# Patient Record
Sex: FEMALE | Race: BLACK OR AFRICAN AMERICAN | NOT HISPANIC OR LATINO
[De-identification: names, ages, dates, MRNs, and addresses within clinical notes are randomized per-mention and may not be internally consistent; named-entity substitution may affect disease eponyms.]

---

## 2017-02-09 ENCOUNTER — APPOINTMENT (OUTPATIENT)
Dept: INTERNAL MEDICINE | Facility: CLINIC | Age: 57
End: 2017-02-09

## 2017-04-11 ENCOUNTER — APPOINTMENT (OUTPATIENT)
Dept: CARDIOLOGY | Facility: CLINIC | Age: 57
End: 2017-04-11

## 2017-04-11 VITALS
HEART RATE: 68 BPM | WEIGHT: 140 LBS | HEIGHT: 62 IN | DIASTOLIC BLOOD PRESSURE: 82 MMHG | SYSTOLIC BLOOD PRESSURE: 152 MMHG | BODY MASS INDEX: 25.76 KG/M2

## 2017-04-11 RX ORDER — MELATONIN 3 MG
3 CAPSULE ORAL
Refills: 0 | Status: ACTIVE | COMMUNITY

## 2017-04-11 RX ORDER — CALCIUM ACETATE 667 MG/1
667 CAPSULE ORAL 3 TIMES DAILY
Refills: 0 | Status: ACTIVE | COMMUNITY

## 2017-04-11 RX ORDER — CARVEDILOL 6.25 MG/1
6.25 TABLET, FILM COATED ORAL TWICE DAILY
Refills: 0 | Status: ACTIVE | COMMUNITY

## 2017-04-11 RX ORDER — ATORVASTATIN CALCIUM 20 MG/1
20 TABLET, FILM COATED ORAL
Refills: 0 | Status: ACTIVE | COMMUNITY

## 2017-04-11 RX ORDER — PREGABALIN 100 MG/1
100 CAPSULE ORAL 3 TIMES DAILY
Refills: 0 | Status: ACTIVE | COMMUNITY

## 2017-04-11 RX ORDER — FUROSEMIDE 40 MG/1
40 TABLET ORAL DAILY
Refills: 0 | Status: ACTIVE | COMMUNITY

## 2017-04-11 RX ORDER — CHLORHEXIDINE GLUCONATE 4 %
325 (65 FE) LIQUID (ML) TOPICAL 3 TIMES DAILY
Refills: 0 | Status: ACTIVE | COMMUNITY

## 2017-04-20 ENCOUNTER — APPOINTMENT (OUTPATIENT)
Dept: INTERNAL MEDICINE | Facility: CLINIC | Age: 57
End: 2017-04-20

## 2017-04-24 RX ORDER — GLUCAGON 1 MG
1 KIT INJECTION
Qty: 1 | Refills: 0 | Status: ACTIVE | COMMUNITY
Start: 2017-04-16

## 2017-05-09 ENCOUNTER — APPOINTMENT (OUTPATIENT)
Dept: CARDIOLOGY | Facility: CLINIC | Age: 57
End: 2017-05-09

## 2017-05-09 VITALS
OXYGEN SATURATION: 97 % | BODY MASS INDEX: 25.76 KG/M2 | HEART RATE: 75 BPM | SYSTOLIC BLOOD PRESSURE: 133 MMHG | WEIGHT: 140 LBS | DIASTOLIC BLOOD PRESSURE: 73 MMHG | HEIGHT: 62 IN

## 2017-05-25 ENCOUNTER — APPOINTMENT (OUTPATIENT)
Dept: VASCULAR SURGERY | Facility: CLINIC | Age: 57
End: 2017-05-25

## 2017-05-25 ENCOUNTER — APPOINTMENT (OUTPATIENT)
Dept: PODIATRY | Facility: CLINIC | Age: 57
End: 2017-05-25

## 2017-05-30 ENCOUNTER — APPOINTMENT (OUTPATIENT)
Dept: INTERNAL MEDICINE | Facility: CLINIC | Age: 57
End: 2017-05-30

## 2017-06-16 ENCOUNTER — OUTPATIENT (OUTPATIENT)
Dept: OUTPATIENT SERVICES | Facility: HOSPITAL | Age: 57
LOS: 1 days | Discharge: HOME | End: 2017-06-16

## 2017-06-16 DIAGNOSIS — E11.9 TYPE 2 DIABETES MELLITUS WITHOUT COMPLICATIONS: ICD-10-CM

## 2017-06-16 DIAGNOSIS — N28.9 DISORDER OF KIDNEY AND URETER, UNSPECIFIED: ICD-10-CM

## 2017-06-16 DIAGNOSIS — R06.02 SHORTNESS OF BREATH: ICD-10-CM

## 2017-06-16 DIAGNOSIS — N18.6 END STAGE RENAL DISEASE: ICD-10-CM

## 2017-06-16 DIAGNOSIS — E16.2 HYPOGLYCEMIA, UNSPECIFIED: ICD-10-CM

## 2017-06-16 DIAGNOSIS — E11.621 TYPE 2 DIABETES MELLITUS WITH FOOT ULCER: ICD-10-CM

## 2017-06-16 DIAGNOSIS — N39.0 URINARY TRACT INFECTION, SITE NOT SPECIFIED: ICD-10-CM

## 2017-06-22 ENCOUNTER — EMERGENCY (EMERGENCY)
Facility: HOSPITAL | Age: 57
LOS: 1 days | Discharge: NURSING, REHAB OR LONG-TERM CARE | End: 2017-06-22
Admitting: INTERNAL MEDICINE

## 2017-06-22 DIAGNOSIS — N18.6 END STAGE RENAL DISEASE: ICD-10-CM

## 2017-06-22 DIAGNOSIS — R06.02 SHORTNESS OF BREATH: ICD-10-CM

## 2017-06-22 DIAGNOSIS — N28.9 DISORDER OF KIDNEY AND URETER, UNSPECIFIED: ICD-10-CM

## 2017-06-22 DIAGNOSIS — E11.9 TYPE 2 DIABETES MELLITUS WITHOUT COMPLICATIONS: ICD-10-CM

## 2017-06-22 DIAGNOSIS — E11.621 TYPE 2 DIABETES MELLITUS WITH FOOT ULCER: ICD-10-CM

## 2017-06-22 DIAGNOSIS — N39.0 URINARY TRACT INFECTION, SITE NOT SPECIFIED: ICD-10-CM

## 2017-06-22 DIAGNOSIS — E16.2 HYPOGLYCEMIA, UNSPECIFIED: ICD-10-CM

## 2017-06-26 ENCOUNTER — APPOINTMENT (OUTPATIENT)
Dept: CARDIOLOGY | Facility: CLINIC | Age: 57
End: 2017-06-26

## 2017-06-28 DIAGNOSIS — B99.9 UNSPECIFIED INFECTIOUS DISEASE: ICD-10-CM

## 2017-06-29 ENCOUNTER — APPOINTMENT (OUTPATIENT)
Dept: VASCULAR SURGERY | Facility: CLINIC | Age: 57
End: 2017-06-29

## 2017-06-29 DIAGNOSIS — D64.9 ANEMIA, UNSPECIFIED: ICD-10-CM

## 2017-06-29 DIAGNOSIS — I12.0 HYPERTENSIVE CHRONIC KIDNEY DISEASE WITH STAGE 5 CHRONIC KIDNEY DISEASE OR END STAGE RENAL DISEASE: ICD-10-CM

## 2017-06-29 DIAGNOSIS — Z79.899 OTHER LONG TERM (CURRENT) DRUG THERAPY: ICD-10-CM

## 2017-06-29 DIAGNOSIS — E78.00 PURE HYPERCHOLESTEROLEMIA, UNSPECIFIED: ICD-10-CM

## 2017-06-29 DIAGNOSIS — Z79.4 LONG TERM (CURRENT) USE OF INSULIN: ICD-10-CM

## 2017-06-29 DIAGNOSIS — I25.2 OLD MYOCARDIAL INFARCTION: ICD-10-CM

## 2017-06-29 DIAGNOSIS — E11.9 TYPE 2 DIABETES MELLITUS WITHOUT COMPLICATIONS: ICD-10-CM

## 2017-06-29 DIAGNOSIS — N18.6 END STAGE RENAL DISEASE: ICD-10-CM

## 2017-07-11 ENCOUNTER — APPOINTMENT (OUTPATIENT)
Dept: CARDIOLOGY | Facility: CLINIC | Age: 57
End: 2017-07-11

## 2017-07-11 VITALS
HEIGHT: 62 IN | HEART RATE: 68 BPM | WEIGHT: 140 LBS | BODY MASS INDEX: 25.76 KG/M2 | SYSTOLIC BLOOD PRESSURE: 138 MMHG | DIASTOLIC BLOOD PRESSURE: 80 MMHG

## 2017-07-11 RX ORDER — ACETAMINOPHEN 325 MG/1
325 TABLET ORAL
Refills: 0 | Status: ACTIVE | COMMUNITY

## 2017-07-11 RX ORDER — DOCUSATE SODIUM 100 MG
100 TABLET ORAL
Refills: 0 | Status: ACTIVE | COMMUNITY

## 2017-07-11 RX ORDER — MAGNESIUM HYDROXIDE 400 MG/5ML
400 SUSPENSION ORAL
Refills: 0 | Status: ACTIVE | COMMUNITY

## 2017-07-21 ENCOUNTER — OUTPATIENT (OUTPATIENT)
Dept: OUTPATIENT SERVICES | Facility: HOSPITAL | Age: 57
LOS: 1 days | Discharge: HOME | End: 2017-07-21

## 2017-07-21 DIAGNOSIS — E11.9 TYPE 2 DIABETES MELLITUS WITHOUT COMPLICATIONS: ICD-10-CM

## 2017-07-21 DIAGNOSIS — N18.6 END STAGE RENAL DISEASE: ICD-10-CM

## 2017-07-21 DIAGNOSIS — R06.02 SHORTNESS OF BREATH: ICD-10-CM

## 2017-07-21 DIAGNOSIS — E16.2 HYPOGLYCEMIA, UNSPECIFIED: ICD-10-CM

## 2017-07-21 DIAGNOSIS — N28.9 DISORDER OF KIDNEY AND URETER, UNSPECIFIED: ICD-10-CM

## 2017-07-21 DIAGNOSIS — N39.0 URINARY TRACT INFECTION, SITE NOT SPECIFIED: ICD-10-CM

## 2017-07-21 DIAGNOSIS — E11.621 TYPE 2 DIABETES MELLITUS WITH FOOT ULCER: ICD-10-CM

## 2017-07-21 DIAGNOSIS — E87.5 HYPERKALEMIA: ICD-10-CM

## 2017-07-27 ENCOUNTER — OUTPATIENT (OUTPATIENT)
Dept: OUTPATIENT SERVICES | Facility: HOSPITAL | Age: 57
LOS: 1 days | Discharge: HOME | End: 2017-07-27

## 2017-07-27 DIAGNOSIS — R06.02 SHORTNESS OF BREATH: ICD-10-CM

## 2017-07-27 DIAGNOSIS — N18.6 END STAGE RENAL DISEASE: ICD-10-CM

## 2017-07-27 DIAGNOSIS — Z01.818 ENCOUNTER FOR OTHER PREPROCEDURAL EXAMINATION: ICD-10-CM

## 2017-07-27 DIAGNOSIS — N39.0 URINARY TRACT INFECTION, SITE NOT SPECIFIED: ICD-10-CM

## 2017-07-27 DIAGNOSIS — N28.9 DISORDER OF KIDNEY AND URETER, UNSPECIFIED: ICD-10-CM

## 2017-07-27 DIAGNOSIS — E11.621 TYPE 2 DIABETES MELLITUS WITH FOOT ULCER: ICD-10-CM

## 2017-07-27 DIAGNOSIS — I25.10 ATHEROSCLEROTIC HEART DISEASE OF NATIVE CORONARY ARTERY WITHOUT ANGINA PECTORIS: ICD-10-CM

## 2017-07-27 DIAGNOSIS — E16.2 HYPOGLYCEMIA, UNSPECIFIED: ICD-10-CM

## 2017-07-27 DIAGNOSIS — E11.9 TYPE 2 DIABETES MELLITUS WITHOUT COMPLICATIONS: ICD-10-CM

## 2017-08-03 ENCOUNTER — OUTPATIENT (OUTPATIENT)
Dept: OUTPATIENT SERVICES | Facility: HOSPITAL | Age: 57
LOS: 1 days | Discharge: HOME | End: 2017-08-03

## 2017-08-03 DIAGNOSIS — E78.00 PURE HYPERCHOLESTEROLEMIA, UNSPECIFIED: ICD-10-CM

## 2017-08-03 DIAGNOSIS — I50.9 HEART FAILURE, UNSPECIFIED: ICD-10-CM

## 2017-08-03 DIAGNOSIS — Z87.891 PERSONAL HISTORY OF NICOTINE DEPENDENCE: ICD-10-CM

## 2017-08-03 DIAGNOSIS — I25.10 ATHEROSCLEROTIC HEART DISEASE OF NATIVE CORONARY ARTERY WITHOUT ANGINA PECTORIS: ICD-10-CM

## 2017-08-03 DIAGNOSIS — E16.2 HYPOGLYCEMIA, UNSPECIFIED: ICD-10-CM

## 2017-08-03 DIAGNOSIS — Z99.2 DEPENDENCE ON RENAL DIALYSIS: ICD-10-CM

## 2017-08-03 DIAGNOSIS — N39.0 URINARY TRACT INFECTION, SITE NOT SPECIFIED: ICD-10-CM

## 2017-08-03 DIAGNOSIS — E11.9 TYPE 2 DIABETES MELLITUS WITHOUT COMPLICATIONS: ICD-10-CM

## 2017-08-03 DIAGNOSIS — I42.9 CARDIOMYOPATHY, UNSPECIFIED: ICD-10-CM

## 2017-08-03 DIAGNOSIS — N28.9 DISORDER OF KIDNEY AND URETER, UNSPECIFIED: ICD-10-CM

## 2017-08-03 DIAGNOSIS — E11.621 TYPE 2 DIABETES MELLITUS WITH FOOT ULCER: ICD-10-CM

## 2017-08-03 DIAGNOSIS — I12.0 HYPERTENSIVE CHRONIC KIDNEY DISEASE WITH STAGE 5 CHRONIC KIDNEY DISEASE OR END STAGE RENAL DISEASE: ICD-10-CM

## 2017-08-03 DIAGNOSIS — R06.02 SHORTNESS OF BREATH: ICD-10-CM

## 2017-08-03 DIAGNOSIS — N18.6 END STAGE RENAL DISEASE: ICD-10-CM

## 2017-08-03 DIAGNOSIS — D64.9 ANEMIA, UNSPECIFIED: ICD-10-CM

## 2017-08-03 DIAGNOSIS — Z79.4 LONG TERM (CURRENT) USE OF INSULIN: ICD-10-CM

## 2017-08-03 DIAGNOSIS — I25.2 OLD MYOCARDIAL INFARCTION: ICD-10-CM

## 2017-08-11 ENCOUNTER — OUTPATIENT (OUTPATIENT)
Dept: OUTPATIENT SERVICES | Facility: HOSPITAL | Age: 57
LOS: 1 days | Discharge: HOME | End: 2017-08-11

## 2017-08-11 DIAGNOSIS — N28.9 DISORDER OF KIDNEY AND URETER, UNSPECIFIED: ICD-10-CM

## 2017-08-11 DIAGNOSIS — N18.6 END STAGE RENAL DISEASE: ICD-10-CM

## 2017-08-11 DIAGNOSIS — N39.0 URINARY TRACT INFECTION, SITE NOT SPECIFIED: ICD-10-CM

## 2017-08-11 DIAGNOSIS — R06.02 SHORTNESS OF BREATH: ICD-10-CM

## 2017-08-11 DIAGNOSIS — E87.5 HYPERKALEMIA: ICD-10-CM

## 2017-08-11 DIAGNOSIS — E11.621 TYPE 2 DIABETES MELLITUS WITH FOOT ULCER: ICD-10-CM

## 2017-08-11 DIAGNOSIS — E16.2 HYPOGLYCEMIA, UNSPECIFIED: ICD-10-CM

## 2017-08-11 DIAGNOSIS — E11.9 TYPE 2 DIABETES MELLITUS WITHOUT COMPLICATIONS: ICD-10-CM

## 2017-09-06 ENCOUNTER — APPOINTMENT (OUTPATIENT)
Dept: CARDIOLOGY | Facility: CLINIC | Age: 57
End: 2017-09-06

## 2017-09-18 ENCOUNTER — APPOINTMENT (OUTPATIENT)
Dept: VASCULAR SURGERY | Facility: CLINIC | Age: 57
End: 2017-09-18
Payer: MEDICARE

## 2017-09-18 PROCEDURE — 99213 OFFICE O/P EST LOW 20 MIN: CPT

## 2017-09-27 ENCOUNTER — APPOINTMENT (OUTPATIENT)
Dept: CARDIOLOGY | Facility: CLINIC | Age: 57
End: 2017-09-27

## 2017-09-27 VITALS
BODY MASS INDEX: 27.79 KG/M2 | SYSTOLIC BLOOD PRESSURE: 140 MMHG | HEIGHT: 62 IN | DIASTOLIC BLOOD PRESSURE: 78 MMHG | WEIGHT: 151 LBS | HEART RATE: 69 BPM

## 2017-09-27 DIAGNOSIS — Z01.810 ENCOUNTER FOR PREPROCEDURAL CARDIOVASCULAR EXAMINATION: ICD-10-CM

## 2017-09-27 RX ORDER — COLLAGENASE SANTYL 250 [ARB'U]/G
250 OINTMENT TOPICAL
Qty: 30 | Refills: 0 | Status: ACTIVE | COMMUNITY
Start: 2017-06-27

## 2017-09-27 RX ORDER — INSULIN GLARGINE 100 [IU]/ML
100 INJECTION, SOLUTION SUBCUTANEOUS
Qty: 10 | Refills: 0 | Status: ACTIVE | COMMUNITY
Start: 2017-05-31

## 2017-09-28 ENCOUNTER — OUTPATIENT (OUTPATIENT)
Dept: OUTPATIENT SERVICES | Facility: HOSPITAL | Age: 57
LOS: 1 days | Discharge: HOME | End: 2017-09-28

## 2017-09-28 DIAGNOSIS — N28.9 DISORDER OF KIDNEY AND URETER, UNSPECIFIED: ICD-10-CM

## 2017-09-28 DIAGNOSIS — Z40.9 ENCOUNTER FOR PROPHYLACTIC SURGERY, UNSPECIFIED: ICD-10-CM

## 2017-09-28 DIAGNOSIS — N18.6 END STAGE RENAL DISEASE: ICD-10-CM

## 2017-09-28 DIAGNOSIS — E11.9 TYPE 2 DIABETES MELLITUS WITHOUT COMPLICATIONS: ICD-10-CM

## 2017-09-28 DIAGNOSIS — E11.621 TYPE 2 DIABETES MELLITUS WITH FOOT ULCER: ICD-10-CM

## 2017-09-28 DIAGNOSIS — E16.2 HYPOGLYCEMIA, UNSPECIFIED: ICD-10-CM

## 2017-09-28 DIAGNOSIS — R06.02 SHORTNESS OF BREATH: ICD-10-CM

## 2017-09-28 DIAGNOSIS — N39.0 URINARY TRACT INFECTION, SITE NOT SPECIFIED: ICD-10-CM

## 2017-10-02 ENCOUNTER — APPOINTMENT (OUTPATIENT)
Dept: CARDIOLOGY | Facility: CLINIC | Age: 57
End: 2017-10-02

## 2017-10-02 VITALS
WEIGHT: 151 LBS | SYSTOLIC BLOOD PRESSURE: 155 MMHG | OXYGEN SATURATION: 98 % | HEIGHT: 62 IN | HEART RATE: 78 BPM | BODY MASS INDEX: 27.79 KG/M2 | DIASTOLIC BLOOD PRESSURE: 81 MMHG

## 2017-10-02 DIAGNOSIS — N18.4 CHRONIC KIDNEY DISEASE, STAGE 4 (SEVERE): ICD-10-CM

## 2017-10-02 DIAGNOSIS — E11.9 TYPE 2 DIABETES MELLITUS W/OUT COMPLICATIONS: ICD-10-CM

## 2017-10-02 RX ORDER — HYDRALAZINE HYDROCHLORIDE 25 MG/1
25 TABLET ORAL
Refills: 0 | Status: ACTIVE | COMMUNITY

## 2017-10-02 RX ORDER — SEVELAMER CARBONATE 800 MG/1
800 TABLET, FILM COATED ORAL 3 TIMES DAILY
Refills: 0 | Status: ACTIVE | COMMUNITY

## 2017-10-05 PROBLEM — Z01.810 PREOPERATIVE CARDIOVASCULAR EXAMINATION: Status: ACTIVE | Noted: 2017-10-05

## 2017-10-10 ENCOUNTER — APPOINTMENT (OUTPATIENT)
Dept: VASCULAR SURGERY | Facility: HOSPITAL | Age: 57
End: 2017-10-10
Payer: MEDICARE

## 2017-10-10 ENCOUNTER — APPOINTMENT (OUTPATIENT)
Dept: CARDIOLOGY | Facility: CLINIC | Age: 57
End: 2017-10-10

## 2017-10-10 ENCOUNTER — OUTPATIENT (OUTPATIENT)
Dept: OUTPATIENT SERVICES | Facility: HOSPITAL | Age: 57
LOS: 1 days | Discharge: HOME | End: 2017-10-10

## 2017-10-10 DIAGNOSIS — E16.2 HYPOGLYCEMIA, UNSPECIFIED: ICD-10-CM

## 2017-10-10 DIAGNOSIS — N39.0 URINARY TRACT INFECTION, SITE NOT SPECIFIED: ICD-10-CM

## 2017-10-10 DIAGNOSIS — E11.9 TYPE 2 DIABETES MELLITUS WITHOUT COMPLICATIONS: ICD-10-CM

## 2017-10-10 DIAGNOSIS — E11.621 TYPE 2 DIABETES MELLITUS WITH FOOT ULCER: ICD-10-CM

## 2017-10-10 DIAGNOSIS — N28.9 DISORDER OF KIDNEY AND URETER, UNSPECIFIED: ICD-10-CM

## 2017-10-10 DIAGNOSIS — R06.02 SHORTNESS OF BREATH: ICD-10-CM

## 2017-10-10 DIAGNOSIS — N18.6 END STAGE RENAL DISEASE: ICD-10-CM

## 2017-10-10 PROCEDURE — 37224: CPT | Mod: RT

## 2017-10-10 PROCEDURE — 37228: CPT | Mod: RT

## 2017-10-10 PROCEDURE — 76937 US GUIDE VASCULAR ACCESS: CPT | Mod: 26

## 2017-10-10 PROCEDURE — 75710 ARTERY X-RAYS ARM/LEG: CPT | Mod: 26

## 2017-10-10 PROCEDURE — 36247 INS CATH ABD/L-EXT ART 3RD: CPT | Mod: 59,RT

## 2017-10-10 PROCEDURE — 36200 PLACE CATHETER IN AORTA: CPT | Mod: 59,RT

## 2017-10-12 ENCOUNTER — OUTPATIENT (OUTPATIENT)
Dept: OUTPATIENT SERVICES | Facility: HOSPITAL | Age: 57
LOS: 1 days | Discharge: HOME | End: 2017-10-12

## 2017-10-12 DIAGNOSIS — N28.9 DISORDER OF KIDNEY AND URETER, UNSPECIFIED: ICD-10-CM

## 2017-10-12 DIAGNOSIS — E11.9 TYPE 2 DIABETES MELLITUS WITHOUT COMPLICATIONS: ICD-10-CM

## 2017-10-12 DIAGNOSIS — E11.621 TYPE 2 DIABETES MELLITUS WITH FOOT ULCER: ICD-10-CM

## 2017-10-12 DIAGNOSIS — E16.2 HYPOGLYCEMIA, UNSPECIFIED: ICD-10-CM

## 2017-10-12 DIAGNOSIS — N39.0 URINARY TRACT INFECTION, SITE NOT SPECIFIED: ICD-10-CM

## 2017-10-12 DIAGNOSIS — R06.02 SHORTNESS OF BREATH: ICD-10-CM

## 2017-10-12 DIAGNOSIS — N18.6 END STAGE RENAL DISEASE: ICD-10-CM

## 2017-10-15 ENCOUNTER — INPATIENT (INPATIENT)
Facility: HOSPITAL | Age: 57
LOS: 4 days | Discharge: NURSING, REHAB OR LONG-TERM CARE | End: 2017-10-20
Admitting: INTERNAL MEDICINE

## 2017-10-15 DIAGNOSIS — E16.2 HYPOGLYCEMIA, UNSPECIFIED: ICD-10-CM

## 2017-10-15 DIAGNOSIS — E11.9 TYPE 2 DIABETES MELLITUS WITHOUT COMPLICATIONS: ICD-10-CM

## 2017-10-15 DIAGNOSIS — E11.621 TYPE 2 DIABETES MELLITUS WITH FOOT ULCER: ICD-10-CM

## 2017-10-15 DIAGNOSIS — N39.0 URINARY TRACT INFECTION, SITE NOT SPECIFIED: ICD-10-CM

## 2017-10-15 DIAGNOSIS — N18.6 END STAGE RENAL DISEASE: ICD-10-CM

## 2017-10-15 DIAGNOSIS — N28.9 DISORDER OF KIDNEY AND URETER, UNSPECIFIED: ICD-10-CM

## 2017-10-15 DIAGNOSIS — R06.02 SHORTNESS OF BREATH: ICD-10-CM

## 2017-10-17 ENCOUNTER — APPOINTMENT (OUTPATIENT)
Dept: VASCULAR SURGERY | Facility: HOSPITAL | Age: 57
End: 2017-10-17
Payer: MEDICARE

## 2017-10-17 DIAGNOSIS — I70.234 ATHEROSCLEROSIS OF NATIVE ARTERIES OF RIGHT LEG WITH ULCERATION OF HEEL AND MIDFOOT: ICD-10-CM

## 2017-10-17 DIAGNOSIS — L97.419 NON-PRESSURE CHRONIC ULCER OF RIGHT HEEL AND MIDFOOT WITH UNSPECIFIED SEVERITY: ICD-10-CM

## 2017-10-17 DIAGNOSIS — Z87.891 PERSONAL HISTORY OF NICOTINE DEPENDENCE: ICD-10-CM

## 2017-10-17 DIAGNOSIS — I10 ESSENTIAL (PRIMARY) HYPERTENSION: ICD-10-CM

## 2017-10-17 DIAGNOSIS — E11.9 TYPE 2 DIABETES MELLITUS WITHOUT COMPLICATIONS: ICD-10-CM

## 2017-10-17 PROCEDURE — 37229: CPT | Mod: LT

## 2017-10-17 PROCEDURE — 37225: CPT | Mod: LT

## 2017-10-17 PROCEDURE — 36247 INS CATH ABD/L-EXT ART 3RD: CPT | Mod: 59,LT

## 2017-10-17 PROCEDURE — 76937 US GUIDE VASCULAR ACCESS: CPT | Mod: 26

## 2017-10-17 PROCEDURE — 75710 ARTERY X-RAYS ARM/LEG: CPT | Mod: 26

## 2017-10-23 ENCOUNTER — APPOINTMENT (OUTPATIENT)
Dept: VASCULAR SURGERY | Facility: CLINIC | Age: 57
End: 2017-10-23
Payer: MEDICARE

## 2017-10-23 VITALS — WEIGHT: 145 LBS | BODY MASS INDEX: 26.52 KG/M2

## 2017-10-23 PROCEDURE — 99213 OFFICE O/P EST LOW 20 MIN: CPT

## 2017-10-23 PROCEDURE — 93925 LOWER EXTREMITY STUDY: CPT

## 2017-10-24 DIAGNOSIS — D72.829 ELEVATED WHITE BLOOD CELL COUNT, UNSPECIFIED: ICD-10-CM

## 2017-10-24 DIAGNOSIS — L97.419 NON-PRESSURE CHRONIC ULCER OF RIGHT HEEL AND MIDFOOT WITH UNSPECIFIED SEVERITY: ICD-10-CM

## 2017-10-24 DIAGNOSIS — E11.52 TYPE 2 DIABETES MELLITUS WITH DIABETIC PERIPHERAL ANGIOPATHY WITH GANGRENE: ICD-10-CM

## 2017-10-24 DIAGNOSIS — I25.10 ATHEROSCLEROTIC HEART DISEASE OF NATIVE CORONARY ARTERY WITHOUT ANGINA PECTORIS: ICD-10-CM

## 2017-10-24 DIAGNOSIS — M86.172 OTHER ACUTE OSTEOMYELITIS, LEFT ANKLE AND FOOT: ICD-10-CM

## 2017-10-24 DIAGNOSIS — N18.6 END STAGE RENAL DISEASE: ICD-10-CM

## 2017-10-24 DIAGNOSIS — E11.59 TYPE 2 DIABETES MELLITUS WITH OTHER CIRCULATORY COMPLICATIONS: ICD-10-CM

## 2017-10-24 DIAGNOSIS — Z99.2 DEPENDENCE ON RENAL DIALYSIS: ICD-10-CM

## 2017-10-24 DIAGNOSIS — M85.80 OTHER SPECIFIED DISORDERS OF BONE DENSITY AND STRUCTURE, UNSPECIFIED SITE: ICD-10-CM

## 2017-10-24 DIAGNOSIS — I50.22 CHRONIC SYSTOLIC (CONGESTIVE) HEART FAILURE: ICD-10-CM

## 2017-10-24 DIAGNOSIS — D64.9 ANEMIA, UNSPECIFIED: ICD-10-CM

## 2017-10-24 DIAGNOSIS — E87.1 HYPO-OSMOLALITY AND HYPONATREMIA: ICD-10-CM

## 2017-10-24 DIAGNOSIS — Z74.01 BED CONFINEMENT STATUS: ICD-10-CM

## 2017-10-24 DIAGNOSIS — I42.9 CARDIOMYOPATHY, UNSPECIFIED: ICD-10-CM

## 2017-10-24 DIAGNOSIS — Z99.3 DEPENDENCE ON WHEELCHAIR: ICD-10-CM

## 2017-10-24 DIAGNOSIS — I25.2 OLD MYOCARDIAL INFARCTION: ICD-10-CM

## 2017-10-24 DIAGNOSIS — E87.5 HYPERKALEMIA: ICD-10-CM

## 2017-10-24 DIAGNOSIS — I13.2 HYPERTENSIVE HEART AND CHRONIC KIDNEY DISEASE WITH HEART FAILURE AND WITH STAGE 5 CHRONIC KIDNEY DISEASE, OR END STAGE RENAL DISEASE: ICD-10-CM

## 2017-10-24 DIAGNOSIS — E78.5 HYPERLIPIDEMIA, UNSPECIFIED: ICD-10-CM

## 2017-10-24 DIAGNOSIS — E11.69 TYPE 2 DIABETES MELLITUS WITH OTHER SPECIFIED COMPLICATION: ICD-10-CM

## 2017-10-24 DIAGNOSIS — E11.42 TYPE 2 DIABETES MELLITUS WITH DIABETIC POLYNEUROPATHY: ICD-10-CM

## 2017-10-24 DIAGNOSIS — M86.672 OTHER CHRONIC OSTEOMYELITIS, LEFT ANKLE AND FOOT: ICD-10-CM

## 2017-10-24 DIAGNOSIS — Z87.891 PERSONAL HISTORY OF NICOTINE DEPENDENCE: ICD-10-CM

## 2017-10-24 DIAGNOSIS — A41.9 SEPSIS, UNSPECIFIED ORGANISM: ICD-10-CM

## 2017-10-24 DIAGNOSIS — E11.621 TYPE 2 DIABETES MELLITUS WITH FOOT ULCER: ICD-10-CM

## 2017-10-24 DIAGNOSIS — L97.429 NON-PRESSURE CHRONIC ULCER OF LEFT HEEL AND MIDFOOT WITH UNSPECIFIED SEVERITY: ICD-10-CM

## 2017-10-24 DIAGNOSIS — Z79.82 LONG TERM (CURRENT) USE OF ASPIRIN: ICD-10-CM

## 2017-10-24 DIAGNOSIS — Z79.4 LONG TERM (CURRENT) USE OF INSULIN: ICD-10-CM

## 2017-12-27 ENCOUNTER — APPOINTMENT (OUTPATIENT)
Dept: CARDIOLOGY | Facility: CLINIC | Age: 57
End: 2017-12-27

## 2017-12-27 VITALS
HEART RATE: 65 BPM | DIASTOLIC BLOOD PRESSURE: 60 MMHG | SYSTOLIC BLOOD PRESSURE: 116 MMHG | BODY MASS INDEX: 27.05 KG/M2 | HEIGHT: 62 IN | WEIGHT: 147 LBS

## 2017-12-27 DIAGNOSIS — I25.10 ATHEROSCLEROTIC HEART DISEASE OF NATIVE CORONARY ARTERY W/OUT ANGINA PECTORIS: ICD-10-CM

## 2017-12-27 DIAGNOSIS — I50.22 CHRONIC SYSTOLIC (CONGESTIVE) HEART FAILURE: ICD-10-CM

## 2017-12-27 DIAGNOSIS — I42.9 CARDIOMYOPATHY, UNSPECIFIED: ICD-10-CM

## 2017-12-27 DIAGNOSIS — I10 ESSENTIAL (PRIMARY) HYPERTENSION: ICD-10-CM

## 2018-01-22 ENCOUNTER — APPOINTMENT (OUTPATIENT)
Dept: VASCULAR SURGERY | Facility: CLINIC | Age: 58
End: 2018-01-22
Payer: MEDICARE

## 2018-01-22 PROCEDURE — 93925 LOWER EXTREMITY STUDY: CPT

## 2018-01-22 PROCEDURE — 99213 OFFICE O/P EST LOW 20 MIN: CPT

## 2018-01-30 ENCOUNTER — OUTPATIENT (OUTPATIENT)
Dept: OUTPATIENT SERVICES | Facility: HOSPITAL | Age: 58
LOS: 1 days | Discharge: HOME | End: 2018-01-30

## 2018-01-30 DIAGNOSIS — L89.620 PRESSURE ULCER OF LEFT HEEL, UNSTAGEABLE: ICD-10-CM

## 2018-02-04 DIAGNOSIS — E11.621 TYPE 2 DIABETES MELLITUS WITH FOOT ULCER: ICD-10-CM

## 2018-02-04 DIAGNOSIS — E11.9 TYPE 2 DIABETES MELLITUS WITHOUT COMPLICATIONS: ICD-10-CM

## 2018-02-04 DIAGNOSIS — N28.9 DISORDER OF KIDNEY AND URETER, UNSPECIFIED: ICD-10-CM

## 2018-02-04 DIAGNOSIS — R06.02 SHORTNESS OF BREATH: ICD-10-CM

## 2018-02-04 DIAGNOSIS — N39.0 URINARY TRACT INFECTION, SITE NOT SPECIFIED: ICD-10-CM

## 2018-02-04 DIAGNOSIS — E16.2 HYPOGLYCEMIA, UNSPECIFIED: ICD-10-CM

## 2018-02-04 DIAGNOSIS — N18.6 END STAGE RENAL DISEASE: ICD-10-CM

## 2018-02-08 DIAGNOSIS — I73.9 PERIPHERAL VASCULAR DISEASE, UNSPECIFIED: ICD-10-CM

## 2018-02-10 ENCOUNTER — OUTPATIENT (OUTPATIENT)
Dept: OUTPATIENT SERVICES | Facility: HOSPITAL | Age: 58
LOS: 1 days | Discharge: HOME | End: 2018-02-10

## 2018-02-11 DIAGNOSIS — B99.9 UNSPECIFIED INFECTIOUS DISEASE: ICD-10-CM

## 2018-02-16 ENCOUNTER — APPOINTMENT (OUTPATIENT)
Dept: VASCULAR SURGERY | Facility: HOSPITAL | Age: 58
End: 2018-02-16
Payer: MEDICARE

## 2018-02-16 ENCOUNTER — OUTPATIENT (OUTPATIENT)
Dept: OUTPATIENT SERVICES | Facility: HOSPITAL | Age: 58
LOS: 1 days | Discharge: HOME | End: 2018-02-16

## 2018-02-16 VITALS
DIASTOLIC BLOOD PRESSURE: 48 MMHG | TEMPERATURE: 98 F | SYSTOLIC BLOOD PRESSURE: 115 MMHG | RESPIRATION RATE: 16 BRPM | HEART RATE: 83 BPM | OXYGEN SATURATION: 99 %

## 2018-02-16 VITALS
HEIGHT: 60 IN | WEIGHT: 139.99 LBS | HEART RATE: 75 BPM | RESPIRATION RATE: 12 BRPM | SYSTOLIC BLOOD PRESSURE: 128 MMHG | DIASTOLIC BLOOD PRESSURE: 60 MMHG | TEMPERATURE: 100 F

## 2018-02-16 DIAGNOSIS — Z95.828 PRESENCE OF OTHER VASCULAR IMPLANTS AND GRAFTS: Chronic | ICD-10-CM

## 2018-02-16 DIAGNOSIS — Z86.79 PERSONAL HISTORY OF OTHER DISEASES OF THE CIRCULATORY SYSTEM: Chronic | ICD-10-CM

## 2018-02-16 PROCEDURE — 76937 US GUIDE VASCULAR ACCESS: CPT | Mod: 26

## 2018-02-16 PROCEDURE — 36247 INS CATH ABD/L-EXT ART 3RD: CPT | Mod: 59,LT

## 2018-02-16 PROCEDURE — 37229: CPT | Mod: LT

## 2018-02-16 PROCEDURE — 75710 ARTERY X-RAYS ARM/LEG: CPT | Mod: 26,59

## 2018-02-16 PROCEDURE — 37224: CPT | Mod: LT

## 2018-02-16 RX ORDER — MORPHINE SULFATE 50 MG/1
1 CAPSULE, EXTENDED RELEASE ORAL
Qty: 0 | Refills: 0 | Status: DISCONTINUED | OUTPATIENT
Start: 2018-02-16 | End: 2018-02-17

## 2018-02-16 RX ORDER — SODIUM CHLORIDE 9 MG/ML
1000 INJECTION INTRAMUSCULAR; INTRAVENOUS; SUBCUTANEOUS
Qty: 0 | Refills: 0 | Status: DISCONTINUED | OUTPATIENT
Start: 2018-02-16 | End: 2018-02-17

## 2018-02-16 RX ORDER — FUROSEMIDE 40 MG
60 TABLET ORAL
Qty: 0 | Refills: 0 | COMMUNITY

## 2018-02-16 RX ORDER — INSULIN GLARGINE 100 [IU]/ML
0 INJECTION, SOLUTION SUBCUTANEOUS
Qty: 0 | Refills: 0 | COMMUNITY

## 2018-02-16 NOTE — ASU PATIENT PROFILE, ADULT - PMH
CHF (congestive heart failure)    End stage renal failure on dialysis    Heart failure    Hyperlipemia    Hypertension    Type 2 diabetes mellitus

## 2018-02-16 NOTE — PRE-ANESTHESIA EVALUATION ADULT - NSANTHOSAYNRD_GEN_A_CORE
not performed/No. ANITHA screening performed.  STOP BANG Legend: 0-2 = LOW Risk; 3-4 = INTERMEDIATE Risk; 5-8 = HIGH Risk

## 2018-02-16 NOTE — BRIEF OPERATIVE NOTE - PROCEDURE
<<-----Click on this checkbox to enter Procedure Angiogram, extremity, left  02/16/2018  Left popliteal, TPT trunk, peroneal artery angioplasty with Lutonix GIRISH  Left TPT and peroneal artery atherectomy  Ultrasound guided access  Active  CHOR

## 2018-02-16 NOTE — CHART NOTE - NSCHARTNOTEFT_GEN_A_CORE
Anesthesia Post Op Assessment  		(    ) Intubated           TV _____	Rate _____	FiO2_____  		(  x  ) Patent airway. Full return of protective reflexes  		( x   )Full recovery from anesthesia/sedation to baseline status      Cardiovascular Function:  		BP:	151/72	                  Pulse:		80                  RR:		12                  Temp:		98.2                  O2Sat:                 99      Mental Status:  	        (  x  ) awake		  ( x   ) alert		 (    ) drowsy	               (    ) sedated      Nausea/Vomiting:  		(    ) Yes, See post-op orders		   (  x  ) No      Pain Scale: (0-10):	0		Treatment:     (  x  ) None	            (  x  ) See Post-Op/PCA Orders      Post-operative Fluids: 	   (    ) Oral	          ( x   ) See post-op Orders        Comments:    Uneventful. No complications from anesthesia.  Discharge when criteria met.

## 2018-02-17 ENCOUNTER — OUTPATIENT (OUTPATIENT)
Dept: OUTPATIENT SERVICES | Facility: HOSPITAL | Age: 58
LOS: 1 days | Discharge: HOME | End: 2018-02-17

## 2018-02-17 DIAGNOSIS — E78.81 LIPOID DERMATOARTHRITIS: ICD-10-CM

## 2018-02-17 DIAGNOSIS — Z86.79 PERSONAL HISTORY OF OTHER DISEASES OF THE CIRCULATORY SYSTEM: Chronic | ICD-10-CM

## 2018-02-17 DIAGNOSIS — Z95.828 PRESENCE OF OTHER VASCULAR IMPLANTS AND GRAFTS: Chronic | ICD-10-CM

## 2018-02-20 DIAGNOSIS — I12.0 HYPERTENSIVE CHRONIC KIDNEY DISEASE WITH STAGE 5 CHRONIC KIDNEY DISEASE OR END STAGE RENAL DISEASE: ICD-10-CM

## 2018-02-20 DIAGNOSIS — I70.262 ATHEROSCLEROSIS OF NATIVE ARTERIES OF EXTREMITIES WITH GANGRENE, LEFT LEG: ICD-10-CM

## 2018-02-20 DIAGNOSIS — I50.9 HEART FAILURE, UNSPECIFIED: ICD-10-CM

## 2018-02-20 DIAGNOSIS — E11.9 TYPE 2 DIABETES MELLITUS WITHOUT COMPLICATIONS: ICD-10-CM

## 2018-02-20 DIAGNOSIS — N18.6 END STAGE RENAL DISEASE: ICD-10-CM

## 2018-02-20 DIAGNOSIS — Z99.2 DEPENDENCE ON RENAL DIALYSIS: ICD-10-CM

## 2018-02-22 ENCOUNTER — OUTPATIENT (OUTPATIENT)
Dept: OUTPATIENT SERVICES | Facility: HOSPITAL | Age: 58
LOS: 1 days | Discharge: HOME | End: 2018-02-22

## 2018-02-22 DIAGNOSIS — Z95.828 PRESENCE OF OTHER VASCULAR IMPLANTS AND GRAFTS: Chronic | ICD-10-CM

## 2018-02-22 DIAGNOSIS — R76.0 RAISED ANTIBODY TITER: ICD-10-CM

## 2018-02-22 DIAGNOSIS — Z86.79 PERSONAL HISTORY OF OTHER DISEASES OF THE CIRCULATORY SYSTEM: Chronic | ICD-10-CM

## 2018-02-23 ENCOUNTER — INPATIENT (INPATIENT)
Facility: HOSPITAL | Age: 58
LOS: 16 days | Discharge: NURSING, REHAB OR LONG-TERM CARE | End: 2018-03-12
Attending: INTERNAL MEDICINE

## 2018-02-23 VITALS
HEART RATE: 60 BPM | TEMPERATURE: 98 F | WEIGHT: 149.47 LBS | SYSTOLIC BLOOD PRESSURE: 113 MMHG | DIASTOLIC BLOOD PRESSURE: 76 MMHG | OXYGEN SATURATION: 100 % | RESPIRATION RATE: 18 BRPM

## 2018-02-23 DIAGNOSIS — Z95.828 PRESENCE OF OTHER VASCULAR IMPLANTS AND GRAFTS: Chronic | ICD-10-CM

## 2018-02-23 DIAGNOSIS — Z86.79 PERSONAL HISTORY OF OTHER DISEASES OF THE CIRCULATORY SYSTEM: Chronic | ICD-10-CM

## 2018-02-23 DIAGNOSIS — S81.802A UNSPECIFIED OPEN WOUND, LEFT LOWER LEG, INITIAL ENCOUNTER: ICD-10-CM

## 2018-02-23 DIAGNOSIS — I10 ESSENTIAL (PRIMARY) HYPERTENSION: ICD-10-CM

## 2018-02-23 DIAGNOSIS — N18.9 CHRONIC KIDNEY DISEASE, UNSPECIFIED: ICD-10-CM

## 2018-02-23 DIAGNOSIS — E78.5 HYPERLIPIDEMIA, UNSPECIFIED: ICD-10-CM

## 2018-02-23 DIAGNOSIS — N18.6 END STAGE RENAL DISEASE: ICD-10-CM

## 2018-02-23 DIAGNOSIS — E11.9 TYPE 2 DIABETES MELLITUS WITHOUT COMPLICATIONS: ICD-10-CM

## 2018-02-23 DIAGNOSIS — I50.9 HEART FAILURE, UNSPECIFIED: ICD-10-CM

## 2018-02-23 RX ORDER — ACETAMINOPHEN 500 MG
650 TABLET ORAL ONCE
Qty: 0 | Refills: 0 | Status: COMPLETED | OUTPATIENT
Start: 2018-02-23 | End: 2018-02-23

## 2018-02-23 RX ORDER — CALCIUM ACETATE 667 MG
667 TABLET ORAL
Qty: 0 | Refills: 0 | Status: DISCONTINUED | OUTPATIENT
Start: 2018-02-23 | End: 2018-03-06

## 2018-02-23 RX ORDER — LISINOPRIL 2.5 MG/1
1 TABLET ORAL
Qty: 0 | Refills: 0 | COMMUNITY

## 2018-02-23 RX ORDER — PIPERACILLIN AND TAZOBACTAM 4; .5 G/20ML; G/20ML
2.25 INJECTION, POWDER, LYOPHILIZED, FOR SOLUTION INTRAVENOUS EVERY 8 HOURS
Qty: 0 | Refills: 0 | Status: DISCONTINUED | OUTPATIENT
Start: 2018-02-23 | End: 2018-02-27

## 2018-02-23 RX ORDER — LANOLIN ALCOHOL/MO/W.PET/CERES
3 CREAM (GRAM) TOPICAL AT BEDTIME
Qty: 0 | Refills: 0 | Status: DISCONTINUED | OUTPATIENT
Start: 2018-02-23 | End: 2018-03-06

## 2018-02-23 RX ORDER — ACETAMINOPHEN 500 MG
650 TABLET ORAL EVERY 6 HOURS
Qty: 0 | Refills: 0 | Status: DISCONTINUED | OUTPATIENT
Start: 2018-02-23 | End: 2018-03-06

## 2018-02-23 RX ORDER — HYDRALAZINE HCL 50 MG
25 TABLET ORAL THREE TIMES A DAY
Qty: 0 | Refills: 0 | Status: DISCONTINUED | OUTPATIENT
Start: 2018-02-23 | End: 2018-03-06

## 2018-02-23 RX ORDER — COLLAGENASE CLOSTRIDIUM HIST. 250 UNIT/G
1 OINTMENT (GRAM) TOPICAL DAILY
Qty: 0 | Refills: 0 | Status: DISCONTINUED | OUTPATIENT
Start: 2018-02-23 | End: 2018-03-06

## 2018-02-23 RX ORDER — ASPIRIN/CALCIUM CARB/MAGNESIUM 324 MG
81 TABLET ORAL DAILY
Qty: 0 | Refills: 0 | Status: DISCONTINUED | OUTPATIENT
Start: 2018-02-23 | End: 2018-03-02

## 2018-02-23 RX ORDER — INSULIN GLARGINE 100 [IU]/ML
12 INJECTION, SOLUTION SUBCUTANEOUS AT BEDTIME
Qty: 0 | Refills: 0 | Status: DISCONTINUED | OUTPATIENT
Start: 2018-02-23 | End: 2018-03-06

## 2018-02-23 RX ORDER — LISINOPRIL 2.5 MG/1
20 TABLET ORAL DAILY
Qty: 0 | Refills: 0 | Status: DISCONTINUED | OUTPATIENT
Start: 2018-02-23 | End: 2018-03-06

## 2018-02-23 RX ORDER — FUROSEMIDE 40 MG
40 TABLET ORAL DAILY
Qty: 0 | Refills: 0 | Status: DISCONTINUED | OUTPATIENT
Start: 2018-02-23 | End: 2018-03-06

## 2018-02-23 RX ORDER — SEVELAMER CARBONATE 2400 MG/1
800 POWDER, FOR SUSPENSION ORAL
Qty: 0 | Refills: 0 | Status: DISCONTINUED | OUTPATIENT
Start: 2018-02-23 | End: 2018-03-04

## 2018-02-23 RX ORDER — CARVEDILOL PHOSPHATE 80 MG/1
6.25 CAPSULE, EXTENDED RELEASE ORAL
Qty: 0 | Refills: 0 | Status: DISCONTINUED | OUTPATIENT
Start: 2018-02-23 | End: 2018-03-06

## 2018-02-23 RX ADMIN — Medication 3 MILLIGRAM(S): at 23:39

## 2018-02-23 RX ADMIN — Medication 100 MILLIGRAM(S): at 23:39

## 2018-02-23 RX ADMIN — Medication 650 MILLIGRAM(S): at 21:07

## 2018-02-23 RX ADMIN — Medication 25 MILLIGRAM(S): at 23:39

## 2018-02-23 NOTE — H&P ADULT - ASSESSMENT
56 y/o female  sent by nursing home  for picc line .  Pt stating " i am here for a PICC LINE placement so that i can get my daily ABX"

## 2018-02-23 NOTE — ED ADULT NURSE NOTE - CHIEF COMPLAINT QUOTE
Pt BIBA from [facility] for PICC line.  Per EMS "Pt's left foot is necrotic and she's septic.  She needs a PICC line inserted".

## 2018-02-23 NOTE — H&P ADULT - NSHPREVIEWOFSYSTEMS_GEN_ALL_CORE

## 2018-02-23 NOTE — ED ADULT NURSE NOTE - NS ED NURSE REPORT GIVEN TO FT
Genevieve Parker Genevieve Parker, Receiving RN aware that patient has elevated temp. Tylenol administered as ordered.

## 2018-02-23 NOTE — ED PROVIDER NOTE - CARE PLAN
Principal Discharge DX:	Wound of left lower extremity  Secondary Diagnosis:	CKD (chronic kidney disease)

## 2018-02-23 NOTE — H&P ADULT - PROBLEM SELECTOR PLAN 1
picc line abx iv zosyn picc line abx iv zosyn. pt refuses labs and iv lines to be placed for zosyn abx. I will order 1 st dose to be given at dialysis

## 2018-02-23 NOTE — ED PROVIDER NOTE - OBJECTIVE STATEMENT
56 yo F with PMHx of HTN, ESRD on dialysis (Tu/Th/Sat), CHF, DM, and left diabetic foot ulcer presents to the ED sent in from [facility] for placement of PICC line. Pt states she did not want to come to the hospital. Pt states she refuses any IV placement without sedation. Pt was on Zosyn for her diabetic foot ulcer. She denies any complaints. She denies fever, chills, nausea, vomiting, abdominal pain, back pain, dizziness, SOB, chest pain, back pain, cough, congestion.

## 2018-02-23 NOTE — ED PROVIDER NOTE - MEDICAL DECISION MAKING DETAILS
Pt sent from NH for placement of PICC line.  Pt upset in ED that this can not be done right away.  She adamantly refuses IV placemnet or blood to be drawn despite staff explaining that she needs to continue antibiotics.  She is AA O x 3 and explains that they can give her the abx during her regular HD tomorrow.  I spoke with Dr North covering for Dr. Rosales who wants patient to be admitted to facilitate her care. Medical PA aware of situation.

## 2018-02-23 NOTE — H&P ADULT - NSHPPHYSICALEXAM_GEN_ALL_CORE
GENERAL:  56y/o Female NAD, resting comfortably.  HEAD:  Atraumatic, Normocephalic  EYES: EOMI, PERRLA, conjunctiva and sclera clear  NECK: Supple, No JVD, no cervical lymphadenopathy, non-tender  CHEST/LUNG: Clear to auscultation bilaterally; No wheeze, rhonchi, or rales  HEART: Regular rate and rhythm; S1&S2  ABDOMEN: Soft, Nontender, Nondistended x 4 quadrants; Bowel sounds present  EXTREMITIES:   Peripheral Pulses Present, No clubbing, no cyanosis, or no edema, no calf tenderness, but + right foot necrotic ulcer  PSYCH: AAOx3, cooperative, appropriate  NEUROLOGY: WNL  SKIN: WNL

## 2018-02-23 NOTE — ED ADULT NURSE REASSESSMENT NOTE - NS ED NURSE REASSESS COMMENT FT1
Patient educated on importance of participating in treatment, patient refusing to allow venipuncture or blood sampling at this time, MD Greer aware. Vital signs stable, no acute distress present, will continue to closely monitor.

## 2018-02-23 NOTE — ED PROVIDER NOTE - ATTENDING CONTRIBUTION TO CARE
56 yo F PMHx DM, HTN, CHF, ESRD on HD (T/Th/Sat), last HD was yesterday presents from NH for the placement of a PICC line for a necrotic foot ulcer, Pt is refusing IV and labs in ED. On exam pt in NAD AAO x 3, MMM, OP clear, Lungs CAT B/L, abd isosft nt nd, + right foot with necrotic ulcer, foul smelling, no edema  will contact PMD

## 2018-02-24 LAB
ANION GAP SERPL CALC-SCNC: 10 MMOL/L — SIGNIFICANT CHANGE UP (ref 7–14)
ANISOCYTOSIS BLD QL: SIGNIFICANT CHANGE UP
BASOPHILS # BLD AUTO: 0.02 K/UL — SIGNIFICANT CHANGE UP (ref 0–0.2)
BASOPHILS NFR BLD AUTO: 0.1 % — SIGNIFICANT CHANGE UP (ref 0–1)
BUN SERPL-MCNC: 33 MG/DL — HIGH (ref 10–20)
CALCIUM SERPL-MCNC: 8.7 MG/DL — SIGNIFICANT CHANGE UP (ref 8.5–10.1)
CHLORIDE SERPL-SCNC: 95 MMOL/L — LOW (ref 98–110)
CO2 SERPL-SCNC: 33 MMOL/L — HIGH (ref 17–32)
CREAT SERPL-MCNC: 4.1 MG/DL — CRITICAL HIGH (ref 0.7–1.5)
EOSINOPHIL # BLD AUTO: 0.16 K/UL — SIGNIFICANT CHANGE UP (ref 0–0.7)
EOSINOPHIL NFR BLD AUTO: 1 % — SIGNIFICANT CHANGE UP (ref 0–8)
GLUCOSE SERPL-MCNC: 178 MG/DL — HIGH (ref 70–110)
HCT VFR BLD CALC: 22.1 % — LOW (ref 37–47)
HCT VFR BLD CALC: 24.7 % — LOW (ref 37–47)
HGB BLD-MCNC: 6.8 G/DL — CRITICAL LOW (ref 14–18)
HGB BLD-MCNC: 7.5 G/DL — LOW (ref 14–18)
HYPOCHROMIA BLD QL: SIGNIFICANT CHANGE UP
IMM GRANULOCYTES NFR BLD AUTO: 1.1 % — HIGH (ref 0.1–0.3)
LYMPHOCYTES # BLD AUTO: 1.77 K/UL — SIGNIFICANT CHANGE UP (ref 1.2–3.4)
LYMPHOCYTES # BLD AUTO: 10.9 % — LOW (ref 20.5–51.1)
MCHC RBC-ENTMCNC: 26.4 PG — LOW (ref 27–31)
MCHC RBC-ENTMCNC: 26.7 PG — LOW (ref 27–31)
MCHC RBC-ENTMCNC: 30.4 G/DL — LOW (ref 32–37)
MCHC RBC-ENTMCNC: 30.8 G/DL — LOW (ref 32–37)
MCV RBC AUTO: 86.7 FL — SIGNIFICANT CHANGE UP (ref 81–91)
MCV RBC AUTO: 87 FL — SIGNIFICANT CHANGE UP (ref 81–91)
MONOCYTES # BLD AUTO: 1.32 K/UL — HIGH (ref 0.1–0.6)
MONOCYTES NFR BLD AUTO: 8.1 % — SIGNIFICANT CHANGE UP (ref 1.7–9.3)
NEUTROPHILS # BLD AUTO: 12.79 K/UL — HIGH (ref 1.4–6.5)
NEUTROPHILS NFR BLD AUTO: 78.8 % — HIGH (ref 42.2–75.2)
NRBC # BLD: 0 /100 WBCS — SIGNIFICANT CHANGE UP (ref 0–0)
NRBC # BLD: 0 /100 WBCS — SIGNIFICANT CHANGE UP (ref 0–0)
PHOSPHATE SERPL-MCNC: 3.6 MG/DL — SIGNIFICANT CHANGE UP (ref 2.1–4.9)
PLAT MORPH BLD: NORMAL — SIGNIFICANT CHANGE UP
PLATELET # BLD AUTO: 451 K/UL — HIGH (ref 130–400)
PLATELET # BLD AUTO: 490 K/UL — HIGH (ref 130–400)
PLATELET COUNT - ESTIMATE: (no result)
POTASSIUM SERPL-MCNC: 4.3 MMOL/L — SIGNIFICANT CHANGE UP (ref 3.5–5)
POTASSIUM SERPL-SCNC: 4.3 MMOL/L — SIGNIFICANT CHANGE UP (ref 3.5–5)
RBC # BLD: 2.55 M/UL — LOW (ref 4.2–5.4)
RBC # BLD: 2.84 M/UL — LOW (ref 4.2–5.4)
RBC # FLD: 20.8 % — HIGH (ref 11.5–14.5)
RBC # FLD: 21 % — HIGH (ref 11.5–14.5)
RBC BLD AUTO: (no result)
SODIUM SERPL-SCNC: 138 MMOL/L — SIGNIFICANT CHANGE UP (ref 135–146)
TYPE + AB SCN PNL BLD: SIGNIFICANT CHANGE UP
WBC # BLD: 16.24 K/UL — HIGH (ref 4.8–10.8)
WBC # BLD: 18.68 K/UL — HIGH (ref 4.8–10.8)
WBC # FLD AUTO: 16.24 K/UL — HIGH (ref 4.8–10.8)
WBC # FLD AUTO: 18.68 K/UL — HIGH (ref 4.8–10.8)

## 2018-02-24 RX ADMIN — INSULIN GLARGINE 12 UNIT(S): 100 INJECTION, SOLUTION SUBCUTANEOUS at 00:07

## 2018-02-24 RX ADMIN — Medication 667 MILLIGRAM(S): at 17:40

## 2018-02-24 RX ADMIN — Medication 81 MILLIGRAM(S): at 15:53

## 2018-02-24 RX ADMIN — Medication 3 MILLIGRAM(S): at 21:31

## 2018-02-24 RX ADMIN — Medication 25 MILLIGRAM(S): at 05:46

## 2018-02-24 RX ADMIN — CARVEDILOL PHOSPHATE 6.25 MILLIGRAM(S): 80 CAPSULE, EXTENDED RELEASE ORAL at 05:47

## 2018-02-24 RX ADMIN — INSULIN GLARGINE 12 UNIT(S): 100 INJECTION, SOLUTION SUBCUTANEOUS at 21:51

## 2018-02-24 RX ADMIN — LISINOPRIL 20 MILLIGRAM(S): 2.5 TABLET ORAL at 05:46

## 2018-02-24 RX ADMIN — Medication 100 MILLIGRAM(S): at 05:46

## 2018-02-24 RX ADMIN — Medication 25 MILLIGRAM(S): at 15:53

## 2018-02-24 RX ADMIN — CARVEDILOL PHOSPHATE 6.25 MILLIGRAM(S): 80 CAPSULE, EXTENDED RELEASE ORAL at 17:40

## 2018-02-24 RX ADMIN — Medication 100 MILLIGRAM(S): at 15:52

## 2018-02-24 RX ADMIN — Medication 40 MILLIGRAM(S): at 05:46

## 2018-02-24 RX ADMIN — Medication 1 APPLICATION(S): at 11:55

## 2018-02-24 RX ADMIN — Medication 25 MILLIGRAM(S): at 21:31

## 2018-02-24 RX ADMIN — Medication 100 MILLIGRAM(S): at 21:31

## 2018-02-24 NOTE — CONSULT NOTE ADULT - ASSESSMENT
57/F with ESRD on HD TTS, DM, HTN, admitted for Lt DFU and picc line placement.    ESRD - HD due today   - 2K bath///UF 2.5L as tolerated  - please follow up labs today drawn before HD  - renal diet, will check phos  -cont Phoslo     HTN - cont current meds    DFU on Zosyn,   - awaiting picc line

## 2018-02-24 NOTE — CHART NOTE - NSCHARTNOTEFT_GEN_A_CORE
Called by lab regarding pt with hgb6.8. No priorpecimens in system. Specimen obtained at HD.  Repeat CBC and T&S obtained after completion of HD. Hgb 7.5. Pt refusing blood transfusion, if necessary, at this time.  Rpt CBC ordered for am.

## 2018-02-24 NOTE — CONSULT NOTE ADULT - SUBJECTIVE AND OBJECTIVE BOX
NEPHROLOGY CONSULTATION NOTE    Patient is a 57y Female who presented to the hospital for picc placement.  Pt with ESRD on HD TTS.  LAst HD 2/22. Denies SOB, nausea, fevers.    PAST MEDICAL & SURGICAL HISTORY:  Type 2 diabetes mellitus  CHF (congestive heart failure)  End stage renal failure on dialysis  Hypertension  Hyperlipemia  Heart failure  History of femoral angiogram: left angiogram  Port-a-cath in place: right chest wall    Allergies:  No Known Allergies    Home Medications Reviewed  Hospital Medications:   MEDICATIONS  (STANDING):  aspirin  chewable 81 milliGRAM(s) Oral daily  calcium acetate 667 milliGRAM(s) Oral two times a day with meals  carvedilol Oral Tab/Cap - Peds 6.25 milliGRAM(s) Oral two times a day  collagenase Ointment 1 Application(s) Topical daily  furosemide    Tablet 40 milliGRAM(s) Oral daily  hydrALAZINE 25 milliGRAM(s) Oral three times a day  insulin glargine SubCutaneous Injection (LANTUS) - Peds 12 Unit(s) SubCutaneous at bedtime  lisinopril 20 milliGRAM(s) Oral daily  melatonin 3 milliGRAM(s) Oral at bedtime  metolazone 2.5 milliGRAM(s) Oral daily  piperacillin/tazobactam IVPB. 2.25 Gram(s) IV Intermittent every 8 hours  pregabalin 100 milliGRAM(s) Oral three times a day  sevelamer hydrochloride 800 milliGRAM(s) Oral with breakfast      SOCIAL HISTORY:  Denies ETOH,Smoking,   FAMILY HISTORY:  No pertinent family history in first degree relatives        REVIEW OF SYSTEMS:  CONSTITUTIONAL: No weakness, fevers or chills  EYES/ENT: No visual changes;  No vertigo or throat pain   NECK: No pain or stiffness  RESPIRATORY: No cough, wheezing, hemoptysis; No shortness of breath  CARDIOVASCULAR: No chest pain or palpitations.  GASTROINTESTINAL: No abdominal or epigastric pain. No nausea, vomiting,   NEUROLOGICAL: No weakness  VASCULAR: No bilateral lower extremity edema.   All other review of systems is negative unless indicated above.    VITALS:  T(F): 98.4 (02-24-18 @ 06:10), Max: 101.3 (02-23-18 @ 20:52)  HR: 81 (02-24-18 @ 06:10)  BP: 117/74 (02-24-18 @ 06:10)  RR: 16 (02-24-18 @ 06:10)  SpO2: 98% (02-23-18 @ 20:52)    02-23 @ 07:01  -  02-24 @ 07:00  --------------------------------------------------------  IN: 0 mL / OUT: 2 mL / NET: -2 mL        Weight (kg): 68.2 (02-23 @ 22:39)      I&O's Detail    23 Feb 2018 07:01  -  24 Feb 2018 07:00  --------------------------------------------------------  IN:  Total IN: 0 mL    OUT:    Voided: 2 mL  Total OUT: 2 mL    Total NET: -2 mL            PHYSICAL EXAM:  Constitutional: NAD  HEENT: anicteric sclera, oropharynx clear, MMM  Neck: No JVD  Respiratory: CTAB, no wheezes, rales or rhonchi  Cardiovascular: S1, S2, RRR  Gastrointestinal: BS+, soft, NT/ND  Extremities: No cyanosis or clubbing. No peripheral edema  Neurological: A/O x 3, no focal deficits  Psychiatric: Flat affect, uncooperative  : No CVA tenderness. No anderson.   Skin: No rashes  Vascular Access: Gigi cath Rt Chest wall    LABS:        Creatinine Trend:     Urine Studies:              RADIOLOGY & ADDITIONAL STUDIES:

## 2018-02-25 DIAGNOSIS — I96 GANGRENE, NOT ELSEWHERE CLASSIFIED: ICD-10-CM

## 2018-02-25 RX ORDER — HYDROXYZINE HCL 10 MG
50 TABLET ORAL ONCE
Qty: 0 | Refills: 0 | Status: COMPLETED | OUTPATIENT
Start: 2018-02-25 | End: 2018-02-25

## 2018-02-25 RX ADMIN — Medication 650 MILLIGRAM(S): at 04:33

## 2018-02-25 RX ADMIN — Medication 25 MILLIGRAM(S): at 13:15

## 2018-02-25 RX ADMIN — Medication 40 MILLIGRAM(S): at 06:28

## 2018-02-25 RX ADMIN — CARVEDILOL PHOSPHATE 6.25 MILLIGRAM(S): 80 CAPSULE, EXTENDED RELEASE ORAL at 06:29

## 2018-02-25 RX ADMIN — Medication 667 MILLIGRAM(S): at 16:56

## 2018-02-25 RX ADMIN — Medication 25 MILLIGRAM(S): at 06:29

## 2018-02-25 RX ADMIN — Medication 50 MILLIGRAM(S): at 02:47

## 2018-02-25 RX ADMIN — Medication 100 MILLIGRAM(S): at 06:30

## 2018-02-25 RX ADMIN — Medication 100 MILLIGRAM(S): at 22:53

## 2018-02-25 RX ADMIN — Medication 100 MILLIGRAM(S): at 13:15

## 2018-02-25 RX ADMIN — SEVELAMER CARBONATE 800 MILLIGRAM(S): 2400 POWDER, FOR SUSPENSION ORAL at 09:15

## 2018-02-25 RX ADMIN — CARVEDILOL PHOSPHATE 6.25 MILLIGRAM(S): 80 CAPSULE, EXTENDED RELEASE ORAL at 17:03

## 2018-02-25 RX ADMIN — Medication 1 APPLICATION(S): at 12:04

## 2018-02-25 RX ADMIN — Medication 25 MILLIGRAM(S): at 22:51

## 2018-02-25 RX ADMIN — Medication 667 MILLIGRAM(S): at 09:16

## 2018-02-25 RX ADMIN — LISINOPRIL 20 MILLIGRAM(S): 2.5 TABLET ORAL at 06:29

## 2018-02-25 RX ADMIN — Medication 81 MILLIGRAM(S): at 12:04

## 2018-02-25 RX ADMIN — Medication 3 MILLIGRAM(S): at 22:51

## 2018-02-25 RX ADMIN — Medication 650 MILLIGRAM(S): at 22:53

## 2018-02-25 NOTE — CONSULT NOTE ADULT - PROBLEM SELECTOR RECOMMENDATION 9
- Picc line for Ivabx.  - ID follow-up.  - Local wound care for left foot.  - Continue medical and renal mgt.    - Case d/w Dr. Roberts (vascular fellow) and patient is well known to [facility] team and she actually had an angiogram LLE at [facility] by Dr. Carrillo about one week ago.  Dr. Roberts states it was recommended to patient about possible need for BKA but she had refused at that time.    Dr. Roberts states if patient agrees to Left BKA, then can transfer to [facility] for probable BKA.  If patient refuses vascular surgical intervention, then recommends Podiatry consult/treatment and IVABX.

## 2018-02-25 NOTE — DIETITIAN INITIAL EVALUATION ADULT. - PROBLEM SELECTOR PLAN 1
picc line abx iv zosyn. pt refuses labs and iv lines to be placed for zosyn abx. I will order 1 st dose to be given at dialysis

## 2018-02-25 NOTE — CONSULT NOTE ADULT - SUBJECTIVE AND OBJECTIVE BOX
PHILIPPE SMITH 57yFemalePatient is a 57y old  Female who presents with a chief complaint of left foot necrosis (23 Feb 2018 22:12)      Patient has history of:  No Known Allergies        WOUND OF LEFT LOWER EXTREMITY CKD  ^WOUND OF LEFT LOWER EXTREMITY CKD  H/o or current diagnosis of HF- ACEI/ARB contraindication unknown  No pertinent family history in first degree relatives  Handoff  MEWS Score  Type 2 diabetes mellitus  CHF (congestive heart failure)  End stage renal failure on dialysis  Hypertension  Hyperlipemia  Heart failure  Acute heart failure, unspecified heart failure type  Heart failure  Wound of left lower extremity  CHF (congestive heart failure)  Hyperlipemia  Hypertension  End stage renal failure on dialysis  Type 2 diabetes mellitus  CKD (chronic kidney disease)  Wound of left lower extremity  History of femoral angiogram  Port-a-cath in place  MEDICAL EVAL  90+  CKD (chronic kidney disease)        Patient treated with:  piperacillin/tazobactam IVPB. 2.25 Gram(s) IV Intermittent every 8 hours        PHYSICAL EXAM  T(F): 97.8 (02-25-18 @ 06:20), Max: 99.8 (02-24-18 @ 15:34)  HR: 65 (02-25-18 @ 06:20) (65 - 79)  BP: 144/65 (02-25-18 @ 06:20) (118/58 - 164/80)  RR: 16 (02-25-18 @ 06:20) (16 - 18)  SpO2: --  Daily     Daily   HEENT: normal, no nuchal rigidity  Cor: RSR Nl S1 S2  Lungs: clear  Decreased breath sounds at bases    Abdomen: Nontender, Nl BS,     Ext: No clubbing,cyanosis or edema    LAB & RADIOLOGIC RESULTS:                        7.5    18.68 )-----------( 490      ( 24 Feb 2018 14:55 )             24.7         02-24    138  |  95<L>  |  33<H>  ----------------------------<  178<H>  4.3   |  33<H>  |  4.1<HH>    Alkalosis    TPro  6.6  /  Alb  1.8<L>  /  TBili  0.4  /  DBili  x   /  AST  13  /  ALT  9   /  AlkPhos  99  02-24                 eGFR if Non African American: 11 mL/min/1.73M2 (02-24-18 @ 12:56)  eGFR if : 13 mL/min/1.73M2 (02-24-18 @ 12:56)  Creatinine, Serum: 4.1 mg/dL (02-24-18 @ 12:56)  Creatinine, Serum: 4.1 mg/dL (02-24-18 @ 12:56)  eGFR if Non African American: 11 mL/min/1.73M2 (02-24-18 @ 12:56)  eGFR if : 13 mL/min/1.73M2 (02-24-18 @ 12:56)      Cxray:

## 2018-02-25 NOTE — DIETITIAN INITIAL EVALUATION ADULT. - ORAL INTAKE PTA
good/pt was on a renal NCS diet with 1L FR, prosource x 3 and high protein pudding with meals as per SNF transfer sheets, as per pt good appetite PTA

## 2018-02-25 NOTE — DIETITIAN INITIAL EVALUATION ADULT. - OTHER INFO
pt sent from [facility] for PICC line insertion needed for ABX 2/2 L DFU, found to be anemic pt presently refusing blood transfusion. tolerating renal diet well. PMHX: CHF, ESRD/HD, DM, L angiogram, R [id number]

## 2018-02-25 NOTE — CONSULT NOTE ADULT - SUBJECTIVE AND OBJECTIVE BOX
Patient is a 56 y/o female admitted from [facility] with gangrenous left foot and for PICC line placement for IVABX.    Patient is a poor historian but reports she thinks it has been black colored and malodorous for some time.    Patient reports saw a vascular MD in the past but unsure of his name.  She states vascular MD did a procedure through her left groin for blood flow but was unsuccessful.  Patient unsure when procedure was done.    Patient reports generalized tenderness to LLE.     Patient unsure if she has been febrile.        Vital Signs Last 24 Hrs  T(C): 35.9 (25 Feb 2018 13:55), Max: 37.7 (24 Feb 2018 15:34)  T(F): 96.6 (25 Feb 2018 13:55), Max: 99.8 (24 Feb 2018 15:34)  HR: 76 (25 Feb 2018 13:55) (65 - 76)  BP: 127/60 (25 Feb 2018 13:55) (118/58 - 161/68)  BP(mean): --  RR: 16 (25 Feb 2018 13:55) (16 - 18)        MEDICAL HISTORY:  Type 2 diabetes mellitus  CHF (congestive heart failure)  End stage renal failure on dialysis  Hypertension  Hyperlipemia  Heart failure  Peripheral Vascular Disease      SURGICAL HISTORY:  History of femoral angiogram: left angiogram  Port-a-cath in place: right chest wall        Home Medications:  Aspir 81: orally once a day (16 Feb 2018 13:38)  calcium acetate 667 mg oral tablet: orally 2 times a day (16 Feb 2018 13:38)  carvedilol 6.25 mg oral tablet: 1 tab(s) orally 2 times a day (16 Feb 2018 13:38)  hydrALAZINE 25 mg oral tablet: orally 3 times a day (16 Feb 2018 13:38)  insulin glargine 100 units/mL subcutaneous solution: 12 unit(s) subcutaneous once a day (at bedtime) (16 Feb 2018 13:38)  Lasix: 60 milligram(s) orally once a day (16 Feb 2018 13:38)  lisinopril 20 mg oral tablet: 1 tab(s) orally once a day (16 Feb 2018 13:38)  Lyrica 100 mg oral capsule: orally 3 times a day (16 Feb 2018 13:38)  Melatonin 3 mg oral tablet: 1 tab(s) orally once (at bedtime) (16 Feb 2018 13:38)  metOLazone 2.5 mg oral tablet: 1 tab(s) orally once a day (16 Feb 2018 13:38)  Renvela 800 mg oral tablet: 1 tab(s) orally 3 times a day (with meals) (16 Feb 2018 13:38)  Santyl 250 units/g topical ointment: Apply topically to affected area once a day (23 Feb 2018 20:57)      Allergies:  No Known Allergies or Intolerances      SOCIAL HISTORY:  Denies ETOH, Smoking or drug use.       Review of Systems:  · CONSTITUTIONAL: no fever and no chills.	  · EYES: no discharge, no irritation, no pain, no redness, and no visual changes.	  · ENMT: Ears: no ear pain and no hearing problems.Nose: no nasal congestion and no nasal drainage.Mouth/Throat: no dysphagia, no hoarseness and no throat pain.Neck: no lumps, no pain, no stiffness and no swollen glands.	  · CARDIOVASCULAR: normal rate and rhythm, no chest pain and no edema.	  · RESPIRATORY: no chest pain, no cough, and no shortness of breath.	  · GASTROINTESTINAL: no abdominal pain, no bloating, no constipation, no diarrhea, no nausea and no vomiting.	  · MUSCULOSKELETAL: - - - 	  · Musculoskeletal [+]: (+) left foot diabetic foot ulcer	  · SKIN: no abrasions, no jaundice, no lesions, no pruritis.	  · ENDOCRINE: - - - 	  · Endocrine [+]: (+) DM	           Exam:  General: WD, thin female, conversant in NAD.  Lungs: CTA B/L.  Cor: S1&S2, RRR.  Abd: + BS, distended but soft, Non tender, no peritoneal signs.  Ext:  Left foot with dressing in place, malodorous, gangrenous, + DFU with greenish/yellow discharge, unable to palpate pedal pulses,  to palpation, no calf tenderness.          Labs:    * (Patient refused 2/25/18 lab draw).*                           7.5    18.68 )-----------( 490      ( 24 Feb 2018 14:55 )             24.7       02-24-18:    138  |  95<L>  |  33<H>  ----------------------------<  178<H>  4.3   |  33<H>  |  4.1<HH>    Ca    8.7      24 Feb 2018 12:56  Phos  3.6     02-24    TPro  6.6  /  Alb  1.8<L>  /  TBili  0.4  /  DBili  x   /  AST  13  /  ALT  9   /  AlkPhos  99  02-24

## 2018-02-25 NOTE — PROGRESS NOTE ADULT - SUBJECTIVE AND OBJECTIVE BOX
PHILIPPE SMITH  57y  Female    Patient is a 57y old  Female who presents with a chief complaint of left foot necrosis (23 Feb 2018 22:12)    ALLERGIES:  No Known Allergies      INTERVAL HPI/OVERNIGHT EVENTS:   gangrene and foul smelling    VITALS:  T(F): 97.8 (02-25-18 @ 06:20), Max: 99.8 (02-24-18 @ 15:34)  HR: 65 (02-25-18 @ 06:20) (65 - 79)  BP: 144/65 (02-25-18 @ 06:20) (118/58 - 164/80)  RR: 16 (02-25-18 @ 06:20) (16 - 18)  SpO2: --    LABS:  02-24    138  |  95<L>  |  33<H>  ----------------------------<  178<H>  4.3   |  33<H>  |  4.1<HH>    Ca    8.7      24 Feb 2018 12:56  Phos  3.6     02-24    TPro  6.6  /  Alb  1.8<L>  /  TBili  0.4  /  DBili  x   /  AST  13  /  ALT  9   /  AlkPhos  99  02-24    MICROBIOLOGY:    MEDICATION:  acetaminophen   Tablet. 650 milliGRAM(s) Oral every 6 hours PRN  aspirin  chewable 81 milliGRAM(s) Oral daily  calcium acetate 667 milliGRAM(s) Oral two times a day with meals  carvedilol Oral Tab/Cap - Peds 6.25 milliGRAM(s) Oral two times a day  collagenase Ointment 1 Application(s) Topical daily  furosemide    Tablet 40 milliGRAM(s) Oral daily  hydrALAZINE 25 milliGRAM(s) Oral three times a day  insulin glargine SubCutaneous Injection (LANTUS) - Peds 12 Unit(s) SubCutaneous at bedtime  lisinopril 20 milliGRAM(s) Oral daily  melatonin 3 milliGRAM(s) Oral at bedtime  metolazone 2.5 milliGRAM(s) Oral daily  piperacillin/tazobactam IVPB. 2.25 Gram(s) IV Intermittent every 8 hours  pregabalin 100 milliGRAM(s) Oral three times a day  sevelamer hydrochloride 800 milliGRAM(s) Oral with breakfast    RADIOLOGY & ADDITIONAL TESTS:

## 2018-02-25 NOTE — CONSULT NOTE ADULT - ASSESSMENT
Impression:    56 y/o female with medical history of Type 2 diabetes mellitus, CHF (congestive heart failure), End stage renal failure on dialysis, Hypertension, Hyperlipemia, Heart failure, Peripheral Vascular Disease now with Gangrene left foot.

## 2018-02-25 NOTE — CONSULT NOTE ADULT - ASSESSMENT
IMPRESSION  Pt with Type 2 diabetes mellitus, CHF, End stage renal failure on dialysis admitted with L foot necrosis    On piperacillin/tazobactam IVPB. 2.25 Gram(s) IV Intermittent every 8 hours    Alkalosis (HCO3 34)      SUGGESTIONs    Continue Zosyn for now    Repeat white blood cell count    Vascular eval for possible surgical intervention

## 2018-02-26 DIAGNOSIS — Z02.9 ENCOUNTER FOR ADMINISTRATIVE EXAMINATIONS, UNSPECIFIED: ICD-10-CM

## 2018-02-26 DIAGNOSIS — R79.0 ABNORMAL LEVEL OF BLOOD MINERAL: ICD-10-CM

## 2018-02-26 RX ORDER — GLUCAGON INJECTION, SOLUTION 0.5 MG/.1ML
1 INJECTION, SOLUTION SUBCUTANEOUS ONCE
Qty: 0 | Refills: 0 | Status: DISCONTINUED | OUTPATIENT
Start: 2018-02-26 | End: 2018-03-06

## 2018-02-26 RX ORDER — DEXTROSE 50 % IN WATER 50 %
12.5 SYRINGE (ML) INTRAVENOUS ONCE
Qty: 0 | Refills: 0 | Status: DISCONTINUED | OUTPATIENT
Start: 2018-02-26 | End: 2018-03-06

## 2018-02-26 RX ORDER — SODIUM CHLORIDE 9 MG/ML
1000 INJECTION, SOLUTION INTRAVENOUS
Qty: 0 | Refills: 0 | Status: DISCONTINUED | OUTPATIENT
Start: 2018-02-26 | End: 2018-03-06

## 2018-02-26 RX ORDER — INSULIN LISPRO 100/ML
VIAL (ML) SUBCUTANEOUS
Qty: 0 | Refills: 0 | Status: DISCONTINUED | OUTPATIENT
Start: 2018-02-26 | End: 2018-03-06

## 2018-02-26 RX ORDER — DEXTROSE 50 % IN WATER 50 %
1 SYRINGE (ML) INTRAVENOUS ONCE
Qty: 0 | Refills: 0 | Status: DISCONTINUED | OUTPATIENT
Start: 2018-02-26 | End: 2018-03-06

## 2018-02-26 RX ORDER — DEXTROSE 50 % IN WATER 50 %
25 SYRINGE (ML) INTRAVENOUS ONCE
Qty: 0 | Refills: 0 | Status: DISCONTINUED | OUTPATIENT
Start: 2018-02-26 | End: 2018-03-06

## 2018-02-26 RX ORDER — INSULIN GLARGINE 100 [IU]/ML
12 INJECTION, SOLUTION SUBCUTANEOUS ONCE
Qty: 0 | Refills: 0 | Status: COMPLETED | OUTPATIENT
Start: 2018-02-26 | End: 2018-02-26

## 2018-02-26 RX ADMIN — INSULIN GLARGINE 12 UNIT(S): 100 INJECTION, SOLUTION SUBCUTANEOUS at 06:57

## 2018-02-26 RX ADMIN — Medication 100 MILLIGRAM(S): at 06:23

## 2018-02-26 RX ADMIN — INSULIN GLARGINE 12 UNIT(S): 100 INJECTION, SOLUTION SUBCUTANEOUS at 21:09

## 2018-02-26 RX ADMIN — PIPERACILLIN AND TAZOBACTAM 25 GRAM(S): 4; .5 INJECTION, POWDER, LYOPHILIZED, FOR SOLUTION INTRAVENOUS at 21:10

## 2018-02-26 RX ADMIN — CARVEDILOL PHOSPHATE 6.25 MILLIGRAM(S): 80 CAPSULE, EXTENDED RELEASE ORAL at 18:32

## 2018-02-26 RX ADMIN — Medication 100 MILLIGRAM(S): at 13:18

## 2018-02-26 RX ADMIN — Medication 81 MILLIGRAM(S): at 13:14

## 2018-02-26 RX ADMIN — Medication 3 MILLIGRAM(S): at 21:09

## 2018-02-26 RX ADMIN — Medication 1 APPLICATION(S): at 13:14

## 2018-02-26 RX ADMIN — SEVELAMER CARBONATE 800 MILLIGRAM(S): 2400 POWDER, FOR SUSPENSION ORAL at 08:21

## 2018-02-26 RX ADMIN — Medication 25 MILLIGRAM(S): at 13:15

## 2018-02-26 RX ADMIN — Medication 100 MILLIGRAM(S): at 21:10

## 2018-02-26 RX ADMIN — Medication 25 MILLIGRAM(S): at 06:23

## 2018-02-26 RX ADMIN — Medication 10: at 19:25

## 2018-02-26 RX ADMIN — LISINOPRIL 20 MILLIGRAM(S): 2.5 TABLET ORAL at 06:24

## 2018-02-26 RX ADMIN — Medication 25 MILLIGRAM(S): at 21:08

## 2018-02-26 RX ADMIN — CARVEDILOL PHOSPHATE 6.25 MILLIGRAM(S): 80 CAPSULE, EXTENDED RELEASE ORAL at 06:24

## 2018-02-26 RX ADMIN — Medication 667 MILLIGRAM(S): at 08:21

## 2018-02-26 RX ADMIN — Medication 667 MILLIGRAM(S): at 18:32

## 2018-02-26 RX ADMIN — Medication 12: at 13:15

## 2018-02-26 RX ADMIN — Medication 40 MILLIGRAM(S): at 06:24

## 2018-02-26 RX ADMIN — Medication 650 MILLIGRAM(S): at 00:16

## 2018-02-26 NOTE — PROGRESS NOTE ADULT - SUBJECTIVE AND OBJECTIVE BOX
PHILIPPE SMITH  57y  Female    Patient is a 57y old  Female who presents with a chief complaint of left foot necrosis (23 Feb 2018 22:12)    ALLERGIES:  No Known Allergies      INTERVAL HPI/OVERNIGHT EVENTS:    VITALS:  T(F): 98.4 (02-26-18 @ 06:00), Max: 101.5 (02-25-18 @ 22:06)  HR: 73 (02-26-18 @ 06:00) (68 - 76)  BP: 179/78 (02-26-18 @ 06:00) (125/62 - 179/78)  RR: 14 (02-26-18 @ 06:00) (14 - 16)  SpO2: --    LABS:  02-24    138  |  95<L>  |  33<H>  ----------------------------<  178<H>  4.3   |  33<H>  |  4.1<HH>    Ca    8.7      24 Feb 2018 12:56  Phos  3.6     02-24    TPro  6.6  /  Alb  1.8<L>  /  TBili  0.4  /  DBili  x   /  AST  13  /  ALT  9   /  AlkPhos  99  02-24    MICROBIOLOGY:    MEDICATION:  acetaminophen   Tablet. 650 milliGRAM(s) Oral every 6 hours PRN  aspirin  chewable 81 milliGRAM(s) Oral daily  calcium acetate 667 milliGRAM(s) Oral two times a day with meals  carvedilol Oral Tab/Cap - Peds 6.25 milliGRAM(s) Oral two times a day  collagenase Ointment 1 Application(s) Topical daily  dextrose 5%. 1000 milliLiter(s) IV Continuous <Continuous>  dextrose 50% Injectable 12.5 Gram(s) IV Push once  dextrose 50% Injectable 25 Gram(s) IV Push once  dextrose 50% Injectable 25 Gram(s) IV Push once  dextrose Gel 1 Dose(s) Oral once PRN  furosemide    Tablet 40 milliGRAM(s) Oral daily  glucagon  Injectable 1 milliGRAM(s) IntraMuscular once PRN  hydrALAZINE 25 milliGRAM(s) Oral three times a day  insulin glargine SubCutaneous Injection (LANTUS) - Peds 12 Unit(s) SubCutaneous at bedtime  insulin lispro (HumaLOG) corrective regimen sliding scale   SubCutaneous three times a day before meals  lisinopril 20 milliGRAM(s) Oral daily  melatonin 3 milliGRAM(s) Oral at bedtime  metolazone 2.5 milliGRAM(s) Oral daily  piperacillin/tazobactam IVPB. 2.25 Gram(s) IV Intermittent every 8 hours  pregabalin 100 milliGRAM(s) Oral three times a day  sevelamer hydrochloride 800 milliGRAM(s) Oral with breakfast    RADIOLOGY & ADDITIONAL TESTS:

## 2018-02-26 NOTE — CONSULT NOTE ADULT - SUBJECTIVE AND OBJECTIVE BOX
Patient is a 57y old  Female who presents with a chief complaint of left foot necrosis (23 Feb 2018 22:12)    Patient is poor historian. Patient did not wake up until shaken by PCA and patient unable to relate who has been taking care of her wounds and just mentioning a nurse at a nursing home. According to her nurse at bedside patient is unable to ambulate. Yessy could only relate that her feet had been like they were for "months" and that she does not recall if she has a podiatrist.    HPI:  56 y/o female  sent by nursing home  for picc line .  Pt stating " i am here for a PICC LINE placement so that i can get my daily ABX" (23 Feb 2018 20:38)          PAST MEDICAL & SURGICAL HISTORY:  Type 2 diabetes mellitus  CHF (congestive heart failure)  End stage renal failure on dialysis  Hypertension  Hyperlipemia  Heart failure  History of femoral angiogram: left angiogram  Port-a-cath in place: right chest wall      Allergies    No Known Allergies    Intolerances        MEDICATIONS  (STANDING):  aspirin  chewable 81 milliGRAM(s) Oral daily  calcium acetate 667 milliGRAM(s) Oral two times a day with meals  carvedilol Oral Tab/Cap - Peds 6.25 milliGRAM(s) Oral two times a day  collagenase Ointment 1 Application(s) Topical daily  furosemide    Tablet 40 milliGRAM(s) Oral daily  hydrALAZINE 25 milliGRAM(s) Oral three times a day  insulin glargine SubCutaneous Injection (LANTUS) - Peds 12 Unit(s) SubCutaneous at bedtime  lisinopril 20 milliGRAM(s) Oral daily  melatonin 3 milliGRAM(s) Oral at bedtime  metolazone 2.5 milliGRAM(s) Oral daily  piperacillin/tazobactam IVPB. 2.25 Gram(s) IV Intermittent every 8 hours  pregabalin 100 milliGRAM(s) Oral three times a day  sevelamer hydrochloride 800 milliGRAM(s) Oral with breakfast    MEDICATIONS  (PRN):  acetaminophen   Tablet. 650 milliGRAM(s) Oral every 6 hours PRN Mild Pain (1 - 3)      FAMILY HISTORY:  No pertinent family history in first degree relatives      SOCIAL HISTORY:     REVIEW OF SYSTEMS:    PHYSICAL EXAM:    Vital Signs Last 24 Hrs  T(C): 36.9 (26 Feb 2018 06:00), Max: 38.6 (25 Feb 2018 22:06)  T(F): 98.4 (26 Feb 2018 06:00), Max: 101.5 (25 Feb 2018 22:06)  HR: 73 (26 Feb 2018 06:00) (68 - 76)  BP: 179/78 (26 Feb 2018 06:00) (125/62 - 179/78)  BP(mean): --  RR: 14 (26 Feb 2018 06:00) (14 - 16)  SpO2: --    Patient seen at bedside. Patient NAD and was alert towards end of examination and bandage change  Vascular: DP/PT pulses non-palpable bilaterally. CFT>5 seconds to all digits bilaterally. Skin temperature cool to cool from proximal to distal bilaterally.   Neurologic: Light touch sensation diminished bilaterally.  Musculoskeletal: No pain to palpation noted bilaterally.  Dermatological: distal right foot stable dry gangrene and right plantar heel superficial ulceration with granular base. Left foot dry gangrene extending to entire plantar foot and dorsal forefoot. Plantar left foot with patches of necrotic skin and granular, bleeding skin noted +malodor.       LABS:                          7.5    18.68 )-----------( 490      ( 24 Feb 2018 14:55 )             24.7                   02-24    138  |  95<L>  |  33<H>  ----------------------------<  178<H>  4.3   |  33<H>  |  4.1<HH>    Ca    8.7      24 Feb 2018 12:56  Phos  3.6     02-24    TPro  6.6  /  Alb  1.8<L>  /  TBili  0.4  /  DBili  x   /  AST  13  /  ALT  9   /  AlkPhos  99  02-24

## 2018-02-26 NOTE — CHART NOTE - NSCHARTNOTEFT_GEN_A_CORE
Called by RN for elevated finger stick 349 this am.  Patient refused Lantus last night (12u) but received it this am.  B/S still elevated.  Wuann-marie start on s/s with ac and HS accu checks.  Patient asymptomatic

## 2018-02-26 NOTE — CONSULT NOTE ADULT - ATTENDING COMMENTS
Patient seen at bedside   Patient will be seen by vascular at [facility] for possible surgical intervention.   Discussed case with patient   Patient is aware of plan.

## 2018-02-27 DIAGNOSIS — M86.672 OTHER CHRONIC OSTEOMYELITIS, LEFT ANKLE AND FOOT: ICD-10-CM

## 2018-02-27 LAB
ANION GAP SERPL CALC-SCNC: 10 MMOL/L — SIGNIFICANT CHANGE UP (ref 7–14)
BUN SERPL-MCNC: 52 MG/DL — HIGH (ref 10–20)
CALCIUM SERPL-MCNC: 8.1 MG/DL — LOW (ref 8.5–10.1)
CHLORIDE SERPL-SCNC: 96 MMOL/L — LOW (ref 98–110)
CO2 SERPL-SCNC: 29 MMOL/L — SIGNIFICANT CHANGE UP (ref 17–32)
CREAT SERPL-MCNC: 5.8 MG/DL — CRITICAL HIGH (ref 0.7–1.5)
GLUCOSE SERPL-MCNC: 182 MG/DL — HIGH (ref 70–110)
HCT VFR BLD CALC: 22.1 % — LOW (ref 37–47)
HGB BLD-MCNC: 6.7 G/DL — CRITICAL LOW (ref 14–18)
MCHC RBC-ENTMCNC: 26.6 PG — LOW (ref 27–31)
MCHC RBC-ENTMCNC: 30.3 G/DL — LOW (ref 32–37)
MCV RBC AUTO: 87.7 FL — SIGNIFICANT CHANGE UP (ref 81–91)
NRBC # BLD: 0 /100 WBCS — SIGNIFICANT CHANGE UP (ref 0–0)
PLATELET # BLD AUTO: 429 K/UL — HIGH (ref 130–400)
POTASSIUM SERPL-MCNC: 6.2 MMOL/L — CRITICAL HIGH (ref 3.5–5)
POTASSIUM SERPL-SCNC: 6.2 MMOL/L — CRITICAL HIGH (ref 3.5–5)
RBC # BLD: 2.52 M/UL — LOW (ref 4.2–5.4)
RBC # FLD: 20.9 % — HIGH (ref 11.5–14.5)
SODIUM SERPL-SCNC: 135 MMOL/L — SIGNIFICANT CHANGE UP (ref 135–146)
WBC # BLD: 16.93 K/UL — HIGH (ref 4.8–10.8)
WBC # FLD AUTO: 16.93 K/UL — HIGH (ref 4.8–10.8)

## 2018-02-27 RX ORDER — DARBEPOETIN ALFA IN POLYSORBAT 200MCG/0.4
80 PEN INJECTOR (ML) SUBCUTANEOUS
Qty: 0 | Refills: 0 | Status: DISCONTINUED | OUTPATIENT
Start: 2018-02-27 | End: 2018-03-06

## 2018-02-27 RX ORDER — VANCOMYCIN HCL 1 G
750 VIAL (EA) INTRAVENOUS
Qty: 0 | Refills: 0 | Status: DISCONTINUED | OUTPATIENT
Start: 2018-02-27 | End: 2018-03-03

## 2018-02-27 RX ORDER — METRONIDAZOLE 500 MG
500 TABLET ORAL
Qty: 0 | Refills: 0 | Status: DISCONTINUED | OUTPATIENT
Start: 2018-02-27 | End: 2018-03-06

## 2018-02-27 RX ORDER — CEFEPIME 1 G/1
2000 INJECTION, POWDER, FOR SOLUTION INTRAMUSCULAR; INTRAVENOUS
Qty: 0 | Refills: 0 | Status: DISCONTINUED | OUTPATIENT
Start: 2018-02-27 | End: 2018-03-03

## 2018-02-27 RX ADMIN — Medication 150 MILLIGRAM(S): at 10:27

## 2018-02-27 RX ADMIN — CEFEPIME 100 MILLIGRAM(S): 1 INJECTION, POWDER, FOR SOLUTION INTRAMUSCULAR; INTRAVENOUS at 11:20

## 2018-02-27 RX ADMIN — Medication 4: at 17:01

## 2018-02-27 RX ADMIN — Medication 667 MILLIGRAM(S): at 08:10

## 2018-02-27 RX ADMIN — Medication 80 MICROGRAM(S): at 10:36

## 2018-02-27 RX ADMIN — Medication 81 MILLIGRAM(S): at 12:14

## 2018-02-27 RX ADMIN — LISINOPRIL 20 MILLIGRAM(S): 2.5 TABLET ORAL at 06:05

## 2018-02-27 RX ADMIN — CARVEDILOL PHOSPHATE 6.25 MILLIGRAM(S): 80 CAPSULE, EXTENDED RELEASE ORAL at 06:05

## 2018-02-27 RX ADMIN — CARVEDILOL PHOSPHATE 6.25 MILLIGRAM(S): 80 CAPSULE, EXTENDED RELEASE ORAL at 17:41

## 2018-02-27 RX ADMIN — Medication 40 MILLIGRAM(S): at 06:05

## 2018-02-27 RX ADMIN — SEVELAMER CARBONATE 800 MILLIGRAM(S): 2400 POWDER, FOR SUSPENSION ORAL at 08:10

## 2018-02-27 RX ADMIN — Medication 25 MILLIGRAM(S): at 06:05

## 2018-02-27 RX ADMIN — Medication 667 MILLIGRAM(S): at 17:40

## 2018-02-27 RX ADMIN — Medication 500 MILLIGRAM(S): at 17:40

## 2018-02-27 RX ADMIN — Medication 100 MILLIGRAM(S): at 15:12

## 2018-02-27 RX ADMIN — Medication 100 MILLIGRAM(S): at 06:05

## 2018-02-27 RX ADMIN — Medication 25 MILLIGRAM(S): at 21:13

## 2018-02-27 RX ADMIN — Medication 1 APPLICATION(S): at 12:14

## 2018-02-27 RX ADMIN — Medication 4: at 08:09

## 2018-02-27 RX ADMIN — Medication 25 MILLIGRAM(S): at 15:12

## 2018-02-27 RX ADMIN — Medication 100 MILLIGRAM(S): at 21:11

## 2018-02-27 RX ADMIN — INSULIN GLARGINE 12 UNIT(S): 100 INJECTION, SOLUTION SUBCUTANEOUS at 21:13

## 2018-02-27 RX ADMIN — Medication 3 MILLIGRAM(S): at 21:14

## 2018-02-27 NOTE — PROGRESS NOTE ADULT - SUBJECTIVE AND OBJECTIVE BOX
Nephrology progress note    Patient is seen and examined, events over the last 24 h noted .  Seen on HD today. Denies SOB, nausea, leg pain    Allergies:  No Known Allergies    Hospital Medications:   MEDICATIONS  (STANDING):  aspirin  chewable 81 milliGRAM(s) Oral daily  calcium acetate 667 milliGRAM(s) Oral two times a day with meals  carvedilol Oral Tab/Cap - Peds 6.25 milliGRAM(s) Oral two times a day  cefepime  IVPB 2000 milliGRAM(s) IV Intermittent every 48 hours  collagenase Ointment 1 Application(s) Topical daily  darbepoetin Injectable ViaL 80 MICROGram(s) IV Push <User Schedule>  dextrose 5%. 1000 milliLiter(s) (50 mL/Hr) IV Continuous <Continuous>  dextrose 50% Injectable 12.5 Gram(s) IV Push once  dextrose 50% Injectable 25 Gram(s) IV Push once  dextrose 50% Injectable 25 Gram(s) IV Push once  furosemide    Tablet 40 milliGRAM(s) Oral daily  hydrALAZINE 25 milliGRAM(s) Oral three times a day  insulin glargine SubCutaneous Injection (LANTUS) - Peds 12 Unit(s) SubCutaneous at bedtime  insulin lispro (HumaLOG) corrective regimen sliding scale   SubCutaneous three times a day before meals  lisinopril 20 milliGRAM(s) Oral daily  melatonin 3 milliGRAM(s) Oral at bedtime  metolazone 2.5 milliGRAM(s) Oral daily  metroNIDAZOLE    Tablet 500 milliGRAM(s) Oral two times a day  pregabalin 100 milliGRAM(s) Oral three times a day  sevelamer hydrochloride 800 milliGRAM(s) Oral with breakfast  vancomycin  IVPB 750 milliGRAM(s) IV Intermittent every 48 hours        VITALS:  T(F): 99.2 (02-27-18 @ 05:56), Max: 100.2 (02-26-18 @ 21:30)  HR: 73 (02-27-18 @ 08:25)  BP: 151/68 (02-27-18 @ 08:25)  RR: 14 (02-27-18 @ 05:56)  SpO2: --  Wt(kg): --    02-25 @ 07:01  -  02-26 @ 07:00  --------------------------------------------------------  IN: 360 mL / OUT: 0 mL / NET: 360 mL        Weight (kg): 68.2 (02-26 @ 10:55)    PHYSICAL EXAM:  Constitutional: NAD  HEENT: anicteric sclera, oropharynx clear, MMM  Neck: No JVD  Respiratory: CTAB, no wheezes, rales or rhonchi  Cardiovascular: S1, S2, RRR  Gastrointestinal: BS+, soft, NT/ND  Extremities: Left foot gangrene  Neurological: A/O x 3  : No CVA tenderness. No anderson.   Vascular Access: Tesio CW    LABS:            Urine Studies:      RADIOLOGY & ADDITIONAL STUDIES:  < from: VA Duplex Lower Extrem Arterial, Bilat (02.26.18 @ 11:48) >    Bilateral diffuse arterial disease of lower extremities without any   apparent hemodynamical significance.  Right external iliac artery with velocities of 243 cm/s suggestive of   moderate stenosis however there is no spectral broadening and no luminal   narrowing please correlate clinically  Similar findings are seen in study performed on 10/16/2017.    < end of copied text >

## 2018-02-27 NOTE — CHART NOTE - NSCHARTNOTEFT_GEN_A_CORE
Spoke with medical PA, who was told by Dr. Rosales that she was now interested in BKA. Spoke with patient, who states that she is still thinking about it and hs not yet made a decision. If pt agrees to procedure, will D/W Dr. Carrillo regarding [place]. Otherwise, she has an outpatient follow up appointment with Dr. Carrillo on 3/5/18 at 10:45 am. Will follow

## 2018-02-27 NOTE — PROGRESS NOTE ADULT - SUBJECTIVE AND OBJECTIVE BOX
PHILIPPE SMITH  57y  Female    Patient is a 57y old  Female who presents with a chief complaint of left foot necrosis (23 Feb 2018 22:12)    ALLERGIES:  No Known Allergies      INTERVAL HPI/OVERNIGHT EVENTS:    VITALS:  T(F): 99.2 (02-27-18 @ 05:56), Max: 100.2 (02-26-18 @ 21:30)  HR: 73 (02-27-18 @ 08:25) (73 - 83)  BP: 151/68 (02-27-18 @ 08:25) (129/60 - 151/68)  RR: 14 (02-27-18 @ 05:56) (14 - 16)  SpO2: --    LABS:        MICROBIOLOGY:    MEDICATION:  acetaminophen   Tablet. 650 milliGRAM(s) Oral every 6 hours PRN  aspirin  chewable 81 milliGRAM(s) Oral daily  calcium acetate 667 milliGRAM(s) Oral two times a day with meals  carvedilol Oral Tab/Cap - Peds 6.25 milliGRAM(s) Oral two times a day  cefepime  IVPB 2000 milliGRAM(s) IV Intermittent every 48 hours  collagenase Ointment 1 Application(s) Topical daily  darbepoetin Injectable ViaL 80 MICROGram(s) IV Push <User Schedule>  dextrose 5%. 1000 milliLiter(s) IV Continuous <Continuous>  dextrose 50% Injectable 12.5 Gram(s) IV Push once  dextrose 50% Injectable 25 Gram(s) IV Push once  dextrose 50% Injectable 25 Gram(s) IV Push once  dextrose Gel 1 Dose(s) Oral once PRN  furosemide    Tablet 40 milliGRAM(s) Oral daily  glucagon  Injectable 1 milliGRAM(s) IntraMuscular once PRN  hydrALAZINE 25 milliGRAM(s) Oral three times a day  insulin glargine SubCutaneous Injection (LANTUS) - Peds 12 Unit(s) SubCutaneous at bedtime  insulin lispro (HumaLOG) corrective regimen sliding scale   SubCutaneous three times a day before meals  lisinopril 20 milliGRAM(s) Oral daily  melatonin 3 milliGRAM(s) Oral at bedtime  metolazone 2.5 milliGRAM(s) Oral daily  metroNIDAZOLE    Tablet 500 milliGRAM(s) Oral two times a day  pregabalin 100 milliGRAM(s) Oral three times a day  sevelamer hydrochloride 800 milliGRAM(s) Oral with breakfast  vancomycin  IVPB 750 milliGRAM(s) IV Intermittent every 48 hours    RADIOLOGY & ADDITIONAL TESTS:

## 2018-02-27 NOTE — PROGRESS NOTE ADULT - PROBLEM SELECTOR PLAN 2
refusing abx     Needs amputation    Give Vanco & Cefepime post HD - doubt salvage is possible  PO Flagyl 500mg Q12 - 6 weeks    recall if needed

## 2018-02-27 NOTE — CHART NOTE - NSCHARTNOTEFT_GEN_A_CORE
Labs noted and d/w renal attending Dr. Frey:  Labs pre-dialysis.  Repeat at next lab draw.  If Hgb <7 transfuse 1 unit.  Recheck K+ as well Labs noted and d/w renal attending Dr. Frey:  Labs pre-dialysis.  Repeat this evening at 1900.  If Hgb <7 transfuse 1 unit.  Recheck K+ as well

## 2018-02-27 NOTE — PROGRESS NOTE ADULT - SUBJECTIVE AND OBJECTIVE BOX
infectious diseases progress note:  PHILIPPE SMITH is a 57yFemale patient    WOUND OF LEFT LOWER EXTREMITY CKD  refusing abx and surgery     Gangrene of left foot  CHF (congestive heart failure)  Hyperlipemia  Hypertension  End stage renal failure on dialysis  Type 2 diabetes mellitus  CKD (chronic kidney disease)  Wound of left lower extremity      ROS:  not relevant     Allergies    No Known Allergies    Intolerances        ANTIBIOTICS/RELEVANT:  antimicrobials  piperacillin/tazobactam IVPB. 2.25 Gram(s) IV Intermittent every 8 hours    immunologic:    OTHER:  acetaminophen   Tablet. 650 milliGRAM(s) Oral every 6 hours PRN  aspirin  chewable 81 milliGRAM(s) Oral daily  calcium acetate 667 milliGRAM(s) Oral two times a day with meals  carvedilol Oral Tab/Cap - Peds 6.25 milliGRAM(s) Oral two times a day  collagenase Ointment 1 Application(s) Topical daily  dextrose 5%. 1000 milliLiter(s) IV Continuous <Continuous>  dextrose 50% Injectable 12.5 Gram(s) IV Push once  dextrose 50% Injectable 25 Gram(s) IV Push once  dextrose 50% Injectable 25 Gram(s) IV Push once  dextrose Gel 1 Dose(s) Oral once PRN  furosemide    Tablet 40 milliGRAM(s) Oral daily  glucagon  Injectable 1 milliGRAM(s) IntraMuscular once PRN  hydrALAZINE 25 milliGRAM(s) Oral three times a day  insulin glargine SubCutaneous Injection (LANTUS) - Peds 12 Unit(s) SubCutaneous at bedtime  insulin lispro (HumaLOG) corrective regimen sliding scale   SubCutaneous three times a day before meals  lisinopril 20 milliGRAM(s) Oral daily  melatonin 3 milliGRAM(s) Oral at bedtime  metolazone 2.5 milliGRAM(s) Oral daily  pregabalin 100 milliGRAM(s) Oral three times a day  sevelamer hydrochloride 800 milliGRAM(s) Oral with breakfast      Objective:  T(F): 99.2 (02-27-18 @ 05:56), Max: 100.2 (02-26-18 @ 21:30)  HR: 78 (02-27-18 @ 05:56) (75 - 83)  BP: 143/64 (02-27-18 @ 05:56) (129/60 - 143/64)  RR: 14 (02-27-18 @ 05:56) (14 - 16)  SpO2: --    PHYSICAL EXAM:  Constitutional: comfortable . no distress   Eyes:DORITA, EOMI  Ear/Nose/Throat: no oral lesion, no sinus tenderness on percussion	  Neck:no JVD, no lymphadenopathy, supple  Respiratory: CTA [place]  Cardiovascular: S1S2 RRR, no murmurs  Gastrointestinal:soft, (+) BS, no HSM  Extremities: gangrenous wound left foot         LABS:                  MICROBIOLOGY:            RADIOLOGY & ADDITIONAL STUDIES:

## 2018-02-28 RX ADMIN — Medication 25 MILLIGRAM(S): at 21:06

## 2018-02-28 RX ADMIN — CARVEDILOL PHOSPHATE 6.25 MILLIGRAM(S): 80 CAPSULE, EXTENDED RELEASE ORAL at 18:26

## 2018-02-28 RX ADMIN — Medication 100 MILLIGRAM(S): at 06:04

## 2018-02-28 RX ADMIN — Medication 4: at 12:32

## 2018-02-28 RX ADMIN — Medication 81 MILLIGRAM(S): at 12:32

## 2018-02-28 RX ADMIN — Medication 667 MILLIGRAM(S): at 18:26

## 2018-02-28 RX ADMIN — Medication 100 MILLIGRAM(S): at 14:02

## 2018-02-28 RX ADMIN — Medication 6: at 08:18

## 2018-02-28 RX ADMIN — Medication 25 MILLIGRAM(S): at 14:02

## 2018-02-28 RX ADMIN — Medication 100 MILLIGRAM(S): at 21:06

## 2018-02-28 RX ADMIN — Medication 667 MILLIGRAM(S): at 08:18

## 2018-02-28 RX ADMIN — Medication 40 MILLIGRAM(S): at 06:04

## 2018-02-28 RX ADMIN — SEVELAMER CARBONATE 800 MILLIGRAM(S): 2400 POWDER, FOR SUSPENSION ORAL at 08:18

## 2018-02-28 RX ADMIN — Medication 500 MILLIGRAM(S): at 18:26

## 2018-02-28 RX ADMIN — Medication 1 APPLICATION(S): at 12:37

## 2018-02-28 RX ADMIN — CARVEDILOL PHOSPHATE 6.25 MILLIGRAM(S): 80 CAPSULE, EXTENDED RELEASE ORAL at 06:05

## 2018-02-28 RX ADMIN — LISINOPRIL 20 MILLIGRAM(S): 2.5 TABLET ORAL at 06:05

## 2018-02-28 RX ADMIN — Medication 3 MILLIGRAM(S): at 21:06

## 2018-02-28 RX ADMIN — Medication 25 MILLIGRAM(S): at 06:05

## 2018-02-28 RX ADMIN — Medication 500 MILLIGRAM(S): at 06:04

## 2018-02-28 NOTE — PROGRESS NOTE ADULT - SUBJECTIVE AND OBJECTIVE BOX
PROGRESS NOTE   Patient is a 57y old  Female who presents with a chief complaint of left foot necrosis (23 Feb 2018 22:12)    Vascular recommending BKA possibly Friday, [facility]     HPI:  56 y/o female  sent by nursing home  for picc line .  Pt stating " i am here for a PICC LINE placement so that i can get my daily ABX" (23 Feb 2018 20:38)      Vital Signs Last 24 Hrs  T(C): 36.5 (28 Feb 2018 05:16), Max: 38 (27 Feb 2018 22:20)  T(F): 97.7 (28 Feb 2018 05:16), Max: 100.4 (27 Feb 2018 22:20)  HR: 78 (28 Feb 2018 05:16) (73 - 89)  BP: 144/69 (28 Feb 2018 05:16) (133/61 - 152/67)  BP(mean): --  RR: 14 (28 Feb 2018 05:16) (14 - 16)  SpO2: --                          6.7    16.93 )-----------( 429      ( 27 Feb 2018 10:13 )             22.1               02-27    135  |  96<L>  |  52<H>  ----------------------------<  182<H>  6.2<HH>   |  29  |  5.8<HH>    Ca    8.1<L>      27 Feb 2018 10:13        PHYSICAL EXAM  GEN: PHILIPPE SMITH is a pleasant well-nourished, well developed 57y Female in no acute distress, alert awake, and oriented to person, place and time.   LE Focused:  Vasc:  DP/PT pulses non-palpable bilaterally. CFT >5 seconds to all digits bilaterally. Skin temp warm to cool proximal to distal bilaterally.                          Derm: left foot wet gangrene +malodor, right plantar foot wound superficial                         Neuro: Light touch diminished sensation bilaterally                          MSK: No pain to palpation. Patient bilateral feet in fixed plantarflexed position

## 2018-02-28 NOTE — PROGRESS NOTE ADULT - SUBJECTIVE AND OBJECTIVE BOX
PHILIPPE SMITH  57y  Female    Patient is a 57y old  Female who presents with a chief complaint of left foot necrosis (23 Feb 2018 22:12)    ALLERGIES:  No Known Allergies      INTERVAL HPI/OVERNIGHT EVENTS:    VITALS:  T(F): 97.7 (02-28-18 @ 05:16), Max: 100.4 (02-27-18 @ 22:20)  HR: 78 (02-28-18 @ 05:16) (73 - 89)  BP: 144/69 (02-28-18 @ 05:16) (133/61 - 152/67)  RR: 14 (02-28-18 @ 05:16) (14 - 16)  SpO2: --    LABS:  02-27    135  |  96<L>  |  52<H>  ----------------------------<  182<H>  6.2<HH>   |  29  |  5.8<HH>    Ca    8.1<L>      27 Feb 2018 10:13      MICROBIOLOGY:    MEDICATION:  acetaminophen   Tablet. 650 milliGRAM(s) Oral every 6 hours PRN  aspirin  chewable 81 milliGRAM(s) Oral daily  calcium acetate 667 milliGRAM(s) Oral two times a day with meals  carvedilol Oral Tab/Cap - Peds 6.25 milliGRAM(s) Oral two times a day  cefepime  IVPB 2000 milliGRAM(s) IV Intermittent every 48 hours  collagenase Ointment 1 Application(s) Topical daily  darbepoetin Injectable ViaL 80 MICROGram(s) IV Push <User Schedule>  dextrose 5%. 1000 milliLiter(s) IV Continuous <Continuous>  dextrose 50% Injectable 12.5 Gram(s) IV Push once  dextrose 50% Injectable 25 Gram(s) IV Push once  dextrose 50% Injectable 25 Gram(s) IV Push once  dextrose Gel 1 Dose(s) Oral once PRN  furosemide    Tablet 40 milliGRAM(s) Oral daily  glucagon  Injectable 1 milliGRAM(s) IntraMuscular once PRN  hydrALAZINE 25 milliGRAM(s) Oral three times a day  insulin glargine SubCutaneous Injection (LANTUS) - Peds 12 Unit(s) SubCutaneous at bedtime  insulin lispro (HumaLOG) corrective regimen sliding scale   SubCutaneous three times a day before meals  lisinopril 20 milliGRAM(s) Oral daily  melatonin 3 milliGRAM(s) Oral at bedtime  metolazone 2.5 milliGRAM(s) Oral daily  metroNIDAZOLE    Tablet 500 milliGRAM(s) Oral two times a day  pregabalin 100 milliGRAM(s) Oral three times a day  sevelamer hydrochloride 800 milliGRAM(s) Oral with breakfast  vancomycin  IVPB 750 milliGRAM(s) IV Intermittent every 48 hours    RADIOLOGY & ADDITIONAL TESTS:

## 2018-03-01 LAB
ANION GAP SERPL CALC-SCNC: 9 MMOL/L — SIGNIFICANT CHANGE UP (ref 7–14)
BASOPHILS # BLD AUTO: 0.03 K/UL — SIGNIFICANT CHANGE UP (ref 0–0.2)
BASOPHILS NFR BLD AUTO: 0.2 % — SIGNIFICANT CHANGE UP (ref 0–1)
BUN SERPL-MCNC: 42 MG/DL — HIGH (ref 10–20)
CALCIUM SERPL-MCNC: 8.8 MG/DL — SIGNIFICANT CHANGE UP (ref 8.5–10.1)
CHLORIDE SERPL-SCNC: 98 MMOL/L — SIGNIFICANT CHANGE UP (ref 98–110)
CO2 SERPL-SCNC: 29 MMOL/L — SIGNIFICANT CHANGE UP (ref 17–32)
CREAT SERPL-MCNC: 4.4 MG/DL — CRITICAL HIGH (ref 0.7–1.5)
EOSINOPHIL # BLD AUTO: 0.24 K/UL — SIGNIFICANT CHANGE UP (ref 0–0.7)
EOSINOPHIL NFR BLD AUTO: 1.5 % — SIGNIFICANT CHANGE UP (ref 0–8)
GLUCOSE SERPL-MCNC: 130 MG/DL — HIGH (ref 70–110)
HCT VFR BLD CALC: 23 % — LOW (ref 37–47)
HGB BLD-MCNC: 7.2 G/DL — CRITICAL LOW (ref 14–18)
IMM GRANULOCYTES NFR BLD AUTO: 1.2 % — HIGH (ref 0.1–0.3)
LYMPHOCYTES # BLD AUTO: 1.9 K/UL — SIGNIFICANT CHANGE UP (ref 1.2–3.4)
LYMPHOCYTES # BLD AUTO: 12 % — LOW (ref 20.5–51.1)
MCHC RBC-ENTMCNC: 27.5 PG — SIGNIFICANT CHANGE UP (ref 27–31)
MCHC RBC-ENTMCNC: 31.3 G/DL — LOW (ref 32–37)
MCV RBC AUTO: 87.8 FL — SIGNIFICANT CHANGE UP (ref 81–91)
MONOCYTES # BLD AUTO: 1.5 K/UL — HIGH (ref 0.1–0.6)
MONOCYTES NFR BLD AUTO: 9.5 % — HIGH (ref 1.7–9.3)
NEUTROPHILS # BLD AUTO: 11.99 K/UL — HIGH (ref 1.4–6.5)
NEUTROPHILS NFR BLD AUTO: 75.6 % — HIGH (ref 42.2–75.2)
NRBC # BLD: 0 /100 WBCS — SIGNIFICANT CHANGE UP (ref 0–0)
PHOSPHATE SERPL-MCNC: 4.6 MG/DL — SIGNIFICANT CHANGE UP (ref 2.1–4.9)
PLATELET # BLD AUTO: 409 K/UL — HIGH (ref 130–400)
POTASSIUM SERPL-MCNC: 5.9 MMOL/L — HIGH (ref 3.5–5)
POTASSIUM SERPL-SCNC: 5.9 MMOL/L — HIGH (ref 3.5–5)
RBC # BLD: 2.62 M/UL — LOW (ref 4.2–5.4)
RBC # FLD: 20.1 % — HIGH (ref 11.5–14.5)
SODIUM SERPL-SCNC: 136 MMOL/L — SIGNIFICANT CHANGE UP (ref 135–146)
WBC # BLD: 15.85 K/UL — HIGH (ref 4.8–10.8)
WBC # FLD AUTO: 15.85 K/UL — HIGH (ref 4.8–10.8)

## 2018-03-01 RX ADMIN — Medication 40 MILLIGRAM(S): at 06:06

## 2018-03-01 RX ADMIN — CEFEPIME 100 MILLIGRAM(S): 1 INJECTION, POWDER, FOR SOLUTION INTRAMUSCULAR; INTRAVENOUS at 11:23

## 2018-03-01 RX ADMIN — Medication 150 MILLIGRAM(S): at 10:13

## 2018-03-01 RX ADMIN — Medication 500 MILLIGRAM(S): at 06:06

## 2018-03-01 RX ADMIN — Medication 25 MILLIGRAM(S): at 06:06

## 2018-03-01 RX ADMIN — CARVEDILOL PHOSPHATE 6.25 MILLIGRAM(S): 80 CAPSULE, EXTENDED RELEASE ORAL at 06:06

## 2018-03-01 RX ADMIN — LISINOPRIL 20 MILLIGRAM(S): 2.5 TABLET ORAL at 06:06

## 2018-03-01 RX ADMIN — Medication 100 MILLIGRAM(S): at 06:06

## 2018-03-01 NOTE — CHART NOTE - NSCHARTNOTEFT_GEN_A_CORE
as per dee dee HANNA  who d/w dr. nga garner     request a psych. consult for capacity and decision making

## 2018-03-01 NOTE — PROGRESS NOTE ADULT - SUBJECTIVE AND OBJECTIVE BOX
PHILIPPE SMITH  57y  Female    Patient is a 57y old  Female who presents with a chief complaint of left foot necrosis (23 Feb 2018 22:12)    ALLERGIES:  No Known Allergies      INTERVAL HPI/OVERNIGHT EVENTS:    VITALS:  T(F): 97.3 (03-01-18 @ 06:14), Max: 98 (02-28-18 @ 13:58)  HR: 76 (03-01-18 @ 08:45) (75 - 76)  BP: 139/59 (03-01-18 @ 08:45) (139/59 - 142/71)  RR: 18 (03-01-18 @ 08:45) (16 - 18)  SpO2: --    LABS:  02-27    135  |  96<L>  |  52<H>  ----------------------------<  182<H>  6.2<HH>   |  29  |  5.8<HH>    Ca    8.1<L>      27 Feb 2018 10:13      MICROBIOLOGY:    MEDICATION:  cefepime  IVPB 2000 milliGRAM(s) IV Intermittent every 48 hours  darbepoetin Injectable ViaL 80 MICROGram(s) IV Push <User Schedule>  dextrose 5%. 1000 milliLiter(s) IV Continuous <Continuous>  dextrose 50% Injectable 12.5 Gram(s) IV Push once  dextrose 50% Injectable 25 Gram(s) IV Push once  dextrose 50% Injectable 25 Gram(s) IV Push once  dextrose Gel 1 Dose(s) Oral once PRN  glucagon  Injectable 1 milliGRAM(s) IntraMuscular once PRN  insulin lispro (HumaLOG) corrective regimen sliding scale   SubCutaneous three times a day before meals  metroNIDAZOLE    Tablet 500 milliGRAM(s) Oral two times a day  vancomycin  IVPB 750 milliGRAM(s) IV Intermittent every 48 hours    RADIOLOGY & ADDITIONAL TESTS:

## 2018-03-01 NOTE — PROGRESS NOTE ADULT - SUBJECTIVE AND OBJECTIVE BOX
Nephrology progress note    Patient is seen and examined, events over the last 24 h noted .  Pt seen on HD today.  No SOB, no pain in LE reported.    Allergies:  No Known Allergies    Hospital Medications:   MEDICATIONS  (STANDING):  aspirin  chewable 81 milliGRAM(s) Oral daily  calcium acetate 667 milliGRAM(s) Oral two times a day with meals  carvedilol Oral Tab/Cap - Peds 6.25 milliGRAM(s) Oral two times a day  cefepime  IVPB 2000 milliGRAM(s) IV Intermittent every 48 hours  collagenase Ointment 1 Application(s) Topical daily  darbepoetin Injectable ViaL 80 MICROGram(s) IV Push <User Schedule>  dextrose 5%. 1000 milliLiter(s) (50 mL/Hr) IV Continuous <Continuous>  dextrose 50% Injectable 12.5 Gram(s) IV Push once  dextrose 50% Injectable 25 Gram(s) IV Push once  dextrose 50% Injectable 25 Gram(s) IV Push once  furosemide    Tablet 40 milliGRAM(s) Oral daily  hydrALAZINE 25 milliGRAM(s) Oral three times a day  insulin glargine SubCutaneous Injection (LANTUS) - Peds 12 Unit(s) SubCutaneous at bedtime  insulin lispro (HumaLOG) corrective regimen sliding scale   SubCutaneous three times a day before meals  lisinopril 20 milliGRAM(s) Oral daily  melatonin 3 milliGRAM(s) Oral at bedtime  metolazone 2.5 milliGRAM(s) Oral daily  metroNIDAZOLE    Tablet 500 milliGRAM(s) Oral two times a day  pregabalin 100 milliGRAM(s) Oral three times a day  sevelamer hydrochloride 800 milliGRAM(s) Oral with breakfast  vancomycin  IVPB 750 milliGRAM(s) IV Intermittent every 48 hours        VITALS:  T(F): 97.3 (03-01-18 @ 06:14), Max: 98 (02-28-18 @ 13:58)  HR: 75 (03-01-18 @ 11:45)  BP: 125/55 (03-01-18 @ 11:45)  RR: 20 (03-01-18 @ 11:45)  SpO2: --  Wt(kg): --    02-27 @ 07:01  -  02-28 @ 07:00  --------------------------------------------------------  IN: 240 mL / OUT: 0 mL / NET: 240 mL    02-28 @ 07:01  -  03-01 @ 07:00  --------------------------------------------------------  IN: 0 mL / OUT: 1 mL / NET: -1 mL    03-01 @ 07:01  -  03-01 @ 12:27  --------------------------------------------------------  IN: 0 mL / OUT: 2500 mL / NET: -2500 mL        Weight (kg): 68.2 (03-01 @ 09:46)    PHYSICAL EXAM:  Constitutional: NAD  HEENT: anicteric sclera, oropharynx clear, MMM  Neck: No JVD  Respiratory: CTAB, no wheezes, rales or rhonchi  Cardiovascular: S1, S2, RRR  Gastrointestinal: BS+, soft, NT/ND  Extremities: mild peripheral edema, Lt foot gangrene  Neurological: awake, alert  : No CVA tenderness. No anderson.   Skin: No rashes  Vascular Access: Tesio    LABS:  03-01    136  |  98  |  42<H>  ----------------------------<  130<H>  5.9<H>   |  29  |  4.4<HH>    Ca    8.8      01 Mar 2018 10:19  Phos  4.6     03-01                            7.2    15.85 )-----------( 409      ( 01 Mar 2018 10:19 )             23.0       Urine Studies:      RADIOLOGY & ADDITIONAL STUDIES:

## 2018-03-02 LAB
FERRITIN SERPL-MCNC: 1091 NG/ML — HIGH (ref 15–150)
IRON SATN MFR SERPL: 16 % — SIGNIFICANT CHANGE UP (ref 14–50)
IRON SATN MFR SERPL: 19 UG/DL — LOW (ref 30–160)
IRON SATN MFR SERPL: 19 UG/DL — LOW (ref 30–160)
TIBC SERPL-MCNC: 116 UG/DL — LOW (ref 220–430)
UIBC SERPL-MCNC: 97 UG/DL — LOW (ref 110–370)

## 2018-03-02 RX ADMIN — LISINOPRIL 20 MILLIGRAM(S): 2.5 TABLET ORAL at 05:55

## 2018-03-02 RX ADMIN — Medication 500 MILLIGRAM(S): at 17:13

## 2018-03-02 RX ADMIN — Medication 1 APPLICATION(S): at 13:52

## 2018-03-02 RX ADMIN — SEVELAMER CARBONATE 800 MILLIGRAM(S): 2400 POWDER, FOR SUSPENSION ORAL at 08:42

## 2018-03-02 RX ADMIN — CARVEDILOL PHOSPHATE 6.25 MILLIGRAM(S): 80 CAPSULE, EXTENDED RELEASE ORAL at 05:54

## 2018-03-02 RX ADMIN — INSULIN GLARGINE 12 UNIT(S): 100 INJECTION, SOLUTION SUBCUTANEOUS at 22:56

## 2018-03-02 RX ADMIN — Medication 25 MILLIGRAM(S): at 13:52

## 2018-03-02 RX ADMIN — Medication 667 MILLIGRAM(S): at 08:42

## 2018-03-02 RX ADMIN — Medication 2: at 12:13

## 2018-03-02 RX ADMIN — CARVEDILOL PHOSPHATE 6.25 MILLIGRAM(S): 80 CAPSULE, EXTENDED RELEASE ORAL at 17:13

## 2018-03-02 RX ADMIN — Medication 25 MILLIGRAM(S): at 05:54

## 2018-03-02 RX ADMIN — Medication 25 MILLIGRAM(S): at 22:31

## 2018-03-02 RX ADMIN — Medication 500 MILLIGRAM(S): at 05:55

## 2018-03-02 RX ADMIN — Medication 3 MILLIGRAM(S): at 22:31

## 2018-03-02 RX ADMIN — Medication 40 MILLIGRAM(S): at 05:54

## 2018-03-02 RX ADMIN — Medication 100 MILLIGRAM(S): at 05:54

## 2018-03-02 RX ADMIN — Medication 100 MILLIGRAM(S): at 13:52

## 2018-03-02 RX ADMIN — Medication 100 MILLIGRAM(S): at 22:57

## 2018-03-02 RX ADMIN — Medication 667 MILLIGRAM(S): at 17:13

## 2018-03-02 NOTE — CHART NOTE - NSCHARTNOTEFT_GEN_A_CORE
Registered Dietitian Follow-Up     Patient Profile Reviewed                           Yes [X]   No []     Nutrition History Previously Obtained        Yes [X]  No []       Pertinent Subjective Information: Visited pt for an at nutritional risk follow up. Patient awake but did not respond to any questions. Per PCA, pt eats very little & prefers drinking over eating. PCA unsure if pt has been receiving prostat.     Pertinent Medical Interventions: Pt refusing transfer to [facility] for BKA; pt with L foot gangrene. Last HD treatment on 3/1.     Diet order: Renal Restrictions; prostat q12 not yet activated     Anthropometrics:  - Ht. 60 inches  - Wt. 152.3 lbs.  - %wt change 1.3%  - BMI 29.7  -  lbs.     Pertinent Lab Data: 3/1- H/H 7.2/23L, K 5.9H, BUN 42H, Cr 4.4H, Gluc 130H, GFR 12L; blood glucose levels 121-167mg/dL     Pertinent Meds: Vancomycin, D5%, Dextrose 50%, Humalog, Glucagon, Phoslo, Lasix, Lantus, Melatonin     Physical Findings:  - Appearance: awake and alert; not oriented; sitting up in bed  - GI function: BM+ 3/1  - Tubes: N/A  - Oral/Mouth cavity: no difficulties chewing/swallowing noted  - Skin:  L DFU with necrotic tissue, + gangrene     Nutrition Requirements  Weight Used: admission     Estimated Energy Needs    Continue [X]  Adjust []  Adjusted Energy Recommendations:  3893-1880 kcal/day per 25-30kcals/kg        Estimated Protein Needs    Continue [X]  Adjust []  Adjusted Protein Recommendations:  82-95 gm/day per 1.2-1.4 g/kg        Estimated Fluid Needs        Continue [X]  Adjust []  Adjusted Fluid Recommendations:  1000 mL/day (FR 2/2 HD)     Nutrient Intake: % intakes noted in chart; average: 65%        [] Previous Nutrition Diagnosis: Increased protein needs            [X] Ongoing          [] Resolved    [] No active nutrition diagnosis identified at this time     Nutrition Diagnostic #1  Problem:  Etiology:  Statement:     Nutrition Diagnostic #2  Problem:  Etiology:  Statement:     Nutrition Intervention- Recommend prostat q12 again so order is activated. Also, recommend Nepro q24 to better meet protein needs.     Goal/Expected Outcome: Pt able to meet >75% of needs within 3 days through meals, snacks, and medical food supplements     Indicator/Monitoring: RD to monitor PO intakes and nutrition-focused physical findings.

## 2018-03-02 NOTE — PROGRESS NOTE ADULT - SUBJECTIVE AND OBJECTIVE BOX
Nephrology progress note    Patient is seen and examined, events over the last 24 h noted .  No major events seen by psychiatry and deemed not having capacity     Allergies:  No Known Allergies    Hospital Medications:   MEDICATIONS  (STANDING):  aspirin  chewable 81 milliGRAM(s) Oral daily  calcium acetate 667 milliGRAM(s) Oral two times a day with meals  carvedilol Oral Tab/Cap - Peds 6.25 milliGRAM(s) Oral two times a day  cefepime  IVPB 2000 milliGRAM(s) IV Intermittent every 48 hours  collagenase Ointment 1 Application(s) Topical daily  darbepoetin Injectable ViaL 80 MICROGram(s) IV Push <User Schedule>  furosemide    Tablet 40 milliGRAM(s) Oral daily  hydrALAZINE 25 milliGRAM(s) Oral three times a day  insulin glargine SubCutaneous Injection (LANTUS) - Peds 12 Unit(s) SubCutaneous at bedtime  insulin lispro (HumaLOG) corrective regimen sliding scale   SubCutaneous three times a day before meals  lisinopril 20 milliGRAM(s) Oral daily  melatonin 3 milliGRAM(s) Oral at bedtime  metolazone 2.5 milliGRAM(s) Oral daily  metroNIDAZOLE    Tablet 500 milliGRAM(s) Oral two times a day  pregabalin 100 milliGRAM(s) Oral three times a day  sevelamer hydrochloride 800 milliGRAM(s) Oral with breakfast  vancomycin  IVPB 750 milliGRAM(s) IV Intermittent every 48 hours        VITALS:  T(F): 99 (03-02-18 @ 05:15), Max: 99.7 (03-01-18 @ 12:30)  HR: 82 (03-02-18 @ 05:15)  BP: 155/80 (03-02-18 @ 05:15)  RR: 18 (03-02-18 @ 05:15)      02-28 @ 07:01  -  03-01 @ 07:00  --------------------------------------------------------  IN: 0 mL / OUT: 1 mL / NET: -1 mL    03-01 @ 07:01  -  03-02 @ 07:00  --------------------------------------------------------  IN: 0 mL / OUT: 2500 mL / NET: -2500 mL    03-02 @ 07:01  -  03-02 @ 10:59  --------------------------------------------------------  IN: 420 mL / OUT: 0 mL / NET: 420 mL        Weight (kg): 68.2 (03-02 @ 10:34)    PHYSICAL EXAM:  Constitutional: NAD  HEENT: anicteric sclera, oropharynx clear, MMM  Neck: No JVD  Respiratory: CTAB, no wheezes, rales or rhonchi  Cardiovascular: S1, S2, RRR  Gastrointestinal: BS+, soft, NT/ND  Extremities: mild peripheral edema , left foot gangrene   Skin: No rashes    LABS:  03-01    136  |  98  |  42<H>  ----------------------------<  130<H>  5.9<H>   |  29  |  4.4<HH>    Ca    8.8      01 Mar 2018 10:19  Phos  4.6     03-01                            7.2    15.85 )-----------( 409      ( 01 Mar 2018 10:19 )             23.0             RADIOLOGY & ADDITIONAL STUDIES:

## 2018-03-02 NOTE — CONSULT NOTE ADULT - ASSESSMENT
r/o delirium     she is not capable of making informed decision regarding her treatment and disposition.

## 2018-03-02 NOTE — CHART NOTE - NSCHARTNOTEFT_GEN_A_CORE
57y old female with diabetic foot ulcer. pt needs  BKA. Transfer to [facility] for vascular consult and BKA.  Will transfer [place]. case d/w dr garner

## 2018-03-02 NOTE — PROGRESS NOTE ADULT - SUBJECTIVE AND OBJECTIVE BOX
PHILIPPE SMITH  57y  Female    Patient is a 57y old  Female who presents with a chief complaint of left foot necrosis     ALLERGIES:  No Known Allergies      INTERVAL HPI/OVERNIGHT EVENTS:    VITALS:  T(F): 99 (03-02-18 @ 05:15), Max: 99.7 (03-01-18 @ 12:30)  HR: 82 (03-02-18 @ 05:15) (75 - 85)  BP: 155/80 (03-02-18 @ 05:15) (120/57 - 156/64)  RR: 18 (03-02-18 @ 05:15) (18 - 20)  SpO2: --    LABS:  03-01    136  |  98  |  42<H>  ----------------------------<  130<H>  5.9<H>   |  29  |  4.4<HH>    Ca    8.8      01 Mar 2018 10:19  Phos  4.6     03-01      MICROBIOLOGY:    MEDICATION:  cefepime  IVPB 2000 milliGRAM(s) IV Intermittent every 48 hours  darbepoetin Injectable ViaL 80 MICROGram(s) IV Push <User Schedule>  dextrose 5%. 1000 milliLiter(s) IV Continuous <Continuous>  dextrose 50% Injectable 12.5 Gram(s) IV Push once  dextrose 50% Injectable 25 Gram(s) IV Push once  dextrose 50% Injectable 25 Gram(s) IV Push once  dextrose Gel 1 Dose(s) Oral once PRN  glucagon  Injectable 1 milliGRAM(s) IntraMuscular once PRN  insulin lispro (HumaLOG) corrective regimen sliding scale   SubCutaneous three times a day before meals  metroNIDAZOLE    Tablet 500 milliGRAM(s) Oral two times a day  vancomycin  IVPB 750 milliGRAM(s) IV Intermittent every 48 hours    RADIOLOGY & ADDITIONAL TESTS:

## 2018-03-02 NOTE — CONSULT NOTE ADULT - SUBJECTIVE AND OBJECTIVE BOX
Consult requested to evaluate the capacity to make medical decision.    reviewed and referred to chart notes. pt has Consult requested to evaluate the capacity to make medical decision.    reviewed and referred to chart notes. pt has left foot gangrene requiring possible surgical intervention. She is observed to be calm engages with difficulty. she keeps repeating "my foot smells". she is alert not oriented to place person. she is not able to give names of her daughters. she is confused and disoriented with change in her mental status.    no prior psych hx.    pt is alert ox1 confused changing attention span with cognitive deficits.

## 2018-03-02 NOTE — PROGRESS NOTE ADULT - EXTREMITIES COMMENTS
gangrene foot
gangrene left foot
gangrene left foot
wet gangrene
gangrene and foul smelling left foot
gangrene left foot and foul smelling

## 2018-03-03 RX ORDER — VANCOMYCIN HCL 1 G
750 VIAL (EA) INTRAVENOUS
Qty: 0 | Refills: 0 | Status: DISCONTINUED | OUTPATIENT
Start: 2018-03-03 | End: 2018-03-06

## 2018-03-03 RX ORDER — CEFEPIME 1 G/1
2000 INJECTION, POWDER, FOR SOLUTION INTRAMUSCULAR; INTRAVENOUS
Qty: 0 | Refills: 0 | Status: DISCONTINUED | OUTPATIENT
Start: 2018-03-03 | End: 2018-03-06

## 2018-03-03 RX ADMIN — Medication 667 MILLIGRAM(S): at 17:04

## 2018-03-03 RX ADMIN — SEVELAMER CARBONATE 800 MILLIGRAM(S): 2400 POWDER, FOR SUSPENSION ORAL at 08:32

## 2018-03-03 RX ADMIN — Medication 2: at 17:04

## 2018-03-03 RX ADMIN — CARVEDILOL PHOSPHATE 6.25 MILLIGRAM(S): 80 CAPSULE, EXTENDED RELEASE ORAL at 05:46

## 2018-03-03 RX ADMIN — Medication 150 MILLIGRAM(S): at 10:56

## 2018-03-03 RX ADMIN — Medication 667 MILLIGRAM(S): at 08:32

## 2018-03-03 RX ADMIN — CEFEPIME 100 MILLIGRAM(S): 1 INJECTION, POWDER, FOR SOLUTION INTRAMUSCULAR; INTRAVENOUS at 12:36

## 2018-03-03 RX ADMIN — INSULIN GLARGINE 12 UNIT(S): 100 INJECTION, SOLUTION SUBCUTANEOUS at 21:42

## 2018-03-03 RX ADMIN — Medication 25 MILLIGRAM(S): at 05:46

## 2018-03-03 RX ADMIN — Medication 40 MILLIGRAM(S): at 05:46

## 2018-03-03 RX ADMIN — Medication 650 MILLIGRAM(S): at 18:55

## 2018-03-03 RX ADMIN — Medication 25 MILLIGRAM(S): at 21:42

## 2018-03-03 RX ADMIN — Medication 2: at 13:17

## 2018-03-03 RX ADMIN — Medication 500 MILLIGRAM(S): at 05:47

## 2018-03-03 RX ADMIN — CARVEDILOL PHOSPHATE 6.25 MILLIGRAM(S): 80 CAPSULE, EXTENDED RELEASE ORAL at 17:04

## 2018-03-03 RX ADMIN — Medication 500 MILLIGRAM(S): at 17:04

## 2018-03-03 RX ADMIN — Medication 25 MILLIGRAM(S): at 13:17

## 2018-03-03 RX ADMIN — LISINOPRIL 20 MILLIGRAM(S): 2.5 TABLET ORAL at 05:47

## 2018-03-03 RX ADMIN — Medication 3 MILLIGRAM(S): at 21:42

## 2018-03-03 NOTE — PROGRESS NOTE ADULT - PROBLEM SELECTOR PLAN 1
Patient examined and evaluated.   Patient dressed with Betadine/DSD/ABD/Kerlix bilaterally  Continue Abx per ID recommendations  Will f/u with attending for any further recommendations
prognosis is poor.   likely amputation
patient examined and evaluated  Patient dressed with betadine/DSD/Kerlix to bilateral feet  f/u vascular  continue IV Abx per ID

## 2018-03-03 NOTE — PROGRESS NOTE ADULT - SUBJECTIVE AND OBJECTIVE BOX
patient is seen and examined  no major events over last 24 hours  no chest pain ,no SOB  left diabetic infected foot ulcer  ON EXAMINATION  Vital Signs Last 24 Hrs  T(C): 36.3 (03 Mar 2018 06:49), Max: 37.8 (02 Mar 2018 18:50)  T(F): 97.4 (03 Mar 2018 06:49), Max: 100.1 (02 Mar 2018 18:50)  HR: 71 (03 Mar 2018 06:49) (71 - 78)  BP: 152/71 (03 Mar 2018 06:49) (117/57 - 171/78)  BP(mean): --  RR: 16 (03 Mar 2018 06:49) (16 - 18)    CHEST BILATERAL BASAL CRACKLES  CVS S1 AND S2 NO ADDED SOUND  ABDOMEN SOFT LAX NO ORGANOMEGALY  MILD PEDAL EDEMA  LEFT INFECTED DIABETIC FOOT ULCER    03-01    136  |  98  |  42<H>  ----------------------------<  130<H>  5.9<H>   |  29  |  4.4<HH>    Ca    8.8      01 Mar 2018 10:19  Phos  4.6     03-01                          7.2    15.85 )-----------( 409      ( 01 Mar 2018 10:19 )             23.0   MEDICATIONS  (STANDING):  calcium acetate 667 milliGRAM(s) Oral two times a day with meals  carvedilol Oral Tab/Cap - Peds 6.25 milliGRAM(s) Oral two times a day  cefepime  IVPB 2000 milliGRAM(s) IV Intermittent every 48 hours  collagenase Ointment 1 Application(s) Topical daily  darbepoetin Injectable ViaL 80 MICROGram(s) IV Push <User Schedule>  dextrose 5%. 1000 milliLiter(s) (50 mL/Hr) IV Continuous <Continuous>  dextrose 50% Injectable 12.5 Gram(s) IV Push once  dextrose 50% Injectable 25 Gram(s) IV Push once  dextrose 50% Injectable 25 Gram(s) IV Push once  furosemide    Tablet 40 milliGRAM(s) Oral daily  hydrALAZINE 25 milliGRAM(s) Oral three times a day  insulin glargine SubCutaneous Injection (LANTUS) - Peds 12 Unit(s) SubCutaneous at bedtime  insulin lispro (HumaLOG) corrective regimen sliding scale   SubCutaneous three times a day before meals  lisinopril 20 milliGRAM(s) Oral daily  melatonin 3 milliGRAM(s) Oral at bedtime  metolazone 2.5 milliGRAM(s) Oral daily  metroNIDAZOLE    Tablet 500 milliGRAM(s) Oral two times a day  sevelamer hydrochloride 800 milliGRAM(s) Oral with breakfast  vancomycin  IVPB 750 milliGRAM(s) IV Intermittent every 48 hours    MEDICATIONS  (PRN):  acetaminophen   Tablet. 650 milliGRAM(s) Oral every 6 hours PRN Mild Pain (1 - 3)  dextrose Gel 1 Dose(s) Oral once PRN Blood Glucose LESS THAN 70 milliGRAM(s)/deciliter  glucagon  Injectable 1 milliGRAM(s) IntraMuscular once PRN Glucose LESS THAN 70 milligrams/deciliter

## 2018-03-03 NOTE — PROGRESS NOTE ADULT - SUBJECTIVE AND OBJECTIVE BOX
PROGRESS NOTE   Patient is a 57y old  Female who presents with a chief complaint of left foot necrosis (23 Feb 2018 22:12)      HPI:  56 y/o female  sent by nursing home  for picc line .  Pt stating " i am here for a PICC LINE placement so that i can get my daily ABX" (23 Feb 2018 20:38)      Vital Signs Last 24 Hrs  T(C): 36.7 (03 Mar 2018 21:38), Max: 37 (03 Mar 2018 13:36)  T(F): 98.1 (03 Mar 2018 21:38), Max: 98.6 (03 Mar 2018 13:36)  HR: 82 (03 Mar 2018 21:38) (71 - 82)  BP: 130/60 (03 Mar 2018 21:38) (101/43 - 152/71)  BP(mean): --  RR: 20 (03 Mar 2018 21:38) (16 - 20)  SpO2: --                        PHYSICAL EXAM  GEN: PHILIPPE SMITH is a pleasant well-nourished, well developed 57y Female in no acute distress, alert awake.   LE Focused:  Vasc:  DP/PT pulses non-palpable bilaterally. Skin temp warm to cool proximal to distal bilaterally.                          Derm: left foot wet gangrene. Right plantar midfoot superficial ulceration                         Neuro: Light touch diminished sensation bilaterally                          MSK: No pain to palpation.

## 2018-03-04 RX ADMIN — CARVEDILOL PHOSPHATE 6.25 MILLIGRAM(S): 80 CAPSULE, EXTENDED RELEASE ORAL at 17:40

## 2018-03-04 RX ADMIN — LISINOPRIL 20 MILLIGRAM(S): 2.5 TABLET ORAL at 05:46

## 2018-03-04 RX ADMIN — Medication 4: at 17:37

## 2018-03-04 RX ADMIN — Medication 667 MILLIGRAM(S): at 08:20

## 2018-03-04 RX ADMIN — Medication 4: at 11:53

## 2018-03-04 RX ADMIN — Medication 25 MILLIGRAM(S): at 05:47

## 2018-03-04 RX ADMIN — Medication 500 MILLIGRAM(S): at 17:40

## 2018-03-04 RX ADMIN — Medication 40 MILLIGRAM(S): at 05:47

## 2018-03-04 RX ADMIN — CARVEDILOL PHOSPHATE 6.25 MILLIGRAM(S): 80 CAPSULE, EXTENDED RELEASE ORAL at 05:47

## 2018-03-04 RX ADMIN — Medication 500 MILLIGRAM(S): at 05:46

## 2018-03-04 RX ADMIN — Medication 3 MILLIGRAM(S): at 21:16

## 2018-03-04 RX ADMIN — Medication 25 MILLIGRAM(S): at 21:15

## 2018-03-04 RX ADMIN — Medication 667 MILLIGRAM(S): at 17:40

## 2018-03-04 NOTE — PROGRESS NOTE ADULT - SUBJECTIVE AND OBJECTIVE BOX
patient is seen and examined  no major events over last 24 hours  denies any complaints    ON EXAMINATION  Vital Signs Last 24 Hrs  T(C): 36.7 (04 Mar 2018 05:00), Max: 36.7 (03 Mar 2018 21:38)  T(F): 98.1 (04 Mar 2018 05:00), Max: 98.1 (03 Mar 2018 21:38)  HR: 73 (04 Mar 2018 05:00) (73 - 82)  BP: 138/63 (04 Mar 2018 05:00) (130/60 - 138/63)  RR: 18 (04 Mar 2018 05:00) (18 - 20)  SpO2: 97% (03 Mar 2018 20:00) (97% - 97%)    MILD PALE  NOT IN ANY ACUTE DISTRESS  CHEST BILATERAL MINIMAL BASAL CRACKLES  CVS S1 AND S2 NO ADDED SOUND  ABDOMEN SOFT LAX NO ORGANOMEGALY  INFECTED LEFT FOOT GANGRENE  RT CHEST WALL CATH    labs  no latest labs  MEDICATIONS  (STANDING):  calcium acetate 667 milliGRAM(s) Oral two times a day with meals  carvedilol Oral Tab/Cap - Peds 6.25 milliGRAM(s) Oral two times a day  cefepime  IVPB 2000 milliGRAM(s) IV Intermittent <User Schedule>  collagenase Ointment 1 Application(s) Topical daily  darbepoetin Injectable ViaL 80 MICROGram(s) IV Push <User Schedule>  dextrose 5%. 1000 milliLiter(s) (50 mL/Hr) IV Continuous <Continuous>  dextrose 50% Injectable 12.5 Gram(s) IV Push once  dextrose 50% Injectable 25 Gram(s) IV Push once  dextrose 50% Injectable 25 Gram(s) IV Push once  furosemide    Tablet 40 milliGRAM(s) Oral daily  hydrALAZINE 25 milliGRAM(s) Oral three times a day  insulin glargine SubCutaneous Injection (LANTUS) - Peds 12 Unit(s) SubCutaneous at bedtime  insulin lispro (HumaLOG) corrective regimen sliding scale   SubCutaneous three times a day before meals  lisinopril 20 milliGRAM(s) Oral daily  melatonin 3 milliGRAM(s) Oral at bedtime  metolazone 2.5 milliGRAM(s) Oral daily  metroNIDAZOLE    Tablet 500 milliGRAM(s) Oral two times a day  sevelamer hydrochloride 800 milliGRAM(s) Oral with breakfast  vancomycin  IVPB 750 milliGRAM(s) IV Intermittent <User Schedule>    MEDICATIONS  (PRN):  acetaminophen   Tablet. 650 milliGRAM(s) Oral every 6 hours PRN Mild Pain (1 - 3)  dextrose Gel 1 Dose(s) Oral once PRN Blood Glucose LESS THAN 70 milliGRAM(s)/deciliter  glucagon  Injectable 1 milliGRAM(s) IntraMuscular once PRN Glucose LESS THAN 70 milligrams/deciliter

## 2018-03-04 NOTE — PROVIDER CONTACT NOTE (OTHER) - ACTION/TREATMENT ORDERED:
md alanis made aware of odor from patient lower extremities, color and wound. as per md pardo from podiatry, patient will be getting amputation with vascular team.

## 2018-03-04 NOTE — PROGRESS NOTE ADULT - SUBJECTIVE AND OBJECTIVE BOX
Hospital Day:  9d    Subjective:  no events overnight, laying in bed comfortably, says she doesn't walk uses a wheelchair to get around in the NH, is aware that she is going to have her foot removed on Tuesday    Past Medical Hx:   Type 2 diabetes mellitus  CHF (congestive heart failure)  End stage renal failure on dialysis  Hypertension  Hyperlipemia  Heart failure  Acute heart failure, unspecified heart failure type  Heart failure    Past Sx:  History of femoral angiogram  Port-a-cath in place    Allergies:  No Known Allergies    Current Meds:   Standng Meds:  calcium acetate 667 milliGRAM(s) Oral two times a day with meals  carvedilol Oral Tab/Cap - Peds 6.25 milliGRAM(s) Oral two times a day  cefepime  IVPB 2000 milliGRAM(s) IV Intermittent <User Schedule>  collagenase Ointment 1 Application(s) Topical daily  darbepoetin Injectable ViaL 80 MICROGram(s) IV Push <User Schedule>  dextrose 5%. 1000 milliLiter(s) (50 mL/Hr) IV Continuous <Continuous>  dextrose 50% Injectable 12.5 Gram(s) IV Push once  dextrose 50% Injectable 25 Gram(s) IV Push once  dextrose 50% Injectable 25 Gram(s) IV Push once  furosemide    Tablet 40 milliGRAM(s) Oral daily  hydrALAZINE 25 milliGRAM(s) Oral three times a day  insulin glargine SubCutaneous Injection (LANTUS) - Peds 12 Unit(s) SubCutaneous at bedtime  insulin lispro (HumaLOG) corrective regimen sliding scale   SubCutaneous three times a day before meals  lisinopril 20 milliGRAM(s) Oral daily  melatonin 3 milliGRAM(s) Oral at bedtime  metolazone 2.5 milliGRAM(s) Oral daily  metroNIDAZOLE    Tablet 500 milliGRAM(s) Oral two times a day  sevelamer hydrochloride 800 milliGRAM(s) Oral with breakfast  vancomycin  IVPB 750 milliGRAM(s) IV Intermittent <User Schedule>    PRN Meds:  acetaminophen   Tablet. 650 milliGRAM(s) Oral every 6 hours PRN Mild Pain (1 - 3)  dextrose Gel 1 Dose(s) Oral once PRN Blood Glucose LESS THAN 70 milliGRAM(s)/deciliter  glucagon  Injectable 1 milliGRAM(s) IntraMuscular once PRN Glucose LESS THAN 70 milligrams/deciliter    Vital Signs:   T(F): 99.4 (03-04-18 @ 21:08), Max: 99.4 (03-04-18 @ 21:08)  HR: 78 (03-04-18 @ 21:08) (70 - 80)  BP: 140/64 (03-04-18 @ 21:08) (103/53 - 140/64)  RR: 18 (03-04-18 @ 21:08) (17 - 18)  SpO2: --      03-03-18 @ 07:01  -  03-04-18 @ 07:00  --------------------------------------------------------  IN: 0 mL / OUT: 2000 mL / NET: -2000 mL    03-04-18 @ 07:01  -  03-04-18 @ 22:51  --------------------------------------------------------  IN: 0 mL / OUT: 50 mL / NET: -50 mL      Physical Exam:   GENERAL: NAD  HEENT: NCAT  CHEST/LUNG: CTAB, HD catheter right upper chest,   HEART: Regular rate and rhythm; s1 s2 appreciated, No murmurs  ABDOMEN: Soft, Nontender, Nondistended  EXTREMITIES: b/l feet wrapped in kerlix gauze, negative edema  NERVOUS SYSTEM:  Alert to person place and situation (in hospital to have foot removed on tuesday)    Labs:   No new labs, 4pm labs ordered but not resulted, f/u AM labs    Iron with Total Binding Capacity (03.01.18 @ 10:19)    % Saturation, Iron: 16 %    Iron - Total Binding Capacity.: 116 ug/dL    Iron Total, Serum: 19 ug/dL    Unsaturated Iron Binding Capacity: 97 ug/dL  Ferritin, Serum (03.01.18 @ 10:19)    Ferritin, Serum: 1091.0 ng/mL    Radiology:   lower extremity arterial duplex 2/26/18: Bilateral diffuse arterial disease of lower extremities without any   apparent hemodynamical significance.    Xray LE 2/26/18: Left: Fixed flexion deformity. Multiple foci of gas are seen within the soft tissues of the left foot with surrounding bandage (which was fine bone detail). There is moderate osteopenia with a mildly angulated fracture of the fifth metatarsal bone. There is deformity of the calcaneus presumably from prior osteomyelitis.    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 Pt initially refused IV Abx and surgery at [facility], s/p psych eval pt has no capacity to refuse treatment.    # Gangrenous DFU  -local wound care per podiatry   -IV abx per ID: vanco and cefepime post HD and Flagy 500mg q12  -vascular recs: for BKA likely on Tuesday  -Requires cardio eval for pre-op    # CHF  -2D echo pending  -c/w coreg, lisinopril, lasix    # HTN  -c/w hydralazine, coreg, lisinopril    # ESRD  -s/p HD yesterday  -renal recs appreciated: sevelamer d/c  -c/w phoslo, metolozone, lasix  -monitor potassium levels     # DM  -monitor FS  -c/w insulin regimen    # Anemia (normocytic)  -transfuse PRN if <7 or per vascular recs pre-op  -Iron studies consistent with anemia of chronic disease    # DVT ppx needed    # Dispo  -pt states she doesn't walk and has no use for an orthotic in the future  -likely return to NH when stable post procedure Hospital Day:  9d    Subjective:  no events overnight, laying in bed comfortably, says she doesn't walk uses a wheelchair to get around in the NH, is aware that she is going to have her foot removed on Tuesday    Past Medical Hx:   Type 2 diabetes mellitus  CHF (congestive heart failure)  End stage renal failure on dialysis  Hypertension  Hyperlipemia  Heart failure  Acute heart failure, unspecified heart failure type  Heart failure    Past Sx:  History of femoral angiogram  Port-a-cath in place    Allergies:  No Known Allergies    Current Meds:   Standng Meds:  calcium acetate 667 milliGRAM(s) Oral two times a day with meals  carvedilol Oral Tab/Cap - Peds 6.25 milliGRAM(s) Oral two times a day  cefepime  IVPB 2000 milliGRAM(s) IV Intermittent <User Schedule>  collagenase Ointment 1 Application(s) Topical daily  darbepoetin Injectable ViaL 80 MICROGram(s) IV Push <User Schedule>  dextrose 5%. 1000 milliLiter(s) (50 mL/Hr) IV Continuous <Continuous>  dextrose 50% Injectable 12.5 Gram(s) IV Push once  dextrose 50% Injectable 25 Gram(s) IV Push once  dextrose 50% Injectable 25 Gram(s) IV Push once  furosemide    Tablet 40 milliGRAM(s) Oral daily  hydrALAZINE 25 milliGRAM(s) Oral three times a day  insulin glargine SubCutaneous Injection (LANTUS) - Peds 12 Unit(s) SubCutaneous at bedtime  insulin lispro (HumaLOG) corrective regimen sliding scale   SubCutaneous three times a day before meals  lisinopril 20 milliGRAM(s) Oral daily  melatonin 3 milliGRAM(s) Oral at bedtime  metolazone 2.5 milliGRAM(s) Oral daily  metroNIDAZOLE    Tablet 500 milliGRAM(s) Oral two times a day  sevelamer hydrochloride 800 milliGRAM(s) Oral with breakfast  vancomycin  IVPB 750 milliGRAM(s) IV Intermittent <User Schedule>    PRN Meds:  acetaminophen   Tablet. 650 milliGRAM(s) Oral every 6 hours PRN Mild Pain (1 - 3)  dextrose Gel 1 Dose(s) Oral once PRN Blood Glucose LESS THAN 70 milliGRAM(s)/deciliter  glucagon  Injectable 1 milliGRAM(s) IntraMuscular once PRN Glucose LESS THAN 70 milligrams/deciliter    Vital Signs:   T(F): 99.4 (03-04-18 @ 21:08), Max: 99.4 (03-04-18 @ 21:08)  HR: 78 (03-04-18 @ 21:08) (70 - 80)  BP: 140/64 (03-04-18 @ 21:08) (103/53 - 140/64)  RR: 18 (03-04-18 @ 21:08) (17 - 18)  SpO2: --      03-03-18 @ 07:01  -  03-04-18 @ 07:00  --------------------------------------------------------  IN: 0 mL / OUT: 2000 mL / NET: -2000 mL    03-04-18 @ 07:01  -  03-04-18 @ 22:51  --------------------------------------------------------  IN: 0 mL / OUT: 50 mL / NET: -50 mL      Physical Exam:   GENERAL: NAD  HEENT: NCAT  CHEST/LUNG: CTAB, HD catheter right upper chest,   HEART: Regular rate and rhythm; s1 s2 appreciated, No murmurs  ABDOMEN: Soft, Nontender, Nondistended  EXTREMITIES: b/l feet wrapped in kerlix gauze, negative edema  NERVOUS SYSTEM:  Alert to person place and situation (in hospital to have foot removed on tuesday)    Labs:   No new labs, 4pm labs ordered but not resulted, f/u AM labs    Iron with Total Binding Capacity (03.01.18 @ 10:19)    % Saturation, Iron: 16 %    Iron - Total Binding Capacity.: 116 ug/dL    Iron Total, Serum: 19 ug/dL    Unsaturated Iron Binding Capacity: 97 ug/dL  Ferritin, Serum (03.01.18 @ 10:19)    Ferritin, Serum: 1091.0 ng/mL    Radiology:   lower extremity arterial duplex 2/26/18: Bilateral diffuse arterial disease of lower extremities without any   apparent hemodynamical significance.    Xray LE 2/26/18: Left: Fixed flexion deformity. Multiple foci of gas are seen within the soft tissues of the left foot with surrounding bandage (which was fine bone detail). There is moderate osteopenia with a mildly angulated fracture of the fifth metatarsal bone. There is deformity of the calcaneus presumably from prior osteomyelitis.    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 Pt initially refused IV Abx and surgery at [facility], s/p psych eval pt has no capacity to refuse treatment.    # Gangrenous DFU  -afebrile, WBCs downtrending  -local wound care per podiatry   -IV abx per ID: vanco and cefepime post HD and Flagy 500mg q12  -vascular recs: for BKA likely on Tuesday  -Requires cardio eval for pre-op    # CHF  -2D echo pending  -c/w coreg, lisinopril, lasix    # HTN  -c/w hydralazine, coreg, lisinopril    # ESRD  -s/p HD yesterday  -renal recs appreciated: sevelamer d/c, vanc level pending collection  -c/w phoslo, metolazone lasix  -monitor potassium levels     # DM  -monitor FS  -c/w insulin regimen    # Anemia (normocytic)  -c/w Aransep 80mcg q week  -transfuse PRN if <7 or per vascular recs pre-op  -Iron studies consistent with anemia of chronic disease    # DVT ppx needed    # Dispo  -pt states she doesn't walk and has no use for an orthotic in the future  -likely return to NH when stable post procedure

## 2018-03-04 NOTE — PROGRESS NOTE ADULT - SUBJECTIVE AND OBJECTIVE BOX
Patient was seen and examined. Spoke with RN. Chart reviewed.  No events overnight.  Vital Signs Last 24 Hrs  T(F): 98.1 (04 Mar 2018 05:00), Max: 98.6 (03 Mar 2018 13:36)  HR: 73 (04 Mar 2018 05:00) (72 - 82)  BP: 138/63 (04 Mar 2018 05:00) (101/43 - 138/63)  SpO2: 97% (03 Mar 2018 20:00) (97% - 97%)  MEDICATIONS  (STANDING):  calcium acetate 667 milliGRAM(s) Oral two times a day with meals  carvedilol Oral Tab/Cap - Peds 6.25 milliGRAM(s) Oral two times a day  cefepime  IVPB 2000 milliGRAM(s) IV Intermittent <User Schedule>  collagenase Ointment 1 Application(s) Topical daily  darbepoetin Injectable ViaL 80 MICROGram(s) IV Push <User Schedule>  dextrose 5%. 1000 milliLiter(s) (50 mL/Hr) IV Continuous <Continuous>  dextrose 50% Injectable 12.5 Gram(s) IV Push once  dextrose 50% Injectable 25 Gram(s) IV Push once  dextrose 50% Injectable 25 Gram(s) IV Push once  furosemide    Tablet 40 milliGRAM(s) Oral daily  hydrALAZINE 25 milliGRAM(s) Oral three times a day  insulin glargine SubCutaneous Injection (LANTUS) - Peds 12 Unit(s) SubCutaneous at bedtime  insulin lispro (HumaLOG) corrective regimen sliding scale   SubCutaneous three times a day before meals  lisinopril 20 milliGRAM(s) Oral daily  melatonin 3 milliGRAM(s) Oral at bedtime  metolazone 2.5 milliGRAM(s) Oral daily  metroNIDAZOLE    Tablet 500 milliGRAM(s) Oral two times a day  sevelamer hydrochloride 800 milliGRAM(s) Oral with breakfast  vancomycin  IVPB 750 milliGRAM(s) IV Intermittent <User Schedule>    MEDICATIONS  (PRN):  acetaminophen   Tablet. 650 milliGRAM(s) Oral every 6 hours PRN Mild Pain (1 - 3)  dextrose Gel 1 Dose(s) Oral once PRN Blood Glucose LESS THAN 70 milliGRAM(s)/deciliter  glucagon  Injectable 1 milliGRAM(s) IntraMuscular once PRN Glucose LESS THAN 70 milligrams/deciliter        General: comfortable, NAD  Neurology: A&Ox3, nonfocal  Head:  Normocephalic, atraumatic  ENT:  Mucosa moist, no ulcerations  Neck:  Supple, no JVD,   Resp: CTA B/L  CV: RRR, S1S2,   GI: Soft, NT, bowel sounds  MS: LLE wound    A/P:  56 yo woman transferred from [facility] for BKA due to Gangrene of left foot.  Patient dressed with betadine/DSD/Kerlix to bilateral feet  f/u vascular- ?plan for surgery    podiatry f/u- wound care- please call to notify them of pt location    Cardio for preop assessment    continue IV Abx per ID.  DVT prophylaxis  Decubitus prevention- all measures as per RN protocol  Please call or text me with any questions or updates

## 2018-03-04 NOTE — PROGRESS NOTE ADULT - SUBJECTIVE AND OBJECTIVE BOX
Podiatry follow up  Pt seen bedside. Pt confused in time and place. Pt aware she is going for a procedure but states it is next month.     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

## 2018-03-04 NOTE — CHART NOTE - NSCHARTNOTEFT_GEN_A_CORE
Registered Dietitian Follow-Up     Patient Profile Reviewed                           Yes [X]   No []     Nutrition History Previously Obtained        Yes [X]  No []       Pertinent Subjective Information: Pt alert and oriented but confused at times. Reports good angelique/PO intake and is consuming 100% of meals per EMR.      Pertinent Medical Interventions: chronic osteomyelitis pre-op L BKA, likely Tuesday     Diet order: Renal Diet     Anthropometrics:  - Ht. 60 inches  - Wt. 152.3 lbs.- wt appears stable  - BMI 29.7    Pertinent Lab Data: No labs since 3/1      Pertinent Meds: cefepime, vanco, metronidazole, sevelamer, cavedilol, calcium acetate, furosemide, lisinopril, insulin, hydralazine     Physical Findings:  - Appearance: alert/oriented  - GI function: fecal incontinence   - Tubes: N/A  - Oral/Mouth cavity: no difficulties chewing/swallowing noted  - Skin:  L DFU with necrotic tissue, + gangrene     Nutrition Requirements  Weight Used: admission     Estimated Energy Needs    Continue [X]  Adjust []  Adjusted Energy Recommendations:  8796-4145 kcal/day per 25-30kcals/kg        Estimated Protein Needs    Continue [X]  Adjust []  Adjusted Protein Recommendations:  82-95 gm/day per 1.2-1.4 g/kg        Estimated Fluid Needs        Continue [X]  Adjust []  Adjusted Fluid Recommendations:  1000 mL/day (FR 2/2 HD)     Nutrient Intake: 100% per EMR, and good PO per pt report (meeting needs)        [] Previous Nutrition Diagnosis: Increased protein needs (however pt meeting needs, consuming 100% of meals, no need for supplement at this time)            [X] Ongoing          [] Resolved         Nutrition Intervention- Meals and snacks     Goal/Expected Outcome: Pt able to meet >75% of needs within 3 days through meals, snacks     Indicator/Monitoring: RD to monitor energy intake, renal fx/lytes

## 2018-03-04 NOTE — CONSULT NOTE ADULT - SUBJECTIVE AND OBJECTIVE BOX
VASCULAR SURGERY PROGRESS NOTE    Hospital Day #9d  Post-Op Day #    Procedure/Dx:    Events of past 24 hours:      Diet: reg    I&O's Detail    03 Mar 2018 07:01  -  04 Mar 2018 07:00  --------------------------------------------------------  IN:  Total IN: 0 mL    OUT:    Other: 2000 mL  Total OUT: 2000 mL    Total NET: -2000 mL          PHYSICAL EXAM    Vital Signs Last 24 Hrs  T(C): 36.7 (04 Mar 2018 05:00), Max: 36.7 (03 Mar 2018 21:38)  T(F): 98.1 (04 Mar 2018 05:00), Max: 98.1 (03 Mar 2018 21:38)  HR: 73 (04 Mar 2018 05:00) (73 - 82)  BP: 103/53 (04 Mar 2018 08:07) (103/53 - 138/63)  BP(mean): --  RR: 18 (04 Mar 2018 05:00) (18 - 20)  SpO2: 97% (03 Mar 2018 20:00) (97% - 97%)    Gen: NAD, AAOx3  CV: S1 S2 RRR  Lungs: CTABL  Abd: +BS SOFT NT ND  Incision:   Ext: NO EDEMA NON TENDER    MEDICATIONS  (STANDING):  calcium acetate 667 milliGRAM(s) Oral two times a day with meals  carvedilol Oral Tab/Cap - Peds 6.25 milliGRAM(s) Oral two times a day  cefepime  IVPB 2000 milliGRAM(s) IV Intermittent <User Schedule>  collagenase Ointment 1 Application(s) Topical daily  darbepoetin Injectable ViaL 80 MICROGram(s) IV Push <User Schedule>  dextrose 5%. 1000 milliLiter(s) (50 mL/Hr) IV Continuous <Continuous>  dextrose 50% Injectable 12.5 Gram(s) IV Push once  dextrose 50% Injectable 25 Gram(s) IV Push once  dextrose 50% Injectable 25 Gram(s) IV Push once  furosemide    Tablet 40 milliGRAM(s) Oral daily  hydrALAZINE 25 milliGRAM(s) Oral three times a day  insulin glargine SubCutaneous Injection (LANTUS) - Peds 12 Unit(s) SubCutaneous at bedtime  insulin lispro (HumaLOG) corrective regimen sliding scale   SubCutaneous three times a day before meals  lisinopril 20 milliGRAM(s) Oral daily  melatonin 3 milliGRAM(s) Oral at bedtime  metolazone 2.5 milliGRAM(s) Oral daily  metroNIDAZOLE    Tablet 500 milliGRAM(s) Oral two times a day  sevelamer hydrochloride 800 milliGRAM(s) Oral with breakfast  vancomycin  IVPB 750 milliGRAM(s) IV Intermittent <User Schedule>    MEDICATIONS  (PRN):  acetaminophen   Tablet. 650 milliGRAM(s) Oral every 6 hours PRN Mild Pain (1 - 3)  dextrose Gel 1 Dose(s) Oral once PRN Blood Glucose LESS THAN 70 milliGRAM(s)/deciliter  glucagon  Injectable 1 milliGRAM(s) IntraMuscular once PRN Glucose LESS THAN 70 milligrams/deciliter        LAB/STUDIES: VASCULAR SURGERY PROGRESS NOTE    Hospital Day #9d    Patient presents with c/o wet gangrene left foot. C/o malodorous on-healing foot gangrene. Denies cp, cough, dizziness. fever/chills, chest pain, difficulty breathing, nausea/vomiting/diarrhea      Diet: reg    I&O's Detail    03 Mar 2018 07:01  -  04 Mar 2018 07:00  --------------------------------------------------------  IN:  Total IN: 0 mL    OUT:    Other: 2000 mL  Total OUT: 2000 mL    Total NET: -2000 mL          PHYSICAL EXAM    Vital Signs Last 24 Hrs  T(C): 36.7 (04 Mar 2018 05:00), Max: 36.7 (03 Mar 2018 21:38)  T(F): 98.1 (04 Mar 2018 05:00), Max: 98.1 (03 Mar 2018 21:38)  HR: 73 (04 Mar 2018 05:00) (73 - 82)  BP: 103/53 (04 Mar 2018 08:07) (103/53 - 138/63)  BP(mean): --  RR: 18 (04 Mar 2018 05:00) (18 - 20)  SpO2: 97% (03 Mar 2018 20:00) (97% - 97%)    Gen: NAD, AAOx3  CV: S1 S2 RRR  Lungs: CTABL  Abd: +BS SOFT NT ND  Ext: NO EDEMA NON TENDER, wet gangrene left foot, no signals dp/pt left    MEDICATIONS  (STANDING):  calcium acetate 667 milliGRAM(s) Oral two times a day with meals  carvedilol Oral Tab/Cap - Peds 6.25 milliGRAM(s) Oral two times a day  cefepime  IVPB 2000 milliGRAM(s) IV Intermittent <User Schedule>  collagenase Ointment 1 Application(s) Topical daily  darbepoetin Injectable ViaL 80 MICROGram(s) IV Push <User Schedule>  dextrose 5%. 1000 milliLiter(s) (50 mL/Hr) IV Continuous <Continuous>  dextrose 50% Injectable 12.5 Gram(s) IV Push once  dextrose 50% Injectable 25 Gram(s) IV Push once  dextrose 50% Injectable 25 Gram(s) IV Push once  furosemide    Tablet 40 milliGRAM(s) Oral daily  hydrALAZINE 25 milliGRAM(s) Oral three times a day  insulin glargine SubCutaneous Injection (LANTUS) - Peds 12 Unit(s) SubCutaneous at bedtime  insulin lispro (HumaLOG) corrective regimen sliding scale   SubCutaneous three times a day before meals  lisinopril 20 milliGRAM(s) Oral daily  melatonin 3 milliGRAM(s) Oral at bedtime  metolazone 2.5 milliGRAM(s) Oral daily  metroNIDAZOLE    Tablet 500 milliGRAM(s) Oral two times a day  sevelamer hydrochloride 800 milliGRAM(s) Oral with breakfast  vancomycin  IVPB 750 milliGRAM(s) IV Intermittent <User Schedule>    MEDICATIONS  (PRN):  acetaminophen   Tablet. 650 milliGRAM(s) Oral every 6 hours PRN Mild Pain (1 - 3)  dextrose Gel 1 Dose(s) Oral once PRN Blood Glucose LESS THAN 70 milliGRAM(s)/deciliter  glucagon  Injectable 1 milliGRAM(s) IntraMuscular once PRN Glucose LESS THAN 70 milligrams/deciliter        LAB/STUDIES:

## 2018-03-05 ENCOUNTER — APPOINTMENT (OUTPATIENT)
Dept: VASCULAR SURGERY | Facility: CLINIC | Age: 58
End: 2018-03-05

## 2018-03-05 RX ADMIN — CARVEDILOL PHOSPHATE 6.25 MILLIGRAM(S): 80 CAPSULE, EXTENDED RELEASE ORAL at 17:29

## 2018-03-05 RX ADMIN — Medication 25 MILLIGRAM(S): at 21:22

## 2018-03-05 RX ADMIN — INSULIN GLARGINE 12 UNIT(S): 100 INJECTION, SOLUTION SUBCUTANEOUS at 21:22

## 2018-03-05 RX ADMIN — Medication 500 MILLIGRAM(S): at 17:30

## 2018-03-05 RX ADMIN — Medication 3 MILLIGRAM(S): at 21:22

## 2018-03-05 RX ADMIN — Medication 4: at 12:41

## 2018-03-05 RX ADMIN — Medication 4: at 17:29

## 2018-03-05 RX ADMIN — Medication 667 MILLIGRAM(S): at 17:29

## 2018-03-05 RX ADMIN — Medication 25 MILLIGRAM(S): at 13:49

## 2018-03-05 NOTE — PROGRESS NOTE ADULT - SUBJECTIVE AND OBJECTIVE BOX
Hospital Day:  10d    Subjective:  no events overnight, refused all meds and labs this morning, laying in bed comfortably, denies SOB or any pain, left foot extremely malodorous Patient became very anxious in late afternoon regardng being asleep for the procedure stating I don't want to be awake, pt was counseled that anesthesia will discuss with her tomorrow but she will likely by sleeping for procedure.    Past Medical Hx:   Type 2 diabetes mellitus  CHF (congestive heart failure)  End stage renal failure on dialysis  Hypertension  Hyperlipemia  Heart failure  Acute heart failure, unspecified heart failure type  Heart failure    Past Sx:  History of femoral angiogram  Port-a-cath in place    Allergies:  No Known Allergies    Current Meds:   Standng Meds:  calcium acetate 667 milliGRAM(s) Oral two times a day with meals  carvedilol Oral Tab/Cap - Peds 6.25 milliGRAM(s) Oral two times a day  cefepime  IVPB 2000 milliGRAM(s) IV Intermittent <User Schedule>  collagenase Ointment 1 Application(s) Topical daily  darbepoetin Injectable ViaL 80 MICROGram(s) IV Push <User Schedule>  dextrose 5%. 1000 milliLiter(s) (50 mL/Hr) IV Continuous <Continuous>  dextrose 50% Injectable 12.5 Gram(s) IV Push once  dextrose 50% Injectable 25 Gram(s) IV Push once  dextrose 50% Injectable 25 Gram(s) IV Push once  furosemide    Tablet 40 milliGRAM(s) Oral daily  hydrALAZINE 25 milliGRAM(s) Oral three times a day  insulin glargine SubCutaneous Injection (LANTUS) - Peds 12 Unit(s) SubCutaneous at bedtime  insulin lispro (HumaLOG) corrective regimen sliding scale   SubCutaneous three times a day before meals  lisinopril 20 milliGRAM(s) Oral daily  melatonin 3 milliGRAM(s) Oral at bedtime  metolazone 2.5 milliGRAM(s) Oral daily  metroNIDAZOLE    Tablet 500 milliGRAM(s) Oral two times a day  vancomycin  IVPB 750 milliGRAM(s) IV Intermittent <User Schedule>    PRN Meds:  acetaminophen   Tablet. 650 milliGRAM(s) Oral every 6 hours PRN Mild Pain (1 - 3)  dextrose Gel 1 Dose(s) Oral once PRN Blood Glucose LESS THAN 70 milliGRAM(s)/deciliter  glucagon  Injectable 1 milliGRAM(s) IntraMuscular once PRN Glucose LESS THAN 70 milligrams/deciliter    Vital Signs:   T(F): 98.8 (03-05-18 @ 21:20), Max: 98.8 (03-05-18 @ 21:20)  HR: 77 (03-05-18 @ 21:20) (76 - 77)  BP: 134/63 (03-05-18 @ 21:20) (134/63 - 142/63)  RR: 18 (03-05-18 @ 21:20) (18 - 18)  SpO2: --      03-04-18 @ 07:01  -  03-05-18 @ 07:00  --------------------------------------------------------  IN: 1 mL / OUT: 50 mL / NET: -49 mL    03-05-18 @ 07:01  -  03-05-18 @ 21:33  --------------------------------------------------------  IN: 320 mL / OUT: 0 mL / NET: 320 mL        Physical Exam:   GENERAL: NAD                     exam incomplete???????????????  HEENT: NCAT  CHEST/LUNG: CTAB  HEART: Regular rate and rhythm; s1 s2 appreciated, No murmurs, rubs, or gallops  ABDOMEN: Soft, Nontender, Nondistended; Bowel sounds present  EXTREMITIES: No LE edema b/l  NERVOUS SYSTEM:  Alert & Oriented X3    Labs:   refusing blood draws- says she cant take needles, will get stat labs with HD tomorrow    Radiology:   Echo completed report pending, prelim discussion with tech EF 50%? some mitral regurgitation?    Assessment and Plan:   56 y/o F PMH as listed transfered from [facility] for 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 Pt initially refused IV Abx and surgery at [facility], s/p psych eval pt has no capacity to refuse treatment.    # Gangrenous DFU  -afebrile, WBCs downtrending (per last labs)  -local wound care per podiatry   -IV abx per ID: vanco and cefepime post HD and Flagy 500mg q12  -vascular recs: for BKA on Tuesday afternoon  -seen by cardio fellow,  recs pending attending     # HFrEF ([facility])  -s/p cardiac cath 8/2017: Left main: 20% distal stenosis; LAD: 25% ostial stenosis, 25-30% mid stenosis; Circumflex: Sequential 25-30% mid stenoses; RCA: 50% mid stenosis.   -2D echo official report pending  -c/w coreg, lisinopril, lasix    # HTN  -c/w hydralazine, coreg, lisinopril    # ESRD  -scheduled for HD pre-op in the morning	  -renal recs appreciated  -vanc level pending collection (pt refusing blood draw- will check at beginning of HD tomorrow)  -c/w phoslo, metolazone lasix (pt still making some urine- multiple episodes of incontinence today)  -monitor potassium levels (refused blood draws)    # DM  -monitor FS  -c/w insulin (pt refused lantus last night so no adjustments made today)    # Anemia (normocytic)  -c/w Aransep 80mcg q week  -Iron studies consistent with anemia of chronic disease  -will transfuse 2 units PRBCs tomorrow with HD prior to surgery    # DVT ppx heparin SQ (holding for surgery)    # Dispo  -pt states she doesn't walk and has no use for an orthotic in the future  -likely return to NH when stable post procedure Hospital Day:  10d    Subjective:  no events overnight, refused all meds and labs this morning, laying in bed comfortably, denies SOB or any pain, left foot extremely malodorous Patient became very anxious in late afternoon regardng being asleep for the procedure stating I don't want to be awake, pt was counseled that anesthesia will discuss with her tomorrow but she will likely by sleeping for procedure.    Past Medical Hx:   Type 2 diabetes mellitus  CHF (congestive heart failure)  End stage renal failure on dialysis  Hypertension  Hyperlipemia  Heart failure  Acute heart failure, unspecified heart failure type  Heart failure    Past Sx:  History of femoral angiogram  Port-a-cath in place    Allergies:  No Known Allergies    Current Meds:   Standng Meds:  calcium acetate 667 milliGRAM(s) Oral two times a day with meals  carvedilol Oral Tab/Cap - Peds 6.25 milliGRAM(s) Oral two times a day  cefepime  IVPB 2000 milliGRAM(s) IV Intermittent <User Schedule>  collagenase Ointment 1 Application(s) Topical daily  darbepoetin Injectable ViaL 80 MICROGram(s) IV Push <User Schedule>  dextrose 5%. 1000 milliLiter(s) (50 mL/Hr) IV Continuous <Continuous>  dextrose 50% Injectable 12.5 Gram(s) IV Push once  dextrose 50% Injectable 25 Gram(s) IV Push once  dextrose 50% Injectable 25 Gram(s) IV Push once  furosemide    Tablet 40 milliGRAM(s) Oral daily  hydrALAZINE 25 milliGRAM(s) Oral three times a day  insulin glargine SubCutaneous Injection (LANTUS) - Peds 12 Unit(s) SubCutaneous at bedtime  insulin lispro (HumaLOG) corrective regimen sliding scale   SubCutaneous three times a day before meals  lisinopril 20 milliGRAM(s) Oral daily  melatonin 3 milliGRAM(s) Oral at bedtime  metolazone 2.5 milliGRAM(s) Oral daily  metroNIDAZOLE    Tablet 500 milliGRAM(s) Oral two times a day  vancomycin  IVPB 750 milliGRAM(s) IV Intermittent <User Schedule>    PRN Meds:  acetaminophen   Tablet. 650 milliGRAM(s) Oral every 6 hours PRN Mild Pain (1 - 3)  dextrose Gel 1 Dose(s) Oral once PRN Blood Glucose LESS THAN 70 milliGRAM(s)/deciliter  glucagon  Injectable 1 milliGRAM(s) IntraMuscular once PRN Glucose LESS THAN 70 milligrams/deciliter    Vital Signs:   T(F): 98.8 (03-05-18 @ 21:20), Max: 98.8 (03-05-18 @ 21:20)  HR: 77 (03-05-18 @ 21:20) (76 - 77)  BP: 134/63 (03-05-18 @ 21:20) (134/63 - 142/63)  RR: 18 (03-05-18 @ 21:20) (18 - 18)  SpO2: --      03-04-18 @ 07:01  -  03-05-18 @ 07:00  --------------------------------------------------------  IN: 1 mL / OUT: 50 mL / NET: -49 mL    03-05-18 @ 07:01  -  03-05-18 @ 21:33  --------------------------------------------------------  IN: 320 mL / OUT: 0 mL / NET: 320 mL        Physical Exam:   GENERAL: NAD                      HEENT: NCAT  CHEST/LUNG: CTAB  HEART: Regular rate and rhythm; s1 s2 appreciated, No murmurs  ABDOMEN: Soft, Nontender, Nondistended; Bowel sounds present  EXTREMITIES: No LE edema, b/l feet wrapped, very malodorous  NERVOUS SYSTEM:  Alert & Oriented X3    Labs:   refusing blood draws- says she cant take needles, will get stat labs with HD tomorrow    Radiology:   Echo completed report pending, prelim discussion with tech EF 50%? some mitral regurgitation?    Assessment and Plan:   56 y/o F PMH as listed transfered from [facility] for 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 Pt initially refused IV Abx and surgery at [facility], s/p psych eval pt has no capacity to refuse treatment.    # Gangrenous DFU  -afebrile, WBCs downtrending (per last labs)  -local wound care per podiatry   -IV abx per ID: vanco and cefepime post HD and Flagy 500mg q12  -vascular recs: for BKA on Tuesday afternoon  -seen by cardio fellow,  recs pending attending     # HFrEF ([facility])  -s/p cardiac cath 8/2017: Left main: 20% distal stenosis; LAD: 25% ostial stenosis, 25-30% mid stenosis; Circumflex: Sequential 25-30% mid stenoses; RCA: 50% mid stenosis.   -2D echo official report pending  -c/w coreg, lisinopril, lasix    # HTN  -c/w hydralazine, coreg, lisinopril    # ESRD  -scheduled for HD pre-op in the morning	  -renal recs appreciated  -vanc level pending collection (pt refusing blood draw- will check at beginning of HD tomorrow)  -c/w phoslo, metolazone lasix (pt still making some urine- multiple episodes of incontinence today)  -monitor potassium levels (refused blood draws)    # DM  -monitor FS  -c/w insulin (pt refused lantus last night so no adjustments made today)    # Anemia (normocytic)  -c/w Aransep 80mcg q week  -Iron studies consistent with anemia of chronic disease  -will transfuse 2 units PRBCs tomorrow with HD prior to surgery    # DVT ppx heparin SQ (holding for surgery)    # Dispo  -pt states she doesn't walk and has no use for an orthotic in the future  -likely return to NH when stable post procedure

## 2018-03-05 NOTE — PROGRESS NOTE ADULT - SUBJECTIVE AND OBJECTIVE BOX
seen and examined  no distress  refusing all meds as per RN         Standing Inpatient Medications  calcium acetate 667 milliGRAM(s) Oral two times a day with meals  carvedilol Oral Tab/Cap - Peds 6.25 milliGRAM(s) Oral two times a day  cefepime  IVPB 2000 milliGRAM(s) IV Intermittent <User Schedule>  collagenase Ointment 1 Application(s) Topical daily  darbepoetin Injectable ViaL 80 MICROGram(s) IV Push <User Schedule>  dextrose 5%. 1000 milliLiter(s) IV Continuous <Continuous>  dextrose 50% Injectable 12.5 Gram(s) IV Push once  dextrose 50% Injectable 25 Gram(s) IV Push once  dextrose 50% Injectable 25 Gram(s) IV Push once  furosemide    Tablet 40 milliGRAM(s) Oral daily  hydrALAZINE 25 milliGRAM(s) Oral three times a day  insulin glargine SubCutaneous Injection (LANTUS) - Peds 12 Unit(s) SubCutaneous at bedtime  insulin lispro (HumaLOG) corrective regimen sliding scale   SubCutaneous three times a day before meals  lisinopril 20 milliGRAM(s) Oral daily  melatonin 3 milliGRAM(s) Oral at bedtime  metolazone 2.5 milliGRAM(s) Oral daily  metroNIDAZOLE    Tablet 500 milliGRAM(s) Oral two times a day  vancomycin  IVPB 750 milliGRAM(s) IV Intermittent <User Schedule>          VITALS/PHYSICAL EXAM  --------------------------------------------------------------------------------  T(C): 37.4 (03-04-18 @ 21:08), Max: 37.4 (03-04-18 @ 21:08)  HR: 78 (03-04-18 @ 21:08) (70 - 80)  BP: 140/64 (03-04-18 @ 21:08) (103/53 - 140/64)  RR: 18 (03-04-18 @ 21:08) (17 - 18)  SpO2: --  Wt(kg): --        03-04-18 @ 07:01 - 03-05-18 @ 07:00  --------------------------------------------------------  IN: 1 mL / OUT: 50 mL / NET: -49 mL      Physical Exam:  	Gen: NAD  	Pulm:   decrease BS B/L  	CV: S1S2; no rub  	Abd:  nontender/nondistended  	LE: b/l dressings   	Vascular access: chest wall tesio    LABS/STUDIES  --------------------------------------------------------------------------------        Iron 19, TIBC 116, %sat 16      [03-01-18 @ 10:19]  Ferritin 1091.0      [03-01-18 @ 10:19]

## 2018-03-05 NOTE — CONSULT NOTE ADULT - ASSESSMENT
56 y/o F PMH as listed transfered from [facility] for 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 Pt initially refused IV Abx and surgery at [facility], s/p psych eval pt has no capacity to refuse treatment.    # Gangrenous DFU  -afebrile, WBCs downtrending  -local wound care per podiatry   -IV abx per ID: vanco and cefepime post HD and Flagy 500mg q12  -vascular recs: for BKA likely on Tuesday  -Requires cardio eval for pre-op    #HFREF([facility]ICM):  is not in active failure  -2D echo pending:(bed side echo): EF looks 50%, f/up official echo  -c/w coreg, lisinopril, lasix    # HTN  -c/w hydralazine, coreg, lisinopril    # ESRD  -s/p HD yesterday  -renal recs appreciated: sevelamer d/c, vanc level pending collection  -c/w phoslo, metolazone lasix  -monitor potassium levels     # DM  -monitor FS  -c/w insulin regimen    # Anemia (normocytic)  -c/w Aransep 80mcg q week  -transfuse PRN if <7 or per vascular recs pre-op  -Iron studies consistent with anemia of chronic disease    -Please do ECG, patient has elevated risk for moderate risk procedure. 56 y/o F PMH as listed transferred rom [facility] for 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 Pt initially refused IV Abx and surgery. As per Psychiatry assessment, patient does NOT have capacity to refuse treatment.    #Gangrenous DFU  -afebrile, WBCs downtrending  -local wound care per podiatry   -IV abx per ID: vanco and cefepime post HD and Flagy 500mg q12  -vascular recs: for BKA likely on Tuesday    #HFREF(NICM):  is not in active failure  -2D echo pending:(bed side echo): EF looks 50%, f/up official echo  -c/w coreg, lisinopril, lasix    # HTN  -c/w hydralazine, coreg, lisinopril    # ESRD  -s/p HD yesterday  -renal recs appreciated: sevelamer d/c, vanc level pending collection  -c/w phoslo, metolazone lasix  -monitor potassium levels     # DM  -monitor FS  -c/w insulin regimen    # Anemia (normocytic)  -c/w Aransep 80mcg q week  -transfuse PRN if <7 or per vascular recs pre-op  -Iron studies consistent with anemia of chronic disease      EKG today prior to surgery  Elevated risk for moderate-risk procedure

## 2018-03-05 NOTE — PROGRESS NOTE ADULT - SUBJECTIVE AND OBJECTIVE BOX
Patient was seen and examined. Spoke with RN. Chart reviewed.  No events overnight. Pt grossly noncompliant with meds or medical care  Vital Signs Last 24 Hrs  T(F): 99.4 (04 Mar 2018 21:08), Max: 99.4 (04 Mar 2018 21:08)  HR: 78 (04 Mar 2018 21:08) (70 - 80)  BP: 140/64 (04 Mar 2018 21:08) (103/53 - 140/64)  SpO2: --  MEDICATIONS  (STANDING):  calcium acetate 667 milliGRAM(s) Oral two times a day with meals  carvedilol Oral Tab/Cap - Peds 6.25 milliGRAM(s) Oral two times a day  cefepime  IVPB 2000 milliGRAM(s) IV Intermittent <User Schedule>  collagenase Ointment 1 Application(s) Topical daily  darbepoetin Injectable ViaL 80 MICROGram(s) IV Push <User Schedule>  dextrose 5%. 1000 milliLiter(s) (50 mL/Hr) IV Continuous <Continuous>  dextrose 50% Injectable 12.5 Gram(s) IV Push once  dextrose 50% Injectable 25 Gram(s) IV Push once  dextrose 50% Injectable 25 Gram(s) IV Push once  furosemide    Tablet 40 milliGRAM(s) Oral daily  hydrALAZINE 25 milliGRAM(s) Oral three times a day  insulin glargine SubCutaneous Injection (LANTUS) - Peds 12 Unit(s) SubCutaneous at bedtime  insulin lispro (HumaLOG) corrective regimen sliding scale   SubCutaneous three times a day before meals  lisinopril 20 milliGRAM(s) Oral daily  melatonin 3 milliGRAM(s) Oral at bedtime  metolazone 2.5 milliGRAM(s) Oral daily  metroNIDAZOLE    Tablet 500 milliGRAM(s) Oral two times a day  vancomycin  IVPB 750 milliGRAM(s) IV Intermittent <User Schedule>    MEDICATIONS  (PRN):  acetaminophen   Tablet. 650 milliGRAM(s) Oral every 6 hours PRN Mild Pain (1 - 3)  dextrose Gel 1 Dose(s) Oral once PRN Blood Glucose LESS THAN 70 milliGRAM(s)/deciliter  glucagon  Injectable 1 milliGRAM(s) IntraMuscular once PRN Glucose LESS THAN 70 milligrams/deciliter        General: comfortable, NAD  Neurology:  nonfocal  Head:  Normocephalic, atraumatic  ENT:  Mucosa moist, no ulcerations  Neck:  Supple, no JVD,   Resp: CTA B/L  CV: RRR, S1S2,   GI: Soft, NT, bowel sounds        A/P:  Bilateral lower extremity ischemic gangrene L>R  Continue betadine dsd kerlix dressing q24h bilateral feet   Awaiting VASC follow up for BKA   Podiatry to follow up  renal f/u appreciated    psych eval  DVT prophylaxis  Decubitus prevention- all measures as per RN protocol  Please call or text me with any questions or updates

## 2018-03-06 ENCOUNTER — RESULT REVIEW (OUTPATIENT)
Age: 58
End: 2018-03-06

## 2018-03-06 ENCOUNTER — APPOINTMENT (OUTPATIENT)
Dept: VASCULAR SURGERY | Facility: HOSPITAL | Age: 58
End: 2018-03-06
Payer: MEDICARE

## 2018-03-06 LAB
ANION GAP SERPL CALC-SCNC: 11 MMOL/L — SIGNIFICANT CHANGE UP (ref 7–14)
APTT BLD: 29 SEC — SIGNIFICANT CHANGE UP (ref 27–39.2)
BASOPHILS # BLD AUTO: 0.02 K/UL — SIGNIFICANT CHANGE UP (ref 0–0.2)
BASOPHILS NFR BLD AUTO: 0.2 % — SIGNIFICANT CHANGE UP (ref 0–1)
BLD GP AB SCN SERPL QL: SIGNIFICANT CHANGE UP
BUN SERPL-MCNC: 54 MG/DL — HIGH (ref 10–20)
CALCIUM SERPL-MCNC: 8.6 MG/DL — SIGNIFICANT CHANGE UP (ref 8.5–10.1)
CHLORIDE SERPL-SCNC: 97 MMOL/L — LOW (ref 98–110)
CO2 SERPL-SCNC: 26 MMOL/L — SIGNIFICANT CHANGE UP (ref 17–32)
CREAT SERPL-MCNC: 6 MG/DL — CRITICAL HIGH (ref 0.7–1.5)
EOSINOPHIL # BLD AUTO: 0.27 K/UL — SIGNIFICANT CHANGE UP (ref 0–0.7)
EOSINOPHIL NFR BLD AUTO: 2.4 % — SIGNIFICANT CHANGE UP (ref 0–8)
GLUCOSE SERPL-MCNC: 171 MG/DL — HIGH (ref 70–110)
HCT VFR BLD CALC: 23.2 % — LOW (ref 37–47)
HGB BLD-MCNC: 7.3 G/DL — CRITICAL LOW (ref 12–16)
IMM GRANULOCYTES NFR BLD AUTO: 1.2 % — HIGH (ref 0.1–0.3)
INR BLD: 1.19 RATIO — SIGNIFICANT CHANGE UP (ref 0.65–1.3)
LYMPHOCYTES # BLD AUTO: 1.78 K/UL — SIGNIFICANT CHANGE UP (ref 1.2–3.4)
LYMPHOCYTES # BLD AUTO: 15.6 % — LOW (ref 20.5–51.1)
MAGNESIUM SERPL-MCNC: 2.2 MG/DL — SIGNIFICANT CHANGE UP (ref 1.8–2.4)
MCHC RBC-ENTMCNC: 26.7 PG — LOW (ref 27–31)
MCHC RBC-ENTMCNC: 31.5 G/DL — LOW (ref 32–37)
MCV RBC AUTO: 85 FL — SIGNIFICANT CHANGE UP (ref 81–99)
MONOCYTES # BLD AUTO: 1.1 K/UL — HIGH (ref 0.1–0.6)
MONOCYTES NFR BLD AUTO: 9.6 % — HIGH (ref 1.7–9.3)
NEUTROPHILS # BLD AUTO: 8.12 K/UL — HIGH (ref 1.4–6.5)
NEUTROPHILS NFR BLD AUTO: 71 % — SIGNIFICANT CHANGE UP (ref 42.2–75.2)
NRBC # BLD: 0 /100 WBCS — SIGNIFICANT CHANGE UP (ref 0–0)
PLATELET # BLD AUTO: 343 K/UL — SIGNIFICANT CHANGE UP (ref 130–400)
POTASSIUM SERPL-MCNC: 3.8 MMOL/L — SIGNIFICANT CHANGE UP (ref 3.5–5)
POTASSIUM SERPL-SCNC: 3.8 MMOL/L — SIGNIFICANT CHANGE UP (ref 3.5–5)
PROTHROM AB SERPL-ACNC: 12.9 SEC — HIGH (ref 9.95–12.87)
RBC # BLD: 2.73 M/UL — LOW (ref 4.2–5.4)
RBC # FLD: 19.2 % — HIGH (ref 11.5–14.5)
SODIUM SERPL-SCNC: 134 MMOL/L — LOW (ref 135–146)
TYPE + AB SCN PNL BLD: SIGNIFICANT CHANGE UP
VANCOMYCIN TROUGH SERPL-MCNC: 35.1 UG/ML — HIGH (ref 5–10)
WBC # BLD: 11.43 K/UL — HIGH (ref 4.8–10.8)
WBC # FLD AUTO: 11.43 K/UL — HIGH (ref 4.8–10.8)

## 2018-03-06 PROCEDURE — 97605 NEG PRS WND THER DME<=50SQCM: CPT | Mod: 59,LT

## 2018-03-06 PROCEDURE — 27882 AMPUTATION OF LOWER LEG: CPT | Mod: LT

## 2018-03-06 RX ORDER — LANOLIN ALCOHOL/MO/W.PET/CERES
3 CREAM (GRAM) TOPICAL AT BEDTIME
Qty: 0 | Refills: 0 | Status: DISCONTINUED | OUTPATIENT
Start: 2018-03-06 | End: 2018-03-06

## 2018-03-06 RX ORDER — METRONIDAZOLE 500 MG
500 TABLET ORAL
Qty: 0 | Refills: 0 | Status: DISCONTINUED | OUTPATIENT
Start: 2018-03-06 | End: 2018-03-07

## 2018-03-06 RX ORDER — MORPHINE SULFATE 50 MG/1
4 CAPSULE, EXTENDED RELEASE ORAL
Qty: 0 | Refills: 0 | Status: DISCONTINUED | OUTPATIENT
Start: 2018-03-06 | End: 2018-03-06

## 2018-03-06 RX ORDER — OXYCODONE HYDROCHLORIDE 5 MG/1
5 TABLET ORAL EVERY 4 HOURS
Qty: 0 | Refills: 0 | Status: DISCONTINUED | OUTPATIENT
Start: 2018-03-06 | End: 2018-03-09

## 2018-03-06 RX ORDER — ACETAMINOPHEN 500 MG
650 TABLET ORAL EVERY 6 HOURS
Qty: 0 | Refills: 0 | Status: DISCONTINUED | OUTPATIENT
Start: 2018-03-06 | End: 2018-03-09

## 2018-03-06 RX ORDER — MORPHINE SULFATE 50 MG/1
1 CAPSULE, EXTENDED RELEASE ORAL EVERY 6 HOURS
Qty: 0 | Refills: 0 | Status: DISCONTINUED | OUTPATIENT
Start: 2018-03-06 | End: 2018-03-08

## 2018-03-06 RX ORDER — INSULIN GLARGINE 100 [IU]/ML
12 INJECTION, SOLUTION SUBCUTANEOUS AT BEDTIME
Qty: 0 | Refills: 0 | Status: DISCONTINUED | OUTPATIENT
Start: 2018-03-06 | End: 2018-03-08

## 2018-03-06 RX ORDER — CALCIUM ACETATE 667 MG
667 TABLET ORAL
Qty: 0 | Refills: 0 | Status: DISCONTINUED | OUTPATIENT
Start: 2018-03-06 | End: 2018-03-09

## 2018-03-06 RX ORDER — HYDRALAZINE HCL 50 MG
25 TABLET ORAL THREE TIMES A DAY
Qty: 0 | Refills: 0 | Status: DISCONTINUED | OUTPATIENT
Start: 2018-03-06 | End: 2018-03-09

## 2018-03-06 RX ORDER — SODIUM CHLORIDE 9 MG/ML
1000 INJECTION, SOLUTION INTRAVENOUS
Qty: 0 | Refills: 0 | Status: DISCONTINUED | OUTPATIENT
Start: 2018-03-06 | End: 2018-03-06

## 2018-03-06 RX ORDER — FUROSEMIDE 40 MG
40 TABLET ORAL DAILY
Qty: 0 | Refills: 0 | Status: DISCONTINUED | OUTPATIENT
Start: 2018-03-06 | End: 2018-03-09

## 2018-03-06 RX ORDER — LISINOPRIL 2.5 MG/1
20 TABLET ORAL DAILY
Qty: 0 | Refills: 0 | Status: DISCONTINUED | OUTPATIENT
Start: 2018-03-06 | End: 2018-03-09

## 2018-03-06 RX ORDER — ACETAMINOPHEN 500 MG
650 TABLET ORAL EVERY 6 HOURS
Qty: 0 | Refills: 0 | Status: DISCONTINUED | OUTPATIENT
Start: 2018-03-06 | End: 2018-03-08

## 2018-03-06 RX ORDER — CEFEPIME 1 G/1
2000 INJECTION, POWDER, FOR SOLUTION INTRAMUSCULAR; INTRAVENOUS
Qty: 0 | Refills: 0 | Status: DISCONTINUED | OUTPATIENT
Start: 2018-03-06 | End: 2018-03-07

## 2018-03-06 RX ORDER — DARBEPOETIN ALFA IN POLYSORBAT 200MCG/0.4
80 PEN INJECTOR (ML) SUBCUTANEOUS
Qty: 0 | Refills: 0 | Status: DISCONTINUED | OUTPATIENT
Start: 2018-03-06 | End: 2018-03-09

## 2018-03-06 RX ORDER — DIPHENHYDRAMINE HCL 50 MG
25 CAPSULE ORAL EVERY 6 HOURS
Qty: 0 | Refills: 0 | Status: DISCONTINUED | OUTPATIENT
Start: 2018-03-06 | End: 2018-03-09

## 2018-03-06 RX ORDER — CARVEDILOL PHOSPHATE 80 MG/1
6.25 CAPSULE, EXTENDED RELEASE ORAL
Qty: 0 | Refills: 0 | Status: DISCONTINUED | OUTPATIENT
Start: 2018-03-06 | End: 2018-03-09

## 2018-03-06 RX ORDER — INSULIN LISPRO 100/ML
VIAL (ML) SUBCUTANEOUS
Qty: 0 | Refills: 0 | Status: DISCONTINUED | OUTPATIENT
Start: 2018-03-06 | End: 2018-03-08

## 2018-03-06 RX ORDER — LANOLIN ALCOHOL/MO/W.PET/CERES
5 CREAM (GRAM) TOPICAL AT BEDTIME
Qty: 0 | Refills: 0 | Status: DISCONTINUED | OUTPATIENT
Start: 2018-03-06 | End: 2018-03-09

## 2018-03-06 RX ADMIN — Medication 80 MICROGRAM(S): at 10:52

## 2018-03-06 RX ADMIN — Medication 150 MILLIGRAM(S): at 10:44

## 2018-03-06 RX ADMIN — CARVEDILOL PHOSPHATE 6.25 MILLIGRAM(S): 80 CAPSULE, EXTENDED RELEASE ORAL at 05:16

## 2018-03-06 RX ADMIN — Medication 25 MILLIGRAM(S): at 18:36

## 2018-03-06 RX ADMIN — CARVEDILOL PHOSPHATE 6.25 MILLIGRAM(S): 80 CAPSULE, EXTENDED RELEASE ORAL at 17:43

## 2018-03-06 RX ADMIN — Medication 500 MILLIGRAM(S): at 05:17

## 2018-03-06 RX ADMIN — INSULIN GLARGINE 12 UNIT(S): 100 INJECTION, SOLUTION SUBCUTANEOUS at 21:45

## 2018-03-06 RX ADMIN — Medication 25 MILLIGRAM(S): at 05:16

## 2018-03-06 RX ADMIN — Medication 650 MILLIGRAM(S): at 21:43

## 2018-03-06 RX ADMIN — LISINOPRIL 20 MILLIGRAM(S): 2.5 TABLET ORAL at 05:17

## 2018-03-06 RX ADMIN — CEFEPIME 100 MILLIGRAM(S): 1 INJECTION, POWDER, FOR SOLUTION INTRAMUSCULAR; INTRAVENOUS at 10:49

## 2018-03-06 RX ADMIN — Medication 667 MILLIGRAM(S): at 17:43

## 2018-03-06 RX ADMIN — Medication 500 MILLIGRAM(S): at 17:43

## 2018-03-06 RX ADMIN — Medication 5 MILLIGRAM(S): at 21:43

## 2018-03-06 RX ADMIN — Medication 40 MILLIGRAM(S): at 05:16

## 2018-03-06 RX ADMIN — Medication 25 MILLIGRAM(S): at 21:43

## 2018-03-06 NOTE — PROGRESS NOTE ADULT - SUBJECTIVE AND OBJECTIVE BOX
seen and examined  no distress  lying comfortable     Standing Inpatient Medications  calcium acetate 667 milliGRAM(s) Oral two times a day with meals  carvedilol Oral Tab/Cap - Peds 6.25 milliGRAM(s) Oral two times a day  cefepime  IVPB 2000 milliGRAM(s) IV Intermittent <User Schedule>  collagenase Ointment 1 Application(s) Topical daily  darbepoetin Injectable ViaL 80 MICROGram(s) IV Push <User Schedule>  dextrose 5%. 1000 milliLiter(s) IV Continuous <Continuous>  dextrose 50% Injectable 12.5 Gram(s) IV Push once  dextrose 50% Injectable 25 Gram(s) IV Push once  dextrose 50% Injectable 25 Gram(s) IV Push once  furosemide    Tablet 40 milliGRAM(s) Oral daily  hydrALAZINE 25 milliGRAM(s) Oral three times a day  insulin glargine SubCutaneous Injection (LANTUS) - Peds 12 Unit(s) SubCutaneous at bedtime  insulin lispro (HumaLOG) corrective regimen sliding scale   SubCutaneous three times a day before meals  lisinopril 20 milliGRAM(s) Oral daily  melatonin 3 milliGRAM(s) Oral at bedtime  metolazone 2.5 milliGRAM(s) Oral daily  metroNIDAZOLE    Tablet 500 milliGRAM(s) Oral two times a day  vancomycin  IVPB 750 milliGRAM(s) IV Intermittent <User Schedule>          VITALS/PHYSICAL EXAM  --------------------------------------------------------------------------------  T(C): 36.3 (03-06-18 @ 05:20), Max: 37.1 (03-05-18 @ 21:20)  HR: 71 (03-06-18 @ 05:20) (71 - 77)  BP: 193/81 (03-06-18 @ 05:20) (134/63 - 193/81)  RR: 19 (03-06-18 @ 05:20) (18 - 19)        03-05-18 @ 07:01  -  03-06-18 @ 07:00  --------------------------------------------------------  IN: 320 mL / OUT: 0 mL / NET: 320 mL      Physical Exam:  	Gen: NAD  	Pulm:  decrease BS B/L  	CV: S1S2; no rub  	Abd: distended  	LE: b/l dressings   	Vascular access: chest wall tesio     LABS/STUDIES  --------------------------------------------------------------------------------          Iron 19, TIBC 116, %sat 16      [03-01-18 @ 10:19]  Ferritin 1091.0      [03-01-18 @ 10:19]

## 2018-03-06 NOTE — PRE-OP CHECKLIST - SURGICAL CONSENT
pt lacks psych capacity reaffirmed surgical consent obtained by patients daughter and next of kin, rocio guo via telephone in pre op holding area-/done

## 2018-03-06 NOTE — PROGRESS NOTE ADULT - SUBJECTIVE AND OBJECTIVE BOX
Procedure: Status post lle guillotine below the knee amputation   Post Operative day #1    Pt. resting comfortably no complaints    PMH:  Type 2 diabetes mellitus  CHF (congestive heart failure)  End stage renal failure on dialysis  Hypertension  Hyperlipemia  Heart failure  Wet gangrene  Osteomyelitis, chronic, ankle or foot, left  CKD (chronic kidney disease)  Port-a-cath in place    No Known Allergies    medications:  acetaminophen   Tablet. 650 milliGRAM(s) Oral every 6 hours PRN  calcium acetate 667 milliGRAM(s) Oral two times a day with meals  carvedilol Oral Tab/Cap - Peds 6.25 milliGRAM(s) Oral two times a day  cefepime  IVPB 2000 milliGRAM(s) IV Intermittent <User Schedule>  darbepoetin Injectable ViaL 80 MICROGram(s) IV Push <User Schedule>  diphenhydrAMINE   Capsule 25 milliGRAM(s) Oral every 6 hours PRN  furosemide    Tablet 40 milliGRAM(s) Oral daily  hydrALAZINE 25 milliGRAM(s) Oral three times a day  insulin glargine SubCutaneous Injection (LANTUS) - Peds 12 Unit(s) SubCutaneous at bedtime  insulin lispro (HumaLOG) corrective regimen sliding scale   SubCutaneous three times a day before meals  lisinopril 20 milliGRAM(s) Oral daily  melatonin 3 milliGRAM(s) Oral at bedtime  metolazone 2.5 milliGRAM(s) Oral daily  metroNIDAZOLE    Tablet 500 milliGRAM(s) Oral two times a day  morphine  - Injectable 1 milliGRAM(s) IV Push every 6 hours PRN  oxyCODONE    IR 5 milliGRAM(s) Oral every 4 hours PRN        vitals:  T(C): 37.3 (03-06-18 @ 17:26), Max: 37.9 (03-06-18 @ 15:40)  HR: 75 (03-06-18 @ 17:26) (70 - 84)  BP: 122/58 (03-06-18 @ 17:26) (109/52 - 193/81)  RR: 17 (03-06-18 @ 17:26) (11 - 26)  SpO2: 96% (03-06-18 @ 16:53) (96% - 99%)    03-05-18 @ 07:01  -  03-06-18 @ 07:00  --------------------------------------------------------  IN: 320 mL / OUT: 0 mL / NET: 320 mL    03-06-18 @ 07:01  -  03-06-18 @ 20:00  --------------------------------------------------------  IN: 618 mL / OUT: 2400 mL / NET: -1782 mL        Physical exam :  General: Alert and oriented times 3, not in acute distress   Heart: Regular rate and rhythm, no rubs , murmurs or gallops  Lungs: Clear to auscultation bilaterally, no wheezes, rales, rhonci appreciated  Abdomen: Soft , positive bowel sounds, no tenderness, no distention, no peritoneal signs   Extremities: left lower extremity bka with wound vac in place incison clean dry and intact

## 2018-03-06 NOTE — BRIEF OPERATIVE NOTE - PROCEDURE
<<-----Click on this checkbox to enter Procedure Open amputation of lower limb  03/06/2018  ERICHA  Active  JSCHOR

## 2018-03-06 NOTE — PROGRESS NOTE ADULT - SUBJECTIVE AND OBJECTIVE BOX
Hospital Day:  11d    Subjective:  no events overnight, pt went for HD today and then to the OR for amputation, pt examined when returned to floor post-op, pain controlled, c/o itching    Past Medical Hx:   Type 2 diabetes mellitus  CHF (congestive heart failure)  End stage renal failure on dialysis  Hypertension  Hyperlipemia  Heart failure  Acute heart failure, unspecified heart failure type  Heart failure    Past Sx:  History of femoral angiogram  Port-a-cath in place    Allergies:  No Known Allergies    Current Meds:   Standng Meds:  calcium acetate 667 milliGRAM(s) Oral two times a day with meals  carvedilol Oral Tab/Cap - Peds 6.25 milliGRAM(s) Oral two times a day  cefepime  IVPB 2000 milliGRAM(s) IV Intermittent <User Schedule>  darbepoetin Injectable ViaL 80 MICROGram(s) IV Push <User Schedule>  furosemide    Tablet 40 milliGRAM(s) Oral daily  hydrALAZINE 25 milliGRAM(s) Oral three times a day  insulin glargine SubCutaneous Injection (LANTUS) - Peds 12 Unit(s) SubCutaneous at bedtime  insulin lispro (HumaLOG) corrective regimen sliding scale   SubCutaneous three times a day before meals  lisinopril 20 milliGRAM(s) Oral daily  melatonin 5 milliGRAM(s) Oral at bedtime  metolazone 2.5 milliGRAM(s) Oral daily  metroNIDAZOLE    Tablet 500 milliGRAM(s) Oral two times a day    PRN Meds:  acetaminophen   Tablet 650 milliGRAM(s) Oral every 6 hours PRN For Temp greater than 38 C (100.4 F)  acetaminophen   Tablet. 650 milliGRAM(s) Oral every 6 hours PRN Mild Pain (1 - 3)  diphenhydrAMINE   Capsule 25 milliGRAM(s) Oral every 6 hours PRN Rash and/or Itching  morphine  - Injectable 1 milliGRAM(s) IV Push every 6 hours PRN Severe Pain (7 - 10)  oxyCODONE    IR 5 milliGRAM(s) Oral every 4 hours PRN Moderate Pain (4 - 6)    Vital Signs:   T(F): 100.8 (03-06-18 @ 20:09), Max: 100.8 (03-06-18 @ 20:09)  HR: 82 (03-06-18 @ 20:09) (70 - 84)  BP: 126/60 (03-06-18 @ 20:09) (109/52 - 193/81)  RR: 15 (03-06-18 @ 20:09) (11 - 26)  SpO2: 96% (03-06-18 @ 16:53) (96% - 99%)    03-05-18 @ 07:01  -  03-06-18 @ 07:00  --------------------------------------------------------  IN: 320 mL / OUT: 0 mL / NET: 320 mL    03-06-18 @ 07:01  -  03-06-18 @ 20:47  --------------------------------------------------------  IN: 618 mL / OUT: 2400 mL / NET: -1782 mL    Physical Exam:   GENERAL: NAD  HEENT: NCAT  CHEST/LUNG: CTAB  HEART: Regular rate and rhythm; s1 s2 appreciated, No murmurs, rubs, or gallops  ABDOMEN: Soft, Nontender, Nondistended;  EXTREMITIES: right foot wrapped, left amputation above the ankle, wound vac in place, negative edema b/l  NERVOUS SYSTEM:  Alert & Oriented X3    Labs:                         7.3    11.43 )-----------( 343      ( 06 Mar 2018 09:09 )             23.2     Neutophil% 71.0, Lymphocyte% 15.6, Monocyte% 9.6, Bands% -- 03-06-18 @ 09:09    06 Mar 2018 09:09    134    |  97     |  54     ----------------------------<  171    3.8     |  26     |  6.0      Ca    8.6        06 Mar 2018 09:09  Mg     2.2       06 Mar 2018 09:09         pTT    29.0             ----< 1.19 INR  (03-06-18 @ 09:09)    12.90        PT    Vancomycin Level, Trough: 35.1 (03.06.18 @ 09:09)    Assessment and Plan:   56 y/o F PMH as listed transfered from [facility] for 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 Pt initially refused IV Abx and surgery at [facility], s/p psych eval pt has no capacity to refuse treatment.    # Gangrenous DFU  -afebrile, WBCs downtrending  -local wound care of right foot per podiatry   -IV abx per ID: vanco and cefepime post HD and Flagy 500mg q12  -f/u ID for revised Abx regimen post amputation, will decrease Vancomycin as troph 35  -vascular signout recs: EBL 20cc, advance diet as tolerated, pain management, amputation revision to be scheduled   -benadryl PRN for the itching    # HFrEF (NICM)  -s/p cardiac cath 8/2017: Left main: 20% distal stenosis; LAD: 25% ostial stenosis, 25-30% mid stenosis; Circumflex: Sequential 25-30% mid stenoses; RCA: 50% mid stenosis.   -2D echo: report pending  -c/w coreg, lisinopril, lasix    # HTN  -c/w hydralazine, coreg, lisinopril    # ESRD  -s/p HD this morning	  -renal recs appreciated  -c/w phoslo, metolazone lasix     # DM  -monitor FS  -c/w insulin    # Anemia (normocytic)  -c/w Aransep 80mcg q week  -Iron studies consistent with anemia of chronic disease  -s/p 2 units PRBCs today 3/6/18 with HD pre-op    # DVT ppx heparin SQ (holding for surgery)    # Dispo  -pending revision and final closure of amputation  -likely return to NH when stable post procedure Hospital Day:  11d    Subjective:  no events overnight, pt went for HD today and then to the OR for amputation, pt examined when returned to floor post-op, pain controlled, c/o itching    Past Medical Hx:   Type 2 diabetes mellitus  CHF (congestive heart failure)  End stage renal failure on dialysis  Hypertension  Hyperlipemia  Heart failure  Acute heart failure, unspecified heart failure type  Heart failure    Past Sx:  History of femoral angiogram  Port-a-cath in place    Allergies:  No Known Allergies    Current Meds:   Standng Meds:  calcium acetate 667 milliGRAM(s) Oral two times a day with meals  carvedilol Oral Tab/Cap - Peds 6.25 milliGRAM(s) Oral two times a day  cefepime  IVPB 2000 milliGRAM(s) IV Intermittent <User Schedule>  darbepoetin Injectable ViaL 80 MICROGram(s) IV Push <User Schedule>  furosemide    Tablet 40 milliGRAM(s) Oral daily  hydrALAZINE 25 milliGRAM(s) Oral three times a day  insulin glargine SubCutaneous Injection (LANTUS) - Peds 12 Unit(s) SubCutaneous at bedtime  insulin lispro (HumaLOG) corrective regimen sliding scale   SubCutaneous three times a day before meals  lisinopril 20 milliGRAM(s) Oral daily  melatonin 5 milliGRAM(s) Oral at bedtime  metolazone 2.5 milliGRAM(s) Oral daily  metroNIDAZOLE    Tablet 500 milliGRAM(s) Oral two times a day    PRN Meds:  acetaminophen   Tablet 650 milliGRAM(s) Oral every 6 hours PRN For Temp greater than 38 C (100.4 F)  acetaminophen   Tablet. 650 milliGRAM(s) Oral every 6 hours PRN Mild Pain (1 - 3)  diphenhydrAMINE   Capsule 25 milliGRAM(s) Oral every 6 hours PRN Rash and/or Itching  morphine  - Injectable 1 milliGRAM(s) IV Push every 6 hours PRN Severe Pain (7 - 10)  oxyCODONE    IR 5 milliGRAM(s) Oral every 4 hours PRN Moderate Pain (4 - 6)    Vital Signs:   T(F): 100.8 (03-06-18 @ 20:09), Max: 100.8 (03-06-18 @ 20:09)  HR: 82 (03-06-18 @ 20:09) (70 - 84)  BP: 126/60 (03-06-18 @ 20:09) (109/52 - 193/81)  RR: 15 (03-06-18 @ 20:09) (11 - 26)  SpO2: 96% (03-06-18 @ 16:53) (96% - 99%)    03-05-18 @ 07:01  -  03-06-18 @ 07:00  --------------------------------------------------------  IN: 320 mL / OUT: 0 mL / NET: 320 mL    03-06-18 @ 07:01  -  03-06-18 @ 20:47  --------------------------------------------------------  IN: 618 mL / OUT: 2400 mL / NET: -1782 mL    Physical Exam:   GENERAL: NAD  HEENT: NCAT  CHEST/LUNG: CTAB  HEART: Regular rate and rhythm; s1 s2 appreciated, No murmurs, rubs, or gallops  ABDOMEN: Soft, Nontender, Nondistended;  EXTREMITIES: right foot wrapped, left amputation above the ankle, wound vac in place, negative edema b/l  NERVOUS SYSTEM:  Alert & Oriented X3    Labs:                         7.3    11.43 )-----------( 343      ( 06 Mar 2018 09:09 )             23.2     Neutophil% 71.0, Lymphocyte% 15.6, Monocyte% 9.6, Bands% -- 03-06-18 @ 09:09    06 Mar 2018 09:09    134    |  97     |  54     ----------------------------<  171    3.8     |  26     |  6.0      Ca    8.6        06 Mar 2018 09:09  Mg     2.2       06 Mar 2018 09:09         pTT    29.0             ----< 1.19 INR  (03-06-18 @ 09:09)    12.90        PT    Vancomycin Level, Trough: 35.1 (03.06.18 @ 09:09)    Assessment and Plan:   58 y/o F PMH as listed transfered from [facility] for 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 Pt initially refused IV Abx and surgery at [facility], s/p psych eval pt has no capacity to refuse treatment.    # Gangrenous DFU  -afebrile, WBCs downtrending  -local wound care of right foot per podiatry   -IV abx per ID: vanco and cefepime post HD and Flagy 500mg q12  -f/u ID for revised Abx regimen post amputation,  -vascular signout recs: EBL 20cc, advance diet as tolerated, pain management, amputation revision to be scheduled   -benadryl PRN for the itching    # HFrEF (NICM)  -s/p cardiac cath 8/2017: Left main: 20% distal stenosis; LAD: 25% ostial stenosis, 25-30% mid stenosis; Circumflex: Sequential 25-30% mid stenoses; RCA: 50% mid stenosis.   -2D echo: report pending  -c/w coreg, lisinopril, lasix    # HTN  -c/w hydralazine, coreg, lisinopril    # ESRD  -s/p HD this morning	  -renal recs appreciated  -c/w phoslo, metolazone lasix   -If continuing with Vancomycin will decrease dose to 500mg post hemo as vanc troph 35    # DM  -monitor FS  -c/w insulin    # Anemia (normocytic)  -c/w Aransep 80mcg q week  -Iron studies consistent with anemia of chronic disease  -s/p 2 units PRBCs today 3/6/18 with HD pre-op    # DVT ppx heparin SQ (holding for surgery)    # Dispo  -pending revision and final closure of amputation  -likely return to NH when stable post procedure Hospital Day:  11d    Subjective:  no events overnight, pt went for HD today and then to the OR for amputation, pt examined when returned to floor post-op, pain controlled, c/o itching    Past Medical Hx:   Type 2 diabetes mellitus  CHF (congestive heart failure)  End stage renal failure on dialysis  Hypertension  Hyperlipemia  Heart failure  Acute heart failure, unspecified heart failure type  Heart failure    Past Sx:  History of femoral angiogram  Port-a-cath in place    Allergies:  No Known Allergies    Current Meds:   Standng Meds:  calcium acetate 667 milliGRAM(s) Oral two times a day with meals  carvedilol Oral Tab/Cap - Peds 6.25 milliGRAM(s) Oral two times a day  cefepime  IVPB 2000 milliGRAM(s) IV Intermittent <User Schedule>  darbepoetin Injectable ViaL 80 MICROGram(s) IV Push <User Schedule>  furosemide    Tablet 40 milliGRAM(s) Oral daily  hydrALAZINE 25 milliGRAM(s) Oral three times a day  insulin glargine SubCutaneous Injection (LANTUS) - Peds 12 Unit(s) SubCutaneous at bedtime  insulin lispro (HumaLOG) corrective regimen sliding scale   SubCutaneous three times a day before meals  lisinopril 20 milliGRAM(s) Oral daily  melatonin 5 milliGRAM(s) Oral at bedtime  metolazone 2.5 milliGRAM(s) Oral daily  metroNIDAZOLE    Tablet 500 milliGRAM(s) Oral two times a day    PRN Meds:  acetaminophen   Tablet 650 milliGRAM(s) Oral every 6 hours PRN For Temp greater than 38 C (100.4 F)  acetaminophen   Tablet. 650 milliGRAM(s) Oral every 6 hours PRN Mild Pain (1 - 3)  diphenhydrAMINE   Capsule 25 milliGRAM(s) Oral every 6 hours PRN Rash and/or Itching  morphine  - Injectable 1 milliGRAM(s) IV Push every 6 hours PRN Severe Pain (7 - 10)  oxyCODONE    IR 5 milliGRAM(s) Oral every 4 hours PRN Moderate Pain (4 - 6)    Vital Signs:   T(F): 100.8 (03-06-18 @ 20:09), Max: 100.8 (03-06-18 @ 20:09)  HR: 82 (03-06-18 @ 20:09) (70 - 84)  BP: 126/60 (03-06-18 @ 20:09) (109/52 - 193/81)  RR: 15 (03-06-18 @ 20:09) (11 - 26)  SpO2: 96% (03-06-18 @ 16:53) (96% - 99%)    03-05-18 @ 07:01  -  03-06-18 @ 07:00  --------------------------------------------------------  IN: 320 mL / OUT: 0 mL / NET: 320 mL    03-06-18 @ 07:01  -  03-06-18 @ 20:47  --------------------------------------------------------  IN: 618 mL / OUT: 2400 mL / NET: -1782 mL    Physical Exam:   GENERAL: NAD  HEENT: NCAT  CHEST/LUNG: CTAB  HEART: Regular rate and rhythm; s1 s2 appreciated, No murmurs, rubs, or gallops  ABDOMEN: Soft, Nontender, Nondistended;  EXTREMITIES: right foot wrapped, left amputation above the ankle, wound vac in place, negative edema b/l  NERVOUS SYSTEM:  Alert & Oriented X3    Labs:                         7.3    11.43 )-----------( 343      ( 06 Mar 2018 09:09 )             23.2     Neutophil% 71.0, Lymphocyte% 15.6, Monocyte% 9.6, Bands% -- 03-06-18 @ 09:09    06 Mar 2018 09:09    134    |  97     |  54     ----------------------------<  171    3.8     |  26     |  6.0      Ca    8.6        06 Mar 2018 09:09  Mg     2.2       06 Mar 2018 09:09         pTT    29.0             ----< 1.19 INR  (03-06-18 @ 09:09)    12.90        PT    Vancomycin Level, Trough: 35.1 (03.06.18 @ 09:09)    Assessment and Plan:   56 y/o F PMH as listed transfered from [facility] for 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 Pt initially refused IV Abx and surgery at [facility], s/p psych eval pt has no capacity to refuse treatment.    # Gangrenous DFU  -afebrile, WBCs downtrending  -local wound care of right foot per podiatry   -IV abx per ID: vanco and cefepime post HD and Flagy 500mg q12  -f/u ID for revised Abx regimen post amputation,  -vascular signout recs: EBL 50cc, advance diet as tolerated, pain management, amputation revision to be scheduled   -benadryl PRN for the itching    # HFrEF (NICM)  -s/p cardiac cath 8/2017: Left main: 20% distal stenosis; LAD: 25% ostial stenosis, 25-30% mid stenosis; Circumflex: Sequential 25-30% mid stenoses; RCA: 50% mid stenosis.   -2D echo: report pending  -c/w coreg, lisinopril, lasix    # HTN  -c/w hydralazine, coreg, lisinopril    # ESRD  -s/p HD this morning	  -renal recs appreciated  -c/w phoslo, metolazone lasix   -If continuing with Vancomycin will decrease dose to 500mg post hemo as vanc troph 35    # DM  -monitor FS  -c/w insulin    # Anemia (normocytic)  -c/w Aransep 80mcg q week  -Iron studies consistent with anemia of chronic disease  -s/p 2 units PRBCs today 3/6/18 with HD pre-op    # DVT ppx heparin SQ (holding for surgery)    # Dispo  -pending revision and final closure of amputation  -likely return to NH when stable post procedure

## 2018-03-07 LAB
CREAT SERPL-MCNC: 5.88 MG/DL — HIGH (ref 0.5–1.3)
PHOSPHATE SERPL-MCNC: 5.3 MG/DL — HIGH (ref 2.5–4.5)

## 2018-03-07 RX ORDER — DIPHENHYDRAMINE HCL 50 MG
12.5 CAPSULE ORAL ONCE
Qty: 0 | Refills: 0 | Status: DISCONTINUED | OUTPATIENT
Start: 2018-03-07 | End: 2018-03-07

## 2018-03-07 RX ORDER — DIPHENHYDRAMINE HCL 50 MG
25 CAPSULE ORAL ONCE
Qty: 0 | Refills: 0 | Status: COMPLETED | OUTPATIENT
Start: 2018-03-07 | End: 2018-03-07

## 2018-03-07 RX ADMIN — Medication 500 MILLIGRAM(S): at 06:39

## 2018-03-07 RX ADMIN — Medication 25 MILLIGRAM(S): at 14:27

## 2018-03-07 RX ADMIN — LISINOPRIL 20 MILLIGRAM(S): 2.5 TABLET ORAL at 06:39

## 2018-03-07 RX ADMIN — Medication 40 MILLIGRAM(S): at 06:37

## 2018-03-07 RX ADMIN — Medication 2: at 08:51

## 2018-03-07 RX ADMIN — Medication 25 MILLIGRAM(S): at 17:18

## 2018-03-07 RX ADMIN — Medication 25 MILLIGRAM(S): at 21:53

## 2018-03-07 RX ADMIN — CARVEDILOL PHOSPHATE 6.25 MILLIGRAM(S): 80 CAPSULE, EXTENDED RELEASE ORAL at 18:46

## 2018-03-07 RX ADMIN — Medication 5 MILLIGRAM(S): at 21:52

## 2018-03-07 RX ADMIN — Medication 25 MILLIGRAM(S): at 21:52

## 2018-03-07 RX ADMIN — INSULIN GLARGINE 12 UNIT(S): 100 INJECTION, SOLUTION SUBCUTANEOUS at 21:52

## 2018-03-07 RX ADMIN — OXYCODONE HYDROCHLORIDE 5 MILLIGRAM(S): 5 TABLET ORAL at 06:37

## 2018-03-07 RX ADMIN — Medication 25 MILLIGRAM(S): at 06:38

## 2018-03-07 RX ADMIN — Medication 667 MILLIGRAM(S): at 10:25

## 2018-03-07 RX ADMIN — CARVEDILOL PHOSPHATE 6.25 MILLIGRAM(S): 80 CAPSULE, EXTENDED RELEASE ORAL at 06:39

## 2018-03-07 RX ADMIN — Medication 667 MILLIGRAM(S): at 17:18

## 2018-03-07 RX ADMIN — Medication 25 MILLIGRAM(S): at 06:36

## 2018-03-07 NOTE — PROGRESS NOTE ADULT - SUBJECTIVE AND OBJECTIVE BOX
S/P LEFT BKA 3/6    PAST MEDICAL & SURGICAL HISTORY:  Type 2 diabetes mellitus  CHF (congestive heart failure)  End stage renal failure on dialysis  Hypertension  Hyperlipemia  Heart failure  History of femoral angiogram: left angiogram  Port-a-cath in place: right chest wall    Vital Signs Last 24 Hrs  T(C): 36.9 (07 Mar 2018 05:07), Max: 38.2 (06 Mar 2018 20:09)  T(F): 98.5 (07 Mar 2018 05:07), Max: 100.8 (06 Mar 2018 20:09)  HR: 73 (07 Mar 2018 05:07) (71 - 84)  BP: 131/63 (07 Mar 2018 05:07) (109/52 - 166/73)  BP(mean): --  RR: 16 (07 Mar 2018 05:07) (11 - 26)  SpO2: 96% (06 Mar 2018 16:53) (96% - 99%)        I&O's Detail    06 Mar 2018 07:01  -  07 Mar 2018 07:00  --------------------------------------------------------  IN:    PRBCs (Packed Red Blood Cells): 618 mL  Total IN: 618 mL    OUT:    Other: 2400 mL  Total OUT: 2400 mL    Total NET: -1782 mL               7.3    11.43 )-----------( 343      (  @ 09:09 )             23.2                  134   |  97    |  54                 Ca: 8.6    BMP:   ----------------------------< 171    M.2   (18 @ 09:09)             3.8    |  26    | 6.0                Ph: 5.3      PT/INR - ( 06 Mar 2018 09:09 )   PT: 12.90 sec;   INR: 1.19 ratio         PTT - ( 06 Mar 2018 09:09 )  PTT:29.0 sec    MEDICATIONS  (STANDING):  calcium acetate 667 milliGRAM(s) Oral two times a day with meals  carvedilol Oral Tab/Cap - Peds 6.25 milliGRAM(s) Oral two times a day  cefepime  IVPB 2000 milliGRAM(s) IV Intermittent <User Schedule>  darbepoetin Injectable ViaL 80 MICROGram(s) IV Push <User Schedule>  furosemide    Tablet 40 milliGRAM(s) Oral daily  hydrALAZINE 25 milliGRAM(s) Oral three times a day  insulin glargine SubCutaneous Injection (LANTUS) - Peds 12 Unit(s) SubCutaneous at bedtime  insulin lispro (HumaLOG) corrective regimen sliding scale   SubCutaneous three times a day before meals  lisinopril 20 milliGRAM(s) Oral daily  melatonin 5 milliGRAM(s) Oral at bedtime  metolazone 2.5 milliGRAM(s) Oral daily  metroNIDAZOLE    Tablet 500 milliGRAM(s) Oral two times a day    MEDICATIONS  (PRN):  acetaminophen   Tablet 650 milliGRAM(s) Oral every 6 hours PRN For Temp greater than 38 C (100.4 F)  acetaminophen   Tablet. 650 milliGRAM(s) Oral every 6 hours PRN Mild Pain (1 - 3)  diphenhydrAMINE   Capsule 25 milliGRAM(s) Oral every 6 hours PRN Rash and/or Itching  morphine  - Injectable 1 milliGRAM(s) IV Push every 6 hours PRN Severe Pain (7 - 10)  oxyCODONE    IR 5 milliGRAM(s) Oral every 4 hours PRN Moderate Pain (4 - 6)    Diet, Renal Restrictions:   For patients receiving Renal Replacement - No Protein Restr, No Conc K, No Conc Phos, Low Sodium  Consistent Carbohydrate No Snacks  Supplement Feeding Modality:  Oral  Prostat Cans or Servings Per Day:  1       Frequency:  Two Times a day (18 @ 15:55)    PHYSICAL EXAM:  General Appearance: NAD  Neck: Supple  Chest: Equal expansion bilaterally, equal breath sounds  CV: S1, S2, RRR  Abdomen: Soft, NT, ND  Extremities: Left sided wound vac on BKA, draining minimal amount of blood  Neuro: A&Ox3

## 2018-03-07 NOTE — PROGRESS NOTE ADULT - SUBJECTIVE AND OBJECTIVE BOX
Podiatry Follow Up:  Pt seen bedside. Pt s/p Left BKA open 3/6.   Pt denies F/N/V/C/D/SOB.     A:  Equinus Right LE  Superficial heel ulceration Right heel  Ischemic changes to the Right forefoot    P:  Continue with wound care: betadine dsd kerlix Right foot q24h  continue with heel off  Right foot   Podiatry to follow up q72h.

## 2018-03-07 NOTE — PROGRESS NOTE ADULT - SUBJECTIVE AND OBJECTIVE BOX
Hospital Day:  12d    Subjective:  low grade temp 100.8 overnight treated with tylenol, laying in bed comfortably, has an aversion to seeing the amputation stump, says the itching she had last night resolved with the benadryl    Past Medical Hx:   Type 2 diabetes mellitus  CHF (congestive heart failure)  End stage renal failure on dialysis  Hypertension  Hyperlipemia  Heart failure  Acute heart failure, unspecified heart failure type  Heart failure    Past Sx:  History of femoral angiogram  Port-a-cath in place    Allergies:  No Known Allergies    Current Meds:   Standng Meds:  calcium acetate 667 milliGRAM(s) Oral two times a day with meals  carvedilol Oral Tab/Cap - Peds 6.25 milliGRAM(s) Oral two times a day  cefepime  IVPB 2000 milliGRAM(s) IV Intermittent <User Schedule>  darbepoetin Injectable ViaL 80 MICROGram(s) IV Push <User Schedule>  furosemide    Tablet 40 milliGRAM(s) Oral daily  hydrALAZINE 25 milliGRAM(s) Oral three times a day  insulin glargine SubCutaneous Injection (LANTUS) - Peds 12 Unit(s) SubCutaneous at bedtime  insulin lispro (HumaLOG) corrective regimen sliding scale   SubCutaneous three times a day before meals  lisinopril 20 milliGRAM(s) Oral daily  melatonin 5 milliGRAM(s) Oral at bedtime  metolazone 2.5 milliGRAM(s) Oral daily  metroNIDAZOLE    Tablet 500 milliGRAM(s) Oral two times a day    PRN Meds:  acetaminophen   Tablet 650 milliGRAM(s) Oral every 6 hours PRN For Temp greater than 38 C (100.4 F)  acetaminophen   Tablet. 650 milliGRAM(s) Oral every 6 hours PRN Mild Pain (1 - 3)  diphenhydrAMINE   Capsule 25 milliGRAM(s) Oral every 6 hours PRN Rash and/or Itching  morphine  - Injectable 1 milliGRAM(s) IV Push every 6 hours PRN Severe Pain (7 - 10)  oxyCODONE    IR 5 milliGRAM(s) Oral every 4 hours PRN Moderate Pain (4 - 6)    Vital Signs:   T(F): 97.5 (03-07-18 @ 12:10), Max: 100.8 (03-06-18 @ 20:09)  HR: 65 (03-07-18 @ 12:10) (65 - 82)  BP: 122/59 (03-07-18 @ 12:10) (112/48 - 131/63)  RR: 16 (03-07-18 @ 12:10) (11 - 17)  SpO2: 96% (03-06-18 @ 16:53) (96% - 97%)      03-06-18 @ 07:01  -  03-07-18 @ 07:00  --------------------------------------------------------  IN: 618 mL / OUT: 2400 mL / NET: -1782 mL    Physical Exam:   GENERAL: NAD  HEENT: NCAT  CHEST/LUNG: mild bibasilar crackles  HEART: Regular rate and rhythm; s1 s2 appreciated, Soft systolic murmur  ABDOMEN: Soft, Nontender, Nondistended; Bowel sounds present  EXTREMITIES: Right foot wrapped in gauze, Left BKA (above the ankle) wound vac in place on stump with minimal drainage  NERVOUS SYSTEM:  Alert & Oriented X3    Labs:                         7.3    11.43 )-----------( 343      ( 06 Mar 2018 09:09 )             23.2       06 Mar 2018 09:09    134    |  97     |  54     ----------------------------<  171    3.8     |  26     |  6.0      Ca    8.6        06 Mar 2018 09:09  Phos  5.3       06 Mar 2018 09:09  Mg     2.2       06 Mar 2018 09:09    Assessment and Plan:   58 y/o F PMH as listed transfered from [facility] for 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 Pt initially refused IV Abx and surgery at [facility], s/p psych eval pt has no capacity to refuse treatment.    # Gangrenous DFU  -afebrile, WBCs downtrending  -POD #1 s/p left BKA, wound vac on stump  -local wound care of right foot per podiatry   -vascular recs: wound vac to be changed tomorrow, amputation revision to be scheduled   -d/w ID can d/c Abx as infection source resolved  -pain management  -c/w benadryl PRN for the itching    # HFrEF (NICM)  -s/p cardiac cath 8/2017: Left main: 20% distal stenosis; LAD: 25% ostial stenosis, 25-30% mid stenosis; Circumflex: Sequential 25-30% mid stenoses; RCA: 50% mid stenosis.   -2D echo: report still pending  -c/w coreg, lisinopril, lasix    # HTN  -c/w hydralazine, coreg, lisinopril    # ESRD  -scheduled for HD tomorrow (will get repeat labs in HD)  -renal following   -c/w phoslo, metolazone, lasix     # DM  -monitor FS  -c/w insulin    # Anemia (normocytic)  -c/w Aransep 80mcg q week  -Iron studies consistent with anemia of chronic disease  -s/p 2 units PRBCs 3/6/18 with HD pre-op  -keep active T&S (next due on 3/9/18- will do it with HD tomorrow as pt refuses blood draws)    # DVT ppx- heparin SQ    # Dispo  -pending revision and final closure of amputation  -likely return to NH when stable post procedure

## 2018-03-08 LAB
ANION GAP SERPL CALC-SCNC: 14 MMOL/L — SIGNIFICANT CHANGE UP (ref 7–14)
BASOPHILS # BLD AUTO: 0.04 K/UL — SIGNIFICANT CHANGE UP (ref 0–0.2)
BASOPHILS NFR BLD AUTO: 0.4 % — SIGNIFICANT CHANGE UP (ref 0–1)
BLD GP AB SCN SERPL QL: SIGNIFICANT CHANGE UP
BUN SERPL-MCNC: 35 MG/DL — HIGH (ref 10–20)
CALCIUM SERPL-MCNC: 9.1 MG/DL — SIGNIFICANT CHANGE UP (ref 8.5–10.1)
CHLORIDE SERPL-SCNC: 96 MMOL/L — LOW (ref 98–110)
CO2 SERPL-SCNC: 27 MMOL/L — SIGNIFICANT CHANGE UP (ref 17–32)
CREAT SERPL-MCNC: 4.8 MG/DL — CRITICAL HIGH (ref 0.7–1.5)
EOSINOPHIL # BLD AUTO: 0.26 K/UL — SIGNIFICANT CHANGE UP (ref 0–0.7)
EOSINOPHIL NFR BLD AUTO: 2.7 % — SIGNIFICANT CHANGE UP (ref 0–8)
GLUCOSE SERPL-MCNC: 72 MG/DL — SIGNIFICANT CHANGE UP (ref 70–110)
HCT VFR BLD CALC: 30.1 % — LOW (ref 37–47)
HGB BLD-MCNC: 9.7 G/DL — LOW (ref 12–16)
IMM GRANULOCYTES NFR BLD AUTO: 2 % — HIGH (ref 0.1–0.3)
LYMPHOCYTES # BLD AUTO: 1.71 K/UL — SIGNIFICANT CHANGE UP (ref 1.2–3.4)
LYMPHOCYTES # BLD AUTO: 17.6 % — LOW (ref 20.5–51.1)
MCHC RBC-ENTMCNC: 26.6 PG — LOW (ref 27–31)
MCHC RBC-ENTMCNC: 32.2 G/DL — SIGNIFICANT CHANGE UP (ref 32–37)
MCV RBC AUTO: 82.5 FL — SIGNIFICANT CHANGE UP (ref 81–99)
MONOCYTES # BLD AUTO: 1.3 K/UL — HIGH (ref 0.1–0.6)
MONOCYTES NFR BLD AUTO: 13.4 % — HIGH (ref 1.7–9.3)
NEUTROPHILS # BLD AUTO: 6.23 K/UL — SIGNIFICANT CHANGE UP (ref 1.4–6.5)
NEUTROPHILS NFR BLD AUTO: 63.9 % — SIGNIFICANT CHANGE UP (ref 42.2–75.2)
NRBC # BLD: 0 /100 WBCS — SIGNIFICANT CHANGE UP (ref 0–0)
PHOSPHATE 24H UR-MCNC: SIGNIFICANT CHANGE UP
PHOSPHATE CL ?TM UR+SERPL-VRATE: SIGNIFICANT CHANGE UP % (ref 78–97)
PHOSPHATE SERPL-MCNC: 5.2 MG/DL — HIGH (ref 2.1–4.9)
PLATELET # BLD AUTO: 329 K/UL — SIGNIFICANT CHANGE UP (ref 130–400)
POTASSIUM SERPL-MCNC: 3.7 MMOL/L — SIGNIFICANT CHANGE UP (ref 3.5–5)
POTASSIUM SERPL-SCNC: 3.7 MMOL/L — SIGNIFICANT CHANGE UP (ref 3.5–5)
RBC # BLD: 3.65 M/UL — LOW (ref 4.2–5.4)
RBC # FLD: 20.7 % — HIGH (ref 11.5–14.5)
SODIUM SERPL-SCNC: 137 MMOL/L — SIGNIFICANT CHANGE UP (ref 135–146)
TYPE + AB SCN PNL BLD: SIGNIFICANT CHANGE UP
WBC # BLD: 9.73 K/UL — SIGNIFICANT CHANGE UP (ref 4.8–10.8)
WBC # FLD AUTO: 9.73 K/UL — SIGNIFICANT CHANGE UP (ref 4.8–10.8)

## 2018-03-08 RX ORDER — INSULIN LISPRO 100/ML
VIAL (ML) SUBCUTANEOUS
Qty: 0 | Refills: 0 | Status: DISCONTINUED | OUTPATIENT
Start: 2018-03-08 | End: 2018-03-09

## 2018-03-08 RX ORDER — INSULIN GLARGINE 100 [IU]/ML
9 INJECTION, SOLUTION SUBCUTANEOUS AT BEDTIME
Qty: 0 | Refills: 0 | Status: DISCONTINUED | OUTPATIENT
Start: 2018-03-08 | End: 2018-03-09

## 2018-03-08 RX ADMIN — Medication 40 MILLIGRAM(S): at 05:08

## 2018-03-08 RX ADMIN — Medication 25 MILLIGRAM(S): at 05:08

## 2018-03-08 RX ADMIN — Medication 2: at 12:54

## 2018-03-08 RX ADMIN — OXYCODONE HYDROCHLORIDE 5 MILLIGRAM(S): 5 TABLET ORAL at 13:02

## 2018-03-08 RX ADMIN — Medication 25 MILLIGRAM(S): at 14:04

## 2018-03-08 RX ADMIN — Medication 5 MILLIGRAM(S): at 21:22

## 2018-03-08 RX ADMIN — INSULIN GLARGINE 9 UNIT(S): 100 INJECTION, SOLUTION SUBCUTANEOUS at 21:56

## 2018-03-08 RX ADMIN — Medication 25 MILLIGRAM(S): at 05:53

## 2018-03-08 RX ADMIN — LISINOPRIL 20 MILLIGRAM(S): 2.5 TABLET ORAL at 05:08

## 2018-03-08 RX ADMIN — Medication 25 MILLIGRAM(S): at 12:13

## 2018-03-08 RX ADMIN — CARVEDILOL PHOSPHATE 6.25 MILLIGRAM(S): 80 CAPSULE, EXTENDED RELEASE ORAL at 17:31

## 2018-03-08 RX ADMIN — Medication 25 MILLIGRAM(S): at 23:19

## 2018-03-08 RX ADMIN — CARVEDILOL PHOSPHATE 6.25 MILLIGRAM(S): 80 CAPSULE, EXTENDED RELEASE ORAL at 05:08

## 2018-03-08 RX ADMIN — OXYCODONE HYDROCHLORIDE 5 MILLIGRAM(S): 5 TABLET ORAL at 14:07

## 2018-03-08 RX ADMIN — Medication 667 MILLIGRAM(S): at 17:30

## 2018-03-08 NOTE — PROGRESS NOTE ADULT - SUBJECTIVE AND OBJECTIVE BOX
Hospital Day:  13d    Subjective:  no events overnight, laying in bed comfortably, tired after dialysis, c/o of itchiness and stab of pain in right foot when moved but has refused pain meds saying I don't want drugs, d/w pt that ok to take the pain meds when needed and she is willing, Patient also noted with aversion to looking at stump- when asked she says she is scared to look at it but would agree to have someone come talk to her about it.    Past Medical Hx:   Type 2 diabetes mellitus  CHF (congestive heart failure)  End stage renal failure on dialysis  Hypertension  Hyperlipemia  Heart failure  Acute heart failure, unspecified heart failure type  Heart failure    Past Sx:  History of femoral angiogram  Port-a-cath in place    Allergies:  No Known Allergies    Current Meds:   Standng Meds:  calcium acetate 667 milliGRAM(s) Oral two times a day with meals  carvedilol Oral Tab/Cap - Peds 6.25 milliGRAM(s) Oral two times a day  darbepoetin Injectable ViaL 80 MICROGram(s) IV Push <User Schedule>  furosemide    Tablet 40 milliGRAM(s) Oral daily  hydrALAZINE 25 milliGRAM(s) Oral three times a day  insulin glargine SubCutaneous Injection (LANTUS) - Peds 12 Unit(s) SubCutaneous at bedtime  insulin lispro (HumaLOG) corrective regimen sliding scale   SubCutaneous three times a day before meals  lisinopril 20 milliGRAM(s) Oral daily  melatonin 5 milliGRAM(s) Oral at bedtime  metolazone 2.5 milliGRAM(s) Oral daily    PRN Meds:  acetaminophen   Tablet. 650 milliGRAM(s) Oral every 6 hours PRN Mild Pain (1 - 3)  diphenhydrAMINE   Capsule 25 milliGRAM(s) Oral every 6 hours PRN Rash and/or Itching  oxyCODONE    IR 5 milliGRAM(s) Oral every 4 hours PRN Moderate Pain (4 - 6)    Vital Signs:   T(F): 97.2 (03-08-18 @ 05:15), Max: 97.2 (03-08-18 @ 05:15)  HR: 68 (03-08-18 @ 11:10) (67 - 70)  BP: 118/66 (03-08-18 @ 11:10) (118/66 - 173/78)  RR: 20 (03-08-18 @ 11:10) (16 - 20)  SpO2: 99% (03-08-18 @ 05:58) (99% - 99%)    03-08-18 @ 07:01  -  03-08-18 @ 12:51  --------------------------------------------------------  IN: 0 mL / OUT: 1600 mL / NET: -1600 mL    Physical Exam:   GENERAL: NAD  HEENT: NCAT  CHEST/LUNG: CTAB  HEART: Regular rate and rhythm; s1 s2 appreciated, No murmurs  ABDOMEN: Soft, Nontender, Nondistended;  EXTREMITIES: left BKA (above ankle), right foot wrapped in kerlix (clean/dry/ no strikethrough)  NERVOUS SYSTEM:  Alert & Oriented X3    Labs:                         9.7    9.73  )-----------( 329      ( 08 Mar 2018 09:48 )             30.1     Neutophil% 63.9, Lymphocyte% 17.6, Monocyte% 13.4, Bands% -- 03-08-18 @ 09:48    08 Mar 2018 09:48    137    |  96     |  35     ----------------------------<  72     3.7     |  27     |  4.8      Ca    9.1        08 Mar 2018 09:48  Phos  5.2       08 Mar 2018 09:48    Assessment and Plan:   58 y/o F PMH as listed transfered from [facility] for 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 Pt initially refused IV Abx and surgery at [facility], s/p psych eval pt has no capacity to refuse treatment.    # Gangrenous DFU  -afebrile, WBCs WNL  -POD #2 s/p left BKA, wound vac on stump  -local wound care of right foot per podiatry   -vascular recs: wound vac to be changed by vascular today, amputation revision scheduled for Tuesday 3/13/18  -Abx d/c as infection source resolved  -pain management (IV meds d/c)   -c/w benadryl PRN for the itching  -has aversion to seeing stump will have psych come discuss with patient     # HFrEF (NICM)  -s/p cardiac cath 8/2017: Left main: 20% distal stenosis; LAD: 25% ostial stenosis, 25-30% mid stenosis; Circumflex: Sequential 25-30% mid stenoses; RCA: 50% mid stenosis.   -2D echo 3/5/18: EF 55%, G1DD, LA enlarged, mild-mod MVR, mild TVR/PVR  -c/w coreg, lisinopril, lasix    # HTN  -c/w hydralazine, coreg, lisinopril    # ESRD  -s/p HD today  -renal recs appreciated  -c/w phoslo, metolazone, lasix (pt still making some urine/incontinence)     # DM  -monitor FS  -mornign FS on the lower side will decrease lantus as infection resolved    # Anemia (normocytic)- stable  -c/w Aransep 80mcg q week  -Iron studies consistent with anemia of chronic disease  -s/p 2 units PRBCs 3/6/18 with HD pre-op  -keep active T&S     # DVT ppx- heparin SQ    # Dispo  -pending revision and final closure of amputation on Tuesday  -likely return to NH when stable post procedure Hospital Day:  13d    Subjective:  no events overnight, laying in bed comfortably, tired after dialysis, c/o of itchiness and stab of pain in right foot when moved but has refused pain meds saying I don't want drugs, d/w pt that ok to take the pain meds when needed and she is willing, Patient also noted with aversion to looking at stump- when asked she says she is scared to look at it but would agree to have someone come talk to her about it.    Past Medical Hx:   Type 2 diabetes mellitus  CHF (congestive heart failure)  End stage renal failure on dialysis  Hypertension  Hyperlipemia  Heart failure  Acute heart failure, unspecified heart failure type  Heart failure    Past Sx:  History of femoral angiogram  Port-a-cath in place    Allergies:  No Known Allergies    Current Meds:   Standng Meds:  calcium acetate 667 milliGRAM(s) Oral two times a day with meals  carvedilol Oral Tab/Cap - Peds 6.25 milliGRAM(s) Oral two times a day  darbepoetin Injectable ViaL 80 MICROGram(s) IV Push <User Schedule>  furosemide    Tablet 40 milliGRAM(s) Oral daily  hydrALAZINE 25 milliGRAM(s) Oral three times a day  insulin glargine SubCutaneous Injection (LANTUS) - Peds 12 Unit(s) SubCutaneous at bedtime  insulin lispro (HumaLOG) corrective regimen sliding scale   SubCutaneous three times a day before meals  lisinopril 20 milliGRAM(s) Oral daily  melatonin 5 milliGRAM(s) Oral at bedtime  metolazone 2.5 milliGRAM(s) Oral daily    PRN Meds:  acetaminophen   Tablet. 650 milliGRAM(s) Oral every 6 hours PRN Mild Pain (1 - 3)  diphenhydrAMINE   Capsule 25 milliGRAM(s) Oral every 6 hours PRN Rash and/or Itching  oxyCODONE    IR 5 milliGRAM(s) Oral every 4 hours PRN Moderate Pain (4 - 6)    Vital Signs:   T(F): 97.2 (03-08-18 @ 05:15), Max: 97.2 (03-08-18 @ 05:15)  HR: 68 (03-08-18 @ 11:10) (67 - 70)  BP: 118/66 (03-08-18 @ 11:10) (118/66 - 173/78)  RR: 20 (03-08-18 @ 11:10) (16 - 20)  SpO2: 99% (03-08-18 @ 05:58) (99% - 99%)    03-08-18 @ 07:01  -  03-08-18 @ 12:51  --------------------------------------------------------  IN: 0 mL / OUT: 1600 mL / NET: -1600 mL    Physical Exam:   GENERAL: NAD  HEENT: NCAT  CHEST/LUNG: CTAB  HEART: Regular rate and rhythm; s1 s2 appreciated, No murmurs  ABDOMEN: Soft, Nontender, Nondistended;  EXTREMITIES: left BKA (above ankle), right foot wrapped in kerlix (clean/dry/ no strikethrough)  NERVOUS SYSTEM:  Alert & Oriented X3    Labs:                         9.7    9.73  )-----------( 329      ( 08 Mar 2018 09:48 )             30.1     Neutophil% 63.9, Lymphocyte% 17.6, Monocyte% 13.4, Bands% -- 03-08-18 @ 09:48    08 Mar 2018 09:48    137    |  96     |  35     ----------------------------<  72     3.7     |  27     |  4.8      Ca    9.1        08 Mar 2018 09:48  Phos  5.2       08 Mar 2018 09:48    Assessment and Plan:   58 y/o F PMH as listed transfered from [facility] for 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 Pt initially refused IV Abx and surgery at [facility], s/p psych eval pt has no capacity to refuse treatment.    # Gangrenous DFU  -afebrile, WBCs WNL  -POD #2 s/p left BKA, wound vac on stump  -local wound care of right foot per podiatry   -vascular recs: wound vac to be changed by vascular today, amputation revision scheduled for tomorrow 3/9/18- NPO at 12am  -Abx d/c as infection source resolved  -pain management (IV meds d/c)   -c/w benadryl PRN for the itching  -has aversion to seeing stump will have psych come discuss with patient     # HFrEF (NICM)  -s/p cardiac cath 8/2017: Left main: 20% distal stenosis; LAD: 25% ostial stenosis, 25-30% mid stenosis; Circumflex: Sequential 25-30% mid stenoses; RCA: 50% mid stenosis.   -2D echo 3/5/18: EF 55%, G1DD, LA enlarged, mild-mod MVR, mild TVR/PVR  -c/w coreg, lisinopril, lasix    # HTN  -c/w hydralazine, coreg, lisinopril    # ESRD  -s/p HD today  -renal recs appreciated  -c/w phoslo, metolazone, lasix (pt still making some urine/incontinence)     # DM  -monitor FS  -mornign FS on the lower side will decrease lantus as infection resolved    # Anemia (normocytic)- stable  -c/w Aransep 80mcg q week  -Iron studies consistent with anemia of chronic disease  -s/p 2 units PRBCs 3/6/18 with HD pre-op  -keep active T&S     # DVT ppx- heparin SQ    # Dispo  -pending revision and final closure of amputation on Tuesday  -likely return to NH when stable post procedure

## 2018-03-08 NOTE — PROGRESS NOTE ADULT - SUBJECTIVE AND OBJECTIVE BOX
seen and examined  no distress  lying comfortable     Standing Inpatient Medications  calcium acetate 667 milliGRAM(s) Oral two times a day with meals  carvedilol Oral Tab/Cap - Peds 6.25 milliGRAM(s) Oral two times a day  darbepoetin Injectable ViaL 80 MICROGram(s) IV Push <User Schedule>  furosemide    Tablet 40 milliGRAM(s) Oral daily  hydrALAZINE 25 milliGRAM(s) Oral three times a day  insulin glargine SubCutaneous Injection (LANTUS) - Peds 12 Unit(s) SubCutaneous at bedtime  insulin lispro (HumaLOG) corrective regimen sliding scale   SubCutaneous three times a day before meals  lisinopril 20 milliGRAM(s) Oral daily  melatonin 5 milliGRAM(s) Oral at bedtime  metolazone 2.5 milliGRAM(s) Oral daily            VITALS/PHYSICAL EXAM  --------------------------------------------------------------------------------  T(C): 36.2 (03-08-18 @ 05:15), Max: 36.4 (03-07-18 @ 12:10)  HR: 67 (03-08-18 @ 08:10) (65 - 70)  BP: 173/78 (03-08-18 @ 08:10) (122/59 - 173/78)  RR: 18 (03-08-18 @ 08:10) (16 - 18)  SpO2: 99% (03-08-18 @ 05:58) (99% - 99%)      Physical Exam:  	Gen: NAD  	Pulm:  B/L  	CV: S1S2; no rub  	Abd: soft, nontender/nondistended  	LE: left BKA, dressing right foot   	Vascular access: chest wall tesio     LABS/STUDIES  --------------------------------------------------------------------------------            Iron 19, TIBC 116, %sat 16      [03-01-18 @ 10:19]  Ferritin 1091.0      [03-01-18 @ 10:19]

## 2018-03-09 ENCOUNTER — RESULT REVIEW (OUTPATIENT)
Age: 58
End: 2018-03-09

## 2018-03-09 ENCOUNTER — APPOINTMENT (OUTPATIENT)
Dept: VASCULAR SURGERY | Facility: HOSPITAL | Age: 58
End: 2018-03-09
Payer: MEDICARE

## 2018-03-09 PROCEDURE — 27884 AMPUTATION FOLLOW-UP SURGERY: CPT | Mod: 58,LT

## 2018-03-09 RX ORDER — ONDANSETRON 8 MG/1
4 TABLET, FILM COATED ORAL ONCE
Qty: 0 | Refills: 0 | Status: COMPLETED | OUTPATIENT
Start: 2018-03-09 | End: 2018-03-09

## 2018-03-09 RX ORDER — ACETAMINOPHEN 500 MG
650 TABLET ORAL EVERY 6 HOURS
Qty: 0 | Refills: 0 | Status: DISCONTINUED | OUTPATIENT
Start: 2018-03-09 | End: 2018-03-12

## 2018-03-09 RX ORDER — INSULIN GLARGINE 100 [IU]/ML
9 INJECTION, SOLUTION SUBCUTANEOUS AT BEDTIME
Qty: 0 | Refills: 0 | Status: DISCONTINUED | OUTPATIENT
Start: 2018-03-09 | End: 2018-03-11

## 2018-03-09 RX ORDER — DARBEPOETIN ALFA IN POLYSORBAT 200MCG/0.4
80 PEN INJECTOR (ML) SUBCUTANEOUS
Qty: 0 | Refills: 0 | Status: DISCONTINUED | OUTPATIENT
Start: 2018-03-09 | End: 2018-03-12

## 2018-03-09 RX ORDER — OXYCODONE HYDROCHLORIDE 5 MG/1
5 TABLET ORAL EVERY 4 HOURS
Qty: 0 | Refills: 0 | Status: DISCONTINUED | OUTPATIENT
Start: 2018-03-09 | End: 2018-03-12

## 2018-03-09 RX ORDER — HYDRALAZINE HCL 50 MG
25 TABLET ORAL THREE TIMES A DAY
Qty: 0 | Refills: 0 | Status: DISCONTINUED | OUTPATIENT
Start: 2018-03-09 | End: 2018-03-12

## 2018-03-09 RX ORDER — LISINOPRIL 2.5 MG/1
20 TABLET ORAL DAILY
Qty: 0 | Refills: 0 | Status: DISCONTINUED | OUTPATIENT
Start: 2018-03-09 | End: 2018-03-12

## 2018-03-09 RX ORDER — CALCIUM ACETATE 667 MG
667 TABLET ORAL
Qty: 0 | Refills: 0 | Status: DISCONTINUED | OUTPATIENT
Start: 2018-03-09 | End: 2018-03-12

## 2018-03-09 RX ORDER — INSULIN LISPRO 100/ML
VIAL (ML) SUBCUTANEOUS
Qty: 0 | Refills: 0 | Status: DISCONTINUED | OUTPATIENT
Start: 2018-03-09 | End: 2018-03-11

## 2018-03-09 RX ORDER — DIPHENHYDRAMINE HCL 50 MG
25 CAPSULE ORAL EVERY 6 HOURS
Qty: 0 | Refills: 0 | Status: DISCONTINUED | OUTPATIENT
Start: 2018-03-09 | End: 2018-03-12

## 2018-03-09 RX ORDER — SODIUM CHLORIDE 9 MG/ML
1000 INJECTION INTRAMUSCULAR; INTRAVENOUS; SUBCUTANEOUS
Qty: 0 | Refills: 0 | Status: DISCONTINUED | OUTPATIENT
Start: 2018-03-09 | End: 2018-03-09

## 2018-03-09 RX ORDER — CARVEDILOL PHOSPHATE 80 MG/1
6.25 CAPSULE, EXTENDED RELEASE ORAL
Qty: 0 | Refills: 0 | Status: DISCONTINUED | OUTPATIENT
Start: 2018-03-09 | End: 2018-03-12

## 2018-03-09 RX ORDER — LANOLIN ALCOHOL/MO/W.PET/CERES
5 CREAM (GRAM) TOPICAL AT BEDTIME
Qty: 0 | Refills: 0 | Status: DISCONTINUED | OUTPATIENT
Start: 2018-03-09 | End: 2018-03-12

## 2018-03-09 RX ORDER — FUROSEMIDE 40 MG
40 TABLET ORAL DAILY
Qty: 0 | Refills: 0 | Status: DISCONTINUED | OUTPATIENT
Start: 2018-03-09 | End: 2018-03-12

## 2018-03-09 RX ORDER — MORPHINE SULFATE 50 MG/1
2 CAPSULE, EXTENDED RELEASE ORAL
Qty: 0 | Refills: 0 | Status: DISCONTINUED | OUTPATIENT
Start: 2018-03-09 | End: 2018-03-09

## 2018-03-09 RX ORDER — MORPHINE SULFATE 50 MG/1
2 CAPSULE, EXTENDED RELEASE ORAL EVERY 6 HOURS
Qty: 0 | Refills: 0 | Status: DISCONTINUED | OUTPATIENT
Start: 2018-03-09 | End: 2018-03-09

## 2018-03-09 RX ORDER — MORPHINE SULFATE 50 MG/1
4 CAPSULE, EXTENDED RELEASE ORAL
Qty: 0 | Refills: 0 | Status: DISCONTINUED | OUTPATIENT
Start: 2018-03-09 | End: 2018-03-09

## 2018-03-09 RX ADMIN — OXYCODONE HYDROCHLORIDE 5 MILLIGRAM(S): 5 TABLET ORAL at 20:13

## 2018-03-09 RX ADMIN — Medication 25 MILLIGRAM(S): at 17:27

## 2018-03-09 RX ADMIN — Medication 25 MILLIGRAM(S): at 22:13

## 2018-03-09 RX ADMIN — INSULIN GLARGINE 9 UNIT(S): 100 INJECTION, SOLUTION SUBCUTANEOUS at 22:14

## 2018-03-09 RX ADMIN — ONDANSETRON 4 MILLIGRAM(S): 8 TABLET, FILM COATED ORAL at 22:11

## 2018-03-09 RX ADMIN — Medication 5 MILLIGRAM(S): at 22:13

## 2018-03-09 RX ADMIN — Medication 40 MILLIGRAM(S): at 17:30

## 2018-03-09 RX ADMIN — Medication 40 MILLIGRAM(S): at 05:12

## 2018-03-09 RX ADMIN — Medication 25 MILLIGRAM(S): at 05:06

## 2018-03-09 RX ADMIN — Medication 25 MILLIGRAM(S): at 11:51

## 2018-03-09 RX ADMIN — Medication 25 MILLIGRAM(S): at 03:39

## 2018-03-09 RX ADMIN — SODIUM CHLORIDE 30 MILLILITER(S): 9 INJECTION INTRAMUSCULAR; INTRAVENOUS; SUBCUTANEOUS at 15:01

## 2018-03-09 RX ADMIN — Medication 650 MILLIGRAM(S): at 03:39

## 2018-03-09 RX ADMIN — Medication 25 MILLIGRAM(S): at 22:14

## 2018-03-09 RX ADMIN — CARVEDILOL PHOSPHATE 6.25 MILLIGRAM(S): 80 CAPSULE, EXTENDED RELEASE ORAL at 17:30

## 2018-03-09 RX ADMIN — CARVEDILOL PHOSPHATE 6.25 MILLIGRAM(S): 80 CAPSULE, EXTENDED RELEASE ORAL at 05:06

## 2018-03-09 RX ADMIN — LISINOPRIL 20 MILLIGRAM(S): 2.5 TABLET ORAL at 05:06

## 2018-03-09 RX ADMIN — Medication 650 MILLIGRAM(S): at 05:13

## 2018-03-09 RX ADMIN — Medication 667 MILLIGRAM(S): at 17:30

## 2018-03-09 RX ADMIN — OXYCODONE HYDROCHLORIDE 5 MILLIGRAM(S): 5 TABLET ORAL at 22:16

## 2018-03-09 NOTE — PROGRESS NOTE ADULT - SUBJECTIVE AND OBJECTIVE BOX
Hospital Day:  14d    Subjective:  no events overnight, laying in bed, denies SOB or pain, refused to be examined saying "just leave me alone"    Past Medical Hx:   Type 2 diabetes mellitus  CHF (congestive heart failure)  End stage renal failure on dialysis  Hypertension  Hyperlipemia  Heart failure  Acute heart failure, unspecified heart failure type  Heart failure    Past Sx:  History of femoral angiogram  Port-a-cath in place    Allergies:  No Known Allergies    Current Meds:   Standng Meds:  calcium acetate 667 milliGRAM(s) Oral two times a day with meals  carvedilol Oral Tab/Cap - Peds 6.25 milliGRAM(s) Oral two times a day  darbepoetin Injectable ViaL 80 MICROGram(s) IV Push <User Schedule>  furosemide    Tablet 40 milliGRAM(s) Oral daily  hydrALAZINE 25 milliGRAM(s) Oral three times a day  insulin glargine Injectable (LANTUS) 9 Unit(s) SubCutaneous at bedtime  insulin lispro (HumaLOG) corrective regimen sliding scale   SubCutaneous three times a day before meals  lisinopril 20 milliGRAM(s) Oral daily  melatonin 5 milliGRAM(s) Oral at bedtime  metolazone 2.5 milliGRAM(s) Oral daily    PRN Meds:  acetaminophen   Tablet. 650 milliGRAM(s) Oral every 6 hours PRN Mild Pain (1 - 3)  diphenhydrAMINE   Capsule 25 milliGRAM(s) Oral every 6 hours PRN Rash and/or Itching  oxyCODONE    IR 5 milliGRAM(s) Oral every 4 hours PRN Moderate Pain (4 - 6)    Vital Signs:   T(F): 96.5 (03-09-18 @ 10:09), Max: 98.3 (03-08-18 @ 21:16)  HR: 61 (03-09-18 @ 12:23) (61 - 77)  BP: 184/79 (03-09-18 @ 12:23) (104/55 - 184/79)  RR: 18 (03-09-18 @ 12:23) (18 - 18)  SpO2: 99% (03-09-18 @ 12:23) (95% - 99%)    03-08-18 @ 07:01  -  03-09-18 @ 07:00  --------------------------------------------------------  IN: 0 mL / OUT: 1600 mL / NET: -1600 mL    Physical Exam:   GENERAL: NAD  HEENT: NCAT  NERVOUS SYSTEM:  Alert   -refused to be examined today says "leave me alone"      Labs:                         9.7    9.73  )-----------( 329      ( 08 Mar 2018 09:48 )             30.1       08 Mar 2018 09:48    137    |  96     |  35     ----------------------------<  72     3.7     |  27     |  4.8      Ca    9.1        08 Mar 2018 09:48  Phos  5.2       08 Mar 2018 09:48    Assessment and Plan:   56 y/o F PMH as listed transfered from [facility] for 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 Pt initially refused IV Abx and surgery at [facility], s/p psych eval pt has no capacity to refuse treatment.    # Gangrenous DFU  -afebrile, WBCs WNL  -POD #3 s/p left BKA, going to OR for revision and closure today  -local wound care of right foot per podiatry   -Abx d/c as infection source resolved  -pain management  -c/w benadryl PRN for the itching  -has aversion to seeing stump will have psych come discuss with patient     # HFrEF (NICM)  -s/p cardiac cath 8/2017: Left main: 20% distal stenosis; LAD: 25% ostial stenosis, 25-30% mid stenosis; Circumflex: Sequential 25-30% mid stenoses; RCA: 50% mid stenosis.   -2D echo 3/5/18: EF 55%, G1DD, LA enlarged, mild-mod MVR, mild TVR/PVR  -c/w coreg, lisinopril, lasix    # HTN  -c/w hydralazine, coreg, lisinopril    # ESRD  -s/p HD today  -renal recs appreciated  -c/w phoslo, metolazone, lasix (pt still making some urine/incontinence)     # DM  -monitor FS  -c/w insulin regimen    # Anemia (normocytic)- stable  -c/w Aransep 80mcg q week  -Iron studies consistent with anemia of chronic disease  -s/p 2 units PRBCs 3/6/18 with HD pre-op  -keep active T&S     # DVT ppx- heparin SQ (hold for OR today)    # Dispo  -pending vascular/PT recs  -likely return to NH when stable post procedure Hospital Day:  14d    Subjective:  no events overnight, laying in bed, denies SOB or pain, refused to be examined saying "just leave me alone"    Past Medical Hx:   Type 2 diabetes mellitus  CHF (congestive heart failure)  End stage renal failure on dialysis  Hypertension  Hyperlipemia  Heart failure  Acute heart failure, unspecified heart failure type  Heart failure    Past Sx:  History of femoral angiogram  Port-a-cath in place    Allergies:  No Known Allergies    Current Meds:   Standng Meds:  calcium acetate 667 milliGRAM(s) Oral two times a day with meals  carvedilol Oral Tab/Cap - Peds 6.25 milliGRAM(s) Oral two times a day  darbepoetin Injectable ViaL 80 MICROGram(s) IV Push <User Schedule>  furosemide    Tablet 40 milliGRAM(s) Oral daily  hydrALAZINE 25 milliGRAM(s) Oral three times a day  insulin glargine Injectable (LANTUS) 9 Unit(s) SubCutaneous at bedtime  insulin lispro (HumaLOG) corrective regimen sliding scale   SubCutaneous three times a day before meals  lisinopril 20 milliGRAM(s) Oral daily  melatonin 5 milliGRAM(s) Oral at bedtime  metolazone 2.5 milliGRAM(s) Oral daily    PRN Meds:  acetaminophen   Tablet. 650 milliGRAM(s) Oral every 6 hours PRN Mild Pain (1 - 3)  diphenhydrAMINE   Capsule 25 milliGRAM(s) Oral every 6 hours PRN Rash and/or Itching  oxyCODONE    IR 5 milliGRAM(s) Oral every 4 hours PRN Moderate Pain (4 - 6)    Vital Signs:   T(F): 96.5 (03-09-18 @ 10:09), Max: 98.3 (03-08-18 @ 21:16)  HR: 61 (03-09-18 @ 12:23) (61 - 77)  BP: 184/79 (03-09-18 @ 12:23) (104/55 - 184/79)  RR: 18 (03-09-18 @ 12:23) (18 - 18)  SpO2: 99% (03-09-18 @ 12:23) (95% - 99%)    03-08-18 @ 07:01  -  03-09-18 @ 07:00  --------------------------------------------------------  IN: 0 mL / OUT: 1600 mL / NET: -1600 mL    Physical Exam:   GENERAL: NAD  HEENT: NCAT  NERVOUS SYSTEM:  Alert   -refused to be examined today says "leave me alone"      Labs:                         9.7    9.73  )-----------( 329      ( 08 Mar 2018 09:48 )             30.1       08 Mar 2018 09:48    137    |  96     |  35     ----------------------------<  72     3.7     |  27     |  4.8      Ca    9.1        08 Mar 2018 09:48  Phos  5.2       08 Mar 2018 09:48    Assessment and Plan:   56 y/o F PMH as listed transfered from [facility] for 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 Pt initially refused IV Abx and surgery at [facility], s/p psych eval pt has no capacity to refuse treatment.    # Gangrenous DFU  -afebrile, WBCs WNL  -POD #3 s/p left BKA, going to OR for revision and closure today  -local wound care of right foot per podiatry   -Abx d/c as infection source resolved  -pain management  -c/w benadryl PRN for the itching  -has aversion to seeing stump will have psych come discuss with patient     # HFrEF (NICM)  -s/p cardiac cath 8/2017: Left main: 20% distal stenosis; LAD: 25% ostial stenosis, 25-30% mid stenosis; Circumflex: Sequential 25-30% mid stenoses; RCA: 50% mid stenosis.   -2D echo 3/5/18: EF 55%, G1DD, LA enlarged, mild-mod MVR, mild TVR/PVR  -c/w coreg, lisinopril, lasix    # HTN  -c/w hydralazine, coreg, lisinopril    # ESRD  -s/p HD today  -renal recs appreciated  -c/w phoslo, metolazone, lasix (pt still making some urine/incontinence)     # DM  -monitor FS  -c/w insulin regimen    # Anemia (normocytic)- stable  -c/w Aransep 80mcg q week  -Iron studies consistent with anemia of chronic disease  -s/p 2 units PRBCs 3/6/18 with HD pre-op  -keep active T&S     # DVT ppx- SCDs but pt refusing    # Dispo  -pending vascular/PT recs  -likely return to NH when stable post procedure

## 2018-03-09 NOTE — PRE-ANESTHESIA EVALUATION ADULT - NSANTHOSAYNRD_GEN_A_CORE
not performed/No. ANITHA screening performed.  STOP BANG Legend: 0-2 = LOW Risk; 3-4 = INTERMEDIATE Risk; 5-8 = HIGH Risk
No. ANITHA screening performed.  STOP BANG Legend: 0-2 = LOW Risk; 3-4 = INTERMEDIATE Risk; 5-8 = HIGH Risk/not performed
No. ANITHA screening performed.  STOP BANG Legend: 0-2 = LOW Risk; 3-4 = INTERMEDIATE Risk; 5-8 = HIGH Risk/not performed

## 2018-03-09 NOTE — BRIEF OPERATIVE NOTE - PROCEDURE
<<-----Click on this checkbox to enter Procedure Amputation or amputation revision, below-knee  03/09/2018  Revision of left leg guillotine amputation into formal below-knee amputation  Active  ARVIND

## 2018-03-09 NOTE — BRIEF OPERATIVE NOTE - POST-OP DX
Wet gangrene  03/06/2018  left  Active  Eusebio Carrillo
Wet gangrene  03/06/2018  left  Active  Eusebio Carrillo

## 2018-03-09 NOTE — ANESTHESIA FOLLOW-UP NOTE - NSEVALATIONFT_GEN_ALL_CORE
Patient drowsy but arousable.  Oriented x1 (as was pre-op).  Return of protective reflexes.  Denies PONV or pain.  Vitals stable (refer to anesthesia record).

## 2018-03-09 NOTE — PRE-ANESTHESIA EVALUATION ADULT - NSANTHPMHFT_GEN_ALL_CORE
s/p Left BKA 3/6/18  Patient is confused  Patient sent to pre-op holding with No PIV access.
ESRD/HTN/anemia/CHF/DM/PVD/ b/l gangrene of feet:  -Requires Cardiac Clearance, 2D Echo 3/4/18 with results pending

## 2018-03-09 NOTE — PROGRESS NOTE ADULT - SUBJECTIVE AND OBJECTIVE BOX
Nephrology progress note    Patient is seen and examined, events over the last 24 h noted .    Allergies:  No Known Allergies    Hospital Medications:   MEDICATIONS  (STANDING):  calcium acetate 667 milliGRAM(s) Oral two times a day with meals  carvedilol Oral Tab/Cap - Peds 6.25 milliGRAM(s) Oral two times a day  darbepoetin Injectable ViaL 80 MICROGram(s) IV Push <User Schedule>  furosemide    Tablet 40 milliGRAM(s) Oral daily  hydrALAZINE 25 milliGRAM(s) Oral three times a day  insulin glargine Injectable (LANTUS) 9 Unit(s) SubCutaneous at bedtime  insulin lispro (HumaLOG) corrective regimen sliding scale   SubCutaneous three times a day before meals  lisinopril 20 milliGRAM(s) Oral daily  melatonin 5 milliGRAM(s) Oral at bedtime  metolazone 2.5 milliGRAM(s) Oral daily  sodium chloride 0.9%. 1000 milliLiter(s) (30 mL/Hr) IV Continuous <Continuous>        VITALS:  T(F): 98 (03-09-18 @ 16:01), Max: 98.8 (03-09-18 @ 15:01)  HR: 69 (03-09-18 @ 16:01)  BP: 160/72 (03-09-18 @ 16:01)  RR: 15 (03-09-18 @ 16:01)  SpO2: 98% (03-09-18 @ 16:01)  Wt(kg): --    03-08 @ 07:01  -  03-09 @ 07:00  --------------------------------------------------------  IN: 0 mL / OUT: 1600 mL / NET: -1600 mL    03-09 @ 07:01  -  03-09 @ 16:26  --------------------------------------------------------  IN: 0 mL / OUT: 0 mL / NET: 0 mL      Height (cm): 68.9 (03-09 @ 12:23)  Weight (kg): 61.7 (03-09 @ 12:23)  BMI (kg/m2): 130 (03-09 @ 12:23)  BSA (m2): 0.89 (03-09 @ 12:23)    PHYSICAL EXAM:  Constitutional: NAD  Respiratory: CTAB,  Cardiovascular: S1, S2, RRR  Gastrointestinal: BS+, soft, NT/ND  Extremities: no edema, Lt BKA, dry dressing Rt foot  Neurological: Awake, alert  Vascular Access: TEsio RCW    LABS:  03-08    137  |  96<L>  |  35<H>  ----------------------------<  72  3.7   |  27  |  4.8<HH>    Ca    9.1      08 Mar 2018 09:48  Phos  5.2     03-08                            9.7    9.73  )-----------( 329      ( 08 Mar 2018 09:48 )             30.1       Urine Studies:      RADIOLOGY & ADDITIONAL STUDIES:

## 2018-03-10 DIAGNOSIS — F43.21 ADJUSTMENT DISORDER WITH DEPRESSED MOOD: ICD-10-CM

## 2018-03-10 DIAGNOSIS — F05 DELIRIUM DUE TO KNOWN PHYSIOLOGICAL CONDITION: ICD-10-CM

## 2018-03-10 LAB
ANION GAP SERPL CALC-SCNC: 12 MMOL/L — SIGNIFICANT CHANGE UP (ref 7–14)
BASOPHILS # BLD AUTO: 0.04 K/UL — SIGNIFICANT CHANGE UP (ref 0–0.2)
BASOPHILS NFR BLD AUTO: 0.3 % — SIGNIFICANT CHANGE UP (ref 0–1)
BUN SERPL-MCNC: 28 MG/DL — HIGH (ref 10–20)
CALCIUM SERPL-MCNC: 9.2 MG/DL — SIGNIFICANT CHANGE UP (ref 8.5–10.1)
CHLORIDE SERPL-SCNC: 99 MMOL/L — SIGNIFICANT CHANGE UP (ref 98–110)
CO2 SERPL-SCNC: 26 MMOL/L — SIGNIFICANT CHANGE UP (ref 17–32)
CREAT SERPL-MCNC: 4.6 MG/DL — CRITICAL HIGH (ref 0.7–1.5)
EOSINOPHIL # BLD AUTO: 0.02 K/UL — SIGNIFICANT CHANGE UP (ref 0–0.7)
EOSINOPHIL NFR BLD AUTO: 0.1 % — SIGNIFICANT CHANGE UP (ref 0–8)
GLUCOSE SERPL-MCNC: 78 MG/DL — SIGNIFICANT CHANGE UP (ref 70–110)
HCT VFR BLD CALC: 30.1 % — LOW (ref 37–47)
HGB BLD-MCNC: 9.4 G/DL — LOW (ref 12–16)
IMM GRANULOCYTES NFR BLD AUTO: 1.3 % — HIGH (ref 0.1–0.3)
LYMPHOCYTES # BLD AUTO: 15.5 % — LOW (ref 20.5–51.1)
LYMPHOCYTES # BLD AUTO: 2.32 K/UL — SIGNIFICANT CHANGE UP (ref 1.2–3.4)
MCHC RBC-ENTMCNC: 26.5 PG — LOW (ref 27–31)
MCHC RBC-ENTMCNC: 31.2 G/DL — LOW (ref 32–37)
MCV RBC AUTO: 84.8 FL — SIGNIFICANT CHANGE UP (ref 81–99)
MONOCYTES # BLD AUTO: 1.51 K/UL — HIGH (ref 0.1–0.6)
MONOCYTES NFR BLD AUTO: 10.1 % — HIGH (ref 1.7–9.3)
NEUTROPHILS # BLD AUTO: 10.87 K/UL — HIGH (ref 1.4–6.5)
NEUTROPHILS NFR BLD AUTO: 72.7 % — SIGNIFICANT CHANGE UP (ref 42.2–75.2)
NRBC # BLD: 0 /100 WBCS — SIGNIFICANT CHANGE UP (ref 0–0)
PHOSPHATE SERPL-MCNC: 4.3 MG/DL — SIGNIFICANT CHANGE UP (ref 2.1–4.9)
PLATELET # BLD AUTO: 371 K/UL — SIGNIFICANT CHANGE UP (ref 130–400)
POTASSIUM SERPL-MCNC: 3.8 MMOL/L — SIGNIFICANT CHANGE UP (ref 3.5–5)
POTASSIUM SERPL-SCNC: 3.8 MMOL/L — SIGNIFICANT CHANGE UP (ref 3.5–5)
RBC # BLD: 3.55 M/UL — LOW (ref 4.2–5.4)
RBC # FLD: 19.9 % — HIGH (ref 11.5–14.5)
SODIUM SERPL-SCNC: 137 MMOL/L — SIGNIFICANT CHANGE UP (ref 135–146)
WBC # BLD: 14.95 K/UL — HIGH (ref 4.8–10.8)
WBC # FLD AUTO: 14.95 K/UL — HIGH (ref 4.8–10.8)

## 2018-03-10 RX ADMIN — Medication 5 MILLIGRAM(S): at 21:53

## 2018-03-10 RX ADMIN — Medication 25 MILLIGRAM(S): at 21:54

## 2018-03-10 RX ADMIN — Medication 650 MILLIGRAM(S): at 23:58

## 2018-03-10 RX ADMIN — INSULIN GLARGINE 9 UNIT(S): 100 INJECTION, SOLUTION SUBCUTANEOUS at 21:53

## 2018-03-10 RX ADMIN — CARVEDILOL PHOSPHATE 6.25 MILLIGRAM(S): 80 CAPSULE, EXTENDED RELEASE ORAL at 05:31

## 2018-03-10 RX ADMIN — OXYCODONE HYDROCHLORIDE 5 MILLIGRAM(S): 5 TABLET ORAL at 19:05

## 2018-03-10 RX ADMIN — Medication 25 MILLIGRAM(S): at 13:57

## 2018-03-10 RX ADMIN — OXYCODONE HYDROCHLORIDE 5 MILLIGRAM(S): 5 TABLET ORAL at 09:02

## 2018-03-10 RX ADMIN — Medication 25 MILLIGRAM(S): at 05:31

## 2018-03-10 RX ADMIN — OXYCODONE HYDROCHLORIDE 5 MILLIGRAM(S): 5 TABLET ORAL at 13:57

## 2018-03-10 RX ADMIN — Medication 25 MILLIGRAM(S): at 07:54

## 2018-03-10 RX ADMIN — CARVEDILOL PHOSPHATE 6.25 MILLIGRAM(S): 80 CAPSULE, EXTENDED RELEASE ORAL at 17:48

## 2018-03-10 RX ADMIN — LISINOPRIL 20 MILLIGRAM(S): 2.5 TABLET ORAL at 05:31

## 2018-03-10 RX ADMIN — Medication 40 MILLIGRAM(S): at 05:31

## 2018-03-10 NOTE — BEHAVIORAL HEALTH ASSESSMENT NOTE - NSBHCHARTREVIEWVS_PSY_A_CORE FT
Vital Signs Last 24 Hrs  T(C): 36.3 (10 Mar 2018 06:00), Max: 37.1 (09 Mar 2018 15:01)  T(F): 97.3 (10 Mar 2018 06:00), Max: 98.8 (09 Mar 2018 15:01)  HR: 69 (10 Mar 2018 08:10) (61 - 76)  BP: 128/62 (10 Mar 2018 08:10) (93/64 - 184/79)  BP(mean): --  RR: 18 (10 Mar 2018 06:00) (9 - 19)  SpO2: 99% (09 Mar 2018 17:08) (96% - 100%)

## 2018-03-10 NOTE — CONSULT NOTE ADULT - SUBJECTIVE AND OBJECTIVE BOX
HPI:  56 y/o female  sent by nursing home  for picc line .  Pt stating " i am here for a PICC LINE placement so that i can get my daily ABX" (23 Feb 2018 20:38)      PAST MEDICAL & SURGICAL HISTORY:  Type 2 diabetes mellitus  CHF (congestive heart failure)  End stage renal failure on dialysis  Hypertension  Hyperlipemia  Heart failure  History of femoral angiogram: left angiogram  Port-a-cath in place: right chest wall      Hospital Course:    TODAY'S SUBJECTIVE & REVIEW OF SYMPTOMS:     Constitutional WNL   Cardio WNL   Resp WNL   GI WNL  Heme WNL  Endo WNL  Skin WNL  MSK WNL  Neuro WNL  Cognitive WNL  Psych WNL      MEDICATIONS  (STANDING):  calcium acetate 667 milliGRAM(s) Oral two times a day with meals  carvedilol Oral Tab/Cap - Peds 6.25 milliGRAM(s) Oral two times a day  darbepoetin Injectable ViaL 80 MICROGram(s) IV Push <User Schedule>  furosemide    Tablet 40 milliGRAM(s) Oral daily  hydrALAZINE 25 milliGRAM(s) Oral three times a day  insulin glargine Injectable (LANTUS) 9 Unit(s) SubCutaneous at bedtime  insulin lispro (HumaLOG) corrective regimen sliding scale   SubCutaneous three times a day before meals  lisinopril 20 milliGRAM(s) Oral daily  melatonin 5 milliGRAM(s) Oral at bedtime  metolazone 2.5 milliGRAM(s) Oral daily    MEDICATIONS  (PRN):  acetaminophen   Tablet. 650 milliGRAM(s) Oral every 6 hours PRN Mild Pain (1 - 3)  diphenhydrAMINE   Capsule 25 milliGRAM(s) Oral every 6 hours PRN Rash and/or Itching  oxyCODONE    IR 5 milliGRAM(s) Oral every 4 hours PRN Moderate Pain (4 - 6)      FAMILY HISTORY:  No pertinent family history in first degree relatives      Allergies    No Known Allergies    Intolerances        SOCIAL HISTORY:    [  ] EtOH  [  ] Smoking  [  ] Substance abuse     Home Environment:  [  ] Home Alone  [  ] Lives with Family  [  ] Home Health Aid    Dwelling:  [  ] Apartment  [  ] Private House  [  ] Adult Home  [  ] Skilled Nursing Facility      [  ] Short Term  [  ] Long Term  [  ] Stairs       Elevator [  ]    FUNCTIONAL STATUS PTA: (Check all that apply)  Ambulation: [   ]Independent    [  ] Dependent     [  ] Non-Ambulatory  Assistive Device: [  ] SA Cane  [  ]  Q Cane  [  ] Walker  [  ]  Wheelchair  ADL : [  ] Independent  [  ]  Dependent       Vital Signs Last 24 Hrs  T(C): 36.3 (10 Mar 2018 06:00), Max: 37.1 (09 Mar 2018 15:01)  T(F): 97.3 (10 Mar 2018 06:00), Max: 98.8 (09 Mar 2018 15:01)  HR: 69 (10 Mar 2018 08:10) (61 - 76)  BP: 128/62 (10 Mar 2018 08:10) (93/64 - 184/79)  BP(mean): --  RR: 18 (10 Mar 2018 06:00) (9 - 19)  SpO2: 99% (09 Mar 2018 17:08) (96% - 100%)      PHYSICAL EXAM: Alert & Oriented X3  GENERAL: NAD, well-groomed, well-developed  HEAD:  Atraumatic, Normocephalic  EYES: EOMI, PERRLA, conjunctiva and sclera clear  NECK: Supple, No JVD, Normal thyroid  CHEST/LUNG: Clear to percussion bilaterally; No rales, rhonchi, wheezing, or rubs  HEART: Regular rate and rhythm; No murmurs, rubs, or gallops  ABDOMEN: Soft, Nontender, Nondistended; Bowel sounds present  EXTREMITIES:  2+ Peripheral Pulses, No clubbing, cyanosis, or edema    NERVOUS SYSTEM:  Cranial Nerves 2-12 intact [  ] Abnormal  [  ]  ROM: WFL all extremities [  ]  Abnormal [  ]  Motor Strength: WFL all extremities  [  ]  Abnormal [  ]  Sensation: intact to light touch [  ] Abnormal [  ]  Reflexes: Symmetric [  ]  Abnormal [  ]    FUNCTIONAL STATUS:  Bed Mobility: Independent [  ]  Supervision [  ]  Needs Assistance [  ]  N/A [  ]  Transfers: Independent [  ]  Supervision [  ]  Needs Assistance [  ]  N/A [  ]   Ambulation: Independent [  ]  Supervision [  ]  Needs Assistance [  ]  N/A [  ]  ADL: Independent [  ] Requires Assistance [  ] N/A [  ]      LABS:                        9.7    9.73  )-----------( 329      ( 08 Mar 2018 09:48 )             30.1     03-08    137  |  96<L>  |  35<H>  ----------------------------<  72  3.7   |  27  |  4.8<HH>    Ca    9.1      08 Mar 2018 09:48  Phos  5.2     03-08            RADIOLOGY & ADDITIONAL STUDIES:    EXAM:  ART DUPLEX LOWER EXT BILATERAL            PROCEDURE DATE:  02/26/2018            INTERPRETATION:  Clinical History / Reason for exam: This patient is a   57-year-old female with lower extremity ulcers.   Lower extremity arterial duplex ultrasound was performed to assess for   arterial occlusive disease.    Right external iliac, common femoral, superficial femoral, popliteal,   anterior tibial and posterior tibial arteries were visualized. Mild   diffuse disease visualized in femoral and popliteal vessels, while   moderate to severe arterial disease is visualized in the tibial vessels   based on velocities, waveform degeneration and spectral broadening.    The left external iliac, common femoral, superficial femoral, popliteal   and and posterior tibial arteries were visualized. Mild to moderate   diffuse disease visualized in the vessels of left lower extremity without   evidence of hemodynamic significance.    Impression:    Bilateral diffuse arterial disease of lower extremities without any   apparent hemodynamical significance.  Right external iliac artery with velocities of 243 cm/s suggestive of   moderate stenosis however there is no spectral broadening and no luminal   narrowing please correlate clinically  Similar findings are seen in study performed on 10/16/2017.    ICD-10: I70.233  EXAM:  DUPLEX SCAN EXT VEINS LOWER BI            PROCEDURE DATE:  02/26/2018            INTERPRETATION:  Clinical History / Reason for exam: The patient is a   57-year-old female with lower extremity swelling. A venous duplex   examination was performed to evaluate the patient for deep venous   thrombosis of the lower extremities.    The common femoral, greater saphenous, superficial femoral, popliteal and   lesser saphenous veins were visualized bilaterally with no evidence of   deep venous thrombosis.    All veins were fully compressible.  There was presence of spontaneous   flow, augmentation with distal compression and phasicity.    The right anterior tibial veins were  patent  right posterior tibial   veins were visualized and  right peroneal veins were not visualized    The left anterior tibial veins were  patent  left posterior tibial veins   were not visualized and  left peroneal veins were not visualized    Impression:    No evidence of deep venous thrombosis or superficial thrombophlebitis in   bilateral lower extremities.    EXAM:  XR FOOT COMP MIN 3 VIEWS BI            PROCEDURE DATE:  02/26/2018            INTERPRETATION:  Clinical History/Reason For Exam: Gangrene.    Comparison: 10/16/2017.    Procedure: Three views of the left foot. 3 views of the right foot.    Findings/  Impression:    Left: Fixed flexion deformity. Multiple foci of gas are seen within the   soft tissues of the left foot with surrounding bandage (which was fine   bone detail). There is moderate osteopenia with a mildly angulated   fracture of the fifth metatarsal bone. There is deformity of the   calcaneus presumably from prior osteomyelitis.    Right: Fixed flexion deformity. Moderate osteopenia. No acute fracture or   dislocation. No soft tissue ulceration or soft tissue gas. HPI:  56 y/o female  sent by nursing home  for picc line .  Pt stating "I am here for a PICC LINE placement so that i can get my daily ABX" (23 Feb 2018 20:38)      PAST MEDICAL & SURGICAL HISTORY:  Type 2 diabetes mellitus  CHF (congestive heart failure)  End stage renal failure on dialysis  Hypertension  Hyperlipemia  Heart failure  History of femoral angiogram: left angiogram  Port-a-cath in place: right chest wall      Hospital Course:  S/P LEFT BKA 3/6/2018, s/p amputation revision 3/9/2018    TODAY'S SUBJECTIVE & REVIEW OF SYMPTOMS:     Constitutional WNL   Cardio WNL   Resp WNL   GI WNL  Heme WNL  Endo WNL  Skin WNL  MSK WNL  Neuro WNL  Cognitive WNL  Psych WNL      MEDICATIONS  (STANDING):  calcium acetate 667 milliGRAM(s) Oral two times a day with meals  carvedilol Oral Tab/Cap - Peds 6.25 milliGRAM(s) Oral two times a day  darbepoetin Injectable ViaL 80 MICROGram(s) IV Push <User Schedule>  furosemide    Tablet 40 milliGRAM(s) Oral daily  hydrALAZINE 25 milliGRAM(s) Oral three times a day  insulin glargine Injectable (LANTUS) 9 Unit(s) SubCutaneous at bedtime  insulin lispro (HumaLOG) corrective regimen sliding scale   SubCutaneous three times a day before meals  lisinopril 20 milliGRAM(s) Oral daily  melatonin 5 milliGRAM(s) Oral at bedtime  metolazone 2.5 milliGRAM(s) Oral daily    MEDICATIONS  (PRN):  acetaminophen   Tablet. 650 milliGRAM(s) Oral every 6 hours PRN Mild Pain (1 - 3)  diphenhydrAMINE   Capsule 25 milliGRAM(s) Oral every 6 hours PRN Rash and/or Itching  oxyCODONE    IR 5 milliGRAM(s) Oral every 4 hours PRN Moderate Pain (4 - 6)      FAMILY HISTORY:  No pertinent family history in first degree relatives      Allergies    No Known Allergies    Intolerances        SOCIAL HISTORY:    [  ] EtOH  [  ] Smoking  [  ] Substance abuse     Home Environment:  [  ] Home Alone  [  ] Lives with Family  [  ] Home Health Aid    Dwelling:  [  ] Apartment  [  ] Private House  [  ] Adult Home  [ X ] Skilled Nursing Facility      [  ] Short Term  [X  ] Long Term--[facility]  [  ] Stairs       Elevator [  ]    FUNCTIONAL STATUS PTA: (Check all that apply)  Ambulation: [   ]Independent    [  ] Dependent     [ X ] Non-Ambulatory. Patient reports Adrianne lift transfers x 1 year  Assistive Device: [  ] SA Cane  [  ]  Q Cane  [  ] Walker  [  ]  Wheelchair  ADL : [  ] Independent  [ X ]  Dependent       Vital Signs Last 24 Hrs  T(C): 36.3 (10 Mar 2018 06:00), Max: 37.1 (09 Mar 2018 15:01)  T(F): 97.3 (10 Mar 2018 06:00), Max: 98.8 (09 Mar 2018 15:01)  HR: 69 (10 Mar 2018 08:10) (61 - 76)  BP: 128/62 (10 Mar 2018 08:10) (93/64 - 184/79)  BP(mean): --  RR: 18 (10 Mar 2018 06:00) (9 - 19)  SpO2: 99% (09 Mar 2018 17:08) (96% - 100%)      PHYSICAL EXAM: Alert & Oriented X3  GENERAL: NAD, well-groomed, well-developed  HEAD:  Atraumatic, Normocephalic  EYES: EOMI, PERRLA, conjunctiva and sclera clear  NECK: Supple, No JVD, Normal thyroid  CHEST/LUNG: Clear to percussion bilaterally; No rales, rhonchi, wheezing, or rubs  HEART: Regular rate and rhythm; No murmurs, rubs, or gallops  ABDOMEN: Soft, Nontender, Nondistended; Bowel sounds present  EXTREMITIES:  L BKA, R foot dressing    NERVOUS SYSTEM:  Cranial Nerves 2-12 intact [ X ] Abnormal  [  ]  ROM: WFL all extremities [  ]  Abnormal [ X ]  Motor Strength: WFL all extremities  [  ]  Abnormal [ X ]  Sensation: intact to light touch [  ] Abnormal [  ]  Reflexes: Symmetric [  ]  Abnormal [  ]    FUNCTIONAL STATUS:  Bed Mobility: Independent [  ]  Supervision [  ]  Needs Assistance [ X ]  N/A [  ]  Transfers: Independent [  ]  Supervision [  ]  Needs Assistance [  ]  N/A [ X ]   Ambulation: Independent [  ]  Supervision [  ]  Needs Assistance [  ]  N/A [ X ]  ADL: Independent [  ] Requires Assistance [  ] N/A [  ]      LABS:                        9.7    9.73  )-----------( 329      ( 08 Mar 2018 09:48 )             30.1     03-08    137  |  96<L>  |  35<H>  ----------------------------<  72  3.7   |  27  |  4.8<HH>    Ca    9.1      08 Mar 2018 09:48  Phos  5.2     03-08            RADIOLOGY & ADDITIONAL STUDIES:    EXAM:  ART DUPLEX LOWER EXT BILATERAL            PROCEDURE DATE:  02/26/2018            INTERPRETATION:  Clinical History / Reason for exam: This patient is a   57-year-old female with lower extremity ulcers.   Lower extremity arterial duplex ultrasound was performed to assess for   arterial occlusive disease.    Right external iliac, common femoral, superficial femoral, popliteal,   anterior tibial and posterior tibial arteries were visualized. Mild   diffuse disease visualized in femoral and popliteal vessels, while   moderate to severe arterial disease is visualized in the tibial vessels   based on velocities, waveform degeneration and spectral broadening.    The left external iliac, common femoral, superficial femoral, popliteal   and and posterior tibial arteries were visualized. Mild to moderate   diffuse disease visualized in the vessels of left lower extremity without   evidence of hemodynamic significance.    Impression:    Bilateral diffuse arterial disease of lower extremities without any   apparent hemodynamical significance.  Right external iliac artery with velocities of 243 cm/s suggestive of   moderate stenosis however there is no spectral broadening and no luminal   narrowing please correlate clinically  Similar findings are seen in study performed on 10/16/2017.    ICD-10: I70.233  EXAM:  DUPLEX SCAN EXT VEINS LOWER BI            PROCEDURE DATE:  02/26/2018            INTERPRETATION:  Clinical History / Reason for exam: The patient is a   57-year-old female with lower extremity swelling. A venous duplex   examination was performed to evaluate the patient for deep venous   thrombosis of the lower extremities.    The common femoral, greater saphenous, superficial femoral, popliteal and   lesser saphenous veins were visualized bilaterally with no evidence of   deep venous thrombosis.    All veins were fully compressible.  There was presence of spontaneous   flow, augmentation with distal compression and phasicity.    The right anterior tibial veins were  patent  right posterior tibial   veins were visualized and  right peroneal veins were not visualized    The left anterior tibial veins were  patent  left posterior tibial veins   were not visualized and  left peroneal veins were not visualized    Impression:    No evidence of deep venous thrombosis or superficial thrombophlebitis in   bilateral lower extremities.    EXAM:  XR FOOT COMP MIN 3 VIEWS BI            PROCEDURE DATE:  02/26/2018            INTERPRETATION:  Clinical History/Reason For Exam: Gangrene.    Comparison: 10/16/2017.    Procedure: Three views of the left foot. 3 views of the right foot.    Findings/  Impression:    Left: Fixed flexion deformity. Multiple foci of gas are seen within the   soft tissues of the left foot with surrounding bandage (which was fine   bone detail). There is moderate osteopenia with a mildly angulated   fracture of the fifth metatarsal bone. There is deformity of the   calcaneus presumably from prior osteomyelitis.    Right: Fixed flexion deformity. Moderate osteopenia. No acute fracture or   dislocation. No soft tissue ulceration or soft tissue gas.

## 2018-03-10 NOTE — BEHAVIORAL HEALTH ASSESSMENT NOTE - CASE SUMMARY
56 yo -American woman with no formal psychiatric history in hospital following left leg BKA due to complications of insulin-dependent diabetes mellitus and end-stage renal disease (on hemodialysis). Pt also suffers from CHF, hypertension and associated medical conditions.  As noted we were consulted due to pt's demeanor following surgery. On exam this afternoon the patient is subdued but pleasant and engageable. She acknowledges her difficulty accepting her amputation: "I can't look at it...but I'll get used to it...I did it for my grandkids...it's hard but I'll be ok, I have to be for them." On exam patient does not endorse depressed mood, suicidality or other psychopathology. She has adequate supports and a hopeful attitude. No further psychiatric intervention necessary.

## 2018-03-10 NOTE — PROGRESS NOTE ADULT - SUBJECTIVE AND OBJECTIVE BOX
S/P LEFT BKA 3/6, amputation revision 3/9    PAST MEDICAL & SURGICAL HISTORY:  Type 2 diabetes mellitus  CHF (congestive heart failure)  End stage renal failure on dialysis  Hypertension  Hyperlipemia  Heart failure  History of femoral angiogram: left angiogram  Port-a-cath in place: right chest wall    Vital Signs Last 24 Hrs  T(C): 36.3 (09 Mar 2018 21:45), Max: 37.1 (09 Mar 2018 15:01)  T(F): 97.3 (09 Mar 2018 21:45), Max: 98.8 (09 Mar 2018 15:01)  HR: 75 (10 Mar 2018 01:14) (61 - 76)  BP: 132/61 (10 Mar 2018 01:14) (93/64 - 184/79)  BP(mean): --  RR: 19 (09 Mar 2018 21:45) (9 - 19)  SpO2: 99% (09 Mar 2018 17:08) (96% - 100%)    I&O's Detail    08 Mar 2018 07:01  -  09 Mar 2018 07:00  --------------------------------------------------------  IN:  Total IN: 0 mL    OUT:    Other: 1600 mL  Total OUT: 1600 mL    Total NET: -1600 mL      09 Mar 2018 07:01  -  10 Mar 2018 05:13  --------------------------------------------------------  IN:    sodium chloride 0.9%: 30 mL  Total IN: 30 mL    OUT:  Total OUT: 0 mL    Total NET: 30 mL                 9.7    9.73  )-----------( 329      ( 03-08 @ 09:48 )             30.1                    137   |  96    |  35                 Ca: 9.1    BMP:   ----------------------------< 72     Mg: x     (03-08-18 @ 09:48)             3.7    |  27    | 4.8                Ph: 5.2      MEDICATIONS  (STANDING):  calcium acetate 667 milliGRAM(s) Oral two times a day with meals  carvedilol Oral Tab/Cap - Peds 6.25 milliGRAM(s) Oral two times a day  darbepoetin Injectable ViaL 80 MICROGram(s) IV Push <User Schedule>  furosemide    Tablet 40 milliGRAM(s) Oral daily  hydrALAZINE 25 milliGRAM(s) Oral three times a day  insulin glargine Injectable (LANTUS) 9 Unit(s) SubCutaneous at bedtime  insulin lispro (HumaLOG) corrective regimen sliding scale   SubCutaneous three times a day before meals  lisinopril 20 milliGRAM(s) Oral daily  melatonin 5 milliGRAM(s) Oral at bedtime  metolazone 2.5 milliGRAM(s) Oral daily    MEDICATIONS  (PRN):  acetaminophen   Tablet. 650 milliGRAM(s) Oral every 6 hours PRN Mild Pain (1 - 3)  diphenhydrAMINE   Capsule 25 milliGRAM(s) Oral every 6 hours PRN Rash and/or Itching  oxyCODONE    IR 5 milliGRAM(s) Oral every 4 hours PRN Moderate Pain (4 - 6)      Diet, Renal Restrictions:   For patients receiving Renal Replacement - No Protein Restr, No Conc K, No Conc Phos, Low Sodium  Consistent Carbohydrate No Snacks  Supplement Feeding Modality:  Oral  Prostat Cans or Servings Per Day:  1       Frequency:  Two Times a day (03-09-18 @ 15:15)      PHYSICAL EXAM:  General Appearance: Appears well, NAD  Neck: Supple  Chest: Equal expansion bilaterally, equal breath sounds  CV: S1, S2, RRR  Abdomen: Soft, NT, ND  Extremities: Dressing on, intact  Neuro: A&Ox3

## 2018-03-10 NOTE — CONSULT NOTE ADULT - CONSULT REQUESTED DATE/TIME
02-Mar-2018 10:27
04-Mar-2018
05-Mar-2018 18:01
24-Feb-2018 08:51
25-Feb-2018 14:10
26-Feb-2018 07:31
10-Mar-2018 09:21
25-Feb-2018 09:08

## 2018-03-10 NOTE — BEHAVIORAL HEALTH ASSESSMENT NOTE - PRIMARY DX
Delirium due to another medical condition, acute, hypoactive Adjustment disorder with depressed mood

## 2018-03-10 NOTE — PROGRESS NOTE ADULT - SUBJECTIVE AND OBJECTIVE BOX
seen and examined  lying comfortable  no distress         Standing Inpatient Medications  calcium acetate 667 milliGRAM(s) Oral two times a day with meals  carvedilol Oral Tab/Cap - Peds 6.25 milliGRAM(s) Oral two times a day  darbepoetin Injectable ViaL 80 MICROGram(s) IV Push <User Schedule>  furosemide    Tablet 40 milliGRAM(s) Oral daily  hydrALAZINE 25 milliGRAM(s) Oral three times a day  insulin glargine Injectable (LANTUS) 9 Unit(s) SubCutaneous at bedtime  insulin lispro (HumaLOG) corrective regimen sliding scale   SubCutaneous three times a day before meals  lisinopril 20 milliGRAM(s) Oral daily  melatonin 5 milliGRAM(s) Oral at bedtime  metolazone 2.5 milliGRAM(s) Oral daily        VITALS/PHYSICAL EXAM  --------------------------------------------------------------------------------  T(C): 36.3 (03-10-18 @ 06:00), Max: 37.1 (03-09-18 @ 15:01)  HR: 75 (03-10-18 @ 06:00) (61 - 76)  BP: 131/62 (03-10-18 @ 06:00) (93/64 - 184/79)  RR: 18 (03-10-18 @ 06:00) (9 - 19)  SpO2: 99% (03-09-18 @ 17:08) (96% - 100%)        03-09-18 @ 07:01  -  03-10-18 @ 07:00  --------------------------------------------------------  IN: 30 mL / OUT: 0 mL / NET: 30 mL      Physical Exam:  	Gen: NAD  	Pulm: CTA B/L  	CV:  S1S2; no rub  	Abd: nontender/nondistended  	LE:  left BKA, no edema  	Vascular access: chest wall tesio     LABS/STUDIES  --------------------------------------------------------------------------------              9.7    9.73  >-----------<  329      [03-08-18 @ 09:48]              30.1     137  |  96  |  35  ----------------------------<  72      [03-08-18 @ 09:48]  3.7   |  27  |  4.8        Ca     9.1     [03-08-18 @ 09:48]      Phos  5.2     [03-08-18 @ 09:48]          Urine Phosphorus See Note      [03-06-18 @ 09:09]    Iron 19, TIBC 116, %sat 16      [03-01-18 @ 10:19]  Ferritin 1091.0      [03-01-18 @ 10:19] seen and examined  lying comfortable  no distress         Standing Inpatient Medications  calcium acetate 667 milliGRAM(s) Oral two times a day with meals  carvedilol Oral Tab/Cap - Peds 6.25 milliGRAM(s) Oral two times a day  darbepoetin Injectable ViaL 80 MICROGram(s) IV Push <User Schedule>  furosemide    Tablet 40 milliGRAM(s) Oral daily  hydrALAZINE 25 milliGRAM(s) Oral three times a day  insulin glargine Injectable (LANTUS) 9 Unit(s) SubCutaneous at bedtime  insulin lispro (HumaLOG) corrective regimen sliding scale   SubCutaneous three times a day before meals  lisinopril 20 milliGRAM(s) Oral daily  melatonin 5 milliGRAM(s) Oral at bedtime  metolazone 2.5 milliGRAM(s) Oral daily        VITALS/PHYSICAL EXAM  --------------------------------------------------------------------------------  T(C): 36.3 (03-10-18 @ 06:00), Max: 37.1 (03-09-18 @ 15:01)  HR: 75 (03-10-18 @ 06:00) (61 - 76)  BP: 131/62 (03-10-18 @ 06:00) (93/64 - 184/79)  RR: 18 (03-10-18 @ 06:00) (9 - 19)  SpO2: 99% (03-09-18 @ 17:08) (96% - 100%)        03-09-18 @ 07:01  -  03-10-18 @ 07:00  --------------------------------------------------------  IN: 30 mL / OUT: 0 mL / NET: 30 mL      Physical Exam:  	Gen: NAD  	Pulm: CTA B/L  	CV:  S1S2; no rub  	Abd: nontender/nondistended  	LE:  left BKA, no edema  	Vascular access: chest wall tesio     LABS/STUDIES  --------------------------------------------------------------------------------              9.7    9.73  >-----------<  329      [03-08-18 @ 09:48]              30.1     137  |  96  |  35  ----------------------------<  72      [03-08-18 @ 09:48]  3.7   |  27  |  4.8        Ca     9.1     [03-08-18 @ 09:48]      Phos  5.2     [03-08-18 @ 09:48]          Iron 19, TIBC 116, %sat 16      [03-01-18 @ 10:19]  Ferritin 1091.0      [03-01-18 @ 10:19]

## 2018-03-10 NOTE — BEHAVIORAL HEALTH ASSESSMENT NOTE - NSBHCONSULTFOLLOWAFTERCARE_PSY_A_CORE FT
Pt would benefit from psychotherapy but is not interested in outpatient psych followup at this time.

## 2018-03-10 NOTE — CONSULT NOTE ADULT - ASSESSMENT
IMPRESSION: Rehab for    PRECAUTIONS: [  ] Cardiac  [  ] Respiratory  [  ] Seizures [  ] Contact Isolation  [  ] Droplet Isolation  [  ] Other    Weight Bearing Status:     RECOMMENDATION:    Out of Bed to Chair     DVT/Decubiti Prophylaxis    REHAB PLAN:     [   ] Bedside P/T 3-5 times a week   [   ]   Bedside O/T  2-3 times a week             [   ] No Rehab Therapy Indicated                   [   ]  Speech Therapy   Conditioning/ROM                                    ADL  Bed Mobility                                               Conditioning/ROM  Transfers                                                     Bed Mobility  Sitting /Standing Balance                         Transfers                                        Gait Training                                               Sitting/Standing Balance  Stair Training [   ]Applicable                    Home equipment Eval                                                                        Splinting  [   ] Only      GOALS:   ADL   [   ]   Independent                    Transfers  [   ] Independent                          Ambulation  [   ] Independent     [    ] With device                            [   ]  CG                                                         [   ]  CG                                                                  [   ] CG                            [    ] Min A                                                   [   ] Min A                                                              [   ] Min  A          DISCHARGE PLAN:   [   ]  Good candidate for Intensive Rehabilitation/Hospital based-[facility]                                             Will tolerate 3hrs Intensive Rehab Daily                                       [    ]  Short Term Rehab in Skilled Nursing Facility                                       [    ]  Home with Outpatient or VN services                                         [    ]  Possible Candidate for Intensive Hospital based Rehab      Thank you. IMPRESSION: Rehab for debility.  Has relied on a [facility] lift for transfers for about one year. Not a candidate for rehabilitation services.    PRECAUTIONS: [  ] Cardiac  [  ] Respiratory  [  ] Seizures [  ] Contact Isolation  [  ] Droplet Isolation  [  ] Other    Weight Bearing Status:  NWB BLEs    RECOMMENDATION:    Out of Bed to Chair via [facility]    DVT/Decubiti Prophylaxis    REHAB PLAN:     [   ] Bedside P/T 3-5 times a week   [   ]   Bedside O/T  2-3 times a week             [  X ] No Rehab Therapy Indicated                   [   ]  Speech Therapy   Conditioning/ROM                                    ADL  Bed Mobility                                               Conditioning/ROM  Transfers                                                     Bed Mobility  Sitting /Standing Balance                         Transfers                                        Gait Training                                               Sitting/Standing Balance  Stair Training [   ]Applicable                    Home equipment Eval                                                                        Splinting  [   ] Only      GOALS:   ADL   [   ]   Independent                    Transfers  [   ] Independent                          Ambulation  [   ] Independent     [    ] With device                            [   ]  CG                                                         [   ]  CG                                                                  [   ] CG                            [    ] Min A                                                   [   ] Min A                                                              [   ] Min  A          DISCHARGE PLAN:   [   ]  Good candidate for Intensive Rehabilitation/Hospital based-[facility]                                             Will tolerate 3hrs Intensive Rehab Daily                                       [  X  ]  Long Term Care in Skilled Nursing Facility--[facility]                                       [    ]  Home with Outpatient or  services                                         [    ]  Possible Candidate for Intensive Hospital based Rehab      Thank you.

## 2018-03-10 NOTE — CONSULT NOTE ADULT - CONSULT REASON
BKA
CAPACITY
ESRD on HD
Gangrene of left foot
Pre-op risk stratification
gangrene left foot
CC:  Functionally bedbound. Work on OOB transfers.
Left foot gangrene

## 2018-03-10 NOTE — BEHAVIORAL HEALTH ASSESSMENT NOTE - AXIS III
Type 2 diabetes mellitus, CHF (congestive heart failure), End stage renal failure on dialysis, Hypertension  Hyperlipemia

## 2018-03-10 NOTE — BEHAVIORAL HEALTH ASSESSMENT NOTE - RISK ASSESSMENT
Pt denies suicidal thoughts at this time. Pt does not warrant involuntary psychiatric hospitalization as she is not considered to be a danger to herself or others at this time.

## 2018-03-10 NOTE — BEHAVIORAL HEALTH ASSESSMENT NOTE - HPI (INCLUDE ILLNESS QUALITY, SEVERITY, DURATION, TIMING, CONTEXT, MODIFYING FACTORS, ASSOCIATED SIGNS AND SYMPTOMS)
56 yo  female, domiciled at [facility], , retired, with multiple medical problems and no formal psych dx. Pt had a left BKA on 3/6 for gangrene 2/2 poorly controlled DM with revision on 3/9. Per the medicine team, pt has been refusing to look at her BKA, Psych team consulted for evaluation. Pt was interviewed in hemodialysis unit. Pt was calm but difficult to engage in interview. Pt has periods of inattention and needs to be redirected to interview. Pt expresses understanding of the reason for BKA "because of diabetes ... it was this or death", and acknowledges avoiding looking at her BKA because it is distressing to her. Pt was a limited historian "I have memory problems", frequently responding with "I don't remember". Pt was minimally engaged in discussion of her mood, but denies suicidal ideation at this time. No signs or symptoms of psychosis or alberto noted at this time.

## 2018-03-10 NOTE — BEHAVIORAL HEALTH ASSESSMENT NOTE - NSBHSUICPROTECTFACT_PSY_A_CORE
Positive therapeutic relationships/Identifies reasons for living/Fear of death or dying due to pain/suffering/Responsibility to family and others

## 2018-03-10 NOTE — BEHAVIORAL HEALTH ASSESSMENT NOTE - SECONDARY DX1
Adjustment disorder with depressed mood Delirium due to another medical condition, acute, hypoactive

## 2018-03-10 NOTE — BEHAVIORAL HEALTH ASSESSMENT NOTE - SUMMARY
56 yo  female with multiple medical problems and no formal psych dx, s/p left BKA on 3/6 for gangrene 2/2 poorly controlled DM with revision on 3/9 58 yo  female with multiple medical problems and no formal psych dx, s/p left BKA on 3/6 for gangrene 2/2 poorly controlled DM with revision on 3/9. Pt appears inattentive, requiring frequent reorientation to conversation and unable to recall or unwilling to discuss details of HPI. Pt may be delirious in the context of recent surgery and ongoing hemodialysis and may not be able to fully engage in interview at this time. Pt expresses some understanding of reason for BKA and admits to avoiding looking at the BKA due to distress with congruently depressed affect. Pt would benefit from psychotherapy but is not interested in outpatient psych followup at this time.

## 2018-03-10 NOTE — CONSULT NOTE ADULT - PROVIDER SPECIALTY LIST ADULT
Cardiology
Nephrology
Podiatry
Psychiatry
Vascular Surgery
Vascular Surgery
Physiatry
Infectious Disease

## 2018-03-11 RX ORDER — INSULIN GLARGINE 100 [IU]/ML
8 INJECTION, SOLUTION SUBCUTANEOUS AT BEDTIME
Qty: 0 | Refills: 0 | Status: DISCONTINUED | OUTPATIENT
Start: 2018-03-11 | End: 2018-03-12

## 2018-03-11 RX ORDER — HEPARIN SODIUM 5000 [USP'U]/ML
5000 INJECTION INTRAVENOUS; SUBCUTANEOUS EVERY 8 HOURS
Qty: 0 | Refills: 0 | Status: DISCONTINUED | OUTPATIENT
Start: 2018-03-11 | End: 2018-03-12

## 2018-03-11 RX ORDER — INSULIN LISPRO 100/ML
VIAL (ML) SUBCUTANEOUS
Qty: 0 | Refills: 0 | Status: DISCONTINUED | OUTPATIENT
Start: 2018-03-11 | End: 2018-03-12

## 2018-03-11 RX ORDER — ONDANSETRON 8 MG/1
8 TABLET, FILM COATED ORAL THREE TIMES A DAY
Qty: 0 | Refills: 0 | Status: DISCONTINUED | OUTPATIENT
Start: 2018-03-11 | End: 2018-03-12

## 2018-03-11 RX ADMIN — OXYCODONE HYDROCHLORIDE 5 MILLIGRAM(S): 5 TABLET ORAL at 22:00

## 2018-03-11 RX ADMIN — CARVEDILOL PHOSPHATE 6.25 MILLIGRAM(S): 80 CAPSULE, EXTENDED RELEASE ORAL at 05:47

## 2018-03-11 RX ADMIN — Medication 25 MILLIGRAM(S): at 22:14

## 2018-03-11 RX ADMIN — OXYCODONE HYDROCHLORIDE 5 MILLIGRAM(S): 5 TABLET ORAL at 22:15

## 2018-03-11 RX ADMIN — Medication 25 MILLIGRAM(S): at 15:58

## 2018-03-11 RX ADMIN — INSULIN GLARGINE 8 UNIT(S): 100 INJECTION, SOLUTION SUBCUTANEOUS at 22:17

## 2018-03-11 RX ADMIN — LISINOPRIL 20 MILLIGRAM(S): 2.5 TABLET ORAL at 05:47

## 2018-03-11 RX ADMIN — ONDANSETRON 8 MILLIGRAM(S): 8 TABLET, FILM COATED ORAL at 17:57

## 2018-03-11 RX ADMIN — CARVEDILOL PHOSPHATE 6.25 MILLIGRAM(S): 80 CAPSULE, EXTENDED RELEASE ORAL at 17:57

## 2018-03-11 RX ADMIN — Medication 25 MILLIGRAM(S): at 22:13

## 2018-03-11 RX ADMIN — Medication 40 MILLIGRAM(S): at 05:47

## 2018-03-11 RX ADMIN — Medication 25 MILLIGRAM(S): at 05:47

## 2018-03-11 RX ADMIN — OXYCODONE HYDROCHLORIDE 5 MILLIGRAM(S): 5 TABLET ORAL at 09:59

## 2018-03-11 RX ADMIN — Medication 5 MILLIGRAM(S): at 22:11

## 2018-03-11 NOTE — PROGRESS NOTE ADULT - SUBJECTIVE AND OBJECTIVE BOX
Hospital Day:  16d    Subjective:  no events overnight, laying in bed comfortably, denies SOB or any pain, says she saw her stump a bit but doesn't want to look at it, says will go slowly    Past Medical Hx:   Type 2 diabetes mellitus  CHF (congestive heart failure)  End stage renal failure on dialysis  Hypertension  Hyperlipemia  Heart failure  Acute heart failure, unspecified heart failure type  Heart failure    Past Sx:  History of femoral angiogram  Port-a-cath in place    Allergies:  No Known Allergies    Current Meds:   Standng Meds:  calcium acetate 667 milliGRAM(s) Oral two times a day with meals  carvedilol Oral Tab/Cap - Peds 6.25 milliGRAM(s) Oral two times a day  darbepoetin Injectable ViaL 80 MICROGram(s) IV Push <User Schedule>  furosemide    Tablet 40 milliGRAM(s) Oral daily  hydrALAZINE 25 milliGRAM(s) Oral three times a day  insulin glargine Injectable (LANTUS) 9 Unit(s) SubCutaneous at bedtime  insulin lispro (HumaLOG) corrective regimen sliding scale   SubCutaneous three times a day before meals  lisinopril 20 milliGRAM(s) Oral daily  melatonin 5 milliGRAM(s) Oral at bedtime  metolazone 2.5 milliGRAM(s) Oral daily    PRN Meds:  acetaminophen   Tablet. 650 milliGRAM(s) Oral every 6 hours PRN Mild Pain (1 - 3)  diphenhydrAMINE   Capsule 25 milliGRAM(s) Oral every 6 hours PRN Rash and/or Itching  ondansetron    Tablet 8 milliGRAM(s) Oral three times a day PRN Nausea and/or Vomiting  oxyCODONE    IR 5 milliGRAM(s) Oral every 4 hours PRN Moderate Pain (4 - 6)    Vital Signs:   T(F): 98.7 (03-11-18 @ 14:03), Max: 98.7 (03-11-18 @ 14:03)  HR: 69 (03-11-18 @ 14:03) (69 - 86)  BP: 154/67 (03-11-18 @ 14:03) (107/52 - 154/67)  RR: 16 (03-11-18 @ 14:03) (16 - 18)  SpO2: 99% (03-10-18 @ 22:48) (98% - 99%)    03-10-18 @ 06:01  -  03-11-18 @ 07:00  --------------------------------------------------------  IN: 0 mL / OUT: 1000 mL / NET: -1000 mL    Physical Exam:   GENERAL: NAD  HEENT: NCAT  CHEST/LUNG: CTAB, right chest wall tesio  HEART: Regular rate and rhythm; s1 s2 appreciated, No murmur  ABDOMEN: Soft, Nontender, Nondistended  EXTREMITIES: No LE edema b/l, right foot wrapped in kerlix, left BKA with stump dressing clean/dry/intact negative strikethrough  NERVOUS SYSTEM:  Alert & Oriented X3    Labs:                         9.4    14.95 )-----------( 371      ( 10 Mar 2018 10:41 )             30.1       10 Mar 2018 10:41    137    |  99     |  28     ----------------------------<  78     3.8     |  26     |  4.6      Ca    9.2        10 Mar 2018 10:41  Phos  4.3       10 Mar 2018 10:41      Assessment and Plan:   56 y/o F PMH as listed transfered from [facility] for 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 Pt initially refused IV Abx and surgery at [facility], s/p psych eval pt has no capacity to refuse treatment.    # Gangrenous DFU  -afebrile, WBCs WNL  -POD #2 s/p revision of left BKA  -local wound care of right foot per podiatry   -Abx d/c as infection source resolved  -pain management  -c/w benadryl PRN for the itching  -psych recs noted: delerium vs adjustment disorder vs depression- pt refusing o/p therapy    # HFrEF (NICM)  -s/p cardiac cath 8/2017: Left main: 20% distal stenosis; LAD: 25% ostial stenosis, 25-30% mid stenosis; Circumflex: Sequential 25-30% mid stenoses; RCA: 50% mid stenosis.   -2D echo 3/5/18: EF 55%, G1DD, LA enlarged, mild-mod MVR, mild TVR/PVR  -c/w coreg, lisinopril, lasix    # HTN  -c/w hydralazine, coreg, lisinopril  -renal recs (3/9/18) to increase hydralazine noted, pt mostly controlled will c/w current regimen for now    # ESRD  -s/p HD yesterday  -renal recs appreciated  -c/w phoslo, metolazone, lasix (pt still making some urine/incontinence)     # DM  -monitor FS  -c/w insulin regimen    # Anemia (normocytic)- stable  -c/w Aransep 80mcg q week  -Iron studies consistent with anemia of chronic disease  -s/p 2 units PRBCs 3/6/18 with HD pre-op  -keep active T&S     # DVT ppx- SCDs but pt refusing    # Dispo  -pending vascular/PT recs  -likely return to NH when stable post procedure Hospital Day:  16d    Subjective:  no events overnight, laying in bed comfortably, denies SOB or any pain, says she saw her stump a bit but doesn't want to look at it, says will go slowly    Past Medical Hx:   Type 2 diabetes mellitus  CHF (congestive heart failure)  End stage renal failure on dialysis  Hypertension  Hyperlipemia  Heart failure  Acute heart failure, unspecified heart failure type  Heart failure    Past Sx:  History of femoral angiogram  Port-a-cath in place    Allergies:  No Known Allergies    Current Meds:   Standng Meds:  calcium acetate 667 milliGRAM(s) Oral two times a day with meals  carvedilol Oral Tab/Cap - Peds 6.25 milliGRAM(s) Oral two times a day  darbepoetin Injectable ViaL 80 MICROGram(s) IV Push <User Schedule>  furosemide    Tablet 40 milliGRAM(s) Oral daily  hydrALAZINE 25 milliGRAM(s) Oral three times a day  insulin glargine Injectable (LANTUS) 9 Unit(s) SubCutaneous at bedtime  insulin lispro (HumaLOG) corrective regimen sliding scale   SubCutaneous three times a day before meals  lisinopril 20 milliGRAM(s) Oral daily  melatonin 5 milliGRAM(s) Oral at bedtime  metolazone 2.5 milliGRAM(s) Oral daily    PRN Meds:  acetaminophen   Tablet. 650 milliGRAM(s) Oral every 6 hours PRN Mild Pain (1 - 3)  diphenhydrAMINE   Capsule 25 milliGRAM(s) Oral every 6 hours PRN Rash and/or Itching  ondansetron    Tablet 8 milliGRAM(s) Oral three times a day PRN Nausea and/or Vomiting  oxyCODONE    IR 5 milliGRAM(s) Oral every 4 hours PRN Moderate Pain (4 - 6)    Vital Signs:   T(F): 98.7 (03-11-18 @ 14:03), Max: 98.7 (03-11-18 @ 14:03)  HR: 69 (03-11-18 @ 14:03) (69 - 86)  BP: 154/67 (03-11-18 @ 14:03) (107/52 - 154/67)  RR: 16 (03-11-18 @ 14:03) (16 - 18)  SpO2: 99% (03-10-18 @ 22:48) (98% - 99%)    03-10-18 @ 06:01  -  03-11-18 @ 07:00  --------------------------------------------------------  IN: 0 mL / OUT: 1000 mL / NET: -1000 mL    Physical Exam:   GENERAL: NAD  HEENT: NCAT  CHEST/LUNG: CTAB, right chest wall tesio  HEART: Regular rate and rhythm; s1 s2 appreciated, No murmur  ABDOMEN: Soft, Nontender, Nondistended  EXTREMITIES: No LE edema b/l, right foot wrapped in kerlix, left BKA with stump dressing clean/dry/intact negative strikethrough  NERVOUS SYSTEM:  Alert & Oriented X3    Labs:                         9.4    14.95 )-----------( 371      ( 10 Mar 2018 10:41 )             30.1       10 Mar 2018 10:41    137    |  99     |  28     ----------------------------<  78     3.8     |  26     |  4.6      Ca    9.2        10 Mar 2018 10:41  Phos  4.3       10 Mar 2018 10:41      Assessment and Plan:   58 y/o F PMH as listed transfered from [facility] for 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 Pt initially refused IV Abx and surgery at [facility], s/p psych eval pt has no capacity to refuse treatment.    # Gangrenous DFU  -afebrile, WBCs WNL  -POD #2 s/p revision of left BKA  -local wound care of right foot per podiatry   -Abx d/c as infection source resolved  -pain management  -c/w benadryl PRN for the itching  -psych recs noted: delerium vs adjustment disorder vs depression- pt refusing o/p therapy    # HFrEF (NICM)  -s/p cardiac cath 8/2017: Left main: 20% distal stenosis; LAD: 25% ostial stenosis, 25-30% mid stenosis; Circumflex: Sequential 25-30% mid stenoses; RCA: 50% mid stenosis.   -2D echo 3/5/18: EF 55%, G1DD, LA enlarged, mild-mod MVR, mild TVR/PVR  -c/w coreg, lisinopril, lasix    # HTN  -c/w hydralazine, coreg, lisinopril  -renal recs (3/9/18) to increase hydralazine noted, pt mostly controlled will c/w current regimen for now    # ESRD  -s/p HD yesterday  -renal recs appreciated  -c/w phoslo, metolazone, lasix (pt still making some urine/incontinence)     # DM  -monitor FS  -insulin regimen adjusted    # Anemia (normocytic)- stable  -c/w Aransep 80mcg q week  -Iron studies consistent with anemia of chronic disease  -s/p 2 units PRBCs 3/6/18 with HD pre-op    # DVT ppx- restart heparin SQ    # Dispo  -physiatry c/s pending  -likely return to NH when stable post procedure

## 2018-03-11 NOTE — PROGRESS NOTE ADULT - SUBJECTIVE AND OBJECTIVE BOX
Patient was seen and examined. Spoke with RN. Chart reviewed.  No events overnight.  Vital Signs Last 24 Hrs  T(F): 98.1 (11 Mar 2018 05:01), Max: 98.1 (11 Mar 2018 05:01)  HR: 75 (11 Mar 2018 05:01) (70 - 86)  BP: 140/65 (11 Mar 2018 05:01) (99/53 - 140/65)  SpO2: 99% (10 Mar 2018 22:48) (98% - 99%)  MEDICATIONS  (STANDING):  calcium acetate 667 milliGRAM(s) Oral two times a day with meals  carvedilol Oral Tab/Cap - Peds 6.25 milliGRAM(s) Oral two times a day  darbepoetin Injectable ViaL 80 MICROGram(s) IV Push <User Schedule>  furosemide    Tablet 40 milliGRAM(s) Oral daily  hydrALAZINE 25 milliGRAM(s) Oral three times a day  insulin glargine Injectable (LANTUS) 9 Unit(s) SubCutaneous at bedtime  insulin lispro (HumaLOG) corrective regimen sliding scale   SubCutaneous three times a day before meals  lisinopril 20 milliGRAM(s) Oral daily  melatonin 5 milliGRAM(s) Oral at bedtime  metolazone 2.5 milliGRAM(s) Oral daily    MEDICATIONS  (PRN):  acetaminophen   Tablet. 650 milliGRAM(s) Oral every 6 hours PRN Mild Pain (1 - 3)  diphenhydrAMINE   Capsule 25 milliGRAM(s) Oral every 6 hours PRN Rash and/or Itching  oxyCODONE    IR 5 milliGRAM(s) Oral every 4 hours PRN Moderate Pain (4 - 6)    Labs:                        9.4    14.95 )-----------( 371      ( 10 Mar 2018 10:41 )             30.1     10 Mar 2018 10:41    137    |  99     |  28     ----------------------------<  78     3.8     |  26     |  4.6      Ca    9.2        10 Mar 2018 10:41  Phos  4.3       10 Mar 2018 10:41            General: NAD  Neurology:  nonfocal  Head:  Normocephalic, atraumatic  ENT:  Mucosa moist, no ulcerations  Neck:  Supple, no JVD,   Skin: no breakdowns (as per RN)  Resp: CTA B/L  CV: RRR, S1S2,   GI: Soft, NT, bowel sounds        A/P:  56 y/o F PMH as listed transfered from [facility] side for 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 Pt initially refused IV Abx and surgery at [facility], s/p psych eval pt has no capacity to refuse treatment.    # Gangrenous DFU  -afebrile, WBCs WNL  - s/p left BKA- with revision done 3/9  -local wound care of right foot per podiatry   -Abx d/c as infection source resolved  -pain management  -c/w benadryl PRN for the itching  -has aversion to seeing stump - ask psych to discuss with patient   - vascular f/u    # HFrEF (NICM)  -s/p cardiac cath 8/2017: Left main: 20% distal stenosis; LAD: 25% ostial stenosis, 25-30% mid stenosis; Circumflex: Sequential 25-30% mid stenoses; RCA: 50% mid stenosis.   -2D echo 3/5/18: EF 55%, G1DD, LA enlarged, mild-mod MVR, mild TVR/PVR  -c/w coreg, lisinopril, lasix    # HTN  -c/w hydralazine, coreg, lisinopril    # ESRD  -s/p HD today  -renal recs appreciated  -c/w phoslo, metolazone, lasix (pt still making some urine/incontinence)     # DM  -monitor FS  -c/w insulin regimen    # Anemia (normocytic)- stable  -c/w Aransep 80mcg q week  -Iron studies consistent with anemia of chronic disease  -s/p 2 units PRBCs 3/6/18 with HD pre-op  -keep active T&S     # DVT ppx- SCDs but pt refusing    # Dispo  -pending vascular/PT recs  -likely return to NH when stable post procedure    DVT prophylaxis  Decubitus prevention- all measures as per RN protocol  Please call or text me with any questions or updates

## 2018-03-12 ENCOUNTER — TRANSCRIPTION ENCOUNTER (OUTPATIENT)
Age: 58
End: 2018-03-12

## 2018-03-12 VITALS
HEART RATE: 78 BPM | SYSTOLIC BLOOD PRESSURE: 156 MMHG | TEMPERATURE: 98 F | RESPIRATION RATE: 17 BRPM | DIASTOLIC BLOOD PRESSURE: 68 MMHG

## 2018-03-12 LAB — SURGICAL PATHOLOGY STUDY: SIGNIFICANT CHANGE UP

## 2018-03-12 RX ORDER — DARBEPOETIN ALFA IN POLYSORBAT 200MCG/0.4
40 PEN INJECTOR (ML) SUBCUTANEOUS
Qty: 0 | Refills: 0 | Status: DISCONTINUED | OUTPATIENT
Start: 2018-03-13 | End: 2018-03-12

## 2018-03-12 RX ORDER — HYDRALAZINE HCL 50 MG
0 TABLET ORAL
Qty: 0 | Refills: 0 | COMMUNITY

## 2018-03-12 RX ORDER — INSULIN LISPRO 100/ML
2 VIAL (ML) SUBCUTANEOUS
Qty: 0 | Refills: 0 | COMMUNITY
Start: 2018-03-12

## 2018-03-12 RX ORDER — HYDRALAZINE HCL 50 MG
50 TABLET ORAL
Qty: 0 | Refills: 0 | DISCHARGE
Start: 2018-03-12

## 2018-03-12 RX ORDER — COLLAGENASE CLOSTRIDIUM HIST. 250 UNIT/G
1 OINTMENT (GRAM) TOPICAL
Qty: 0 | Refills: 0 | COMMUNITY

## 2018-03-12 RX ORDER — SEVELAMER CARBONATE 2400 MG/1
1 POWDER, FOR SUSPENSION ORAL
Qty: 0 | Refills: 0 | COMMUNITY

## 2018-03-12 RX ORDER — HYDRALAZINE HCL 50 MG
1 TABLET ORAL
Qty: 0 | Refills: 0 | DISCHARGE
Start: 2018-03-12

## 2018-03-12 RX ORDER — FUROSEMIDE 40 MG
60 TABLET ORAL
Qty: 0 | Refills: 0 | COMMUNITY

## 2018-03-12 RX ORDER — FUROSEMIDE 40 MG
1 TABLET ORAL
Qty: 0 | Refills: 0 | COMMUNITY
Start: 2018-03-12

## 2018-03-12 RX ORDER — INSULIN LISPRO 100/ML
VIAL (ML) SUBCUTANEOUS
Qty: 0 | Refills: 0 | Status: DISCONTINUED | OUTPATIENT
Start: 2018-03-12 | End: 2018-03-12

## 2018-03-12 RX ORDER — INSULIN GLARGINE 100 [IU]/ML
12 INJECTION, SOLUTION SUBCUTANEOUS
Qty: 0 | Refills: 0 | COMMUNITY

## 2018-03-12 RX ORDER — OXYCODONE HYDROCHLORIDE 5 MG/1
1 TABLET ORAL
Qty: 0 | Refills: 0 | COMMUNITY
Start: 2018-03-12

## 2018-03-12 RX ORDER — DARBEPOETIN ALFA IN POLYSORBAT 200MCG/0.4
0 PEN INJECTOR (ML) SUBCUTANEOUS
Qty: 0 | Refills: 0 | COMMUNITY
Start: 2018-03-12

## 2018-03-12 RX ORDER — DIPHENHYDRAMINE HCL 50 MG
1 CAPSULE ORAL
Qty: 0 | Refills: 0 | COMMUNITY
Start: 2018-03-12

## 2018-03-12 RX ADMIN — Medication 25 MILLIGRAM(S): at 05:26

## 2018-03-12 RX ADMIN — Medication 667 MILLIGRAM(S): at 09:31

## 2018-03-12 RX ADMIN — CARVEDILOL PHOSPHATE 6.25 MILLIGRAM(S): 80 CAPSULE, EXTENDED RELEASE ORAL at 05:26

## 2018-03-12 RX ADMIN — Medication 40 MILLIGRAM(S): at 05:26

## 2018-03-12 RX ADMIN — Medication 25 MILLIGRAM(S): at 13:52

## 2018-03-12 RX ADMIN — Medication 25 MILLIGRAM(S): at 09:31

## 2018-03-12 RX ADMIN — LISINOPRIL 20 MILLIGRAM(S): 2.5 TABLET ORAL at 05:27

## 2018-03-12 NOTE — PROGRESS NOTE ADULT - PROVIDER SPECIALTY LIST ADULT
Infectious Disease
Internal Medicine
Nephrology
Podiatry
Vascular Surgery
Nephrology
Podiatry
Internal Medicine
Internal Medicine

## 2018-03-12 NOTE — DISCHARGE NOTE ADULT - PROVIDER TOKENS
FITZ:'[id number]',FITZ:'[id number]' TOKEN:'[id number]:'[id number]:'[id number]'

## 2018-03-12 NOTE — CHART NOTE - NSCHARTNOTEFT_GEN_A_CORE
Registered Dietitian Follow-Up     Patient Profile Reviewed                           Yes [x]   No []     Nutrition History Previously Obtained        Yes [x]  No []       Pertinent Subjective Information:]  -As per pt report, appetite declined s/p BKA but is now back to baseline with 75-90% PO intake. Prostat ordered but pt states she is not consuming as "supplement gives her diarrhea". Noted that Nepro has same effect on her. No need for nutrition supplement at this time as pt meeting needs with 75-80% PO intake.     Pertinent Medical Interventions:  BKA on 3/6 with revision of surgery on 3/9   Dispo  -physiatry c/s pending  -likely return to NH when stable post procedure     Diet order: Renal, Low Na, Consistent CHO + Prostat Q12H     Anthropometrics:  - Ht.: 69in   - Wt.: 61.7kg s/p L BKA. Will adjust needs based on new wt.   - %wt change  - BMI: 20.1   - IBW     Pertinent Lab Data: (3/10/18) WBC 14.95, RBC 3.55, H/H 9.4/30.1, BUN 28, Cr 4.8, GFR 12     Pertinent Meds: heparin, insulin, ondansetron, phoslo, coreg, lasix     Physical Findings:  - Appearance: a/o, lethargic   - GI function: +LBM 3/11, fecal incontinence   - Tubes:  - Oral/Mouth cavity: none noted by pt   - Skin: L BKA      Nutrition Requirements  Weight Used: 61.7kg wt s/p BKA     Estimated Energy Needs    Continue []  Adjust [x]  Adjusted Energy Recommendations: 8701-6458  kcal/day (25-30 kcal/kg ABW)        Estimated Protein Needs    Continue []  Adjust [x]  Adjusted Protein Recommendations: 74-86 gm/day (1.2-1.4 g/kg ABW)        Estimated Fluid Needs        Continue [x]  Adjust []  Adjusted Fluid Recommendations: 1000 mL/day (FR 2/2 HD)     Nutrient Intake:  -meeting estimated kcal/protein needs      [] Previous Nutrition Diagnosis: Increased protein needs (however pt meeting needs, consuming 100% of meals, no need for supplement at this time)            [X] Ongoing          [] Resolve    Nutrition Intervention   1. Meals and Snacks    Reccs: Liberalize Low Na restriction as this is covered under Renal diet order. No longer need Prostat Q12H.   2. continue Renal, Consistent CHO diet   3. Continue Phoslo with meals    Goal/Expected Outcome:  -Pt to continue to consume/tolerate >75% meals and snacks throughout LOS.     Indicator/Monitoring:  RD will monitor diet order, energy intake, body composition (wt), NFPF (appetite)

## 2018-03-12 NOTE — DISCHARGE NOTE ADULT - PLAN OF CARE
amputation you had an amputation of the left foot, follow up with vascular surgery as outpatient Continue with dialysis Continue with dialysis, follow up with nephrology

## 2018-03-12 NOTE — DISCHARGE NOTE ADULT - CARE PROVIDER_API CALL
Eusebio Carrillo), Surgery; Vascular Surgery  [address] [address]  Phone: [phone]  Fax: [phone]    Marya Obrien), Nephrology  [address] [place] [address]  Phone: [phone]  Fax: [phone] Eusebio Carrillo), Surgery; Vascular Surgery  [address] [address]  Phone: [phone]  Fax: [phone]    Marya Obrien), Nephrology  [address] [place] [address]  Phone: [phone]  Fax: [phone]    Roberto Harper), Surgical Physicians  [address] [place] [address]  Phone: [phone]  Fax: [phone]

## 2018-03-12 NOTE — DISCHARGE NOTE ADULT - CARE PLAN
Principal Discharge DX:	Gangrene of left foot  Goal:	amputation  Assessment and plan of treatment:	you had an amputation of the left foot, follow up with vascular surgery as outpatient  Secondary Diagnosis:	End stage renal failure on dialysis  Assessment and plan of treatment:	Continue with dialysis Principal Discharge DX:	Gangrene of left foot  Goal:	amputation  Assessment and plan of treatment:	you had an amputation of the left foot, follow up with vascular surgery as outpatient  Secondary Diagnosis:	End stage renal failure on dialysis  Assessment and plan of treatment:	Continue with dialysis, follow up with nephrology

## 2018-03-12 NOTE — DISCHARGE NOTE ADULT - CARE PROVIDERS DIRECT ADDRESSES
,[email or web address],DirectAddress_Unknown [email or web address],DirectAddress_Unknown,DirectAddress_Unknown

## 2018-03-12 NOTE — PROGRESS NOTE ADULT - SUBJECTIVE AND OBJECTIVE BOX
PROGRESS NOTE   Pt seen at bedside NAD. Pt seen with attending      Vital Signs Last 24 Hrs  T(C): 36.2 (12 Mar 2018 05:14), Max: 37 (11 Mar 2018 20:35)  T(F): 97.2 (12 Mar 2018 05:14), Max: 98.6 (11 Mar 2018 20:35)  HR: 69 (12 Mar 2018 05:14) (69 - 75)  BP: 183/81 (12 Mar 2018 05:14) (126/53 - 183/81)  RR: 19 (12 Mar 2018 05:14) (16 - 19)  SpO2: 96% (12 Mar 2018 07:56) (96% - 99%)                        PHYSICAL EXAM  GEN: PHILIPPE SMITH is a pleasant well-nourished, well developed 57y Female in no acute distress, alert awake, and oriented to person, place and time.   LE Focused:  Right foot stable with no active sign of infection. ischemic changes noted.     A/P   Right foot with ischemic changes however, no open lesions needed at the moment- No dressing needed.   No intervention from podiatry stand point.  Pt can f/u as o/p. Recall podiatry PRN

## 2018-03-12 NOTE — DISCHARGE NOTE ADULT - HOSPITAL COURSE
56 y/o F PMH of diabetic foot ulcer initially sent to [facility] ED for placement of PICC line for IV antibiotics was found to have wet gangrene of the left foot. Patient transferred to [facility] for left below knee amputation. Patient underwent initial BKA on 3/6/18 with revision and closure on 3/9/18. Both procedures well tolerated. Pt still has an aversion to looking at the BKA stump, she was assessed by psychiatry who recommended out patient therapy for adjustment disorder with depression but patient refused.  Patient was followed by nephrology for her ESRD receiving regularly scheduled dialysis. Diabetic insulin regimen was adjusted as needed based on regular fingerstick blood sugars. Patient was evaluated by physiatry and determined to not be a candidate for rehab. Patient stable for discharge back to long term nursing home.

## 2018-03-12 NOTE — PROVIDER CONTACT NOTE (OTHER) - SITUATION
, Guero Mcarthur notified
patient LLE odor and wound.
MD Nick aware pt has no IV access and pt is adamantly refusing IV access.
/68, dr austin  made aware, saul asymptomatic.  stated no interventions needed at this time, just monitoring
FS 52,  made aware. pt alert. juice provided. FS reassessed , FS 73
Fs 481, Guero Mcarthur notified
pt refsued IV insertion regardless of education. also pt refused SQS, pt has no DVT prophylaxis. dr stated pt scheduled for OR today, will order AC after surgery

## 2018-03-12 NOTE — DISCHARGE NOTE ADULT - MEDICATION SUMMARY - MEDICATIONS TO TAKE
I will START or STAY ON the medications listed below when I get home from the hospital:    Aspir 81  -- orally once a day  -- Indication: For CHF (congestive heart failure)    oxyCODONE 5 mg oral tablet  -- 1 tab(s) by mouth every 4 hours, As needed, Moderate Pain (4 - 6)  -- Indication: For pain    lisinopril 20 mg oral tablet  -- 1 tab(s) by mouth once a day  -- Indication: For Hypertension    Lyrica 100 mg oral capsule  -- orally 3 times a day  -- Indication: For pain    insulin lispro  -- 2 unit(s) subcutaneous 3 times a day (before meals)  -- Indication: For Type 2 diabetes mellitus    diphenhydrAMINE 25 mg oral capsule  -- 1 cap(s) by mouth every 6 hours, As needed, Rash and/or Itching  -- Indication: For itching    carvedilol 6.25 mg oral tablet  -- 1 tab(s) by mouth 2 times a day  -- Indication: For CHF (congestive heart failure)    furosemide 40 mg oral tablet  -- 1 tab(s) by mouth once a day  -- Indication: For CHF (congestive heart failure)    metOLazone 2.5 mg oral tablet  -- 1 tab(s) by mouth once a day  -- Indication: For CHF (congestive heart failure)    darbepoetin carolina 40 mcg/mL injectable solution  --  injectable every 7 days post dialysis (hold if BP>160/90)  -- Indication: For CKD (chronic kidney disease)    Melatonin 3 mg oral tablet  -- 1 tab(s) by mouth once (at bedtime)  -- Indication: For insomnia    calcium acetate 667 mg oral tablet  -- orally 2 times a day  -- Indication: For CKD (chronic kidney disease)    hydrALAZINE 50 mg oral tablet  -- 50 milligram(s) by mouth 3 times a day  -- Indication: For Hypertension

## 2018-03-12 NOTE — DISCHARGE NOTE ADULT - MEDICATION SUMMARY - MEDICATIONS TO CHANGE
I will SWITCH the dose or number of times a day I take the medications listed below when I get home from the hospital:    Lasix  -- 60 milligram(s) by mouth once a day    hydrALAZINE 25 mg oral tablet  -- orally 3 times a day

## 2018-03-12 NOTE — DISCHARGE NOTE ADULT - ADDITIONAL INSTRUCTIONS
Follow up with vascular surgery (Dr Carrillo) in 2 weeks  Follow up with nephrology at your next dialysis session  Follow up with your PMD Follow up with vascular surgery (Dr Carrillo) in 2 weeks  Follow up with nephrology at your next dialysis session  Follow up with your PMD  Follow up with podiatry in 1-2 weeks (no dressings needed on right foot)

## 2018-03-12 NOTE — PROGRESS NOTE ADULT - ASSESSMENT
ESRD/HTN/anemia/CHF/DM/PVD/  gangrene of LE   # HD due tomorrow via Tesio cath    # anemia , on darbo 80, please decrease to 40, hold if BP elevated , no venofer in view of elevated ferritin   # s/p left  BKA    # followed by vascular   #ph noted on phoslo   # HTN - increase Hydralazine to 50 mg q8h  # will follow
left foot wet gangrene
57/F with ESRD on HD TTS, DM, HTN, admitted for Lt DFU and picc line placement.    ESRD - on HD today for 3 hrs   - 2K bath///UF 2.5L as tolerated  - please follow up labs today drawn before HD  - renal diet, will check phos  - cont Phoslo     HTN - cont current meds    Anemia - cont Aranesp 80 mcg q week    Left foot gangrene - on Cefipime, Flagyl and Vanco on Zosyn,   - severe PAD  - needs BKA as per vascular note    Will follow
57/F with ESRD on HD TTS, DM, HTN, admitted for Lt DFU and picc line placement.   Found to have Lt foot gangrene.    ESRD - on HD today for 3 hrs   - 2K bath///UF 2.5L as tolerated  - renal diet, will check phos  - cont Phoslo     HTN - cont current meds    Anemia - cont Aranesp 80 mcg q week    Left foot gangrene - on Cefipime, Flagyl and Vanco  - severe PAD  - will need BKA     Will follow
57/F with ESRD on HD TTS, DM, HTN, admitted for Lt DFU and picc line placement.   Found to have Lt foot gangrene.    ·	ESRD - on HD for HD TODAY 2 -2.5L UF WITH 2 K BATH NO HEPAREN  ·	CKD MBD hyperphosphatemia on Phoslo LAST PO4 4.6 PLEASE HOLD SEVELAMER  ·	HTN - cont current meds  ·	Anemia -on Aranesp 80 mcg q week transfuse if Hb <7 or preop as per vascular recs   ·	Left foot gangrene - on Cefipime, Flagyl and Vanco being transferred to [facility] for amputation   ·	get vancomycin trough level  ·	Patient lacking capacity for decision making as per psych       Will follow
57/F with ESRD on HD TTS, DM, HTN, admitted for Lt DFU and picc line placement.   Found to have Lt foot gangrene.    ·	ESRD - on HD for HD in AM 3h hrs 2K bath///UF 2.5L as tolerated  ·	CKD MBD hyperphosphatemia on Phoslo  ·	HTN - cont current meds  ·	Anemia -on Aranesp 80 mcg q week transfuse if Hb <7 or preop as per vascular recs   ·	Left foot gangrene - on Cefipime, Flagyl and Vanco being transferred to [facility] for amputation   ·	Patient lacking capacity for decision making as per psych       Will follow
57/F with ESRD on HD TTS, DM, HTN, admitted for Lt DFU and picc line placement.   Found to have Lt foot gangrene.    ·	ESRD - s/p hemodialysis yesterday   ·	CKD MBD hyperphosphatemia on Phoslo LAST PO4 4.6 PLEASE HOLD SEVELAMER  ·	HTN - cont current meds  ·	Anemia -on Aranesp 80 mcg q week transfuse if Hb <7 or preop as per vascular recs   ·	Left foot gangrene - on Cefipime, Flagyl and Vanco being transferred to [facility] for amputation   ·	get vancomycin trough level  ·	Patient lacking capacity for decision making as per psych       Will follow
Doing well postop. Continue clinical monitoring.
Doing well postop. Wound vac functioning appropriately. Wound vac change tomorrow. Further plans for debriedement to follow and be discussed with Dr Carrillo.
ESRD/HTN/anemia/CHF/DM/PVD/ b/l gangrene of feet:  # HD due tomorrow, labs reviewed  # anemia , check repeat labs  # s/p left  BKA, going for closure today  # monitor BP, increase Hydralazine 50 mg q 8 h  # will follow
ESRD/HTN/anemia/CHF/DM/PVD/ b/l gangrene of feet:  # HD today, standard bath, uf 2 liters as tolerated, chest wall tesio , no heparin    #off ATB   # d/c lasix and metolazone if no UO   # anemia , check repeat labs  # s/p left  BKA   # check repeat BP after HD, if remains elevated , increase hydralazine to 50   # will follow
ESRD/HTN/anemia/CHF/DM/PVD/ b/l gangrene of feet:  # HD today, standard bath, uf 2 liters as tolerated, chest wall tesio , no heparin   # will do labs today with HD   #on vanco /flagyl/ cefepime, check vanco level  # d/c lasix and metolazone if no UO   # anemia , check repeat labs,  resistant to BRYON, on high dose, will tx before OR   # ? BKA today   # check repeat BP   # will follow
ESRD/HTN/anemia/CHF/DM/PVD/ b/l gangrene of feet:  # s/p hd on saturday, hd in am  # refusing all meds  #on vanco /flagyl/ cefepime, check vanco level  # d/c lasix and metolazone if no UO   # anemia , check repeat labs, tx if h <7, resistant to BRYON  # BP at goal  # will follow
Plan and assessment  Pt. 57y Female status post lle bka  -Pain management  -wound vac in place  -Vascular to follow call as needed     INO Traylor   03-06-18 @ 20:00  # 9085/ 0981
left foot wet gangrene
ESRD/HTN/anemia/CHF/DM/PVD/  gangrene of LE   # HD today, 3 k bath, uf 2 liters as tolerated, chest wall tesio , no heparin    #off ATB   # anemia , on darbo 80, please decrease to 40, hold if BP elevated , no venofer in view of elevated ferritin   # s/p left  BKA    # followed by vascular   #ph noted on phoslo   # will follow
patient need BKA amputation  [place]iatry consultation done. advised  no capacity .d/w daughter going to sign paper for BKMARCIE left
await transfer to [place] for BKA left
gangrene of left foot    plan======vascular consult  need amputation    ESRD and anemia  need to increase procrit
gangrene of left foot  plan  - transfer to [facility]
podiatry follow up noted and appreciated  patient waiting to be transfer to [place] for BKA LEFT

## 2018-03-12 NOTE — DISCHARGE NOTE ADULT - PATIENT PORTAL LINK FT
You can access the [facility] Patient Portal, offered by [facility], by registering with the following website: [email or web address]

## 2018-03-12 NOTE — PROGRESS NOTE ADULT - SUBJECTIVE AND OBJECTIVE BOX
Nephrology progress note    Patient is seen and examined, events over the last 24 h noted .    Allergies:  No Known Allergies    Hospital Medications:   MEDICATIONS  (STANDING):  calcium acetate 667 milliGRAM(s) Oral two times a day with meals  carvedilol Oral Tab/Cap - Peds 6.25 milliGRAM(s) Oral two times a day  furosemide    Tablet 40 milliGRAM(s) Oral daily  heparin  Injectable 5000 Unit(s) SubCutaneous every 8 hours  hydrALAZINE 25 milliGRAM(s) Oral three times a day  insulin glargine Injectable (LANTUS) 8 Unit(s) SubCutaneous at bedtime  insulin lispro (HumaLOG) corrective regimen sliding scale   SubCutaneous three times a day before meals  lisinopril 20 milliGRAM(s) Oral daily  melatonin 5 milliGRAM(s) Oral at bedtime  metolazone 2.5 milliGRAM(s) Oral daily        VITALS:  T(F): 97.2 (03-12-18 @ 05:14), Max: 98.7 (03-11-18 @ 14:03)  HR: 69 (03-12-18 @ 05:14)  BP: 183/81 (03-12-18 @ 05:14)  RR: 19 (03-12-18 @ 05:14)  SpO2: 96% (03-12-18 @ 07:56)  Wt(kg): --    03-10 @ 06:01  -  03-11 @ 07:00  --------------------------------------------------------  IN: 0 mL / OUT: 1000 mL / NET: -1000 mL          PHYSICAL EXAM:  Constitutional: NAD  HEENT: anicteric sclera  Neck: No JVD  Respiratory: CTAB  Cardiovascular: S1, S2, rrr  Extremities: No peripheral edema, Lt BKA  Neurological: Awake, alert  :  No anderson.   Vascular Access: Tesio    LABS:  03-10    137  |  99  |  28<H>  ----------------------------<  78  3.8   |  26  |  4.6<HH>    Ca    9.2      10 Mar 2018 10:41  Phos  4.3     03-10                            9.4    14.95 )-----------( 371      ( 10 Mar 2018 10:41 )             30.1       Urine Studies:      RADIOLOGY & ADDITIONAL STUDIES:

## 2018-03-12 NOTE — DISCHARGE NOTE ADULT - MEDICATION SUMMARY - MEDICATIONS TO STOP TAKING
I will STOP taking the medications listed below when I get home from the hospital:    Renvela 800 mg oral tablet  -- 1 tab(s) by mouth 3 times a day (with meals)    insulin glargine 100 units/mL subcutaneous solution  -- 12 unit(s) subcutaneous once a day (at bedtime)    Santyl 250 units/g topical ointment  -- Apply on skin to affected area once a day

## 2018-03-12 NOTE — PROGRESS NOTE ADULT - ATTENDING COMMENTS
Pt is well known to me. She is s/p angiogram recently. She has wet gangrene and likely non salvageable foot. She will likely need an amputation. Will plan on OR possibly Friday if patient is agreeable to surgery. It will likely be a 2 stage operation with a secondary closure the following week.
Patient awaiting left LE BKA   Will continue to follow for right foot wound care following procedure   Discussed plan with Patient on 3/5/2018
Patient was seen at bedside. No acute surgical care is needed at this time. Patient can follow up at podiatry clinic on discharge.     Thank you for allowing me to participate in your patient care.
pt seen and examined independently, I have read and agree with above exam and plan of care, conitingregg womaryare as per surgery, hd as per renal  amputation revision to be scheduled, continue current treatment
Plan for revision tomorrow. Npo past midnight
as above  OOB to chair  PT rehab
pt seen and examined independently, I have read and agree with above  for revsion of stump  vacc dressing in place  coninue abx  surgical fu  continue current treatment
pt seen and examined independently, I have read and agree with above exam and plan of care  pt for revision of amputation,   continue abx, woundcare  surgical fu  case discussed with kath
s/p open BKA will plan for revision later this week.
seen and examined with fellow  ESRD hd today, chest wall tesio, UF 2 liters as tolerated  foot gangrene on ATB, check vanco level  BRYON resistant in view of infection  will follow
Patient awaiting left LE BKA   Will continue to follow for right foot wound care following procedure   Discussed plan with Patient

## 2018-03-14 DIAGNOSIS — M72.6 NECROTIZING FASCIITIS: ICD-10-CM

## 2018-03-14 DIAGNOSIS — M86.672 OTHER CHRONIC OSTEOMYELITIS, LEFT ANKLE AND FOOT: ICD-10-CM

## 2018-03-14 DIAGNOSIS — N18.6 END STAGE RENAL DISEASE: ICD-10-CM

## 2018-03-14 DIAGNOSIS — I50.22 CHRONIC SYSTOLIC (CONGESTIVE) HEART FAILURE: ICD-10-CM

## 2018-03-14 DIAGNOSIS — E11.52 TYPE 2 DIABETES MELLITUS WITH DIABETIC PERIPHERAL ANGIOPATHY WITH GANGRENE: ICD-10-CM

## 2018-03-14 DIAGNOSIS — E11.69 TYPE 2 DIABETES MELLITUS WITH OTHER SPECIFIED COMPLICATION: ICD-10-CM

## 2018-03-14 DIAGNOSIS — I42.9 CARDIOMYOPATHY, UNSPECIFIED: ICD-10-CM

## 2018-03-14 DIAGNOSIS — L02.416 CUTANEOUS ABSCESS OF LEFT LOWER LIMB: ICD-10-CM

## 2018-03-14 DIAGNOSIS — Z79.4 LONG TERM (CURRENT) USE OF INSULIN: ICD-10-CM

## 2018-03-14 DIAGNOSIS — I70.235 ATHEROSCLEROSIS OF NATIVE ARTERIES OF RIGHT LEG WITH ULCERATION OF OTHER PART OF FOOT: ICD-10-CM

## 2018-03-14 DIAGNOSIS — M86.671 OTHER CHRONIC OSTEOMYELITIS, RIGHT ANKLE AND FOOT: ICD-10-CM

## 2018-03-14 DIAGNOSIS — L97.511 NON-PRESSURE CHRONIC ULCER OF OTHER PART OF RIGHT FOOT LIMITED TO BREAKDOWN OF SKIN: ICD-10-CM

## 2018-03-14 DIAGNOSIS — Z99.2 DEPENDENCE ON RENAL DIALYSIS: ICD-10-CM

## 2018-03-14 DIAGNOSIS — L97.523 NON-PRESSURE CHRONIC ULCER OF OTHER PART OF LEFT FOOT WITH NECROSIS OF MUSCLE: ICD-10-CM

## 2018-03-14 DIAGNOSIS — I13.2 HYPERTENSIVE HEART AND CHRONIC KIDNEY DISEASE WITH HEART FAILURE AND WITH STAGE 5 CHRONIC KIDNEY DISEASE, OR END STAGE RENAL DISEASE: ICD-10-CM

## 2018-03-14 DIAGNOSIS — I70.262 ATHEROSCLEROSIS OF NATIVE ARTERIES OF EXTREMITIES WITH GANGRENE, LEFT LEG: ICD-10-CM

## 2018-03-14 DIAGNOSIS — E11.22 TYPE 2 DIABETES MELLITUS WITH DIABETIC CHRONIC KIDNEY DISEASE: ICD-10-CM

## 2018-03-14 DIAGNOSIS — D64.9 ANEMIA, UNSPECIFIED: ICD-10-CM

## 2018-03-15 LAB — SURGICAL PATHOLOGY STUDY: SIGNIFICANT CHANGE UP

## 2018-03-20 ENCOUNTER — APPOINTMENT (OUTPATIENT)
Dept: PODIATRY | Facility: CLINIC | Age: 58
End: 2018-03-20
Payer: MEDICARE

## 2018-03-20 ENCOUNTER — OUTPATIENT (OUTPATIENT)
Dept: OUTPATIENT SERVICES | Facility: HOSPITAL | Age: 58
LOS: 1 days | Discharge: HOME | End: 2018-03-20

## 2018-03-20 VITALS
HEIGHT: 60 IN | BODY MASS INDEX: 26.9 KG/M2 | TEMPERATURE: 96.6 F | WEIGHT: 137 LBS | HEART RATE: 75 BPM | DIASTOLIC BLOOD PRESSURE: 86 MMHG | SYSTOLIC BLOOD PRESSURE: 172 MMHG

## 2018-03-20 DIAGNOSIS — L85.3 XEROSIS CUTIS: ICD-10-CM

## 2018-03-20 DIAGNOSIS — G89.29 PAIN IN RIGHT FOOT: ICD-10-CM

## 2018-03-20 DIAGNOSIS — Z86.79 PERSONAL HISTORY OF OTHER DISEASES OF THE CIRCULATORY SYSTEM: Chronic | ICD-10-CM

## 2018-03-20 DIAGNOSIS — E11.42 TYPE 2 DIABETES MELLITUS WITH DIABETIC POLYNEUROPATHY: ICD-10-CM

## 2018-03-20 DIAGNOSIS — Z95.828 PRESENCE OF OTHER VASCULAR IMPLANTS AND GRAFTS: Chronic | ICD-10-CM

## 2018-03-20 DIAGNOSIS — M79.671 PAIN IN RIGHT FOOT: ICD-10-CM

## 2018-03-20 PROCEDURE — 99202 OFFICE O/P NEW SF 15 MIN: CPT

## 2018-03-22 ENCOUNTER — APPOINTMENT (OUTPATIENT)
Dept: VASCULAR SURGERY | Facility: CLINIC | Age: 58
End: 2018-03-22

## 2018-03-26 PROBLEM — M79.671 CHRONIC FOOT PAIN, RIGHT: Status: ACTIVE | Noted: 2018-03-26

## 2018-03-26 PROBLEM — L85.3 XEROSIS CUTIS: Status: ACTIVE | Noted: 2018-03-26

## 2018-03-26 PROBLEM — E11.42 CONTROLLED DIABETES MELLITUS WITH DIABETIC POLYNEUROPATHY: Status: ACTIVE | Noted: 2018-03-26

## 2018-03-30 DIAGNOSIS — E11.42 TYPE 2 DIABETES MELLITUS WITH DIABETIC POLYNEUROPATHY: ICD-10-CM

## 2018-03-30 DIAGNOSIS — I70.209 UNSPECIFIED ATHEROSCLEROSIS OF NATIVE ARTERIES OF EXTREMITIES, UNSPECIFIED EXTREMITY: ICD-10-CM

## 2018-03-30 DIAGNOSIS — L85.3 XEROSIS CUTIS: ICD-10-CM

## 2018-03-30 DIAGNOSIS — M79.671 PAIN IN RIGHT FOOT: ICD-10-CM

## 2018-04-19 ENCOUNTER — APPOINTMENT (OUTPATIENT)
Dept: VASCULAR SURGERY | Facility: CLINIC | Age: 58
End: 2018-04-19
Payer: MEDICARE

## 2018-04-19 DIAGNOSIS — I73.9 PERIPHERAL VASCULAR DISEASE, UNSPECIFIED: ICD-10-CM

## 2018-04-19 PROCEDURE — 99024 POSTOP FOLLOW-UP VISIT: CPT

## 2018-06-22 ENCOUNTER — OUTPATIENT (OUTPATIENT)
Dept: OUTPATIENT SERVICES | Facility: HOSPITAL | Age: 58
LOS: 1 days | Discharge: HOME | End: 2018-06-22

## 2018-06-22 DIAGNOSIS — Z95.828 PRESENCE OF OTHER VASCULAR IMPLANTS AND GRAFTS: Chronic | ICD-10-CM

## 2018-06-22 DIAGNOSIS — Z86.79 PERSONAL HISTORY OF OTHER DISEASES OF THE CIRCULATORY SYSTEM: Chronic | ICD-10-CM

## 2018-06-22 DIAGNOSIS — B99.9 UNSPECIFIED INFECTIOUS DISEASE: ICD-10-CM

## 2018-07-02 ENCOUNTER — INPATIENT (INPATIENT)
Facility: HOSPITAL | Age: 58
LOS: 6 days | Discharge: HOME | End: 2018-07-09
Attending: INTERNAL MEDICINE | Admitting: INTERNAL MEDICINE

## 2018-07-02 VITALS
OXYGEN SATURATION: 99 % | RESPIRATION RATE: 20 BRPM | SYSTOLIC BLOOD PRESSURE: 220 MMHG | DIASTOLIC BLOOD PRESSURE: 113 MMHG | HEART RATE: 82 BPM | TEMPERATURE: 97 F

## 2018-07-02 DIAGNOSIS — Z95.828 PRESENCE OF OTHER VASCULAR IMPLANTS AND GRAFTS: Chronic | ICD-10-CM

## 2018-07-02 DIAGNOSIS — Z86.79 PERSONAL HISTORY OF OTHER DISEASES OF THE CIRCULATORY SYSTEM: Chronic | ICD-10-CM

## 2018-07-02 LAB
ALBUMIN SERPL ELPH-MCNC: 4.5 G/DL — SIGNIFICANT CHANGE UP (ref 3.5–5.2)
ALP SERPL-CCNC: 126 U/L — HIGH (ref 30–115)
ALT FLD-CCNC: 10 U/L — SIGNIFICANT CHANGE UP (ref 0–41)
ANION GAP SERPL CALC-SCNC: 29 MMOL/L — HIGH (ref 7–14)
APTT BLD: 33.5 SEC — SIGNIFICANT CHANGE UP (ref 27–39.2)
AST SERPL-CCNC: 13 U/L — SIGNIFICANT CHANGE UP (ref 0–41)
BASOPHILS # BLD AUTO: 0.04 K/UL — SIGNIFICANT CHANGE UP (ref 0–0.2)
BASOPHILS NFR BLD AUTO: 0.5 % — SIGNIFICANT CHANGE UP (ref 0–1)
BILIRUB SERPL-MCNC: 0.7 MG/DL — SIGNIFICANT CHANGE UP (ref 0.2–1.2)
BUN SERPL-MCNC: 58 MG/DL — HIGH (ref 10–20)
CALCIUM SERPL-MCNC: 9.7 MG/DL — SIGNIFICANT CHANGE UP (ref 8.5–10.1)
CHLORIDE SERPL-SCNC: 93 MMOL/L — LOW (ref 98–110)
CK MB CFR SERPL CALC: 3.8 NG/ML — SIGNIFICANT CHANGE UP (ref 0.6–6.3)
CK SERPL-CCNC: 230 U/L — HIGH (ref 0–225)
CO2 SERPL-SCNC: 23 MMOL/L — SIGNIFICANT CHANGE UP (ref 17–32)
CREAT SERPL-MCNC: 8.7 MG/DL — CRITICAL HIGH (ref 0.7–1.5)
EOSINOPHIL # BLD AUTO: 0.03 K/UL — SIGNIFICANT CHANGE UP (ref 0–0.7)
EOSINOPHIL NFR BLD AUTO: 0.4 % — SIGNIFICANT CHANGE UP (ref 0–8)
GAS PNL BLDV: SIGNIFICANT CHANGE UP
GLUCOSE SERPL-MCNC: 216 MG/DL — HIGH (ref 70–99)
HCT VFR BLD CALC: 47.3 % — HIGH (ref 37–47)
HGB BLD-MCNC: 16 G/DL — SIGNIFICANT CHANGE UP (ref 12–16)
IMM GRANULOCYTES NFR BLD AUTO: 0.2 % — SIGNIFICANT CHANGE UP (ref 0.1–0.3)
INR BLD: 1.07 RATIO — SIGNIFICANT CHANGE UP (ref 0.65–1.3)
LIDOCAIN IGE QN: 61 U/L — HIGH (ref 7–60)
LYMPHOCYTES # BLD AUTO: 1.52 K/UL — SIGNIFICANT CHANGE UP (ref 1.2–3.4)
LYMPHOCYTES # BLD AUTO: 18.6 % — LOW (ref 20.5–51.1)
MCHC RBC-ENTMCNC: 27.5 PG — SIGNIFICANT CHANGE UP (ref 27–31)
MCHC RBC-ENTMCNC: 33.8 G/DL — SIGNIFICANT CHANGE UP (ref 32–37)
MCV RBC AUTO: 81.3 FL — SIGNIFICANT CHANGE UP (ref 81–99)
MONOCYTES # BLD AUTO: 0.49 K/UL — SIGNIFICANT CHANGE UP (ref 0.1–0.6)
MONOCYTES NFR BLD AUTO: 6 % — SIGNIFICANT CHANGE UP (ref 1.7–9.3)
NEUTROPHILS # BLD AUTO: 6.08 K/UL — SIGNIFICANT CHANGE UP (ref 1.4–6.5)
NEUTROPHILS NFR BLD AUTO: 74.3 % — SIGNIFICANT CHANGE UP (ref 42.2–75.2)
PLATELET # BLD AUTO: 184 K/UL — SIGNIFICANT CHANGE UP (ref 130–400)
POTASSIUM SERPL-MCNC: 4.9 MMOL/L — SIGNIFICANT CHANGE UP (ref 3.5–5)
POTASSIUM SERPL-SCNC: 4.9 MMOL/L — SIGNIFICANT CHANGE UP (ref 3.5–5)
PROT SERPL-MCNC: 8.7 G/DL — HIGH (ref 6–8)
PROTHROM AB SERPL-ACNC: 11.5 SEC — SIGNIFICANT CHANGE UP (ref 9.95–12.87)
RBC # BLD: 5.82 M/UL — HIGH (ref 4.2–5.4)
RBC # FLD: 16.9 % — HIGH (ref 11.5–14.5)
SODIUM SERPL-SCNC: 145 MMOL/L — SIGNIFICANT CHANGE UP (ref 135–146)
TROPONIN T SERPL-MCNC: 0.54 NG/ML — CRITICAL HIGH
TYPE + AB SCN PNL BLD: SIGNIFICANT CHANGE UP
WBC # BLD: 8.18 K/UL — SIGNIFICANT CHANGE UP (ref 4.8–10.8)
WBC # FLD AUTO: 8.18 K/UL — SIGNIFICANT CHANGE UP (ref 4.8–10.8)

## 2018-07-02 RX ORDER — CARVEDILOL PHOSPHATE 80 MG/1
6.25 CAPSULE, EXTENDED RELEASE ORAL ONCE
Qty: 0 | Refills: 0 | Status: DISCONTINUED | OUTPATIENT
Start: 2018-07-02 | End: 2018-07-02

## 2018-07-02 RX ORDER — HYDRALAZINE HCL 50 MG
50 TABLET ORAL ONCE
Qty: 0 | Refills: 0 | Status: COMPLETED | OUTPATIENT
Start: 2018-07-02 | End: 2018-07-02

## 2018-07-02 RX ORDER — METOCLOPRAMIDE HCL 10 MG
10 TABLET ORAL ONCE
Qty: 0 | Refills: 0 | Status: COMPLETED | OUTPATIENT
Start: 2018-07-02 | End: 2018-07-02

## 2018-07-02 RX ORDER — CARVEDILOL PHOSPHATE 80 MG/1
6.25 CAPSULE, EXTENDED RELEASE ORAL ONCE
Qty: 0 | Refills: 0 | Status: COMPLETED | OUTPATIENT
Start: 2018-07-02 | End: 2018-07-02

## 2018-07-02 RX ORDER — HYDRALAZINE HCL 50 MG
50 TABLET ORAL THREE TIMES A DAY
Qty: 0 | Refills: 0 | Status: DISCONTINUED | OUTPATIENT
Start: 2018-07-03 | End: 2018-07-03

## 2018-07-02 RX ORDER — FUROSEMIDE 40 MG
40 TABLET ORAL DAILY
Qty: 0 | Refills: 0 | Status: DISCONTINUED | OUTPATIENT
Start: 2018-07-02 | End: 2018-07-09

## 2018-07-02 RX ORDER — ASPIRIN/CALCIUM CARB/MAGNESIUM 324 MG
325 TABLET ORAL ONCE
Qty: 0 | Refills: 0 | Status: COMPLETED | OUTPATIENT
Start: 2018-07-02 | End: 2018-07-02

## 2018-07-02 RX ORDER — LABETALOL HCL 100 MG
20 TABLET ORAL ONCE
Qty: 0 | Refills: 0 | Status: COMPLETED | OUTPATIENT
Start: 2018-07-02 | End: 2018-07-02

## 2018-07-02 RX ORDER — LABETALOL HCL 100 MG
10 TABLET ORAL ONCE
Qty: 0 | Refills: 0 | Status: COMPLETED | OUTPATIENT
Start: 2018-07-02 | End: 2018-07-02

## 2018-07-02 RX ORDER — CARVEDILOL PHOSPHATE 80 MG/1
6.25 CAPSULE, EXTENDED RELEASE ORAL EVERY 12 HOURS
Qty: 0 | Refills: 0 | Status: DISCONTINUED | OUTPATIENT
Start: 2018-07-03 | End: 2018-07-03

## 2018-07-02 RX ORDER — METOCLOPRAMIDE HCL 10 MG
5 TABLET ORAL
Qty: 0 | Refills: 0 | Status: DISCONTINUED | OUTPATIENT
Start: 2018-07-02 | End: 2018-07-09

## 2018-07-02 RX ORDER — ONDANSETRON 8 MG/1
4 TABLET, FILM COATED ORAL ONCE
Qty: 0 | Refills: 0 | Status: COMPLETED | OUTPATIENT
Start: 2018-07-02 | End: 2018-07-02

## 2018-07-02 RX ORDER — LANOLIN ALCOHOL/MO/W.PET/CERES
3 CREAM (GRAM) TOPICAL AT BEDTIME
Qty: 0 | Refills: 0 | Status: DISCONTINUED | OUTPATIENT
Start: 2018-07-02 | End: 2018-07-09

## 2018-07-02 RX ORDER — HYDRALAZINE HCL 50 MG
15 TABLET ORAL ONCE
Qty: 0 | Refills: 0 | Status: COMPLETED | OUTPATIENT
Start: 2018-07-02 | End: 2018-07-02

## 2018-07-02 RX ORDER — PANTOPRAZOLE SODIUM 20 MG/1
40 TABLET, DELAYED RELEASE ORAL ONCE
Qty: 0 | Refills: 0 | Status: COMPLETED | OUTPATIENT
Start: 2018-07-02 | End: 2018-07-02

## 2018-07-02 RX ORDER — PANTOPRAZOLE SODIUM 20 MG/1
40 TABLET, DELAYED RELEASE ORAL
Qty: 0 | Refills: 0 | Status: DISCONTINUED | OUTPATIENT
Start: 2018-07-02 | End: 2018-07-06

## 2018-07-02 RX ORDER — HYDRALAZINE HCL 50 MG
50 TABLET ORAL ONCE
Qty: 0 | Refills: 0 | Status: DISCONTINUED | OUTPATIENT
Start: 2018-07-02 | End: 2018-07-02

## 2018-07-02 RX ORDER — DARBEPOETIN ALFA IN POLYSORBAT 200MCG/0.4
40 PEN INJECTOR (ML) SUBCUTANEOUS
Qty: 0 | Refills: 0 | Status: DISCONTINUED | OUTPATIENT
Start: 2018-07-02 | End: 2018-07-03

## 2018-07-02 RX ORDER — LISINOPRIL 2.5 MG/1
20 TABLET ORAL ONCE
Qty: 0 | Refills: 0 | Status: COMPLETED | OUTPATIENT
Start: 2018-07-02 | End: 2018-07-02

## 2018-07-02 RX ORDER — CALCIUM ACETATE 667 MG
667 TABLET ORAL
Qty: 0 | Refills: 0 | Status: DISCONTINUED | OUTPATIENT
Start: 2018-07-02 | End: 2018-07-09

## 2018-07-02 RX ORDER — SODIUM CHLORIDE 9 MG/ML
500 INJECTION INTRAMUSCULAR; INTRAVENOUS; SUBCUTANEOUS ONCE
Qty: 0 | Refills: 0 | Status: COMPLETED | OUTPATIENT
Start: 2018-07-02 | End: 2018-07-02

## 2018-07-02 RX ORDER — LABETALOL HCL 100 MG
40 TABLET ORAL ONCE
Qty: 0 | Refills: 0 | Status: COMPLETED | OUTPATIENT
Start: 2018-07-02 | End: 2018-07-02

## 2018-07-02 RX ADMIN — Medication 10 MILLIGRAM(S): at 14:30

## 2018-07-02 RX ADMIN — Medication 10 MILLIGRAM(S): at 17:44

## 2018-07-02 RX ADMIN — Medication 50 MILLIGRAM(S): at 16:01

## 2018-07-02 RX ADMIN — Medication 40 MILLIGRAM(S): at 20:37

## 2018-07-02 RX ADMIN — Medication 20 MILLIGRAM(S): at 16:02

## 2018-07-02 RX ADMIN — Medication 15 MILLIGRAM(S): at 23:24

## 2018-07-02 RX ADMIN — Medication 20 MILLIGRAM(S): at 16:55

## 2018-07-02 RX ADMIN — ONDANSETRON 4 MILLIGRAM(S): 8 TABLET, FILM COATED ORAL at 12:46

## 2018-07-02 RX ADMIN — PANTOPRAZOLE SODIUM 40 MILLIGRAM(S): 20 TABLET, DELAYED RELEASE ORAL at 12:47

## 2018-07-02 RX ADMIN — CARVEDILOL PHOSPHATE 6.25 MILLIGRAM(S): 80 CAPSULE, EXTENDED RELEASE ORAL at 19:25

## 2018-07-02 RX ADMIN — Medication 40 MILLIGRAM(S): at 17:44

## 2018-07-02 RX ADMIN — Medication 325 MILLIGRAM(S): at 14:51

## 2018-07-02 RX ADMIN — LISINOPRIL 20 MILLIGRAM(S): 2.5 TABLET ORAL at 16:02

## 2018-07-02 RX ADMIN — SODIUM CHLORIDE 250 MILLILITER(S): 9 INJECTION INTRAMUSCULAR; INTRAVENOUS; SUBCUTANEOUS at 12:47

## 2018-07-02 RX ADMIN — Medication 3 MILLIGRAM(S): at 22:56

## 2018-07-02 NOTE — CONSULT NOTE ADULT - ASSESSMENT
57/F with ESRD on HD MWF, HTN, DM, PVD Lt BKA, CHF, presents with nausea and vomiting for 3 days.  No abdominal pain , LFTs are normal.    ESRD - no need for HD today  - pt looks dehydrated  - electrolytes wnl  - CXR no CHF    HTN - uncontrolled, no meds taken for a few days  Labetalol given 10 mg IV  - resume home meds - Hydralazine, Labetalol  - may give Procardia XL 60 mg qd  -if BP remains high, may need to go to tele or Unit for IV Nicardipine or Labetalol    Vomiting - subsided after Zofran  - possibly due to gastritis or diabetic gastroparesis  - may give Reglan 5 mg po qac    D/W ED Team

## 2018-07-02 NOTE — ED PROVIDER NOTE - PHYSICAL EXAMINATION
Constitutional: Well developed, well nourished. NAD.  Head: Normocephalic, atraumatic.  Eyes: PERRL. EOMI.  ENT: No nasal discharge. Mucous membranes moist.  Neck: Supple. Painless ROM.  Cardiovascular: Normal S1, S2. Regular rate and rhythm. No murmurs, rubs, or gallops.  Pulmonary: Normal respiratory rate and effort. Lungs clear to auscultation bilaterally. No wheezing, rales, or rhonchi.  Abdominal: Soft. Nondistended. Nontender. No rebound, guarding, rigidity.  Extremities. Pelvis stable. No lower extremity edema, symmetric calves.  Skin: No rashes, cyanosis.  Neuro: AAOx3. No focal neurological deficits.  Psych: Normal mood. Normal affect. Constitutional: Well developed, well nourished. Uncomfortable.  Head: Normocephalic, atraumatic.  Eyes: PERRL. EOMI.  ENT: No nasal discharge. Mucous membranes moist.  Neck: Supple. Painless ROM.  Cardiovascular: Normal S1, S2. Tachycardic. No murmurs, rubs, or gallops.  Pulmonary: Normal respiratory rate and effort. Lungs clear to auscultation bilaterally. No wheezing, rales, or rhonchi.  Abdominal: Soft. Nondistended. Nontender. No rebound, guarding, rigidity.  Extremities. Pelvis stable. No lower extremity edema. Right BKA.  Skin: No rashes, cyanosis.  Neuro: AAOx3. No focal neurological deficits.  Psych: Normal mood. Normal affect. Constitutional: Well developed, well nourished. Uncomfortable.  Head: Normocephalic, atraumatic.  Eyes: PERRL. EOMI.  ENT: No nasal discharge. Mucous membranes moist.  Neck: Supple. Painless ROM.  Cardiovascular: Normal S1, S2. Tachycardic. No murmurs, rubs, or gallops.  Pulmonary: Normal respiratory rate and effort. Lungs clear to auscultation bilaterally. No wheezing, rales, or rhonchi.  Abdominal: Soft. Nondistended. Nontender. No rebound, guarding, rigidity.  Extremities. Pelvis stable. No lower extremity edema. Left BKA.  Skin: No rashes, cyanosis.  Neuro: AAOx3. No focal neurological deficits.  Psych: Normal mood. Normal affect.

## 2018-07-02 NOTE — H&P ADULT - HISTORY OF PRESENT ILLNESS
58 y/o female with PMH of HTN, DM, Left BKA,  ESRD on dialysis (M/W/F) Dialysis was missed today as patient was not feeling well. She presents to the ED with 2 day history of decreased appetite with Associated Nausea and Vomiting. The vomiting was not blood tinged until this evening in the ED where she has begun to have Coffee ground Emesis x 2 episodes. Denies any fever, chest pain, or diarrhea. Denies any urinary complaints and otherwise states she has been doing well.     PCP: Dr. Mckay  Pharmacy: [facility]

## 2018-07-02 NOTE — ED PROVIDER NOTE - OBJECTIVE STATEMENT
Pt is a 58 y/o female with hx of HTN, DM, ESRD on dialysis (M/W/F) - skipped dose today presents to ED for vomiting x3 which was nonbloody but became coffee ground today. No abd pain, diarrhea, chest pain, SOB, fever, urinary complaints.

## 2018-07-02 NOTE — ED PROVIDER NOTE - PROGRESS NOTE DETAILS
Discussed with Dr. Obrien, will see pt. Attending note:  I personally evaluated the patient. I reviewed the Resident’s note (as assigned above), and agree with the findings and plan except as documented in my note.   56 y/o F HX of ESRD on HD M/W/F last was on Friday, also with HX of HTN, DM, presents with nausea and vomiting since Friday, became coffee ground appearance upon arrival to ED. No CP, SOB, fever. Exam as documented, pt is a little anxious. Will check labs, give IVF, treat SX, discuss with renal, likely admit. I accept transfer of care for Dr. Motley.  Pt w. elevated BP.  Will Give Labetalol and Regaln and re-assesss Pt's BP stabilized with labetalol, will admit to tele. Discussed with MAR. Discussed with Dr. Johansen.

## 2018-07-02 NOTE — H&P ADULT - NSHPPHYSICALEXAM_GEN_ALL_CORE
PHYSICAL EXAM:  GENERAL: NAD, speaks in full sentences, no signs of respiratory distress  HEAD:  Atraumatic, Normocephalic  EYES: EOMI, PERRLA, conjunctiva and sclera clear  NECK: Supple, No JVD  CHEST/LUNG: Clear to auscultation bilaterally; No wheeze; No crackles; No accessory muscles used  HEART: Regular rate and rhythm; No murmurs;   ABDOMEN: Soft, Nontender, Nondistended; Bowel sounds present; No guarding  EXTREMITIES:  2+ Peripheral Pulses, No cyanosis or edema  PSYCH: AAOx3  NEUROLOGY: non-focal neurological Abnormalities   SKIN: No rashes or lesions PHYSICAL EXAM:  GENERAL: NAD, speaks in full sentences, no signs of respiratory distress  HEAD:  Atraumatic, Normocephalic  EYES: EOMI, PERRLA, conjunctiva and sclera clear  NECK: Supple, No JVD  Pulm: Clear to auscultation bilaterally; No wheeze; No crackles; No accessory muscles used  CV: Regular rate and rhythm; No murmurs;   GI: Soft, Nontender, Nondistended; Bowel sounds present; No guarding  EXTREMITIES:  2+ Peripheral Pulses, No cyanosis or edema  PSYCH: AAOx3  NEUROLOGY: non-focal neurological Abnormalities   SKIN: No rashes or lesions

## 2018-07-02 NOTE — ED PROVIDER NOTE - CARE PLAN
Principal Discharge DX:	Vomiting  Secondary Diagnosis:	Elevated troponin  Secondary Diagnosis:	Hypertension

## 2018-07-02 NOTE — H&P ADULT - NSHPLABSRESULTS_GEN_ALL_CORE
16.0   8.18  )-----------( 184      ( 02 Jul 2018 12:07 )             47.3     07-02    145  |  93<L>  |  58<H>  ----------------------------<  216<H>  4.9   |  23  |  8.7<HH>    Ca    9.7      02 Jul 2018 12:07    TPro  8.7<H>  /  Alb  4.5  /  TBili  0.7  /  DBili  x   /  AST  13  /  ALT  10  /  Alk Phos  126<H>  07-02    PT/INR - ( 02 Jul 2018 12:07 )   PT: 11.50 sec;   INR: 1.07 ratio         PTT - ( 02 Jul 2018 12:07 )  PTT:33.5 sec    CARDIAC MARKERS ( 02 Jul 2018 12:07 )  x     / 0.54 ng/mL / 230 U/L / x     / 3.8 ng/mL 16.0   8.18  )-----------( 184      ( 02 Jul 2018 12:07 )             47.3     07-02    145  |  93<L>  |  58<H>  ----------------------------<  216<H>  4.9   |  23  |  8.7<HH>    Ca    9.7      02 Jul 2018 12:07    TPro  8.7<H>  /  Alb  4.5  /  TBili  0.7  /  DBili  x   /  AST  13  /  ALT  10  /  Alk Phos  126<H>  07-02    PT/INR - ( 02 Jul 2018 12:07 )   PT: 11.50 sec;   INR: 1.07 ratio         PTT - ( 02 Jul 2018 12:07 )  PTT:33.5 sec    CARDIAC MARKERS ( 02 Jul 2018 12:07 )  x     / 0.54 ng/mL / 230 U/L / x     / 3.8 ng/mL    Chest xray which I reviewed no acute changes  EKG which I reviewed shows NSR

## 2018-07-02 NOTE — ED ADULT NURSE NOTE - OBJECTIVE STATEMENT
Pt states she has been vomiting since friday. Pt refusing to answer further questions as she states it is bothering her.

## 2018-07-02 NOTE — ED PROVIDER NOTE - NS ED ROS FT
Constitutional: No fever, chills.  Eyes: No visual changes.  ENT: No hearing changes. No sore throat.  Neck: No neck pain or stiffness.  Cardiovascular: No chest pain, palpitations, edema.  Pulmonary: No SOB, cough. No hemoptysis.  Abdominal: No abdominal pain, nausea, diarrhea. + vomiting.  : No dysuria, frequency.  Neuro: No headache, syncope, dizziness.  MS: No back pain. No calf pain/swelling.  Psych: No suicidal ideations.

## 2018-07-02 NOTE — CONSULT NOTE ADULT - SUBJECTIVE AND OBJECTIVE BOX
NEPHROLOGY CONSULTATION NOTE    Patient is a 57y Female who presented to the hospital with nausea and vomiting for 3 days, unable to keep food down. Denies abdominal pain, SOB, CP.  Pt with ESRD on HD MWF, last HD 2 days ago. C/o headache    PAST MEDICAL & SURGICAL HISTORY:  Type 2 diabetes mellitus  CHF (congestive heart failure)  End stage renal failure on dialysis  Hypertension  Hyperlipemia  Heart failure  History of femoral angiogram: left angiogram  Port-a-cath in place: right chest wall    Allergies:  No Known Allergies    Home Medications Reviewed  Hospital Medications:   MEDICATIONS  (STANDING):  hydrALAZINE 50 milliGRAM(s) Oral Once  labetalol Injectable 10 milliGRAM(s) IV Push Once  lisinopril 20 milliGRAM(s) Oral once      SOCIAL HISTORY:  Denies ETOH,Smoking,   FAMILY HISTORY:  No pertinent family history in first degree relatives        REVIEW OF SYSTEMS:  CONSTITUTIONAL: No weakness, fevers or chills  EYES/ENT: No visual changes;  No vertigo or throat pain   NECK: No pain or stiffness, c/o headache  RESPIRATORY: No cough, wheezing, hemoptysis; No shortness of breath  CARDIOVASCULAR: No chest pain or palpitations.  GASTROINTESTINAL: No abdominal or epigastric pain. Has  nausea, vomiting. Had 1 soft BM today.  SKIN: No itching, burning, rashes, or lesions   VASCULAR: No bilateral lower extremity edema. Lt BKA  All other review of systems is negative unless indicated above.    VITALS:  T(F): 96.7 (07-02-18 @ 09:58), Max: 96.7 (07-02-18 @ 09:58)  HR: 85 (07-02-18 @ 14:18)  BP: 230/110 (07-02-18 @ 14:18)  RR: 20 (07-02-18 @ 09:58)  SpO2: 99% (07-02-18 @ 09:58)        I&O's Detail    Creatine Kinase, Serum: 230 U/L (07-02-18 @ 12:07)      PHYSICAL EXAM:  Constitutional: NAD  HEENT: anicteric sclera, oropharynx clear, MMM  Neck: No JVD  Respiratory: CTAB, no wheezes, rales or rhonchi  Cardiovascular: S1, S2, RRR  Gastrointestinal: BS+, soft, NT/ND  Extremities: Lt BKA. No peripheral edema  Neurological: A/O x 3  Psychiatric: Normal mood, normal affect  : No CVA tenderness. No anderson.   Skin: No rashes  Vascular Access: Rt CW Tesio cath    LABS:  07-02    145  |  93<L>  |  58<H>  ----------------------------<  216<H>  4.9   |  23  |  8.7<HH>    Ca    9.7      02 Jul 2018 12:07    TPro  8.7<H>  /  Alb  4.5  /  TBili  0.7  /  DBili      /  AST  13  /  ALT  10  /  AlkPhos  126<H>  07-02    Creatinine Trend: 8.7 <--                        16.0   8.18  )-----------( 184      ( 02 Jul 2018 12:07 )             47.3     Urine Studies:              RADIOLOGY & ADDITIONAL STUDIES:      < from: Xray Chest 1 View-PORTABLE IMMEDIATE (07.02.18 @ 12:37) >  Cardiomegaly. No acute opacifications.    < end of copied text >

## 2018-07-02 NOTE — H&P ADULT - ASSESSMENT
56 y/o female with PMH of HTN, DM, Left sided BKA, ESRD on dialysis (M/W/F) Dialysis was missed today as patient was not feeling well. She presents to the ED with 2 day history of decreased appetite with Associated Nausea and Vomiting. The vomiting was not blood tinged until this evening in the ED where she has begun to have Coffee ground Emesis x 2 episodes. Denies any fever, chest pain, or diarrhea. Denies any urinary complaints and otherwise states she has been doing well.       1) Hypertension Uncontrolled   Follow with Repeat blood pressure.   Blood pressure down from 200 systolic to 170 now.   Patient did not take home meds.   Introduce home meds gradually once blood pressure controlled with IV medications.   Follow with Renal recommendations     2) ESRD   Follow with Renal   Patient to go for dialysis once stable.     3) Vomiting likely secondary to gastroenteritis vs Gastroparesis    Coffee ground emesis episode only once.   Follow with Hemoglobin this evening   Likely related to stress and strain of multiple vomiting episodes prior.     4) DVT Prophylaxis   Not recommended at this time until patients hemoglobin is confirmed stable and no upper GI bleed present or Coffee ground Emesis stopped.     5) GI Prophylaxis   Protonix     Full Code 58 y/o female with PMH of HTN, DM, Left sided BKA, ESRD on dialysis (M/W/F) Dialysis was missed today as patient was not feeling well. She presents to the ED with 2 day history of decreased appetite with Associated Nausea and Vomiting. The vomiting was not blood tinged until this evening in the ED where she has begun to have Coffee ground Emesis x 2 episodes. Denies any fever, chest pain, or diarrhea. Denies any urinary complaints and otherwise states she has been doing well.       1) Hypertension Uncontrolled   Follow with Repeat blood pressure.   Blood pressure down from 200 systolic to 170 now.   Patient did not take home meds.   Introduce home meds gradually once blood pressure controlled with IV medications.   Follow with Renal recommendations   No focal neurological deficits   Continue to monitor     2) ESRD   Follow with Renal   Patient to go for dialysis once stable.     3) Vomiting likely secondary to gastroenteritis vs Gastroparesis    Coffee ground emesis episode only once.   Follow with Hemoglobin this evening   Likely related to stress and strain of multiple vomiting episodes prior.   If vomiting persists give Reglan PRN   Seems to have subsided at this time   If coffee ground emesis persists follow with GI.     4) Elevated Troponin  Likely secondary to ESRD   Follow with repeat troponin this evening and trend.   Cardiac etiology unlikely in the setting of no chest pain or cardiac symptomology.     5) DVT Prophylaxis   Not recommended at this time until patients hemoglobin is confirmed stable and no upper GI bleed present or Coffee ground Emesis stopped.     6) GI Prophylaxis   Protonix     Full Code 58 y/o female with PMH of HTN, DM, Left sided BKA, ESRD on dialysis (M/W/F) Dialysis was missed today as patient was not feeling well. She presents to the ED with 2 day history of decreased appetite with Associated Nausea and Vomiting. The vomiting was not blood tinged until this evening in the ED where she has begun to have Coffee ground Emesis x 2 episodes. Denies any fever, chest pain, or diarrhea. Denies any urinary complaints and otherwise states she has been doing well.       1) Hypertension Uncontrolled   Follow with Repeat blood pressure.   Blood pressure down from 200 systolic to 170 now.   Patient did not take home meds.   Introduce home meds gradually once blood pressure controlled with IV medications.   Follow with Renal recommendations   No focal neurological deficits   Continue to monitor     2) ESRD   Follow with Renal   Patient to go for dialysis once stable.     3) Vomiting likely secondary to gastroenteritis vs Gastroparesis    Coffee ground emesis episode only once.   Follow with Hemoglobin this evening   Likely related to stress and strain of multiple vomiting episodes prior.   If vomiting persists give Reglan PRN   Seems to have subsided at this time   If coffee ground emesis persists follow with GI.     4) Elevated Troponin  Likely secondary to ESRD   Follow with repeat troponin this evening and trend.   Cardiac etiology unlikely in the setting of no chest pain or cardiac symptomology.     5) DM  Stable at this time   Patient has not been eating much will hold insulin for now.   Insulin dosed appropriately if Blood Glucose greater than 180.     6) DVT Prophylaxis   Not recommended at this time until patients hemoglobin is confirmed stable and no upper GI bleed present or Coffee ground Emesis stopped.     7) GI Prophylaxis   Protonix     Full Code 56 y/o female with PMH of HTN, DM, Left sided BKA, ESRD on dialysis (M/W/F) Dialysis was missed today as patient was not feeling well. She presents to the ED with 2 day history of decreased appetite with Associated Nausea and Vomiting. The vomiting was not blood tinged until this evening in the ED where she has begun to have Coffee ground Emesis x 2 episodes. Denies any fever, chest pain, or diarrhea. Denies any urinary complaints and otherwise states she has been doing well.       1) Hypertension Uncontrolled   Follow with Repeat blood pressure.   Blood pressure down from 200 systolic to 170 now.   Patient did not take home meds.   increase hydralazine to 75 TID     2) ESRD   Follow with Renal   Patient to go for dialysis once stable.     3) Vomiting likely secondary to gastroenteritis vs Gastroparesis    Coffee ground emesis episode times 2.  get GI eval   Hemoglobin stable   Likely related to stress and strain of multiple vomiting episodes prior.   If vomiting persists give Reglan PRN   Seems to have subsided at this time   If coffee ground emesis persists follow with GI.     4) Elevated Troponin  Likely secondary to ESRD   Follow with repeat troponin this evening and trend.   check Echo   Cardiac etiology unlikely in the setting of no chest pain or cardiac symptomology.     5) DM  Stable at this time   Patient has not been eating much will hold insulin for now.   Insulin dosed appropriately if Blood Glucose greater than 180.     6) DVT Prophylaxis   Not recommended at this time until patients hemoglobin is confirmed stable and no upper GI bleed present or Coffee ground Emesis stopped.     7) GI Prophylaxis   Protonix     8) patient had fever 100.9 over night : check Blood culture. check UA. chest xray negative     Full Code

## 2018-07-03 LAB
ALBUMIN SERPL ELPH-MCNC: 4.2 G/DL — SIGNIFICANT CHANGE UP (ref 3.5–5.2)
ALP SERPL-CCNC: 106 U/L — SIGNIFICANT CHANGE UP (ref 30–115)
ALT FLD-CCNC: 10 U/L — SIGNIFICANT CHANGE UP (ref 0–41)
ANION GAP SERPL CALC-SCNC: 22 MMOL/L — HIGH (ref 7–14)
AST SERPL-CCNC: 16 U/L — SIGNIFICANT CHANGE UP (ref 0–41)
BASOPHILS # BLD AUTO: 0.04 K/UL — SIGNIFICANT CHANGE UP (ref 0–0.2)
BASOPHILS NFR BLD AUTO: 0.4 % — SIGNIFICANT CHANGE UP (ref 0–1)
BILIRUB SERPL-MCNC: 0.6 MG/DL — SIGNIFICANT CHANGE UP (ref 0.2–1.2)
BUN SERPL-MCNC: 79 MG/DL — CRITICAL HIGH (ref 10–20)
CALCIUM SERPL-MCNC: 8.5 MG/DL — SIGNIFICANT CHANGE UP (ref 8.5–10.1)
CHLORIDE SERPL-SCNC: 93 MMOL/L — LOW (ref 98–110)
CK MB CFR SERPL CALC: 3.7 NG/ML — SIGNIFICANT CHANGE UP (ref 0.6–6.3)
CK SERPL-CCNC: 315 U/L — HIGH (ref 0–225)
CO2 SERPL-SCNC: 26 MMOL/L — SIGNIFICANT CHANGE UP (ref 17–32)
CREAT SERPL-MCNC: 9.2 MG/DL — CRITICAL HIGH (ref 0.7–1.5)
EOSINOPHIL # BLD AUTO: 0.06 K/UL — SIGNIFICANT CHANGE UP (ref 0–0.7)
EOSINOPHIL NFR BLD AUTO: 0.6 % — SIGNIFICANT CHANGE UP (ref 0–8)
GGT SERPL-CCNC: 24 U/L — SIGNIFICANT CHANGE UP (ref 1–40)
GLUCOSE SERPL-MCNC: 199 MG/DL — HIGH (ref 70–99)
HCT VFR BLD CALC: 38 % — SIGNIFICANT CHANGE UP (ref 37–47)
HGB BLD-MCNC: 12.8 G/DL — SIGNIFICANT CHANGE UP (ref 12–16)
IMM GRANULOCYTES NFR BLD AUTO: 0.2 % — SIGNIFICANT CHANGE UP (ref 0.1–0.3)
LYMPHOCYTES # BLD AUTO: 2.18 K/UL — SIGNIFICANT CHANGE UP (ref 1.2–3.4)
LYMPHOCYTES # BLD AUTO: 22.8 % — SIGNIFICANT CHANGE UP (ref 20.5–51.1)
MAGNESIUM SERPL-MCNC: 2.3 MG/DL — SIGNIFICANT CHANGE UP (ref 1.8–2.4)
MCHC RBC-ENTMCNC: 27.4 PG — SIGNIFICANT CHANGE UP (ref 27–31)
MCHC RBC-ENTMCNC: 33.7 G/DL — SIGNIFICANT CHANGE UP (ref 32–37)
MCV RBC AUTO: 81.2 FL — SIGNIFICANT CHANGE UP (ref 81–99)
MONOCYTES # BLD AUTO: 1.01 K/UL — HIGH (ref 0.1–0.6)
MONOCYTES NFR BLD AUTO: 10.5 % — HIGH (ref 1.7–9.3)
NEUTROPHILS # BLD AUTO: 6.27 K/UL — SIGNIFICANT CHANGE UP (ref 1.4–6.5)
NEUTROPHILS NFR BLD AUTO: 65.5 % — SIGNIFICANT CHANGE UP (ref 42.2–75.2)
NRBC # BLD: 0 /100 WBCS — SIGNIFICANT CHANGE UP (ref 0–0)
PHOSPHATE SERPL-MCNC: 5.9 MG/DL — HIGH (ref 2.1–4.9)
PLATELET # BLD AUTO: 167 K/UL — SIGNIFICANT CHANGE UP (ref 130–400)
POTASSIUM SERPL-MCNC: 4.7 MMOL/L — SIGNIFICANT CHANGE UP (ref 3.5–5)
POTASSIUM SERPL-SCNC: 4.7 MMOL/L — SIGNIFICANT CHANGE UP (ref 3.5–5)
PROT SERPL-MCNC: 7.9 G/DL — SIGNIFICANT CHANGE UP (ref 6–8)
RBC # BLD: 4.68 M/UL — SIGNIFICANT CHANGE UP (ref 4.2–5.4)
RBC # FLD: 16.4 % — HIGH (ref 11.5–14.5)
SODIUM SERPL-SCNC: 141 MMOL/L — SIGNIFICANT CHANGE UP (ref 135–146)
TROPONIN T SERPL-MCNC: 0.46 NG/ML — CRITICAL HIGH
WBC # BLD: 9.58 K/UL — SIGNIFICANT CHANGE UP (ref 4.8–10.8)
WBC # FLD AUTO: 9.58 K/UL — SIGNIFICANT CHANGE UP (ref 4.8–10.8)

## 2018-07-03 RX ORDER — CARVEDILOL PHOSPHATE 80 MG/1
25 CAPSULE, EXTENDED RELEASE ORAL EVERY 12 HOURS
Qty: 0 | Refills: 0 | Status: DISCONTINUED | OUTPATIENT
Start: 2018-07-03 | End: 2018-07-09

## 2018-07-03 RX ORDER — IPRATROPIUM/ALBUTEROL SULFATE 18-103MCG
3 AEROSOL WITH ADAPTER (GRAM) INHALATION EVERY 6 HOURS
Qty: 0 | Refills: 0 | Status: DISCONTINUED | OUTPATIENT
Start: 2018-07-03 | End: 2018-07-03

## 2018-07-03 RX ORDER — HYDRALAZINE HCL 50 MG
75 TABLET ORAL EVERY 8 HOURS
Qty: 0 | Refills: 0 | Status: DISCONTINUED | OUTPATIENT
Start: 2018-07-03 | End: 2018-07-04

## 2018-07-03 RX ORDER — DIPHENHYDRAMINE HCL 50 MG
25 CAPSULE ORAL ONCE
Qty: 0 | Refills: 0 | Status: COMPLETED | OUTPATIENT
Start: 2018-07-03 | End: 2018-07-03

## 2018-07-03 RX ORDER — HEPARIN SODIUM 5000 [USP'U]/ML
5000 INJECTION INTRAVENOUS; SUBCUTANEOUS EVERY 8 HOURS
Qty: 0 | Refills: 0 | Status: DISCONTINUED | OUTPATIENT
Start: 2018-07-03 | End: 2018-07-09

## 2018-07-03 RX ADMIN — CARVEDILOL PHOSPHATE 6.25 MILLIGRAM(S): 80 CAPSULE, EXTENDED RELEASE ORAL at 06:02

## 2018-07-03 RX ADMIN — Medication 5 MILLIGRAM(S): at 06:03

## 2018-07-03 RX ADMIN — Medication 50 MILLIGRAM(S): at 06:02

## 2018-07-03 RX ADMIN — Medication 40 MILLIGRAM(S): at 06:03

## 2018-07-03 RX ADMIN — HEPARIN SODIUM 5000 UNIT(S): 5000 INJECTION INTRAVENOUS; SUBCUTANEOUS at 21:38

## 2018-07-03 RX ADMIN — PANTOPRAZOLE SODIUM 40 MILLIGRAM(S): 20 TABLET, DELAYED RELEASE ORAL at 06:03

## 2018-07-03 RX ADMIN — Medication 5 MILLIGRAM(S): at 17:43

## 2018-07-03 RX ADMIN — Medication 25 MILLIGRAM(S): at 06:02

## 2018-07-03 RX ADMIN — Medication 5 MILLIGRAM(S): at 12:35

## 2018-07-03 RX ADMIN — Medication 75 MILLIGRAM(S): at 17:43

## 2018-07-03 RX ADMIN — CARVEDILOL PHOSPHATE 25 MILLIGRAM(S): 80 CAPSULE, EXTENDED RELEASE ORAL at 17:43

## 2018-07-03 RX ADMIN — Medication 75 MILLIGRAM(S): at 21:37

## 2018-07-03 RX ADMIN — Medication 667 MILLIGRAM(S): at 17:43

## 2018-07-03 NOTE — PROGRESS NOTE ADULT - SUBJECTIVE AND OBJECTIVE BOX
seen and examined  no distress  feels nauseous       Standing Inpatient Medications  calcium acetate 667 milliGRAM(s) Oral two times a day with meals  carvedilol 6.25 milliGRAM(s) Oral every 12 hours  darbepoetin Injectable Syringe 40 MICROGram(s) SubCutaneous every 7 days  furosemide    Tablet 40 milliGRAM(s) Oral daily  hydrALAZINE 50 milliGRAM(s) Oral three times a day  melatonin 3 milliGRAM(s) Oral at bedtime  metoclopramide 5 milliGRAM(s) Oral three times a day before meals  metolazone 2.5 milliGRAM(s) Oral daily  pantoprazole    Tablet 40 milliGRAM(s) Oral before breakfast          VITALS/PHYSICAL EXAM  --------------------------------------------------------------------------------  T(C): 35.9 (18 @ 05:29), Max: 38.3 (18 @ 22:24)  HR: 76 (18 05:29) (76 - 85)  BP: 193/82 (18 @ 05:29) (136/61 - 230/110)  RR: 19 (18 22:24) (18 - 20)  SpO2: 97% (18 22:37) (97% - 99%)  Wt(kg): --  Height (cm): 152.4 (18 22:24)  Weight (kg): 70.1 (18 @ 22:24)  BMI (kg/m2): 30.2 (18 22:24)  BSA (m2): 1.67 (18 22:24)      18 @ 07:01  -  18 @ 07:00  --------------------------------------------------------  IN: 480 mL / OUT: 0 mL / NET: 480 mL      Physical Exam:  	Gen: NAD  	Pulm:  BS  B/L  	CV:  S1S2; no rub  	Abd: +distended  	LE: BKA  	Vascular access: chest wall tesio     LABS/STUDIES  --------------------------------------------------------------------------------              16.0   8.18  >-----------<  184      [18 12:07]              47.3     145  |  93  |  58  ----------------------------<  216      [18 12:07]  4.9   |  23  |  8.7        Ca     9.7     [18 12:07]    TPro  8.7  /  Alb  4.5  /  TBili  0.7  /  DBili  x   /  AST  13  /  ALT  10  /  AlkPhos  126  [18 @ 12:07]    PT/INR: PT 11.50, INR 1.07       [18 12:07]  PTT: 33.5       [18 12:07]    Troponin 0.54      [18 12:07]        [18 12:07]    Creatinine Trend:  SCr 8.7 [:07]        Iron 19, TIBC 116, %sat 16      [18 @ 10:19]  Ferritin 1091.0      [18 10:19]

## 2018-07-03 NOTE — PROGRESS NOTE ADULT - ASSESSMENT
57/F with ESRD on HD MWF, HTN, DM, PVD Lt BKA, CHF, presents with nausea and vomiting for 3 days.      #ESRD -: hd today, standard bath uf 1 liter as tolerated, chest wall tesio   #HTN - uncontrolled: increase hydralazine to 75 q 8  # D/C diuretics if no UO   # h/h noted : d/c darbo  # check repeat troponin  # consider GI evaluation  # on binders , check ph  # will folllow

## 2018-07-03 NOTE — PROGRESS NOTE ADULT - ASSESSMENT
57 F w/ ESRD on HD M/W/F, with n/v with one instance of coffee ground emesis and hypertensive urgency    1) Hypertension Uncontrolled   - Follow with Repeat blood pressure, 200 systolic to 170 now. SBP ranged from 193-230    - Hydralzine inc to 75mg q 8  - increase Coreg if BP not controlled with Hydralzine    2) ESRD   - Follow with Renal   - Pt to go to HD this afternoon (missed her M session)  - d/c'd darbo; Hb 16    3) Vomiting secondary to gastroenteritis vs Gastroparesis    - f/u GI eval   - H/H stable  - Reglan prn    4) Elevated Troponin (0.52)  - Likely secondary to ESRD   - trend Trops  - f/u Echo     5. DM  - insulin regimen if glu>180    6) DVT Prophylaxis   - SCDs  - Heparin     7) GI Prophylaxis   - Protonix     8) Fever w/u  -  100.9 over night  - f/u Blood cx, UA. CXR     Full Code   Dispo: 57 F w/ ESRD on HD M/W/F, with n/v with one instance of coffee ground emesis and hypertensive urgency    1) Hypertension Uncontrolled   - Follow with Repeat blood pressure, 200 systolic to 170 now. SBP ranged from 193-230    - Hydralzine inc to 75mg q 8  - increased Coreg to 12.5 because BP was running high     2) ESRD   - Follow with Renal   - Pt to go to HD this afternoon (missed her M session)  - d/c'd darbo; Hb 16    3) Vomiting secondary to gastroenteritis vs Gastroparesis    - f/u GI eval   - H/H stable  - Reglan prn    4) Elevated Troponin (0.52)  - Likely secondary to ESRD   - trend Trops: Troponin T, Serum: 0.46: Critical value: ng/mL (07.03.18 @ 16:10); trending down  - f/u Echo: EF 60%, mild pulm HTN, mild TR    5. DM  - insulin regimen if glu>180    6) DVT Prophylaxis   - SCDs  - Heparin     7) GI Prophylaxis   - Protonix     8) Fever w/u  -  100.9 over night  - f/u Blood cx, UA. CXR     Full Code   Dispo:

## 2018-07-03 NOTE — PROGRESS NOTE ADULT - SUBJECTIVE AND OBJECTIVE BOX
OVERNIGHT EVENTS:   None. Pt is scheduled for HD today in the afternoon.  HISTORY OF PRESENTING ILLNESS:   56 y/o female with PMH of HTN, DM, Left BKA,  ESRD on dialysis (M/W/F) Dialysis was missed today as patient was not feeling well. She presents to the ED with 2 day history of decreased appetite with Associated Nausea and Vomiting. The vomiting was not blood tinged until this evening in the ED where she has begun to have Coffee ground Emesis x 1-2 episodes. Denies any fever, chest pain, or diarrhea. Denies any urinary complaints and otherwise states she has been doing well.    MEDICATIONS:  STANDING MEDICATIONS  calcium acetate 667 milliGRAM(s) Oral two times a day with meals  carvedilol 6.25 milliGRAM(s) Oral every 12 hours  furosemide    Tablet 40 milliGRAM(s) Oral daily  hydrALAZINE 75 milliGRAM(s) Oral every 8 hours  melatonin 3 milliGRAM(s) Oral at bedtime  metoclopramide 5 milliGRAM(s) Oral three times a day before meals  metolazone 2.5 milliGRAM(s) Oral daily  pantoprazole    Tablet 40 milliGRAM(s) Oral before breakfast    PRN MEDICATIONS    VITALS:   T(F): 96.6  HR: 76  BP: 193/82  RR: 19  SpO2: 97%    LABS:                        16.0   8.18  )-----------( 184      ( 02 Jul 2018 12:07 )             47.3     07-02    145  |  93<L>  |  58<H>  ----------------------------<  216<H>  4.9   |  23  |  8.7<HH>    Ca    9.7      02 Jul 2018 12:07    TPro  8.7<H>  /  Alb  4.5  /  TBili  0.7  /  DBili  x   /  AST  13  /  ALT  10  /  AlkPhos  126<H>  07-02    PT/INR - ( 02 Jul 2018 12:07 )   PT: 11.50 sec;   INR: 1.07 ratio         PTT - ( 02 Jul 2018 12:07 )  PTT:33.5 sec      Troponin T, Serum: 0.54 ng/mL <HH> (07-02-18 @ 12:07)  Creatine Kinase, Serum: 230 U/L <H> (07-02-18 @ 12:07)      CARDIAC MARKERS ( 02 Jul 2018 12:07 )  x     / 0.54 ng/mL / 230 U/L / x     / 3.8 ng/mL      RADIOLOGY:    PHYSICAL EXAM:  GEN: No acute distress  HEENT:   LUNGS: Clear to auscultation bilaterally   HEART: S1/S2 present. RRR.   ABD: Soft, non-tender, non-distended. Bowel sounds present  EXT:  NEURO: AAOX3 OVERNIGHT EVENTS:   None. Pt is scheduled for HD today in the afternoon.  HISTORY OF PRESENTING ILLNESS:   56 y/o female with PMH of HTN, DM, Left BKA,  ESRD on dialysis (M/W/F) Dialysis was missed today as patient was not feeling well. She presents to the ED with 2 day history of decreased appetite with Associated Nausea and Vomiting. The vomiting was not blood tinged until this evening in the ED where she has begun to have Coffee ground Emesis x 1-2 episodes. Denies any fever, chest pain, or diarrhea. Denies any urinary complaints and otherwise states she has been doing well.    MEDICATIONS:  STANDING MEDICATIONS  calcium acetate 667 milliGRAM(s) Oral two times a day with meals  carvedilol 6.25 milliGRAM(s) Oral every 12 hours  furosemide    Tablet 40 milliGRAM(s) Oral daily  hydrALAZINE 75 milliGRAM(s) Oral every 8 hours  melatonin 3 milliGRAM(s) Oral at bedtime  metoclopramide 5 milliGRAM(s) Oral three times a day before meals  metolazone 2.5 milliGRAM(s) Oral daily  pantoprazole    Tablet 40 milliGRAM(s) Oral before breakfast    PRN MEDICATIONS    VITALS:   T(F): 96.6  HR: 76  BP: 193/82  RR: 19  SpO2: 97%    LABS:                        16.0   8.18  )-----------( 184      ( 02 Jul 2018 12:07 )             47.3     07-02    145  |  93<L>  |  58<H>  ----------------------------<  216<H>  4.9   |  23  |  8.7<HH>    Ca    9.7      02 Jul 2018 12:07    TPro  8.7<H>  /  Alb  4.5  /  TBili  0.7  /  DBili  x   /  AST  13  /  ALT  10  /  AlkPhos  126<H>  07-02    PT/INR - ( 02 Jul 2018 12:07 )   PT: 11.50 sec;   INR: 1.07 ratio         PTT - ( 02 Jul 2018 12:07 )  PTT:33.5 sec      Troponin T, Serum: 0.54 ng/mL <HH> (07-02-18 @ 12:07)  Creatine Kinase, Serum: 230 U/L <H> (07-02-18 @ 12:07)      CARDIAC MARKERS ( 02 Jul 2018 12:07 )  x     / 0.54 ng/mL / 230 U/L / x     / 3.8 ng/mL      RADIOLOGY:    PHYSICAL EXAM:    	GENERAL: NAD, speaks in full sentences, no signs of respiratory distress  	HEAD:  Atraumatic, Normocephalic  	EYES: EOMI, PERRLA, conjunctiva and sclera clear  	NECK: Supple, No JVD  	Pulm: Clear to auscultation bilaterally; No wheeze; No crackles; No accessory muscles used  	CV: Regular rate and rhythm; No murmurs;   	GI: Soft, Nontender, Nondistended; Bowel sounds present; No guarding  	EXTREMITIES:  2+ Peripheral Pulses, No cyanosis or edema, L BKA  	PSYCH: AAOx2-3  	NEUROLOGY: non-focal neurological Abnormalities   SKIN: No rashes or lesions

## 2018-07-03 NOTE — CONSULT NOTE ADULT - ASSESSMENT
56 y/o female with PMH of HTN, DM, Left BKA, PVD, ESRD on dialysis (M/W/F) presented to ED with   Nausea, vomiting - resolved now  1 episode of coffee ground emesis, no brbpr, no melena  R/o Upper GI Bleed - ? likely mucosal tear from vomiting  no drop in h and h , repeat cbc today  for now liquid diet   PPI BID  Will discuss with attending 58 y/o female with PMH of HTN, DM, Left BKA, PVD, ESRD on dialysis (M/W/F) presented to ED with   Nausea, vomiting - resolved now  1 episode of coffee ground emesis, no brbpr, no melena  R/o Upper GI Bleed - ? likely mucosal tear from vomiting  no drop in h and h , repeat cbc today, monitor for now.  for now liquid diet   PPI BID  Will discuss with attending 56 y/o female with PMH of HTN, DM, Left BKA, PVD, ESRD on dialysis (M/W/F) presented to ED with   Nausea, vomiting - resolved now  1 episode of coffee ground emesis, no brbpr, no melena  R/o Upper GI Bleed - ? likely mucosal tear from vomiting  no drop in h and h , repeat cbc today, monitor for now.  for now liquid diet   PPI BID  Refusing EGD at this point  Will f/up

## 2018-07-03 NOTE — CONSULT NOTE ADULT - SUBJECTIVE AND OBJECTIVE BOX
Chief Complaint:  Patient is a 57y old  Female who presents with a chief complaint of Nausea and Vomiting. of 2 days duration with decreased Appetite. (2018 19:13)      HPI: 56 y/o female with PMH of HTN, DM, Left BKA, PVD, ESRD on dialysis (M/W/F) presented to ED with 2 day history of decreased appetite, nausea and vomiting. Vomit was not blood tinged till patient presented to ED where she had 2 episodes of coffee ground emesis. Denies any fever, chest pain, or diarrhea. ??? abdominal pain, recent travel, sick contacts. ??? alcohol use, smoking, NSAIDs use. Last bowel movement was ???, denies any blood in stool. ??? history of prior GI bleed. Last endoscopy was ???    Allergies:  No Known Allergies      Home Medications:    Hospital Medications:  calcium acetate 667 milliGRAM(s) Oral two times a day with meals  carvedilol 6.25 milliGRAM(s) Oral every 12 hours  furosemide    Tablet 40 milliGRAM(s) Oral daily  hydrALAZINE 75 milliGRAM(s) Oral every 8 hours  melatonin 3 milliGRAM(s) Oral at bedtime  metoclopramide 5 milliGRAM(s) Oral three times a day before meals  metolazone 2.5 milliGRAM(s) Oral daily  pantoprazole    Tablet 40 milliGRAM(s) Oral before breakfast      PMHX/PSHX:  Type 2 diabetes mellitus  CHF (congestive heart failure)  End stage renal failure on dialysis  Hypertension  Hyperlipemia  Heart failure  Acute heart failure, unspecified heart failure type  Heart failure  History of femoral angiogram  Port-a-cath in place      Family history:  No pertinent family history in first degree relatives      Social History:     ROS:     General:  No wt loss, fevers, chills, night sweats, fatigue,   Eyes:  Good vision, no reported pain  ENT:  No sore throat, pain, runny nose, dysphagia  CV:  No pain, palpitations, hypo/hypertension  Resp:  No dyspnea, cough, tachypnea, wheezing  GI:  No pain, No nausea, No vomiting, No diarrhea, No constipation, No weight loss, No fever, No pruritis, No rectal bleeding, No tarry stools, No dysphagia,  :  No pain, bleeding, incontinence, nocturia  Muscle:  No pain, weakness  Neuro:  No weakness, tingling, memory problems  Psych:  No fatigue, insomnia, mood problems, depression  Endocrine:  No polyuria, polydipsia, cold/heat intolerance  Heme:  No petechiae, ecchymosis, easy bruisability  Skin:  No rash, tattoos, scars, edema      PHYSICAL EXAM:   Vital Signs:  Vital Signs Last 24 Hrs  T(C): 35.9 (2018 05:29), Max: 38.3 (2018 22:24)  T(F): 96.6 (2018 05:29), Max: 100.9 (2018 22:24)  HR: 76 (2018 05:29) (76 - 85)  BP: 193/82 (2018 05:29) (136/61 - 230/110)  BP(mean): --  RR: 19 (2018 22:24) (18 - 19)  SpO2: 97% (2018 22:37) (97% - 98%)  Daily Height in cm: 152.4 (2018 22:24)    Daily Weight in k.2 (2018 05:29)    GENERAL:  Appears stated age, well-groomed, well-nourished, no distress  HEENT:  NC/AT,  conjunctivae clear and pink, no thyromegaly, nodules, adenopathy, no JVD, sclera -anicteric  CHEST:  Full & symmetric excursion, no increased effort, breath sounds clear  HEART:  Regular rhythm, S1, S2, no murmur/rub/S3/S4, no abdominal bruit, no edema  ABDOMEN:  Soft, non-tender, non-distended, normoactive bowel sounds,  no masses ,no hepato-splenomegaly, no signs of chronic liver disease  EXTEREMITIES:  no cyanosis,clubbing or edema  SKIN:  No rash/erythema/ecchymoses/petechiae/wounds/abscess/warm/dry  NEURO:  Alert, oriented, no asterixis, no tremor, no encephalopathy    LABS:                        16.0   8.18  )-----------( 184      ( 2018 12:07 )             47.3     07-02    145  |  93<L>  |  58<H>  ----------------------------<  216<H>  4.9   |  23  |  8.7<HH>    Ca    9.7      2018 12:07    TPro  8.7<H>  /  Alb  4.5  /  TBili  0.7  /  DBili  x   /  AST  13  /  ALT  10  /  AlkPhos  126<H>  07-02    LIVER FUNCTIONS - ( 2018 12:07 )  Alb: 4.5 g/dL / Pro: 8.7 g/dL / ALK PHOS: 126 U/L / ALT: 10 U/L / AST: 13 U/L / GGT: x           PT/INR - ( 2018 12:07 )   PT: 11.50 sec;   INR: 1.07 ratio         PTT - ( 2018 12:07 )  PTT:33.5 sec    Amylase Serum--      Lipase serum61       Ammonia--      Imaging: Chief Complaint:  Patient is a 57y old  Female who presents with a chief complaint of Nausea and Vomiting. of 2 days duration with decreased Appetite. (2018 19:13)      HPI: 56 y/o female with PMH of HTN, DM, Left BKA, PVD, ESRD on dialysis (M/W/F) presented to ED with 2 day history of decreased appetite, nausea and vomiting. Vomit was not blood tinged till patient presented to ED where she had 2 episodes of coffee ground emesis. Patient denies any fever, chest pain, or diarrhea. Denies abdominal pain, recent travel, or sick contacts. Patient denies current alcohol use, smoking, or NSAIDs use. Last bowel movement was today, denies any blood in stool. Denies history of prior GI bleed. Patient denies prior colonoscopy or endoscopy.      Allergies:  No Known Allergies      Home Medications:    Hospital Medications:  calcium acetate 667 milliGRAM(s) Oral two times a day with meals  carvedilol 6.25 milliGRAM(s) Oral every 12 hours  furosemide    Tablet 40 milliGRAM(s) Oral daily  hydrALAZINE 75 milliGRAM(s) Oral every 8 hours  melatonin 3 milliGRAM(s) Oral at bedtime  metoclopramide 5 milliGRAM(s) Oral three times a day before meals  metolazone 2.5 milliGRAM(s) Oral daily  pantoprazole    Tablet 40 milliGRAM(s) Oral before breakfast      PMHX/PSHX:  Type 2 diabetes mellitus  CHF (congestive heart failure)  End stage renal failure on dialysis  Hypertension  Hyperlipemia  Heart failure  Acute heart failure, unspecified heart failure type  Heart failure  History of femoral angiogram  Port-a-cath in place      Family history:  No pertinent family history in first degree relatives      Social History: ex smoker, social drinker    ROS:     General:  No wt loss, fevers, chills, night sweats, fatigue,   Eyes:  Good vision, no reported pain  ENT:  No sore throat, pain, runny nose, dysphagia  CV:  No pain, palpitations, hypo/hypertension  Resp:  No dyspnea, cough, tachypnea, wheezing  GI: see HPI    :  No pain, bleeding, incontinence, nocturia  Muscle:  No pain, weakness  Neuro:  No weakness, tingling, memory problems  Psych:  No fatigue, insomnia, mood problems, depression  Endocrine:  No polyuria, polydipsia, cold/heat intolerance  Heme:  No petechiae, ecchymosis, easy bruisability  Skin:  No rash, tattoos, scars, edema      PHYSICAL EXAM:   Vital Signs:  Vital Signs Last 24 Hrs  T(C): 35.9 (2018 05:29), Max: 38.3 (2018 22:24)  T(F): 96.6 (2018 05:29), Max: 100.9 (2018 22:24)  HR: 76 (2018 05:29) (76 - 85)  BP: 193/82 (2018 05:29) (136/61 - 230/110)  BP(mean): --  RR: 19 (2018 22:24) (18 - 19)  SpO2: 97% (2018 22:37) (97% - 98%)  Daily Height in cm: 152.4 (2018 22:24)    Daily Weight in k.2 (2018 05:29)    GENERAL:  Appears stated age, well-groomed, well-nourished, no distress  HEENT:  NC/AT,  conjunctivae clear and pink, no thyromegaly, nodules, adenopathy, no JVD, sclera -anicteric  CHEST:  Full & symmetric excursion, no increased effort, breath sounds clear  HEART:  Regular rhythm, S1, S2, no murmur/rub/S3/S4, no abdominal bruit, no edema  ABDOMEN:  Soft, non-tender, non-distended, normoactive bowel sounds,  no masses ,no hepato-splenomegaly, no signs of chronic liver disease  EXTEREMITIES:  no cyanosis,clubbing or edema  SKIN:  No rash/erythema/ecchymoses/petechiae/wounds/abscess/warm/dry  NEURO:  Alert, oriented, no asterixis, no tremor, no encephalopathy    LABS:                        16.0   8.18  )-----------( 184      ( 2018 12:07 )             47.3     07-02    145  |  93<L>  |  58<H>  ----------------------------<  216<H>  4.9   |  23  |  8.7<HH>    Ca    9.7      2018 12:07    TPro  8.7<H>  /  Alb  4.5  /  TBili  0.7  /  DBili  x   /  AST  13  /  ALT  10  /  AlkPhos  126<H>  07-02    LIVER FUNCTIONS - ( 2018 12:07 )  Alb: 4.5 g/dL / Pro: 8.7 g/dL / ALK PHOS: 126 U/L / ALT: 10 U/L / AST: 13 U/L / GGT: x           PT/INR - ( 2018 12:07 )   PT: 11.50 sec;   INR: 1.07 ratio         PTT - ( 2018 12:07 )  PTT:33.5 sec    Amylase Serum--      Lipase serum61       Ammonia--      Imaging:

## 2018-07-04 RX ORDER — HYDRALAZINE HCL 50 MG
100 TABLET ORAL EVERY 8 HOURS
Qty: 0 | Refills: 0 | Status: DISCONTINUED | OUTPATIENT
Start: 2018-07-04 | End: 2018-07-04

## 2018-07-04 RX ORDER — DIPHENHYDRAMINE HCL 50 MG
25 CAPSULE ORAL ONCE
Qty: 0 | Refills: 0 | Status: COMPLETED | OUTPATIENT
Start: 2018-07-04 | End: 2018-07-04

## 2018-07-04 RX ORDER — NIFEDIPINE 30 MG
30 TABLET, EXTENDED RELEASE 24 HR ORAL DAILY
Qty: 0 | Refills: 0 | Status: DISCONTINUED | OUTPATIENT
Start: 2018-07-04 | End: 2018-07-04

## 2018-07-04 RX ORDER — HYDRALAZINE HCL 50 MG
75 TABLET ORAL EVERY 8 HOURS
Qty: 0 | Refills: 0 | Status: DISCONTINUED | OUTPATIENT
Start: 2018-07-04 | End: 2018-07-05

## 2018-07-04 RX ORDER — LABETALOL HCL 100 MG
20 TABLET ORAL ONCE
Qty: 0 | Refills: 0 | Status: COMPLETED | OUTPATIENT
Start: 2018-07-04 | End: 2018-07-04

## 2018-07-04 RX ADMIN — Medication 5 MILLIGRAM(S): at 12:03

## 2018-07-04 RX ADMIN — Medication 667 MILLIGRAM(S): at 17:07

## 2018-07-04 RX ADMIN — Medication 3 MILLIGRAM(S): at 21:17

## 2018-07-04 RX ADMIN — CARVEDILOL PHOSPHATE 25 MILLIGRAM(S): 80 CAPSULE, EXTENDED RELEASE ORAL at 17:07

## 2018-07-04 RX ADMIN — Medication 75 MILLIGRAM(S): at 21:17

## 2018-07-04 RX ADMIN — Medication 5 MILLIGRAM(S): at 06:09

## 2018-07-04 RX ADMIN — HEPARIN SODIUM 5000 UNIT(S): 5000 INJECTION INTRAVENOUS; SUBCUTANEOUS at 21:17

## 2018-07-04 RX ADMIN — Medication 25 MILLIGRAM(S): at 17:06

## 2018-07-04 RX ADMIN — HEPARIN SODIUM 5000 UNIT(S): 5000 INJECTION INTRAVENOUS; SUBCUTANEOUS at 06:10

## 2018-07-04 RX ADMIN — Medication 75 MILLIGRAM(S): at 06:13

## 2018-07-04 RX ADMIN — HEPARIN SODIUM 5000 UNIT(S): 5000 INJECTION INTRAVENOUS; SUBCUTANEOUS at 14:49

## 2018-07-04 RX ADMIN — Medication 75 MILLIGRAM(S): at 14:48

## 2018-07-04 RX ADMIN — Medication 3 MILLIGRAM(S): at 00:45

## 2018-07-04 RX ADMIN — PANTOPRAZOLE SODIUM 40 MILLIGRAM(S): 20 TABLET, DELAYED RELEASE ORAL at 06:10

## 2018-07-04 RX ADMIN — Medication 40 MILLIGRAM(S): at 06:09

## 2018-07-04 RX ADMIN — Medication 5 MILLIGRAM(S): at 17:07

## 2018-07-04 RX ADMIN — CARVEDILOL PHOSPHATE 25 MILLIGRAM(S): 80 CAPSULE, EXTENDED RELEASE ORAL at 06:09

## 2018-07-04 NOTE — PROGRESS NOTE ADULT - ASSESSMENT
1) Hypertension  was high this am now better after am meds   follow throughout the day and if not controlled will add nifidipine     2) ESRD   RRT per renal     3) Vomiting likely secondary to gastroenteritis vs Gastroparesis    Coffee ground emesis episode times 2.    H&H stable   offered EGD by GI but declined     4) Elevated Troponin  trended down   asymptomatic   will need stress test in future but will need EGD prior     5) DM  targer -180 in hospital .     6) DVT Prophylaxis   hep sub c     7) GI Prophylaxis   Protonix

## 2018-07-04 NOTE — CHART NOTE - NSCHARTNOTEFT_GEN_A_CORE
RD has been notified by staff that pt is refusing to be on Renal Dialysis due to her ordering foods that are outside of her limit.  RD went to pt's room multiple times with patient refusing to be spoken with. Even when both CA and RD together she won't sign the refusal form.   Then finally, with the RN help, pt signed the form. Now patient will be on Regular diet. CA and diet office will be notified.

## 2018-07-04 NOTE — PROGRESS NOTE ADULT - SUBJECTIVE AND OBJECTIVE BOX
no chest pain and no sob     Vital Signs Last 24 Hrs  T(C): 37.1 (04 Jul 2018 06:01), Max: 37.1 (04 Jul 2018 06:01)  T(F): 98.8 (04 Jul 2018 06:01), Max: 98.8 (04 Jul 2018 06:01)  HR: 68 (04 Jul 2018 08:00) (68 - 78)  BP: 115/57 (04 Jul 2018 08:00) (115/57 - 219/96)  BP(mean): --  RR: 18 (04 Jul 2018 06:01) (18 - 19)  SpO2: 95% (04 Jul 2018 08:00) (95% - 98%)                          12.8   9.58  )-----------( 167      ( 03 Jul 2018 16:10 )             38.0     07-03    141  |  93<L>  |  79<HH>  ----------------------------<  199<H>  4.7   |  26  |  9.2<HH>    Ca    8.5      03 Jul 2018 16:10  Phos  5.9     07-03  Mg     2.3     07-03    TPro  7.9  /  Alb  4.2  /  TBili  0.6  /  DBili  x   /  AST  16  /  ALT  10  /  AlkPhos  106  07-03    LIVER FUNCTIONS - ( 03 Jul 2018 16:10 )  Alb: 4.2 g/dL / Pro: 7.9 g/dL / ALK PHOS: 106 U/L / ALT: 10 U/L / AST: 16 U/L / GGT: 24 U/L       PT/INR - ( 02 Jul 2018 12:07 )   PT: 11.50 sec;   INR: 1.07 ratio         PTT - ( 02 Jul 2018 12:07 )  PTT:33.5 sec  CARDIAC MARKERS ( 03 Jul 2018 16:10 )  x     / 0.46 ng/mL / 315 U/L / x     / 3.7 ng/mL  CARDIAC MARKERS ( 02 Jul 2018 12:07 )  x     / 0.54 ng/mL / 230 U/L / x     / 3.8 ng/mL

## 2018-07-05 LAB
ALBUMIN SERPL ELPH-MCNC: 4.2 G/DL — SIGNIFICANT CHANGE UP (ref 3.5–5.2)
ALP SERPL-CCNC: 104 U/L — SIGNIFICANT CHANGE UP (ref 30–115)
ALT FLD-CCNC: 8 U/L — SIGNIFICANT CHANGE UP (ref 0–41)
ANION GAP SERPL CALC-SCNC: 22 MMOL/L — HIGH (ref 7–14)
ANION GAP SERPL CALC-SCNC: 23 MMOL/L — HIGH (ref 7–14)
AST SERPL-CCNC: 12 U/L — SIGNIFICANT CHANGE UP (ref 0–41)
BILIRUB SERPL-MCNC: 0.5 MG/DL — SIGNIFICANT CHANGE UP (ref 0.2–1.2)
BUN SERPL-MCNC: 48 MG/DL — HIGH (ref 10–20)
BUN SERPL-MCNC: 51 MG/DL — HIGH (ref 10–20)
CALCIUM SERPL-MCNC: 8.6 MG/DL — SIGNIFICANT CHANGE UP (ref 8.5–10.1)
CALCIUM SERPL-MCNC: 8.7 MG/DL — SIGNIFICANT CHANGE UP (ref 8.5–10.1)
CHLORIDE SERPL-SCNC: 92 MMOL/L — LOW (ref 98–110)
CHLORIDE SERPL-SCNC: 94 MMOL/L — LOW (ref 98–110)
CO2 SERPL-SCNC: 24 MMOL/L — SIGNIFICANT CHANGE UP (ref 17–32)
CO2 SERPL-SCNC: 26 MMOL/L — SIGNIFICANT CHANGE UP (ref 17–32)
CREAT SERPL-MCNC: 7.1 MG/DL — CRITICAL HIGH (ref 0.7–1.5)
CREAT SERPL-MCNC: 7.3 MG/DL — CRITICAL HIGH (ref 0.7–1.5)
GLUCOSE SERPL-MCNC: 185 MG/DL — HIGH (ref 70–99)
GLUCOSE SERPL-MCNC: 186 MG/DL — HIGH (ref 70–99)
HCT VFR BLD CALC: 39.9 % — SIGNIFICANT CHANGE UP (ref 37–47)
HCT VFR BLD CALC: 41.3 % — SIGNIFICANT CHANGE UP (ref 37–47)
HGB BLD-MCNC: 13.3 G/DL — SIGNIFICANT CHANGE UP (ref 12–16)
HGB BLD-MCNC: 13.9 G/DL — SIGNIFICANT CHANGE UP (ref 12–16)
MAGNESIUM SERPL-MCNC: 2 MG/DL — SIGNIFICANT CHANGE UP (ref 1.8–2.4)
MCHC RBC-ENTMCNC: 27.3 PG — SIGNIFICANT CHANGE UP (ref 27–31)
MCHC RBC-ENTMCNC: 27.4 PG — SIGNIFICANT CHANGE UP (ref 27–31)
MCHC RBC-ENTMCNC: 33.3 G/DL — SIGNIFICANT CHANGE UP (ref 32–37)
MCHC RBC-ENTMCNC: 33.7 G/DL — SIGNIFICANT CHANGE UP (ref 32–37)
MCV RBC AUTO: 81.5 FL — SIGNIFICANT CHANGE UP (ref 81–99)
MCV RBC AUTO: 81.9 FL — SIGNIFICANT CHANGE UP (ref 81–99)
NRBC # BLD: 0 /100 WBCS — SIGNIFICANT CHANGE UP (ref 0–0)
NRBC # BLD: 0 /100 WBCS — SIGNIFICANT CHANGE UP (ref 0–0)
PHOSPHATE SERPL-MCNC: 5.6 MG/DL — HIGH (ref 2.1–4.9)
PLATELET # BLD AUTO: 165 K/UL — SIGNIFICANT CHANGE UP (ref 130–400)
PLATELET # BLD AUTO: 175 K/UL — SIGNIFICANT CHANGE UP (ref 130–400)
POTASSIUM SERPL-MCNC: 4.4 MMOL/L — SIGNIFICANT CHANGE UP (ref 3.5–5)
POTASSIUM SERPL-MCNC: 4.6 MMOL/L — SIGNIFICANT CHANGE UP (ref 3.5–5)
POTASSIUM SERPL-SCNC: 4.4 MMOL/L — SIGNIFICANT CHANGE UP (ref 3.5–5)
POTASSIUM SERPL-SCNC: 4.6 MMOL/L — SIGNIFICANT CHANGE UP (ref 3.5–5)
PROT SERPL-MCNC: 8.2 G/DL — HIGH (ref 6–8)
RBC # BLD: 4.87 M/UL — SIGNIFICANT CHANGE UP (ref 4.2–5.4)
RBC # BLD: 5.07 M/UL — SIGNIFICANT CHANGE UP (ref 4.2–5.4)
RBC # FLD: 16 % — HIGH (ref 11.5–14.5)
RBC # FLD: 16.1 % — HIGH (ref 11.5–14.5)
SODIUM SERPL-SCNC: 139 MMOL/L — SIGNIFICANT CHANGE UP (ref 135–146)
SODIUM SERPL-SCNC: 142 MMOL/L — SIGNIFICANT CHANGE UP (ref 135–146)
WBC # BLD: 6.01 K/UL — SIGNIFICANT CHANGE UP (ref 4.8–10.8)
WBC # BLD: 6.25 K/UL — SIGNIFICANT CHANGE UP (ref 4.8–10.8)
WBC # FLD AUTO: 6.01 K/UL — SIGNIFICANT CHANGE UP (ref 4.8–10.8)
WBC # FLD AUTO: 6.25 K/UL — SIGNIFICANT CHANGE UP (ref 4.8–10.8)

## 2018-07-05 RX ORDER — NIFEDIPINE 30 MG
90 TABLET, EXTENDED RELEASE 24 HR ORAL DAILY
Qty: 0 | Refills: 0 | Status: DISCONTINUED | OUTPATIENT
Start: 2018-07-05 | End: 2018-07-09

## 2018-07-05 RX ORDER — HYDRALAZINE HCL 50 MG
100 TABLET ORAL EVERY 8 HOURS
Qty: 0 | Refills: 0 | Status: DISCONTINUED | OUTPATIENT
Start: 2018-07-05 | End: 2018-07-09

## 2018-07-05 RX ORDER — NIFEDIPINE 30 MG
30 TABLET, EXTENDED RELEASE 24 HR ORAL DAILY
Qty: 0 | Refills: 0 | Status: DISCONTINUED | OUTPATIENT
Start: 2018-07-05 | End: 2018-07-05

## 2018-07-05 RX ADMIN — Medication 3 MILLIGRAM(S): at 21:58

## 2018-07-05 RX ADMIN — Medication 100 MILLIGRAM(S): at 12:42

## 2018-07-05 RX ADMIN — HEPARIN SODIUM 5000 UNIT(S): 5000 INJECTION INTRAVENOUS; SUBCUTANEOUS at 05:50

## 2018-07-05 RX ADMIN — Medication 40 MILLIGRAM(S): at 05:50

## 2018-07-05 RX ADMIN — Medication 5 MILLIGRAM(S): at 16:40

## 2018-07-05 RX ADMIN — HEPARIN SODIUM 5000 UNIT(S): 5000 INJECTION INTRAVENOUS; SUBCUTANEOUS at 12:43

## 2018-07-05 RX ADMIN — Medication 5 MILLIGRAM(S): at 12:42

## 2018-07-05 RX ADMIN — HEPARIN SODIUM 5000 UNIT(S): 5000 INJECTION INTRAVENOUS; SUBCUTANEOUS at 22:00

## 2018-07-05 RX ADMIN — Medication 667 MILLIGRAM(S): at 16:40

## 2018-07-05 RX ADMIN — Medication 100 MILLIGRAM(S): at 21:58

## 2018-07-05 RX ADMIN — CARVEDILOL PHOSPHATE 25 MILLIGRAM(S): 80 CAPSULE, EXTENDED RELEASE ORAL at 05:50

## 2018-07-05 RX ADMIN — CARVEDILOL PHOSPHATE 25 MILLIGRAM(S): 80 CAPSULE, EXTENDED RELEASE ORAL at 17:22

## 2018-07-05 RX ADMIN — Medication 75 MILLIGRAM(S): at 05:50

## 2018-07-05 NOTE — PROGRESS NOTE ADULT - ASSESSMENT
57/F with ESRD on HD MWF, HTN, DM, PVD Lt BKA, CHF, presents with nausea and vomiting for 3 days.      #ESRD -: hd today, standard bath uf 1 liter as tolerated, chest wall tesio   #HTN - uncontrolled: on  hydralazine to 75 q 8, increase nifedipne to 90 , d/c lasix and metolazone  if no uo   # h/h noted : d/c darbo  # on binders ,  ph noted , change phoslo to three times a day with meals  # if febrile again will start ATB ( patient has a tesio ) check panculture  # N, V improving   # will follow

## 2018-07-05 NOTE — PROGRESS NOTE ADULT - SUBJECTIVE AND OBJECTIVE BOX
OVERNIGHT EVENTS:     HISTORY OF PRESENTING ILLNESS:   58 y/o female with PMH of HTN, DM, Left BKA,  ESRD on dialysis (M/W/F) Dialysis was missed today as patient was not feeling well. She presents to the ED with 2 day history of decreased appetite with Associated Nausea and Vomiting. The vomiting was not blood tinged until this evening in the ED where she has begun to have Coffee ground Emesis x 1-2 episodes. Denies any fever, chest pain, or diarrhea. Denies any urinary complaints and otherwise states she has been doing well.    MEDICATIONS:  STANDING MEDICATIONS  calcium acetate 667 milliGRAM(s) Oral two times a day with meals  carvedilol 25 milliGRAM(s) Oral every 12 hours  furosemide    Tablet 40 milliGRAM(s) Oral daily  heparin  Injectable 5000 Unit(s) SubCutaneous every 8 hours  hydrALAZINE 75 milliGRAM(s) Oral every 8 hours  melatonin 3 milliGRAM(s) Oral at bedtime  metoclopramide 5 milliGRAM(s) Oral three times a day before meals  metolazone 2.5 milliGRAM(s) Oral daily  pantoprazole    Tablet 40 milliGRAM(s) Oral before breakfast    PRN MEDICATIONS    VITALS:   T(F): 97  HR: 73  BP: 231/99  RR: 18  SpO2: 95%    LABS:                        12.8   9.58  )-----------( 167      ( 03 Jul 2018 16:10 )             38.0     07-03    141  |  93<L>  |  79<HH>  ----------------------------<  199<H>  4.7   |  26  |  9.2<HH>    Ca    8.5      03 Jul 2018 16:10  Phos  5.9     07-03  Mg     2.3     07-03    TPro  7.9  /  Alb  4.2  /  TBili  0.6  /  DBili  x   /  AST  16  /  ALT  10  /  AlkPhos  106  07-03              CARDIAC MARKERS ( 03 Jul 2018 16:10 )  x     / 0.46 ng/mL / 315 U/L / x     / 3.7 ng/mL      RADIOLOGY:    PHYSICAL EXAM:  GEN: No acute distress  HEENT:   LUNGS: Clear to auscultation bilaterally   HEART: S1/S2 present. RRR.   ABD: Soft, non-tender, non-distended. Bowel sounds present  EXT:  NEURO: AAOX3 OVERNIGHT EVENTS:     HISTORY OF PRESENTING ILLNESS:   56 y/o female with PMH of HTN, DM, Left BKA,  ESRD on dialysis (M/W/F) Dialysis was missed today as patient was not feeling well. She presents to the ED with 2 day history of decreased appetite with Associated Nausea and Vomiting. The vomiting was not blood tinged until this evening in the ED where she has begun to have Coffee ground Emesis x 1-2 episodes. Denies any fever, chest pain, or diarrhea. Denies any urinary complaints and otherwise states she has been doing well.    MEDICATIONS:  STANDING MEDICATIONS  calcium acetate 667 milliGRAM(s) Oral two times a day with meals  carvedilol 25 milliGRAM(s) Oral every 12 hours  furosemide    Tablet 40 milliGRAM(s) Oral daily  heparin  Injectable 5000 Unit(s) SubCutaneous every 8 hours  hydrALAZINE 75 milliGRAM(s) Oral every 8 hours  melatonin 3 milliGRAM(s) Oral at bedtime  metoclopramide 5 milliGRAM(s) Oral three times a day before meals  metolazone 2.5 milliGRAM(s) Oral daily  pantoprazole    Tablet 40 milliGRAM(s) Oral before breakfast    PRN MEDICATIONS    VITALS:   T(F): 97  HR: 73  BP: 231/99  RR: 18  SpO2: 95%    LABS:                        12.8   9.58  )-----------( 167      ( 03 Jul 2018 16:10 )             38.0     07-03    141  |  93<L>  |  79<HH>  ----------------------------<  199<H>  4.7   |  26  |  9.2<HH>    Ca    8.5      03 Jul 2018 16:10  Phos  5.9     07-03  Mg     2.3     07-03    TPro  7.9  /  Alb  4.2  /  TBili  0.6  /  DBili  x   /  AST  16  /  ALT  10  /  AlkPhos  106  07-03              CARDIAC MARKERS ( 03 Jul 2018 16:10 )  x     / 0.46 ng/mL / 315 U/L / x     / 3.7 ng/mL      RADIOLOGY:    PHYSICAL EXAM:    	GENERAL: NAD, speaks in full sentences, no signs of respiratory distress  	HEAD:  Atraumatic, Normocephalic  	EYES: EOMI, PERRLA, conjunctiva and sclera clear  	NECK: Supple, No JVD  	Pulm: Clear to auscultation bilaterally; No wheeze; No crackles; No accessory muscles used  	CV: Regular rate and rhythm; No murmurs;   	GI: Soft, Nontender, Nondistended; Bowel sounds present; No guarding  	EXTREMITIES:  2+ Peripheral Pulses, No cyanosis or edema, L BKA  	PSYCH: AAOx2-3  	NEUROLOGY: non-focal neurological Abnormalities   SKIN: No rashes or lesions OVERNIGHT EVENTS:   none. Pt states she was vomitting this am, denies brown/bloody, states vomitus was clear. /106 LUE, 217/93 RUE, adjusted BP meds once more. SBP was elevated, pt received her BP meds prior to BP readings. HD in am.  HISTORY OF PRESENTING ILLNESS:   58 y/o female with PMH of HTN, DM, Left BKA,  ESRD on dialysis (M/W/F) Dialysis was missed today as patient was not feeling well. She presents to the ED with 2 day history of decreased appetite with Associated Nausea and Vomiting. The vomiting was not blood tinged until this evening in the ED where she has begun to have Coffee ground Emesis x 1-2 episodes. Denies any fever, chest pain, or diarrhea. Denies any urinary complaints and otherwise states she has been doing well.    MEDICATIONS:  STANDING MEDICATIONS  calcium acetate 667 milliGRAM(s) Oral two times a day with meals  carvedilol 25 milliGRAM(s) Oral every 12 hours  furosemide    Tablet 40 milliGRAM(s) Oral daily  heparin  Injectable 5000 Unit(s) SubCutaneous every 8 hours  hydrALAZINE 75 milliGRAM(s) Oral every 8 hours  melatonin 3 milliGRAM(s) Oral at bedtime  metoclopramide 5 milliGRAM(s) Oral three times a day before meals  metolazone 2.5 milliGRAM(s) Oral daily  pantoprazole    Tablet 40 milliGRAM(s) Oral before breakfast    PRN MEDICATIONS    VITALS:   T(F): 97  HR: 73  BP: 231/99  RR: 18  SpO2: 95%    LABS:                        12.8   9.58  )-----------( 167      ( 03 Jul 2018 16:10 )             38.0     07-03    141  |  93<L>  |  79<HH>  ----------------------------<  199<H>  4.7   |  26  |  9.2<HH>    Ca    8.5      03 Jul 2018 16:10  Phos  5.9     07-03  Mg     2.3     07-03    TPro  7.9  /  Alb  4.2  /  TBili  0.6  /  DBili  x   /  AST  16  /  ALT  10  /  AlkPhos  106  07-03              CARDIAC MARKERS ( 03 Jul 2018 16:10 )  x     / 0.46 ng/mL / 315 U/L / x     / 3.7 ng/mL      RADIOLOGY:    PHYSICAL EXAM:    	GENERAL: NAD, speaks in full sentences, no signs of respiratory distress  	HEAD:  Atraumatic, Normocephalic  	EYES: EOMI, PERRLA, conjunctiva and sclera clear  	NECK: Supple, No JVD  	Pulm: Clear to auscultation bilaterally; No wheeze; No crackles; No accessory muscles used  	CV: Regular rate and rhythm; No murmurs;   	GI: Soft, Nontender, Nondistended; Bowel sounds present; No guarding  	EXTREMITIES:  2+ Peripheral Pulses, No cyanosis or edema, L BKA  	PSYCH: AAOx2-3  	NEUROLOGY: non-focal neurological Abnormalities   SKIN: No rashes or lesions OVERNIGHT EVENTS:   none. Pt states she was vomitting this am, denies brown/bloody, states vomitus was clear. /106 LUE, 217/93 RUE, adjusted BP meds once more. SBP was elevated, pt received her BP meds prior to BP readings. HD in am.  HISTORY OF PRESENTING ILLNESS:   56 y/o female with PMH of HTN, DM, Left BKA,  ESRD on dialysis (M/W/F) Dialysis was missed today as patient was not feeling well. She presents to the ED with 2 day history of decreased appetite with Associated Nausea and Vomiting. The vomiting was not blood tinged until this evening in the ED where she has begun to have Coffee ground Emesis x 1-2 episodes. Denies any fever, chest pain, or diarrhea. Denies any urinary complaints and otherwise states she has been doing well.    MEDICATIONS:  STANDING MEDICATIONS  calcium acetate 667 milliGRAM(s) Oral two times a day with meals  carvedilol 25 milliGRAM(s) Oral every 12 hours  furosemide    Tablet 40 milliGRAM(s) Oral daily  heparin  Injectable 5000 Unit(s) SubCutaneous every 8 hours  hydrALAZINE 75 milliGRAM(s) Oral every 8 hours  melatonin 3 milliGRAM(s) Oral at bedtime  metoclopramide 5 milliGRAM(s) Oral three times a day before meals  metolazone 2.5 milliGRAM(s) Oral daily  pantoprazole    Tablet 40 milliGRAM(s) Oral before breakfast    PRN MEDICATIONS    VITALS:   T(F): 97  HR: 73  BP: 231/99  RR: 18  SpO2: 95%    LABS:                        12.8   9.58  )-----------( 167      ( 03 Jul 2018 16:10 )             38.0     07-03    141  |  93<L>  |  79<HH>  ----------------------------<  199<H>  4.7   |  26  |  9.2<HH>    Ca    8.5      03 Jul 2018 16:10  Phos  5.9     07-03  Mg     2.3     07-03    TPro  7.9  /  Alb  4.2  /  TBili  0.6  /  DBili  x   /  AST  16  /  ALT  10  /  AlkPhos  106  07-03              CARDIAC MARKERS ( 03 Jul 2018 16:10 )  x     / 0.46 ng/mL / 315 U/L / x     / 3.7 ng/mL      RADIOLOGY:    PHYSICAL EXAM:    	GENERAL: NAD, speaks in full sentences, no signs of respiratory distress  	HEAD:  Atraumatic, Normocephalic  	EYES: EOMI, PERRLA, conjunctiva and sclera clear  	NECK: Supple, No JVDm + tesio cath for HD, no erythema around cath  	Pulm: Clear to auscultation bilaterally; No wheeze; No crackles; No accessory muscles used  	CV: Regular rate and rhythm; No murmurs;   	GI: Soft, Nontender, Nondistended; Bowel sounds present; No guarding  	EXTREMITIES:  2+ Peripheral Pulses, No cyanosis or edema, L BKA  	PSYCH: AAOx2-3  	NEUROLOGY: non-focal neurological Abnormalities   SKIN: No rashes or lesions

## 2018-07-05 NOTE — PROGRESS NOTE ADULT - SUBJECTIVE AND OBJECTIVE BOX
no chest pain and no sob     Vital Signs Last 24 Hrs  T(C): 36.1 (05 Jul 2018 05:50), Max: 37.9 (04 Jul 2018 20:08)  T(F): 97 (05 Jul 2018 05:50), Max: 100.3 (04 Jul 2018 20:08)  HR: 74 (05 Jul 2018 08:20) (73 - 79)  BP: 223/103 (05 Jul 2018 08:20) (145/67 - 231/99)  BP(mean): --  RR: 18 (05 Jul 2018 05:50) (18 - 19)  SpO2: 95% (05 Jul 2018 07:45) (95% - 95%)    PHYSICAL EXAM:  GENERAL: NAD, well-developed  HEAD:  Atraumatic, Normocephalic  EYES: EOMI, PERRLA, conjunctiva and sclera clear  NECK: Supple, No JVD  Pulm: Clear to auscultation bilaterally; No wheeze  CV: Regular rate and rhythm; No murmurs, rubs, or gallops  GI: Soft, Nontender, Nondistended; Bowel sounds present  EXTREMITIES:  left BKA   PSYCH: AAOx3  NEUROLOGY: non-focal  SKIN: No rashes or lesions                          13.9   6.01  )-----------( 175      ( 05 Jul 2018 05:41 )             41.3     07-05    139  |  92<L>  |  48<H>  ----------------------------<  185<H>  4.6   |  24  |  7.1<HH>    Ca    8.7      05 Jul 2018 05:41  Phos  5.9     07-03  Mg     2.3     07-03    TPro  7.9  /  Alb  4.2  /  TBili  0.6  /  DBili  x   /  AST  16  /  ALT  10  /  AlkPhos  106  07-03    LIVER FUNCTIONS - ( 03 Jul 2018 16:10 )  Alb: 4.2 g/dL / Pro: 7.9 g/dL / ALK PHOS: 106 U/L / ALT: 10 U/L / AST: 16 U/L / GGT: 24 U/L         CARDIAC MARKERS ( 03 Jul 2018 16:10 )  x     / 0.46 ng/mL / 315 U/L / x     / 3.7 ng/mL

## 2018-07-05 NOTE — PROGRESS NOTE ADULT - ASSESSMENT
1) Hypertension  was high this am now better after am meds   follow throughout the day and if not controlled will add nifidipine     2) ESRD   RRT per renal     3) Vomiting resolved   Coffee ground emesis episode times 2.  resolved   H&H stable   offered EGD by GI but patient declined     4) Elevated Troponin  trended down   asymptomatic   will need stress test in future but will need EGD prior. patient declined EGD and stress test    5) Fever 100.3 monitor fever curve. check blood culture patient has tesio.     6) DM  targer -180 in hospital .     7) DVT Prophylaxis   hep sub c

## 2018-07-05 NOTE — PROGRESS NOTE ADULT - SUBJECTIVE AND OBJECTIVE BOX
seen and examined  no new complaints     Standing Inpatient Medications  calcium acetate 667 milliGRAM(s) Oral two times a day with meals  carvedilol 25 milliGRAM(s) Oral every 12 hours  furosemide    Tablet 40 milliGRAM(s) Oral daily  heparin  Injectable 5000 Unit(s) SubCutaneous every 8 hours  hydrALAZINE 100 milliGRAM(s) Oral every 8 hours  melatonin 3 milliGRAM(s) Oral at bedtime  metoclopramide 5 milliGRAM(s) Oral three times a day before meals  metolazone 2.5 milliGRAM(s) Oral daily  NIFEdipine XL 30 milliGRAM(s) Oral daily  pantoprazole    Tablet 40 milliGRAM(s) Oral before breakfast            VITALS/PHYSICAL EXAM  --------------------------------------------------------------------------------  T(C): 36.1 (07-05-18 @ 05:50), Max: 37.9 (07-04-18 @ 20:08)  HR: 74 (07-05-18 @ 08:20) (73 - 79)  BP: 223/103 (07-05-18 @ 08:20) (145/67 - 231/99)  RR: 18 (07-05-18 @ 05:50) (18 - 19)  SpO2: 95% (07-05-18 @ 07:45) (95% - 95%)  Wt(kg): --        07-04-18 @ 07:01  -  07-05-18 @ 07:00  --------------------------------------------------------  IN: 60 mL / OUT: 0 mL / NET: 60 mL      Physical Exam:  	Gen: NAD  	Pulm: CTA B/L  	CV: S1S2; no rub  	Abd: +distended  	LE: bka  	Vascular access:    LABS/STUDIES  --------------------------------------------------------------------------------              13.9   6.01  >-----------<  175      [07-05-18 @ 05:41]              41.3     139  |  92  |  48  ----------------------------<  185      [07-05-18 @ 05:41]  4.6   |  24  |  7.1        Ca     8.7     [07-05-18 @ 05:41]      Mg     2.3     [07-03-18 @ 16:10]      Phos  5.9     [07-03-18 @ 16:10]    TPro  7.9  /  Alb  4.2  /  TBili  0.6  /  DBili  x   /  AST  16  /  ALT  10  /  AlkPhos  106  [07-03-18 @ 16:10]        Troponin 0.46      [07-03-18 @ 16:10]        [07-03-18 @ 16:10]    Creatinine Trend:  SCr 7.1 [07-05 @ 05:41]  SCr 9.2 [07-03 @ 16:10]  SCr 8.7 [07-02 @ 12:07]        Iron 19, TIBC 116, %sat 16      [03-01-18 @ 10:19]  Ferritin 1091.0      [03-01-18 @ 10:19]

## 2018-07-05 NOTE — PROGRESS NOTE ADULT - ASSESSMENT
57 F w/ ESRD on HD M/W/F, with n/v with one instance of coffee ground emesis and hypertensive urgency    1) Hypertension Uncontrolled   - Follow with Repeat blood pressure, 200 systolic to 170 now. SBP ranged from 193-230    - Hydralzine inc to 75mg q 8  - increased Coreg to 12.5 because BP was running high   - Nifidipine if SBP still high    2) ESRD   - Follow with Renal   - HD M/W/F  - d/c'd darbo; Hb 16    3) Vomiting secondary to gastroenteritis vs Gastroparesis    - f/u GI eval   - H/H stable  - Reglan prn    4) Elevated Troponin (0.52)  - Likely secondary to ESRD   - trend Trops: Troponin T, Serum: 0.46: Critical value: ng/mL (07.03.18 @ 16:10); trending down  - f/u Echo: EF 60%, mild pulm HTN, mild TR    5. DM  - insulin regimen if glu>180    6) DVT Prophylaxis   - SCDs  - Heparin     7) GI Prophylaxis   - Protonix     8) Fever w/u  -  100.9 Tmax  - f/u Blood cx, UA. CXR - pt refused blood draw for blood cx, risks and benefits were explained to the pt    Full Code   Dispo: 57 F w/ ESRD on HD M/W/F, with n/v with one instance of coffee ground emesis and hypertensive urgency    1) Hypertension Uncontrolled   - SBP >200  - Hydralzine inc to 100 mg q 8  - increased Coreg to 12.5 because BP was running high   - Nifidipine 30 mg q d    2) ESRD   - Follow with Renal   - HD M/W/F  - d/c'd darbo; Hb 16    3) Vomiting secondary to gastroenteritis vs Gastroparesis    - NTD per GI unless pt has rpt episodes of hematemesis  - H/H stable  - Reglan prn    4) Elevated Troponin (0.52)  - Likely secondary to ESRD   - trend Trops: Troponin T, Serum: 0.46: Critical value: ng/mL (07.03.18 @ 16:10); trending down  - f/u Echo: EF 60%, mild pulm HTN, mild TR    5. DM  - insulin regimen if glu>180    6) DVT Prophylaxis   - SCDs  - Heparin     7) GI Prophylaxis   - Protonix     8) Fever w/u  -  100.9 Tmax  - f/u Blood cx, UA. CXR - pt refused blood draw for blood cx, risks and benefits were explained to the pt    Full Code   Dispo: 57 F w/ ESRD on HD M/W/F, with n/v with one instance of coffee ground emesis and hypertensive urgency    1) Hypertension Uncontrolled   - SBP >200  - Hydralzine inc to 100 mg q 8  - increased Coreg to 12.5 because BP was running high   - Nifidipine 30 mg q d    2) ESRD   - Follow with Renal   - HD M/W/F  - d/c'd darbo; Hb 16    3) Vomiting secondary to gastroenteritis vs Gastroparesis    - NTD per GI unless pt has rpt episodes of hematemesis  - H/H stable  - Reglan prn    4) Elevated Troponin (0.52)  - Likely secondary to ESRD   - trend Trops: Troponin T, Serum: 0.46: Critical value: ng/mL (07.03.18 @ 16:10); trending down  - f/u Echo: EF 60%, mild pulm HTN, mild TR    5. DM  - insulin regimen if glu>180    6) DVT Prophylaxis   - SCDs  - Heparin     7) GI Prophylaxis   - Protonix     8) Fever w/u  -  100.9 Tmax  - f/u Blood cx, UA. CXR    Full Code   Dispo:

## 2018-07-06 LAB
ANION GAP SERPL CALC-SCNC: 22 MMOL/L — HIGH (ref 7–14)
BUN SERPL-MCNC: 32 MG/DL — HIGH (ref 10–20)
CALCIUM SERPL-MCNC: 8.8 MG/DL — SIGNIFICANT CHANGE UP (ref 8.5–10.1)
CHLORIDE SERPL-SCNC: 93 MMOL/L — LOW (ref 98–110)
CO2 SERPL-SCNC: 24 MMOL/L — SIGNIFICANT CHANGE UP (ref 17–32)
CREAT SERPL-MCNC: 4.9 MG/DL — CRITICAL HIGH (ref 0.7–1.5)
GLUCOSE SERPL-MCNC: 152 MG/DL — HIGH (ref 70–99)
HCT VFR BLD CALC: 38.3 % — SIGNIFICANT CHANGE UP (ref 37–47)
HGB BLD-MCNC: 12.9 G/DL — SIGNIFICANT CHANGE UP (ref 12–16)
MAGNESIUM SERPL-MCNC: 2 MG/DL — SIGNIFICANT CHANGE UP (ref 1.8–2.4)
MCHC RBC-ENTMCNC: 27.8 PG — SIGNIFICANT CHANGE UP (ref 27–31)
MCHC RBC-ENTMCNC: 33.7 G/DL — SIGNIFICANT CHANGE UP (ref 32–37)
MCV RBC AUTO: 82.5 FL — SIGNIFICANT CHANGE UP (ref 81–99)
NRBC # BLD: 0 /100 WBCS — SIGNIFICANT CHANGE UP (ref 0–0)
PHOSPHATE SERPL-MCNC: 4.7 MG/DL — SIGNIFICANT CHANGE UP (ref 2.1–4.9)
PLATELET # BLD AUTO: 164 K/UL — SIGNIFICANT CHANGE UP (ref 130–400)
POTASSIUM SERPL-MCNC: 3.9 MMOL/L — SIGNIFICANT CHANGE UP (ref 3.5–5)
POTASSIUM SERPL-SCNC: 3.9 MMOL/L — SIGNIFICANT CHANGE UP (ref 3.5–5)
RBC # BLD: 4.64 M/UL — SIGNIFICANT CHANGE UP (ref 4.2–5.4)
RBC # FLD: 16.1 % — HIGH (ref 11.5–14.5)
SODIUM SERPL-SCNC: 139 MMOL/L — SIGNIFICANT CHANGE UP (ref 135–146)
WBC # BLD: 6.69 K/UL — SIGNIFICANT CHANGE UP (ref 4.8–10.8)
WBC # FLD AUTO: 6.69 K/UL — SIGNIFICANT CHANGE UP (ref 4.8–10.8)

## 2018-07-06 RX ORDER — PANTOPRAZOLE SODIUM 20 MG/1
40 TABLET, DELAYED RELEASE ORAL
Qty: 0 | Refills: 0 | Status: DISCONTINUED | OUTPATIENT
Start: 2018-07-06 | End: 2018-07-09

## 2018-07-06 RX ORDER — HYDRALAZINE HCL 50 MG
10 TABLET ORAL ONCE
Qty: 0 | Refills: 0 | Status: COMPLETED | OUTPATIENT
Start: 2018-07-06 | End: 2018-07-06

## 2018-07-06 RX ORDER — LABETALOL HCL 100 MG
100 TABLET ORAL ONCE
Qty: 0 | Refills: 0 | Status: COMPLETED | OUTPATIENT
Start: 2018-07-06 | End: 2018-07-06

## 2018-07-06 RX ORDER — HYDRALAZINE HCL 50 MG
10 TABLET ORAL ONCE
Qty: 0 | Refills: 0 | Status: DISCONTINUED | OUTPATIENT
Start: 2018-07-06 | End: 2018-07-06

## 2018-07-06 RX ORDER — HYDRALAZINE HCL 50 MG
10 TABLET ORAL ONCE
Qty: 0 | Refills: 0 | Status: COMPLETED | OUTPATIENT
Start: 2018-07-06 | End: 2018-07-07

## 2018-07-06 RX ORDER — LABETALOL HCL 100 MG
10 TABLET ORAL ONCE
Qty: 0 | Refills: 0 | Status: DISCONTINUED | OUTPATIENT
Start: 2018-07-06 | End: 2018-07-06

## 2018-07-06 RX ORDER — ONDANSETRON 8 MG/1
4 TABLET, FILM COATED ORAL
Qty: 0 | Refills: 0 | Status: DISCONTINUED | OUTPATIENT
Start: 2018-07-06 | End: 2018-07-09

## 2018-07-06 RX ADMIN — HEPARIN SODIUM 5000 UNIT(S): 5000 INJECTION INTRAVENOUS; SUBCUTANEOUS at 06:06

## 2018-07-06 RX ADMIN — CARVEDILOL PHOSPHATE 25 MILLIGRAM(S): 80 CAPSULE, EXTENDED RELEASE ORAL at 06:05

## 2018-07-06 RX ADMIN — HEPARIN SODIUM 5000 UNIT(S): 5000 INJECTION INTRAVENOUS; SUBCUTANEOUS at 15:10

## 2018-07-06 RX ADMIN — Medication 40 MILLIGRAM(S): at 06:05

## 2018-07-06 RX ADMIN — Medication 90 MILLIGRAM(S): at 06:05

## 2018-07-06 RX ADMIN — Medication 100 MILLIGRAM(S): at 12:35

## 2018-07-06 RX ADMIN — HEPARIN SODIUM 5000 UNIT(S): 5000 INJECTION INTRAVENOUS; SUBCUTANEOUS at 21:47

## 2018-07-06 RX ADMIN — Medication 667 MILLIGRAM(S): at 09:29

## 2018-07-06 RX ADMIN — Medication 5 MILLIGRAM(S): at 11:51

## 2018-07-06 RX ADMIN — ONDANSETRON 4 MILLIGRAM(S): 8 TABLET, FILM COATED ORAL at 18:43

## 2018-07-06 RX ADMIN — Medication 100 MILLIGRAM(S): at 06:05

## 2018-07-06 RX ADMIN — Medication 5 MILLIGRAM(S): at 09:29

## 2018-07-06 RX ADMIN — PANTOPRAZOLE SODIUM 40 MILLIGRAM(S): 20 TABLET, DELAYED RELEASE ORAL at 06:05

## 2018-07-06 NOTE — PROGRESS NOTE ADULT - ASSESSMENT
57/F with ESRD on HD MWF, HTN, DM, PVD Lt BKA, CHF, presents with nausea and vomiting for 3 days.      #ESRD : s/p hd yesterday, hd in am    #HTN -uncontrolled:  hydralazine increased to  100 q 8, nifedipne increased  to 90 ,  if remains elevated will add clonidine   #d/c lasix and metolazone  if no uo   # h/h noted : no darbo, iron stores noted no venofer   # on binders ,  ph noted , change phoslo to three times a day with meals  # will follow

## 2018-07-06 NOTE — PROGRESS NOTE ADULT - ASSESSMENT
1) Hypertension  was high this am now better after am meds   follow throughout the day and if not controlled will add nifidipine     2) ESRD   RRT per renal     3) Vomiting resolved   Coffee ground emesis episode times 2.  resolved   H&H stable   offered EGD by GI but patient declined     4) Elevated Troponin  trended down   asymptomatic   will need stress test in future but will need EGD prior. patient declined EGD and stress test    5) Fever 100.3 monitor fever curve. check blood culture patient has tesio.     6) DM  targer -180 in hospital .     7) DVT Prophylaxis   hep sub c       Follow up blood culture and c/w reglan. Possible gastroparesis. Advance diet slowly

## 2018-07-06 NOTE — PROGRESS NOTE ADULT - SUBJECTIVE AND OBJECTIVE BOX
no chest pain and no sob     Vital Signs Last 24 Hrs  Vital Signs Last 24 Hrs  T(C): 37 (06 Jul 2018 07:06), Max: 37.6 (05 Jul 2018 17:53)  T(F): 98.6 (06 Jul 2018 07:06), Max: 99.6 (05 Jul 2018 17:53)  HR: 78 (06 Jul 2018 07:06) (74 - 78)  BP: 170/81 (06 Jul 2018 07:06) (168/82 - 185/79)  RR: 18 (06 Jul 2018 07:06) (18 - 18)      PHYSICAL EXAM:  GENERAL: NAD, well-developed  HEAD:  Atraumatic, Normocephalic  EYES: EOMI, PERRLA, conjunctiva and sclera clear  NECK: Supple, No JVD  Pulm: Clear to auscultation bilaterally; No wheeze  CV: Regular rate and rhythm; No murmurs, rubs, or gallops  GI: Soft, Nontender, Nondistended; Bowel sounds present  EXTREMITIES:  left BKA   PSYCH: AAOx3  NEUROLOGY: non-focal  SKIN: No rashes or lesions                          13.9   6.01  )-----------( 175      ( 05 Jul 2018 05:41 )             41.3     07-05    139  |  92<L>  |  48<H>  ----------------------------<  185<H>  4.6   |  24  |  7.1<HH>    Ca    8.7      05 Jul 2018 05:41  Phos  5.9     07-03  Mg     2.3     07-03    TPro  7.9  /  Alb  4.2  /  TBili  0.6  /  DBili  x   /  AST  16  /  ALT  10  /  AlkPhos  106  07-03    LIVER FUNCTIONS - ( 03 Jul 2018 16:10 )  Alb: 4.2 g/dL / Pro: 7.9 g/dL / ALK PHOS: 106 U/L / ALT: 10 U/L / AST: 16 U/L / GGT: 24 U/L         CARDIAC MARKERS ( 03 Jul 2018 16:10 )  x     / 0.46 ng/mL / 315 U/L / x     / 3.7 ng/mL

## 2018-07-06 NOTE — PROGRESS NOTE ADULT - SUBJECTIVE AND OBJECTIVE BOX
seen and examined  no new complaints  s/p hd yesterday       Standing Inpatient Medications  calcium acetate 667 milliGRAM(s) Oral two times a day with meals  carvedilol 25 milliGRAM(s) Oral every 12 hours  furosemide    Tablet 40 milliGRAM(s) Oral daily  heparin  Injectable 5000 Unit(s) SubCutaneous every 8 hours  hydrALAZINE 100 milliGRAM(s) Oral every 8 hours  melatonin 3 milliGRAM(s) Oral at bedtime  metoclopramide 5 milliGRAM(s) Oral three times a day before meals  metolazone 2.5 milliGRAM(s) Oral daily  NIFEdipine XL 90 milliGRAM(s) Oral daily  pantoprazole    Tablet 40 milliGRAM(s) Oral before breakfast      VITALS/PHYSICAL EXAM  --------------------------------------------------------------------------------  T(C): 37 (07-06-18 @ 07:06), Max: 37.6 (07-05-18 @ 17:53)  HR: 78 (07-06-18 @ 07:06) (70 - 78)  BP: 170/81 (07-06-18 @ 07:06) (114/59 - 185/79)  RR: 18 (07-06-18 @ 07:06) (18 - 18)  SpO2: --  Wt(kg): --    Weight (kg): 68.7 (07-06-18 @ 07:06)      07-05-18 @ 07:01  -  07-06-18 @ 07:00  --------------------------------------------------------  IN: 480 mL / OUT: 1300 mL / NET: -820 mL      Physical Exam:  	Gen: NAD  	Pulm:  decrease BS B/L  	CV: S1S2; no rub  	Abd:  soft, nontender/nondistended  	LE: bka  	Vascular access: chest wall tesio     LABS/STUDIES  --------------------------------------------------------------------------------              13.3   6.25  >-----------<  165      [07-05-18 @ 08:06]              39.9     142  |  94  |  51  ----------------------------<  186      [07-05-18 @ 08:06]  4.4   |  26  |  7.3        Ca     8.6     [07-05-18 @ 08:06]      Mg     2.0     [07-05-18 @ 08:06]      Phos  5.6     [07-05-18 @ 08:06]    TPro  8.2  /  Alb  4.2  /  TBili  0.5  /  DBili  x   /  AST  12  /  ALT  8   /  AlkPhos  104  [07-05-18 @ 08:06]          Creatinine Trend:  SCr 7.3 [07-05 @ 08:06]  SCr 7.1 [07-05 @ 05:41]  SCr 9.2 [07-03 @ 16:10]  SCr 8.7 [07-02 @ 12:07]        Iron 19, TIBC 116, %sat 16      [03-01-18 @ 10:19]  Ferritin 1091.0      [03-01-18 @ 10:19]

## 2018-07-07 LAB
ALBUMIN SERPL ELPH-MCNC: 4.3 G/DL — SIGNIFICANT CHANGE UP (ref 3.5–5.2)
ALP SERPL-CCNC: 94 U/L — SIGNIFICANT CHANGE UP (ref 30–115)
ALT FLD-CCNC: 6 U/L — SIGNIFICANT CHANGE UP (ref 0–41)
ANION GAP SERPL CALC-SCNC: 23 MMOL/L — HIGH (ref 7–14)
AST SERPL-CCNC: 10 U/L — SIGNIFICANT CHANGE UP (ref 0–41)
BILIRUB SERPL-MCNC: 0.5 MG/DL — SIGNIFICANT CHANGE UP (ref 0.2–1.2)
BUN SERPL-MCNC: 50 MG/DL — HIGH (ref 10–20)
CALCIUM SERPL-MCNC: 8.3 MG/DL — LOW (ref 8.5–10.1)
CHLORIDE SERPL-SCNC: 92 MMOL/L — LOW (ref 98–110)
CO2 SERPL-SCNC: 23 MMOL/L — SIGNIFICANT CHANGE UP (ref 17–32)
CREAT SERPL-MCNC: 6.6 MG/DL — CRITICAL HIGH (ref 0.7–1.5)
GLUCOSE SERPL-MCNC: 101 MG/DL — HIGH (ref 70–99)
HCT VFR BLD CALC: 38.3 % — SIGNIFICANT CHANGE UP (ref 37–47)
HGB BLD-MCNC: 12.9 G/DL — SIGNIFICANT CHANGE UP (ref 12–16)
MAGNESIUM SERPL-MCNC: 2 MG/DL — SIGNIFICANT CHANGE UP (ref 1.8–2.4)
MCHC RBC-ENTMCNC: 27.6 PG — SIGNIFICANT CHANGE UP (ref 27–31)
MCHC RBC-ENTMCNC: 33.7 G/DL — SIGNIFICANT CHANGE UP (ref 32–37)
MCV RBC AUTO: 81.8 FL — SIGNIFICANT CHANGE UP (ref 81–99)
NRBC # BLD: 0 /100 WBCS — SIGNIFICANT CHANGE UP (ref 0–0)
PLATELET # BLD AUTO: 186 K/UL — SIGNIFICANT CHANGE UP (ref 130–400)
POTASSIUM SERPL-MCNC: 4.4 MMOL/L — SIGNIFICANT CHANGE UP (ref 3.5–5)
POTASSIUM SERPL-SCNC: 4.4 MMOL/L — SIGNIFICANT CHANGE UP (ref 3.5–5)
PROT SERPL-MCNC: 7.7 G/DL — SIGNIFICANT CHANGE UP (ref 6–8)
RBC # BLD: 4.68 M/UL — SIGNIFICANT CHANGE UP (ref 4.2–5.4)
RBC # FLD: 15.9 % — HIGH (ref 11.5–14.5)
SODIUM SERPL-SCNC: 138 MMOL/L — SIGNIFICANT CHANGE UP (ref 135–146)
WBC # BLD: 7.38 K/UL — SIGNIFICANT CHANGE UP (ref 4.8–10.8)
WBC # FLD AUTO: 7.38 K/UL — SIGNIFICANT CHANGE UP (ref 4.8–10.8)

## 2018-07-07 RX ORDER — ONDANSETRON 8 MG/1
2 TABLET, FILM COATED ORAL ONCE
Qty: 0 | Refills: 0 | Status: COMPLETED | OUTPATIENT
Start: 2018-07-07 | End: 2018-07-07

## 2018-07-07 RX ORDER — ONDANSETRON 8 MG/1
4 TABLET, FILM COATED ORAL ONCE
Qty: 0 | Refills: 0 | Status: COMPLETED | OUTPATIENT
Start: 2018-07-07 | End: 2018-07-07

## 2018-07-07 RX ADMIN — Medication 100 MILLIGRAM(S): at 06:28

## 2018-07-07 RX ADMIN — Medication 40 MILLIGRAM(S): at 06:28

## 2018-07-07 RX ADMIN — CARVEDILOL PHOSPHATE 25 MILLIGRAM(S): 80 CAPSULE, EXTENDED RELEASE ORAL at 17:31

## 2018-07-07 RX ADMIN — PANTOPRAZOLE SODIUM 40 MILLIGRAM(S): 20 TABLET, DELAYED RELEASE ORAL at 06:29

## 2018-07-07 RX ADMIN — Medication 100 MILLIGRAM(S): at 14:31

## 2018-07-07 RX ADMIN — HEPARIN SODIUM 5000 UNIT(S): 5000 INJECTION INTRAVENOUS; SUBCUTANEOUS at 14:31

## 2018-07-07 RX ADMIN — HEPARIN SODIUM 5000 UNIT(S): 5000 INJECTION INTRAVENOUS; SUBCUTANEOUS at 22:34

## 2018-07-07 RX ADMIN — Medication 5 MILLIGRAM(S): at 06:28

## 2018-07-07 RX ADMIN — Medication 100 MILLIGRAM(S): at 22:33

## 2018-07-07 RX ADMIN — HEPARIN SODIUM 5000 UNIT(S): 5000 INJECTION INTRAVENOUS; SUBCUTANEOUS at 06:37

## 2018-07-07 RX ADMIN — Medication 5 MILLIGRAM(S): at 17:31

## 2018-07-07 RX ADMIN — Medication 0.1 MILLIGRAM(S): at 17:32

## 2018-07-07 RX ADMIN — PANTOPRAZOLE SODIUM 40 MILLIGRAM(S): 20 TABLET, DELAYED RELEASE ORAL at 17:31

## 2018-07-07 RX ADMIN — CARVEDILOL PHOSPHATE 25 MILLIGRAM(S): 80 CAPSULE, EXTENDED RELEASE ORAL at 06:28

## 2018-07-07 RX ADMIN — Medication 90 MILLIGRAM(S): at 06:29

## 2018-07-07 RX ADMIN — Medication 3 MILLIGRAM(S): at 22:34

## 2018-07-07 RX ADMIN — Medication 10 MILLIGRAM(S): at 01:17

## 2018-07-07 RX ADMIN — ONDANSETRON 4 MILLIGRAM(S): 8 TABLET, FILM COATED ORAL at 02:45

## 2018-07-07 RX ADMIN — Medication 667 MILLIGRAM(S): at 17:31

## 2018-07-07 NOTE — PROGRESS NOTE ADULT - SUBJECTIVE AND OBJECTIVE BOX
Patient is a 57y old  Female who presents with a chief complaint of Nausea and Vomiting. of 2 days duration with decreased Appetite. (02 Jul 2018 19:13)      HPI:  56 y/o female with PMH of HTN, DM, Left BKA,  ESRD on dialysis (M/W/F) Dialysis was missed today as patient was not feeling well. She presents to the ED with 2 day history of decreased appetite with Associated Nausea and Vomiting. The vomiting was not blood tinged until this evening in the ED where she has begun to have Coffee ground Emesis x 2 episodes. Denies any fever, chest pain, or diarrhea. Denies any urinary complaints and otherwise states she has been doing well.     PCP: Dr. Mckay  Pharmacy: [facility] (02 Jul 2018 19:13)      PAST MEDICAL & SURGICAL HISTORY:  Type 2 diabetes mellitus  CHF (congestive heart failure)  End stage renal failure on dialysis  Hypertension  Hyperlipemia  Heart failure  History of femoral angiogram: left angiogram  Port-a-cath in place: right chest wall      Home Medications:  Aspir 81: orally once a day (02 Mar 2018 11:31)  calcium acetate 667 mg oral tablet: orally 2 times a day (02 Mar 2018 11:31)  carvedilol 6.25 mg oral tablet: 1 tab(s) orally 2 times a day (02 Mar 2018 11:31)  darbepoetin carolina 40 mcg/mL injectable solution:  injectable every 7 days post dialysis (hold if BP&gt;160/90) (12 Mar 2018 15:19)  diphenhydrAMINE 25 mg oral capsule: 1 cap(s) orally every 6 hours, As needed, Rash and/or Itching (12 Mar 2018 13:32)  furosemide 40 mg oral tablet: 1 tab(s) orally once a day (12 Mar 2018 15:19)  hydrALAZINE 50 mg oral tablet: 50 milligram(s) orally 3 times a day (12 Mar 2018 15:19)  insulin lispro: 2 unit(s) subcutaneous 3 times a day (before meals) (12 Mar 2018 15:19)  lisinopril 20 mg oral tablet: 1 tab(s) orally once a day (02 Mar 2018 11:31)  Lyrica 100 mg oral capsule: orally 3 times a day (02 Mar 2018 11:31)  Melatonin 3 mg oral tablet: 1 tab(s) orally once (at bedtime) (02 Mar 2018 11:31)  metOLazone 2.5 mg oral tablet: 1 tab(s) orally once a day (02 Mar 2018 11:31)  oxyCODONE 5 mg oral tablet: 1 tab(s) orally every 4 hours, As needed, Moderate Pain (4 - 6) (12 Mar 2018 13:32)    REVIEW OF SYSTEMS:    CONSTITUTIONAL: fatigued lethargic  EYES: No visual changes; No double vision,  No vertigo, eye pain  Ears: no otalgia, no otorhea, no hearing loss, tinnitus  Nose: no epistaxis, rhinorrhea, post-discharge, sinus pressure  Throat: no throat pain, no oral lesions, tooth pain   NECK: No pain or stiffness  RESPIRATORY: No cough (productive or dry), wheezing, hemoptysis; No shortness of breath, orthnopnea, PND, PATEL, snoring,  CARDIOVASCULAR: No chest pain or palpitations, no leg edema, no claudication    GASTROINTESTINAL: Persistent nausea and vomiting clear fluids  GENITOURINARY: No dysuria, frequency, urgency or hematuria, no pelvic pain, urinary incontinence, urgency  Muscloskeletal: no joints or muscle pain, no swelling in joints or muscles  NEUROLOGICAL: Persistent dizziness  SKIN: No pruritis, rashes, lesions or new moles  Psych: No anxiety, sadness, insomnia, suicide thoughts  Endocrine: No Heat or Cold intolerance, polydipsia, polyphagia  Heme/Lymph: no LN enlargement, no easy bruising or bleeding         No Known Allergies      FAMILY HISTORY:  No pertinent family history in first degree relatives      calcium acetate 667 milliGRAM(s) Oral two times a day with meals  carvedilol 25 milliGRAM(s) Oral every 12 hours  furosemide    Tablet 40 milliGRAM(s) Oral daily  heparin  Injectable 5000 Unit(s) SubCutaneous every 8 hours  hydrALAZINE 100 milliGRAM(s) Oral every 8 hours  melatonin 3 milliGRAM(s) Oral at bedtime  metoclopramide 5 milliGRAM(s) Oral three times a day before meals  metolazone 2.5 milliGRAM(s) Oral daily  NIFEdipine XL 90 milliGRAM(s) Oral daily  ondansetron Injectable 4 milliGRAM(s) IV Push two times a day PRN  pantoprazole    Tablet 40 milliGRAM(s) Oral two times a day      Vital Signs Last 24 Hrs  T(C): 36.7 (06 Jul 2018 21:07), Max: 37 (06 Jul 2018 07:06)  T(F): 98 (06 Jul 2018 21:07), Max: 98.6 (06 Jul 2018 07:06)  HR: 77 (07 Jul 2018 01:11) (76 - 79)  BP: 195/84 (07 Jul 2018 01:11) (170/81 - 228/98)  BP(mean): --  RR: 18 (06 Jul 2018 21:07) (18 - 18)  SpO2: 95% (06 Jul 2018 22:20) (95% - 95%)    I&O's Summary    05 Jul 2018 07:01  -  06 Jul 2018 07:00  --------------------------------------------------------  IN: 480 mL / OUT: 1300 mL / NET: -820 mL                              12.9   6.69  )-----------( 164      ( 06 Jul 2018 07:32 )             38.3       07-06    139  |  93<L>  |  32<H>  ----------------------------<  152<H>  3.9   |  24  |  4.9<HH>    Ca    8.8      06 Jul 2018 07:32  Phos  4.7     07-06  Mg     2.0     07-06    TPro  8.2<H>  /  Alb  4.2  /  TBili  0.5  /  DBili  x   /  AST  12  /  ALT  8   /  AlkPhos  104  07-05      Culture - Blood (collected 04 Jul 2018 04:30)  Source: .Blood Blood  Preliminary Report (07 Jul 2018 01:02):    No growth to date.      General: lethargic  Neurology: A&Ox3, nonfocal, BOWEN x 4  Head:  Normocephalic, atraumatic  ENT:  Mucosa moist, no ulcerations  Neck:  Supple, no sinuses or palpable masses  Lymphatic:  No palpable cervical, supraclavicular, axillary or inguinal adenopathy  Respiratory: CTA B/L  CV: RRR, S1S2, no murmur  Abdominal: Soft, NT, ND no palpable mass  MSK: No edema, + peripheral pulses, FROM all 4 extremity  Incisions: intact, no erythema or drainage

## 2018-07-07 NOTE — PROGRESS NOTE ADULT - ASSESSMENT
57/F with ESRD on HD MWF, HTN, DM, PVD Lt BKA, CHF, presents with nausea and vomiting for 3 days.      #ESRD : hd today, 3 k bath, uf 1.5 liters as tolerated   #HTN  better controlled  #d/c lasix and metolazone  if no uo   # h/h noted : no darbo, iron stores noted no venofer   # on binders ,  ph noted , change phoslo to three times a day with meals  # will follow

## 2018-07-07 NOTE — PROGRESS NOTE ADULT - SUBJECTIVE AND OBJECTIVE BOX
seen and examined  still feels nauseous       Standing Inpatient Medications  calcium acetate 667 milliGRAM(s) Oral two times a day with meals  carvedilol 25 milliGRAM(s) Oral every 12 hours  cloNIDine 0.1 milliGRAM(s) Oral two times a day  furosemide    Tablet 40 milliGRAM(s) Oral daily  heparin  Injectable 5000 Unit(s) SubCutaneous every 8 hours  hydrALAZINE 100 milliGRAM(s) Oral every 8 hours  melatonin 3 milliGRAM(s) Oral at bedtime  metoclopramide 5 milliGRAM(s) Oral three times a day before meals  metolazone 2.5 milliGRAM(s) Oral daily  NIFEdipine XL 90 milliGRAM(s) Oral daily  pantoprazole    Tablet 40 milliGRAM(s) Oral two times a day            VITALS/PHYSICAL EXAM  --------------------------------------------------------------------------------  T(C): 36.6 (07-07-18 @ 06:37), Max: 36.7 (07-06-18 @ 21:07)  HR: 73 (07-07-18 @ 08:22) (73 - 79)  BP: 137/62 (07-07-18 @ 08:22) (137/62 - 228/98)  RR: 18 (07-07-18 @ 06:37) (18 - 18)  SpO2: 95% (07-06-18 @ 22:20) (95% - 95%)  Wt(kg): --    Weight (kg): 68.7 (07-06-18 @ 07:06)      Physical Exam:  	Gen: NAD  	Pulm: CTA B/L  	CV:  S1S2; no rub  	Abd: distended  	LE: BKA  	Vascular access: chest wall tesio     LABS/STUDIES  --------------------------------------------------------------------------------              12.9   6.69  >-----------<  164      [07-06-18 @ 07:32]              38.3     139  |  93  |  32  ----------------------------<  152      [07-06-18 @ 07:32]  3.9   |  24  |  4.9        Ca     8.8     [07-06-18 @ 07:32]      Mg     2.0     [07-06-18 @ 07:32]      Phos  4.7     [07-06-18 @ 07:32]            Creatinine Trend:  SCr 4.9 [07-06 @ 07:32]  SCr 7.3 [07-05 @ 08:06]  SCr 7.1 [07-05 @ 05:41]  SCr 9.2 [07-03 @ 16:10]  SCr 8.7 [07-02 @ 12:07]        Iron 19, TIBC 116, %sat 16      [03-01-18 @ 10:19]  Ferritin 1091.0      [03-01-18 @ 10:19]

## 2018-07-08 RX ORDER — DIPHENHYDRAMINE HCL 50 MG
25 CAPSULE ORAL ONCE
Qty: 0 | Refills: 0 | Status: COMPLETED | OUTPATIENT
Start: 2018-07-08 | End: 2018-07-08

## 2018-07-08 RX ADMIN — Medication 0.1 MILLIGRAM(S): at 18:53

## 2018-07-08 RX ADMIN — HEPARIN SODIUM 5000 UNIT(S): 5000 INJECTION INTRAVENOUS; SUBCUTANEOUS at 13:03

## 2018-07-08 RX ADMIN — HEPARIN SODIUM 5000 UNIT(S): 5000 INJECTION INTRAVENOUS; SUBCUTANEOUS at 21:45

## 2018-07-08 RX ADMIN — Medication 667 MILLIGRAM(S): at 18:53

## 2018-07-08 RX ADMIN — PANTOPRAZOLE SODIUM 40 MILLIGRAM(S): 20 TABLET, DELAYED RELEASE ORAL at 18:53

## 2018-07-08 RX ADMIN — HEPARIN SODIUM 5000 UNIT(S): 5000 INJECTION INTRAVENOUS; SUBCUTANEOUS at 06:39

## 2018-07-08 RX ADMIN — Medication 100 MILLIGRAM(S): at 21:45

## 2018-07-08 RX ADMIN — CARVEDILOL PHOSPHATE 25 MILLIGRAM(S): 80 CAPSULE, EXTENDED RELEASE ORAL at 18:53

## 2018-07-08 RX ADMIN — Medication 90 MILLIGRAM(S): at 06:40

## 2018-07-08 RX ADMIN — PANTOPRAZOLE SODIUM 40 MILLIGRAM(S): 20 TABLET, DELAYED RELEASE ORAL at 06:37

## 2018-07-08 RX ADMIN — Medication 3 MILLIGRAM(S): at 21:45

## 2018-07-08 RX ADMIN — Medication 100 MILLIGRAM(S): at 13:03

## 2018-07-08 RX ADMIN — Medication 5 MILLIGRAM(S): at 18:53

## 2018-07-08 RX ADMIN — Medication 40 MILLIGRAM(S): at 06:37

## 2018-07-08 RX ADMIN — Medication 25 MILLIGRAM(S): at 22:44

## 2018-07-08 RX ADMIN — CARVEDILOL PHOSPHATE 25 MILLIGRAM(S): 80 CAPSULE, EXTENDED RELEASE ORAL at 06:37

## 2018-07-08 RX ADMIN — Medication 100 MILLIGRAM(S): at 06:40

## 2018-07-08 RX ADMIN — Medication 0.1 MILLIGRAM(S): at 06:37

## 2018-07-08 RX ADMIN — Medication 5 MILLIGRAM(S): at 13:03

## 2018-07-08 RX ADMIN — Medication 5 MILLIGRAM(S): at 06:40

## 2018-07-08 NOTE — PROGRESS NOTE ADULT - SUBJECTIVE AND OBJECTIVE BOX
Patient is a 57y old  Female who presents with a chief complaint of Nausea and Vomiting. of 2 days duration with decreased Appetite. (02 Jul 2018 19:13)      HPI:  58 y/o female with PMH of HTN, DM, Left BKA,  ESRD on dialysis (M/W/F) Dialysis was missed as patient was not feeling well. She presents to the ED with 2 day history of decreased appetite with Associated Nausea and Vomiting. The vomiting was not blood tinged until this evening in the ED where she has begun to have Coffee ground Emesis x 2 episodes. Denies any fever, chest pain, or diarrhea. Denies any urinary complaints and otherwise states she has been doing well.     PCP: Dr. Mckay  Pharmacy: [facility] (02 Jul 2018 19:13)      PAST MEDICAL & SURGICAL HISTORY:  Type 2 diabetes mellitus  CHF (congestive heart failure)  End stage renal failure on dialysis  Hypertension  Hyperlipemia  Heart failure  History of femoral angiogram: left angiogram  Port-a-cath in place: right chest wall      Home Medications:  Aspir 81: orally once a day (02 Mar 2018 11:31)  calcium acetate 667 mg oral tablet: orally 2 times a day (02 Mar 2018 11:31)  carvedilol 6.25 mg oral tablet: 1 tab(s) orally 2 times a day (02 Mar 2018 11:31)  darbepoetin carolina 40 mcg/mL injectable solution:  injectable every 7 days post dialysis (hold if BP&gt;160/90) (12 Mar 2018 15:19)  diphenhydrAMINE 25 mg oral capsule: 1 cap(s) orally every 6 hours, As needed, Rash and/or Itching (12 Mar 2018 13:32)  furosemide 40 mg oral tablet: 1 tab(s) orally once a day (12 Mar 2018 15:19)  hydrALAZINE 50 mg oral tablet: 50 milligram(s) orally 3 times a day (12 Mar 2018 15:19)  insulin lispro: 2 unit(s) subcutaneous 3 times a day (before meals) (12 Mar 2018 15:19)  lisinopril 20 mg oral tablet: 1 tab(s) orally once a day (02 Mar 2018 11:31)  Lyrica 100 mg oral capsule: orally 3 times a day (02 Mar 2018 11:31)  Melatonin 3 mg oral tablet: 1 tab(s) orally once (at bedtime) (02 Mar 2018 11:31)  metOLazone 2.5 mg oral tablet: 1 tab(s) orally once a day (02 Mar 2018 11:31)  oxyCODONE 5 mg oral tablet: 1 tab(s) orally every 4 hours, As needed, Moderate Pain (4 - 6) (12 Mar 2018 13:32)    REVIEW OF SYSTEMS:    CONSTITUTIONAL: fatigued lethargic  EYES: No visual changes; No double vision,  No vertigo, eye pain  Ears: no otalgia, no otorhea, no hearing loss, tinnitus  Nose: no epistaxis, rhinorrhea, post-discharge, sinus pressure  Throat: no throat pain, no oral lesions, tooth pain   NECK: No pain or stiffness  RESPIRATORY: No cough (productive or dry), wheezing, hemoptysis; No shortness of breath, orthnopnea, PND, PATEL, snoring,  CARDIOVASCULAR: No chest pain or palpitations, no leg edema, no claudication    GASTROINTESTINAL: Persistent nausea and vomiting clear fluids  GENITOURINARY: No dysuria, frequency, urgency or hematuria, no pelvic pain, urinary incontinence, urgency  Muscloskeletal: no joints or muscle pain, no swelling in joints or muscles  NEUROLOGICAL: Persistent dizziness  SKIN: No pruritis, rashes, lesions or new moles  Psych: No anxiety, sadness, insomnia, suicide thoughts  Endocrine: No Heat or Cold intolerance, polydipsia, polyphagia  Heme/Lymph: no LN enlargement, no easy bruising or bleeding         No Known Allergies      FAMILY HISTORY:  No pertinent family history in first degree relatives      calcium acetate 667 milliGRAM(s) Oral two times a day with meals  carvedilol 25 milliGRAM(s) Oral every 12 hours  furosemide    Tablet 40 milliGRAM(s) Oral daily  heparin  Injectable 5000 Unit(s) SubCutaneous every 8 hours  hydrALAZINE 100 milliGRAM(s) Oral every 8 hours  melatonin 3 milliGRAM(s) Oral at bedtime  metoclopramide 5 milliGRAM(s) Oral three times a day before meals  metolazone 2.5 milliGRAM(s) Oral daily  NIFEdipine XL 90 milliGRAM(s) Oral daily  ondansetron Injectable 4 milliGRAM(s) IV Push two times a day PRN  pantoprazole    Tablet 40 milliGRAM(s) Oral two times a day    Vital Signs Last 24 Hrs  T(C): 36.8 (08 Jul 2018 13:10), Max: 37.2 (07 Jul 2018 21:10)  T(F): 98.2 (08 Jul 2018 13:10), Max: 98.9 (07 Jul 2018 21:10)  HR: 62 (08 Jul 2018 13:10) (62 - 78)  BP: 120/59 (08 Jul 2018 13:10) (120/59 - 186/87)  RR: 18 (08 Jul 2018 13:10) (18 - 18)  SpO2: 97% (07 Jul 2018 22:34) (97% - 97%)  I&O's Summary          07-06    139  |  93<L>  |  32<H>  ----------------------------<  152<H>  3.9   |  24  |  4.9<HH>    Ca    8.8      06 Jul 2018 07:32  Phos  4.7     07-06  Mg     2.0     07-06    TPro  8.2<H>  /  Alb  4.2  /  TBili  0.5  /  DBili  x   /  AST  12  /  ALT  8   /  AlkPhos  104  07-05      Culture - Blood (collected 04 Jul 2018 04:30)  Source: .Blood Blood  Preliminary Report (07 Jul 2018 01:02):    No growth to date.    P/E -     General: lethargic  Neurology: A&Ox3, nonfocal.   Head:  Normocephalic, atraumatic  ENT:  Mucosa moist, no ulcerations  Neck:  Supple, no sinuses or palpable masses  Lymphatic:  No palpable cervical, supraclavicular, axillary or inguinal adenopathy  Respiratory: CTA B/L  CV: RRR, S1S2, no murmur  Abdominal: Soft, NT, ND no palpable mass  MSK: No edema, + peripheral pulses, FROM all 4 extremity  Incisions: intact, no erythema or drainage

## 2018-07-08 NOTE — PROGRESS NOTE ADULT - ASSESSMENT
1) Uncontrolled Hypertension - BP stable now after starting the Clonidine. Continue to monitor        2) ESRD   RRT per renal     3) Vomiting resolved   Coffee ground emesis episode times 2.  resolved   H&H stable   offered EGD by GI but patient declined     4) Elevated Troponin  trended down   asymptomatic   will need stress test in future but will need EGD prior. patient declined EGD and stress test      6) DM  targer -180 in hospital .     7) DVT Prophylaxis   hep sub c       Follow up blood culture - NEGATIVE.   and c/w reglan. Possibly  gastroparesis. and her symptoms are secondary to the missed dialysis.   Anticipating D/C Home Monday   Advancing diet slow

## 2018-07-09 ENCOUNTER — TRANSCRIPTION ENCOUNTER (OUTPATIENT)
Age: 58
End: 2018-07-09

## 2018-07-09 VITALS — SYSTOLIC BLOOD PRESSURE: 114 MMHG | HEART RATE: 67 BPM | DIASTOLIC BLOOD PRESSURE: 62 MMHG

## 2018-07-09 RX ORDER — NIFEDIPINE 30 MG
1 TABLET, EXTENDED RELEASE 24 HR ORAL
Qty: 14 | Refills: 0 | OUTPATIENT
Start: 2018-07-09 | End: 2018-07-22

## 2018-07-09 RX ORDER — AMLODIPINE BESYLATE 2.5 MG/1
1 TABLET ORAL
Qty: 14 | Refills: 0 | OUTPATIENT
Start: 2018-07-09 | End: 2018-07-22

## 2018-07-09 RX ORDER — CALCIUM ACETATE 667 MG
0 TABLET ORAL
Qty: 0 | Refills: 0 | COMMUNITY

## 2018-07-09 RX ORDER — METOCLOPRAMIDE HCL 10 MG
0.5 TABLET ORAL
Qty: 21 | Refills: 0 | OUTPATIENT
Start: 2018-07-09 | End: 2018-07-22

## 2018-07-09 RX ORDER — METOCLOPRAMIDE HCL 10 MG
2.5 TABLET ORAL
Qty: 42 | Refills: 0 | OUTPATIENT
Start: 2018-07-09 | End: 2018-07-22

## 2018-07-09 RX ADMIN — Medication 0.1 MILLIGRAM(S): at 06:35

## 2018-07-09 RX ADMIN — Medication 667 MILLIGRAM(S): at 08:45

## 2018-07-09 RX ADMIN — Medication 40 MILLIGRAM(S): at 06:34

## 2018-07-09 RX ADMIN — Medication 5 MILLIGRAM(S): at 06:34

## 2018-07-09 RX ADMIN — PANTOPRAZOLE SODIUM 40 MILLIGRAM(S): 20 TABLET, DELAYED RELEASE ORAL at 18:08

## 2018-07-09 RX ADMIN — Medication 100 MILLIGRAM(S): at 06:35

## 2018-07-09 RX ADMIN — Medication 667 MILLIGRAM(S): at 18:08

## 2018-07-09 RX ADMIN — HEPARIN SODIUM 5000 UNIT(S): 5000 INJECTION INTRAVENOUS; SUBCUTANEOUS at 13:16

## 2018-07-09 RX ADMIN — PANTOPRAZOLE SODIUM 40 MILLIGRAM(S): 20 TABLET, DELAYED RELEASE ORAL at 06:35

## 2018-07-09 RX ADMIN — CARVEDILOL PHOSPHATE 25 MILLIGRAM(S): 80 CAPSULE, EXTENDED RELEASE ORAL at 06:35

## 2018-07-09 RX ADMIN — Medication 90 MILLIGRAM(S): at 06:35

## 2018-07-09 RX ADMIN — HEPARIN SODIUM 5000 UNIT(S): 5000 INJECTION INTRAVENOUS; SUBCUTANEOUS at 06:36

## 2018-07-09 NOTE — PROGRESS NOTE ADULT - ASSESSMENT
57/F with ESRD on HD MWF, HTN, DM, PVD Lt BKA, CHF, presents with nausea and vomiting for 3 days.    #ESRD : s/p hd saturday  hd in am    #HTN  better controlled  #d/c lasix and metolazone  if no uo   # h/h noted : no darbo, iron stores noted no venofer   # on binders ,  ph noted , change phoslo to three times a day with meals  # ? d/c plan

## 2018-07-09 NOTE — DISCHARGE NOTE ADULT - PLAN OF CARE
Resolution of Hematemesis Continue on nausea/vomiting regimen, and follow up with your PMD in 1-2 weeks. Also follow up with the nephrologist Dr. Darinel Darden in 2 weeks as an outpatient.

## 2018-07-09 NOTE — DISCHARGE NOTE ADULT - CARE PLAN
Principal Discharge DX:	Hematemesis with nausea  Goal:	Resolution of Hematemesis  Assessment and plan of treatment:	Continue on nausea/vomiting regimen, and follow up with your PMD in 1-2 weeks. Also follow up with the nephrologist Dr. Darinel Darden in 2 weeks as an outpatient.

## 2018-07-09 NOTE — DIETITIAN INITIAL EVALUATION ADULT. - DIET TYPE
Renal diet order in place via EMR however, pt signed diet refusal form on 7/4. CA on unit aware and sending Regular diet despite RD and LIP reccs. Observed 70% PO intake lunch during assessment. No further nutrition intervention at this time./renal replacement pts:no protein restr,no conc K & phos, low sodium

## 2018-07-09 NOTE — DIETITIAN INITIAL EVALUATION ADULT. - OTHER INFO
Pt presented with n/v x 2 days PTA. Primary dx: vomiting, elevated troponin and HTN. Hospital course complicated by Uncontrolled Hypertension - BP better. ESRD on HD-phoslo in place. Pt signed Renal diet refusal form. Vomiting-resolved, reglan in place d/t suspected gastroparesis. Pt denied EGD. Elevated troponin-trending down, asymptomatic. T2DM. DVT ppx. Reason for assessment: LOS.

## 2018-07-09 NOTE — DISCHARGE NOTE ADULT - PATIENT PORTAL LINK FT
You can access the [facility], offered by [facility], by registering with the following website: [email or web address]

## 2018-07-09 NOTE — DISCHARGE NOTE ADULT - MEDICATION SUMMARY - MEDICATIONS TO STOP TAKING
I will STOP taking the medications listed below when I get home from the hospital:    metOLazone 2.5 mg oral tablet  -- 1 tab(s) by mouth once a day    furosemide 40 mg oral tablet  -- 1 tab(s) by mouth once a day    insulin lispro  -- 2 unit(s) subcutaneous 3 times a day (before meals)

## 2018-07-09 NOTE — DIETITIAN INITIAL EVALUATION ADULT. - ORAL INTAKE PTA
Pt reports so/so PO intake PTA but provided very little detailed responses as pt states shes uninterested in talking to RD. NKFA. Denies recent wt changes

## 2018-07-09 NOTE — DISCHARGE NOTE ADULT - MEDICATION SUMMARY - MEDICATIONS TO CHANGE
I will SWITCH the dose or number of times a day I take the medications listed below when I get home from the hospital:    calcium acetate 667 mg oral tablet  -- orally 2 times a day

## 2018-07-09 NOTE — DIETITIAN INITIAL EVALUATION ADULT. - FACTORS AFF FOOD INTAKE
Denies difficulty chewing/swallowing or GI abnormalities. fecal incontinence. LBM not noted and pt unsure.

## 2018-07-09 NOTE — DISCHARGE NOTE ADULT - CARE PROVIDERS DIRECT ADDRESSES
[email or web address]

## 2018-07-09 NOTE — DISCHARGE NOTE ADULT - MEDICATION SUMMARY - MEDICATIONS TO TAKE
I will START or STAY ON the medications listed below when I get home from the hospital:    Aspir 81  -- orally once a day  -- Indication: For HM    oxyCODONE 5 mg oral tablet  -- 1 tab(s) by mouth every 4 hours, As needed, Moderate Pain (4 - 6)  -- Indication: For Pain    lisinopril 20 mg oral tablet  -- 1 tab(s) by mouth once a day  -- Indication: For HTN    cloNIDine 0.1 mg oral tablet  -- 1 tab(s) by mouth 2 times a day MDD:2 tabs  -- Indication: For HTN    Lyrica 100 mg oral capsule  -- orally 3 times a day  -- Indication: For HM    diphenhydrAMINE 25 mg oral capsule  -- 1 cap(s) by mouth every 6 hours, As needed, Rash and/or Itching  -- Indication: For Rash    metoclopramide 5 mg oral tablet  -- 0.5 tab(s) by mouth 3 times a day MDD:1.5 tabs    Half a tab, three times a day  -- May cause drowsiness.  Alcohol may intensify this effect.  Use care when operating dangerous machinery.  Take medication on an empty stomach 1 hour before or 2 to 3 hours after a meal unless otherwise directed by your doctor.    -- Indication: For Nausea    carvedilol 6.25 mg oral tablet  -- 1 tab(s) by mouth 2 times a day  -- Indication: For HTN    NIFEdipine 90 mg oral tablet, extended release  -- 1 tab(s) by mouth once a day MDD:1  -- Indication: For HTN    darbepoetin [place] 40 mcg/mL injectable solution  --  injectable every 7 days post dialysis (hold if BP>160/90)  -- Indication: For HM    Melatonin 3 mg oral tablet  -- 1 tab(s) by mouth once (at bedtime)  -- Indication: For Sleep    calcium acetate 667 mg oral tablet  -- orally 3 times a day  -- Indication: For HM    hydrALAZINE 50 mg oral tablet  -- 50 milligram(s) by mouth 3 times a day  -- Indication: For HTN

## 2018-07-09 NOTE — DISCHARGE NOTE ADULT - CARE PROVIDER_API CALL
Suraj Mckay), Internal Medicine  [address]  [place] [address]  Phone: [phone]  Fax: [phone]    Joyce Grant), Internal Medicine; Nephrology  [address] [place] [address]  Phone: [phone]  Fax: [phone]

## 2018-07-09 NOTE — PROGRESS NOTE ADULT - SUBJECTIVE AND OBJECTIVE BOX
seen and examined  no distress  no new complaints     Standing Inpatient Medications  calcium acetate 667 milliGRAM(s) Oral two times a day with meals  carvedilol 25 milliGRAM(s) Oral every 12 hours  cloNIDine 0.1 milliGRAM(s) Oral two times a day  furosemide    Tablet 40 milliGRAM(s) Oral daily  heparin  Injectable 5000 Unit(s) SubCutaneous every 8 hours  hydrALAZINE 100 milliGRAM(s) Oral every 8 hours  melatonin 3 milliGRAM(s) Oral at bedtime  metoclopramide 5 milliGRAM(s) Oral three times a day before meals  metolazone 2.5 milliGRAM(s) Oral daily  NIFEdipine XL 90 milliGRAM(s) Oral daily  pantoprazole    Tablet 40 milliGRAM(s) Oral two times a day      VITALS/PHYSICAL EXAM  --------------------------------------------------------------------------------  T(C): 36.4 (07-09-18 @ 05:27), Max: 36.8 (07-08-18 @ 13:10)  HR: 61 (07-09-18 @ 05:27) (61 - 68)  BP: 124/55 (07-09-18 @ 05:27) (117/48 - 132/62)  RR: 18 (07-09-18 @ 05:27) (17 - 18)  SpO2: 96% (07-08-18 @ 20:37) (96% - 96%)  Wt(kg): --        07-08-18 @ 07:01  -  07-09-18 @ 07:00  --------------------------------------------------------  IN: 320 mL / OUT: 0 mL / NET: 320 mL      Physical Exam:  	Gen: NAD  	Pulm: decrease BS  B/L  	CV:  S1S2; no rub  	Abd:  soft, nontender/nondistended  	LE: bka   	Vascular access: chest wall tesio     LABS/STUDIES  --------------------------------------------------------------------------------                Creatinine Trend:  SCr 6.6 [07-07 @ 06:46]  SCr 4.9 [07-06 @ 07:32]  SCr 7.3 [07-05 @ 08:06]  SCr 7.1 [07-05 @ 05:41]  SCr 9.2 [07-03 @ 16:10]        Iron 19, TIBC 116, %sat 16      [03-01-18 @ 10:19]  Ferritin 1091.0      [03-01-18 @ 10:19]

## 2018-07-09 NOTE — DISCHARGE NOTE ADULT - HOSPITAL COURSE
Pt presented to the hospital for complaints of decreased appetite associated with 1 episode of non-bloody vomiting that was followed by 2 episodes of coffee-ground episodes at the ED. He was admitted to the floor, and started on anti-emetics as well as seen by the GI team for eval. Patient has refused further evaluation by endoscopy however. She also had uncontrolled hypertension for which her blood pressure medication was adjusted.

## 2018-07-09 NOTE — PROGRESS NOTE ADULT - SUBJECTIVE AND OBJECTIVE BOX
PHILIPPE SMITH  57y  Female  ***My note supercedes ALL resident notes that I sign.  My corrections for their notes are in my note.***    I can be reached directly on [email or web address]. My office number is [phone]. My personal cell number is [phone].    INTERVAL EVENTS: Pt able to eat today. Pt feels like going home. Pt does not talk nicely to staff, she is very short-tempered w/ them, even when the staff are being nice.    T(F): 97.6 (07-09-18 @ 05:27), Max: 97.6 (07-09-18 @ 05:27)  HR: 61 (07-09-18 @ 05:27) (61 - 68)  BP: 124/55 (07-09-18 @ 05:27) (117/48 - 132/62)  RR: 18 (07-09-18 @ 05:27) (17 - 18)  SpO2: 97% (07-09-18 @ 10:22) (96% - 97%)    General: awake, alert ox3; no distress;   ENT:  Mucosa moist, no ulcerations  Neck:  Supple, no nodes  Respiratory: CTA B/L  CV: RRR, S1S2, no murmur  Abdominal: Soft, NT, ND, no palpable mass  MSK: No edema, no c/c    LABS:    RADIOLOGY & ADDITIONAL TESTS:      MEDICATIONS:    calcium acetate 667 milliGRAM(s) Oral two times a day with meals  carvedilol 25 milliGRAM(s) Oral every 12 hours  cloNIDine 0.1 milliGRAM(s) Oral two times a day  furosemide    Tablet 40 milliGRAM(s) Oral daily  heparin  Injectable 5000 Unit(s) SubCutaneous every 8 hours  hydrALAZINE 100 milliGRAM(s) Oral every 8 hours  melatonin 3 milliGRAM(s) Oral at bedtime  metoclopramide 5 milliGRAM(s) Oral three times a day before meals  metolazone 2.5 milliGRAM(s) Oral daily  NIFEdipine XL 90 milliGRAM(s) Oral daily  ondansetron Injectable 4 milliGRAM(s) IV Push two times a day PRN  pantoprazole    Tablet 40 milliGRAM(s) Oral two times a day

## 2018-07-09 NOTE — PROGRESS NOTE ADULT - ASSESSMENT
# Uncontrolled Hypertension - BP better  d/c home on CURRENT regimen     # ESRD - cont HD  outpt renal f/u  d/c lasix and metolazone per renal  incr phos-lo to 3x/day on d/c    # Vomiting resolved   offered EGD by GI but patient declined   cont very low, renal dose of reglan for suspected gastroparesis, decrease to 2.5mg 3x/day on d/c  d/c zofran    # Elevated Troponin in pt on HD  trended down   asymptomatic     # DM  CAPILLARY BLOOD GLUCOSE  165 (07-09-18 @ 07:38)  175 (07-08-18 @ 22:28)  135 (07-08-18 @ 17:34)  136 (07-08-18 @ 11:40)  119 (07-08-18 @ 07:46)  125 (07-07-18 @ 21:10)  111 (07-07-18 @ 16:31)  128 (07-07-18 @ 08:15)    FS Ok on diabetic renal diet - cont  no meds on d/c    # DVT Prophylaxis   hep sub c     D/C home today

## 2018-07-11 LAB
CULTURE RESULTS: SIGNIFICANT CHANGE UP
SPECIMEN SOURCE: SIGNIFICANT CHANGE UP

## 2018-07-13 LAB
CULTURE RESULTS: SIGNIFICANT CHANGE UP
CULTURE RESULTS: SIGNIFICANT CHANGE UP
SPECIMEN SOURCE: SIGNIFICANT CHANGE UP
SPECIMEN SOURCE: SIGNIFICANT CHANGE UP

## 2018-07-17 DIAGNOSIS — Z79.4 LONG TERM (CURRENT) USE OF INSULIN: ICD-10-CM

## 2018-07-17 DIAGNOSIS — I13.2 HYPERTENSIVE HEART AND CHRONIC KIDNEY DISEASE WITH HEART FAILURE AND WITH STAGE 5 CHRONIC KIDNEY DISEASE, OR END STAGE RENAL DISEASE: ICD-10-CM

## 2018-07-17 DIAGNOSIS — R11.10 VOMITING, UNSPECIFIED: ICD-10-CM

## 2018-07-17 DIAGNOSIS — E11.22 TYPE 2 DIABETES MELLITUS WITH DIABETIC CHRONIC KIDNEY DISEASE: ICD-10-CM

## 2018-07-17 DIAGNOSIS — I50.9 HEART FAILURE, UNSPECIFIED: ICD-10-CM

## 2018-07-17 DIAGNOSIS — I16.0 HYPERTENSIVE URGENCY: ICD-10-CM

## 2018-07-17 DIAGNOSIS — Z89.512 ACQUIRED ABSENCE OF LEFT LEG BELOW KNEE: ICD-10-CM

## 2018-07-17 DIAGNOSIS — E86.0 DEHYDRATION: ICD-10-CM

## 2018-07-17 DIAGNOSIS — N18.6 END STAGE RENAL DISEASE: ICD-10-CM

## 2018-07-17 DIAGNOSIS — Z99.2 DEPENDENCE ON RENAL DIALYSIS: ICD-10-CM

## 2018-07-17 DIAGNOSIS — E78.5 HYPERLIPIDEMIA, UNSPECIFIED: ICD-10-CM

## 2018-07-17 DIAGNOSIS — K29.71 GASTRITIS, UNSPECIFIED, WITH BLEEDING: ICD-10-CM

## 2018-07-19 ENCOUNTER — INPATIENT (INPATIENT)
Facility: HOSPITAL | Age: 58
LOS: 14 days | Discharge: NURSING, REHAB OR LONG-TERM CARE | End: 2018-08-03
Attending: HOSPITALIST | Admitting: HOSPITALIST

## 2018-07-19 VITALS
OXYGEN SATURATION: 98 % | DIASTOLIC BLOOD PRESSURE: 77 MMHG | TEMPERATURE: 102 F | RESPIRATION RATE: 20 BRPM | HEART RATE: 81 BPM | SYSTOLIC BLOOD PRESSURE: 146 MMHG

## 2018-07-19 DIAGNOSIS — Z86.79 PERSONAL HISTORY OF OTHER DISEASES OF THE CIRCULATORY SYSTEM: Chronic | ICD-10-CM

## 2018-07-19 DIAGNOSIS — Z95.828 PRESENCE OF OTHER VASCULAR IMPLANTS AND GRAFTS: Chronic | ICD-10-CM

## 2018-07-19 RX ORDER — ACETAMINOPHEN 500 MG
975 TABLET ORAL ONCE
Qty: 0 | Refills: 0 | Status: COMPLETED | OUTPATIENT
Start: 2018-07-19 | End: 2018-07-20

## 2018-07-19 RX ORDER — SODIUM CHLORIDE 9 MG/ML
3 INJECTION INTRAMUSCULAR; INTRAVENOUS; SUBCUTANEOUS ONCE
Qty: 0 | Refills: 0 | Status: COMPLETED | OUTPATIENT
Start: 2018-07-19 | End: 2018-07-19

## 2018-07-19 NOTE — ED ADULT NURSE NOTE - OBJECTIVE STATEMENT
Patient with fever x1day. dialysis yesterday full treatment. complaining of SOB and cough states she takes albuterol at home and ran out of treatments.

## 2018-07-20 DIAGNOSIS — Z89.619 ACQUIRED ABSENCE OF UNSPECIFIED LEG ABOVE KNEE: Chronic | ICD-10-CM

## 2018-07-20 PROBLEM — I50.9 HEART FAILURE, UNSPECIFIED: Chronic | Status: ACTIVE | Noted: 2018-02-16

## 2018-07-20 PROBLEM — I10 ESSENTIAL (PRIMARY) HYPERTENSION: Chronic | Status: ACTIVE | Noted: 2018-02-16

## 2018-07-20 PROBLEM — E78.5 HYPERLIPIDEMIA, UNSPECIFIED: Chronic | Status: ACTIVE | Noted: 2018-02-16

## 2018-07-20 PROBLEM — E11.9 TYPE 2 DIABETES MELLITUS WITHOUT COMPLICATIONS: Chronic | Status: ACTIVE | Noted: 2018-02-16

## 2018-07-20 PROBLEM — N18.6 END STAGE RENAL DISEASE: Chronic | Status: ACTIVE | Noted: 2018-02-16

## 2018-07-20 LAB
ALBUMIN SERPL ELPH-MCNC: 3.2 G/DL — LOW (ref 3.5–5.2)
ALP SERPL-CCNC: 85 U/L — SIGNIFICANT CHANGE UP (ref 30–115)
ALT FLD-CCNC: 24 U/L — SIGNIFICANT CHANGE UP (ref 0–41)
ANION GAP SERPL CALC-SCNC: 19 MMOL/L — HIGH (ref 7–14)
AST SERPL-CCNC: 26 U/L — SIGNIFICANT CHANGE UP (ref 0–41)
BASE EXCESS BLDV CALC-SCNC: 4.2 MMOL/L — HIGH (ref -2–2)
BASE EXCESS BLDV CALC-SCNC: 4.6 MMOL/L — HIGH (ref -2–2)
BASOPHILS # BLD AUTO: 0.01 K/UL — SIGNIFICANT CHANGE UP (ref 0–0.2)
BASOPHILS NFR BLD AUTO: 0.1 % — SIGNIFICANT CHANGE UP (ref 0–1)
BILIRUB SERPL-MCNC: 0.4 MG/DL — SIGNIFICANT CHANGE UP (ref 0.2–1.2)
BUN SERPL-MCNC: 46 MG/DL — HIGH (ref 10–20)
CA-I SERPL-SCNC: 1.16 MMOL/L — SIGNIFICANT CHANGE UP (ref 1.12–1.3)
CA-I SERPL-SCNC: 1.17 MMOL/L — SIGNIFICANT CHANGE UP (ref 1.12–1.3)
CALCIUM SERPL-MCNC: 8.6 MG/DL — SIGNIFICANT CHANGE UP (ref 8.5–10.1)
CHLORIDE SERPL-SCNC: 84 MMOL/L — LOW (ref 98–110)
CK MB CFR SERPL CALC: 1.4 NG/ML — SIGNIFICANT CHANGE UP (ref 0.6–6.3)
CK SERPL-CCNC: 180 U/L — SIGNIFICANT CHANGE UP (ref 0–225)
CO2 SERPL-SCNC: 26 MMOL/L — SIGNIFICANT CHANGE UP (ref 17–32)
CREAT SERPL-MCNC: 5.8 MG/DL — CRITICAL HIGH (ref 0.7–1.5)
EOSINOPHIL # BLD AUTO: 0.15 K/UL — SIGNIFICANT CHANGE UP (ref 0–0.7)
EOSINOPHIL NFR BLD AUTO: 1.1 % — SIGNIFICANT CHANGE UP (ref 0–8)
GAS PNL BLDV: 132 MMOL/L — LOW (ref 136–145)
GAS PNL BLDV: 132 MMOL/L — LOW (ref 136–145)
GAS PNL BLDV: SIGNIFICANT CHANGE UP
GAS PNL BLDV: SIGNIFICANT CHANGE UP
GLUCOSE SERPL-MCNC: 356 MG/DL — HIGH (ref 70–99)
HCO3 BLDV-SCNC: 30 MMOL/L — HIGH (ref 22–29)
HCO3 BLDV-SCNC: 31 MMOL/L — HIGH (ref 22–29)
HCT VFR BLD CALC: 29.7 % — LOW (ref 37–47)
HCT VFR BLDA CALC: 44.3 % — HIGH (ref 34–44)
HCT VFR BLDA CALC: 47.3 % — HIGH (ref 34–44)
HGB BLD CALC-MCNC: 14.5 G/DL — SIGNIFICANT CHANGE UP (ref 14–18)
HGB BLD CALC-MCNC: 15.4 G/DL — SIGNIFICANT CHANGE UP (ref 14–18)
HGB BLD-MCNC: 10 G/DL — LOW (ref 12–16)
IMM GRANULOCYTES NFR BLD AUTO: 0.5 % — HIGH (ref 0.1–0.3)
INR BLD: 1.1 RATIO — SIGNIFICANT CHANGE UP (ref 0.65–1.3)
LACTATE BLDV-MCNC: 1.2 MMOL/L — SIGNIFICANT CHANGE UP (ref 0.5–1.6)
LACTATE BLDV-MCNC: 1.5 MMOL/L — SIGNIFICANT CHANGE UP (ref 0.5–1.6)
LYMPHOCYTES # BLD AUTO: 1.94 K/UL — SIGNIFICANT CHANGE UP (ref 1.2–3.4)
LYMPHOCYTES # BLD AUTO: 14.8 % — LOW (ref 20.5–51.1)
MCHC RBC-ENTMCNC: 27.5 PG — SIGNIFICANT CHANGE UP (ref 27–31)
MCHC RBC-ENTMCNC: 33.7 G/DL — SIGNIFICANT CHANGE UP (ref 32–37)
MCV RBC AUTO: 81.6 FL — SIGNIFICANT CHANGE UP (ref 81–99)
MONOCYTES # BLD AUTO: 1.24 K/UL — HIGH (ref 0.1–0.6)
MONOCYTES NFR BLD AUTO: 9.5 % — HIGH (ref 1.7–9.3)
NEUTROPHILS # BLD AUTO: 9.72 K/UL — HIGH (ref 1.4–6.5)
NEUTROPHILS NFR BLD AUTO: 74 % — SIGNIFICANT CHANGE UP (ref 42.2–75.2)
NRBC # BLD: 0 /100 WBCS — SIGNIFICANT CHANGE UP (ref 0–0)
PCO2 BLDV: 45 MMHG — SIGNIFICANT CHANGE UP (ref 41–51)
PCO2 BLDV: 53 MMHG — HIGH (ref 41–51)
PH BLDV: 7.38 — SIGNIFICANT CHANGE UP (ref 7.26–7.43)
PH BLDV: 7.43 — SIGNIFICANT CHANGE UP (ref 7.26–7.43)
PLATELET # BLD AUTO: 160 K/UL — SIGNIFICANT CHANGE UP (ref 130–400)
PO2 BLDV: 44 MMHG — HIGH (ref 20–40)
PO2 BLDV: 49 MMHG — HIGH (ref 20–40)
POTASSIUM BLDV-SCNC: 4.3 MMOL/L — SIGNIFICANT CHANGE UP (ref 3.3–5.6)
POTASSIUM BLDV-SCNC: 4.3 MMOL/L — SIGNIFICANT CHANGE UP (ref 3.3–5.6)
POTASSIUM SERPL-MCNC: 9 MMOL/L — CRITICAL HIGH (ref 3.5–5)
POTASSIUM SERPL-SCNC: 9 MMOL/L — CRITICAL HIGH (ref 3.5–5)
PROT SERPL-MCNC: 7.1 G/DL — SIGNIFICANT CHANGE UP (ref 6–8)
PROTHROM AB SERPL-ACNC: 11.9 SEC — SIGNIFICANT CHANGE UP (ref 9.95–12.87)
RBC # BLD: 3.64 M/UL — LOW (ref 4.2–5.4)
RBC # FLD: 15.9 % — HIGH (ref 11.5–14.5)
SAO2 % BLDV: 81 % — SIGNIFICANT CHANGE UP
SAO2 % BLDV: 82 % — SIGNIFICANT CHANGE UP
SODIUM SERPL-SCNC: 129 MMOL/L — LOW (ref 135–146)
TROPONIN T SERPL-MCNC: 0.45 NG/ML — CRITICAL HIGH
WBC # BLD: 13.12 K/UL — HIGH (ref 4.8–10.8)
WBC # FLD AUTO: 13.12 K/UL — HIGH (ref 4.8–10.8)

## 2018-07-20 RX ORDER — HEPARIN SODIUM 5000 [USP'U]/ML
5000 INJECTION INTRAVENOUS; SUBCUTANEOUS EVERY 8 HOURS
Qty: 0 | Refills: 0 | Status: DISCONTINUED | OUTPATIENT
Start: 2018-07-20 | End: 2018-08-03

## 2018-07-20 RX ORDER — DEXTROSE 50 % IN WATER 50 %
25 SYRINGE (ML) INTRAVENOUS ONCE
Qty: 0 | Refills: 0 | Status: DISCONTINUED | OUTPATIENT
Start: 2018-07-20 | End: 2018-08-03

## 2018-07-20 RX ORDER — INSULIN LISPRO 100/ML
5 VIAL (ML) SUBCUTANEOUS
Qty: 0 | Refills: 0 | Status: DISCONTINUED | OUTPATIENT
Start: 2018-07-20 | End: 2018-08-03

## 2018-07-20 RX ORDER — CARVEDILOL PHOSPHATE 80 MG/1
6.25 CAPSULE, EXTENDED RELEASE ORAL EVERY 12 HOURS
Qty: 0 | Refills: 0 | Status: DISCONTINUED | OUTPATIENT
Start: 2018-07-20 | End: 2018-08-03

## 2018-07-20 RX ORDER — INSULIN GLARGINE 100 [IU]/ML
15 INJECTION, SOLUTION SUBCUTANEOUS AT BEDTIME
Qty: 0 | Refills: 0 | Status: DISCONTINUED | OUTPATIENT
Start: 2018-07-20 | End: 2018-08-03

## 2018-07-20 RX ORDER — CEFEPIME 1 G/1
750 INJECTION, POWDER, FOR SOLUTION INTRAMUSCULAR; INTRAVENOUS DAILY
Qty: 0 | Refills: 0 | Status: DISCONTINUED | OUTPATIENT
Start: 2018-07-20 | End: 2018-07-20

## 2018-07-20 RX ORDER — SODIUM CHLORIDE 9 MG/ML
1000 INJECTION, SOLUTION INTRAVENOUS
Qty: 0 | Refills: 0 | Status: DISCONTINUED | OUTPATIENT
Start: 2018-07-20 | End: 2018-08-03

## 2018-07-20 RX ORDER — DEXTROSE 50 % IN WATER 50 %
12.5 SYRINGE (ML) INTRAVENOUS ONCE
Qty: 0 | Refills: 0 | Status: DISCONTINUED | OUTPATIENT
Start: 2018-07-20 | End: 2018-08-03

## 2018-07-20 RX ORDER — IPRATROPIUM/ALBUTEROL SULFATE 18-103MCG
3 AEROSOL WITH ADAPTER (GRAM) INHALATION ONCE
Qty: 0 | Refills: 0 | Status: COMPLETED | OUTPATIENT
Start: 2018-07-20 | End: 2018-07-20

## 2018-07-20 RX ORDER — METOCLOPRAMIDE HCL 10 MG
5 TABLET ORAL EVERY 8 HOURS
Qty: 0 | Refills: 0 | Status: DISCONTINUED | OUTPATIENT
Start: 2018-07-20 | End: 2018-08-03

## 2018-07-20 RX ORDER — DEXTROSE 50 % IN WATER 50 %
15 SYRINGE (ML) INTRAVENOUS ONCE
Qty: 0 | Refills: 0 | Status: DISCONTINUED | OUTPATIENT
Start: 2018-07-20 | End: 2018-08-03

## 2018-07-20 RX ORDER — LISINOPRIL 2.5 MG/1
20 TABLET ORAL DAILY
Qty: 0 | Refills: 0 | Status: DISCONTINUED | OUTPATIENT
Start: 2018-07-20 | End: 2018-08-03

## 2018-07-20 RX ORDER — CEFEPIME 1 G/1
1000 INJECTION, POWDER, FOR SOLUTION INTRAMUSCULAR; INTRAVENOUS ONCE
Qty: 0 | Refills: 0 | Status: COMPLETED | OUTPATIENT
Start: 2018-07-20 | End: 2018-07-20

## 2018-07-20 RX ORDER — LANOLIN ALCOHOL/MO/W.PET/CERES
5 CREAM (GRAM) TOPICAL AT BEDTIME
Qty: 0 | Refills: 0 | Status: DISCONTINUED | OUTPATIENT
Start: 2018-07-20 | End: 2018-08-03

## 2018-07-20 RX ORDER — SODIUM CHLORIDE 9 MG/ML
500 INJECTION INTRAMUSCULAR; INTRAVENOUS; SUBCUTANEOUS ONCE
Qty: 0 | Refills: 0 | Status: COMPLETED | OUTPATIENT
Start: 2018-07-20 | End: 2018-07-20

## 2018-07-20 RX ORDER — AMLODIPINE BESYLATE 2.5 MG/1
10 TABLET ORAL DAILY
Qty: 0 | Refills: 0 | Status: DISCONTINUED | OUTPATIENT
Start: 2018-07-21 | End: 2018-08-03

## 2018-07-20 RX ORDER — VANCOMYCIN HCL 1 G
1000 VIAL (EA) INTRAVENOUS ONCE
Qty: 0 | Refills: 0 | Status: COMPLETED | OUTPATIENT
Start: 2018-07-20 | End: 2018-07-20

## 2018-07-20 RX ORDER — HYDRALAZINE HCL 50 MG
50 TABLET ORAL THREE TIMES A DAY
Qty: 0 | Refills: 0 | Status: DISCONTINUED | OUTPATIENT
Start: 2018-07-20 | End: 2018-08-03

## 2018-07-20 RX ORDER — METOCLOPRAMIDE HCL 10 MG
2.5 TABLET ORAL THREE TIMES A DAY
Qty: 0 | Refills: 0 | Status: DISCONTINUED | OUTPATIENT
Start: 2018-07-20 | End: 2018-07-20

## 2018-07-20 RX ORDER — GLUCAGON INJECTION, SOLUTION 0.5 MG/.1ML
1 INJECTION, SOLUTION SUBCUTANEOUS ONCE
Qty: 0 | Refills: 0 | Status: DISCONTINUED | OUTPATIENT
Start: 2018-07-20 | End: 2018-08-03

## 2018-07-20 RX ORDER — CALCIUM ACETATE 667 MG
667 TABLET ORAL
Qty: 0 | Refills: 0 | Status: DISCONTINUED | OUTPATIENT
Start: 2018-07-20 | End: 2018-08-03

## 2018-07-20 RX ORDER — INSULIN LISPRO 100/ML
8 VIAL (ML) SUBCUTANEOUS ONCE
Qty: 0 | Refills: 0 | Status: COMPLETED | OUTPATIENT
Start: 2018-07-20 | End: 2018-07-20

## 2018-07-20 RX ORDER — DIPHENHYDRAMINE HCL 50 MG
25 CAPSULE ORAL EVERY 6 HOURS
Qty: 0 | Refills: 0 | Status: DISCONTINUED | OUTPATIENT
Start: 2018-07-20 | End: 2018-08-03

## 2018-07-20 RX ORDER — VANCOMYCIN HCL 1 G
750 VIAL (EA) INTRAVENOUS
Qty: 0 | Refills: 0 | Status: DISCONTINUED | OUTPATIENT
Start: 2018-07-20 | End: 2018-07-25

## 2018-07-20 RX ORDER — DARBEPOETIN ALFA IN POLYSORBAT 200MCG/0.4
40 PEN INJECTOR (ML) SUBCUTANEOUS
Qty: 0 | Refills: 0 | Status: DISCONTINUED | OUTPATIENT
Start: 2018-07-20 | End: 2018-08-03

## 2018-07-20 RX ORDER — ASPIRIN/CALCIUM CARB/MAGNESIUM 324 MG
81 TABLET ORAL DAILY
Qty: 0 | Refills: 0 | Status: DISCONTINUED | OUTPATIENT
Start: 2018-07-20 | End: 2018-08-03

## 2018-07-20 RX ORDER — ALBUTEROL 90 UG/1
2 AEROSOL, METERED ORAL EVERY 6 HOURS
Qty: 0 | Refills: 0 | Status: DISCONTINUED | OUTPATIENT
Start: 2018-07-20 | End: 2018-08-03

## 2018-07-20 RX ORDER — DARBEPOETIN ALFA IN POLYSORBAT 200MCG/0.4
40 PEN INJECTOR (ML) SUBCUTANEOUS
Qty: 0 | Refills: 0 | Status: DISCONTINUED | OUTPATIENT
Start: 2018-07-20 | End: 2018-07-20

## 2018-07-20 RX ADMIN — Medication 667 MILLIGRAM(S): at 17:14

## 2018-07-20 RX ADMIN — Medication 100 MILLIGRAM(S): at 17:14

## 2018-07-20 RX ADMIN — CEFEPIME 100 MILLIGRAM(S): 1 INJECTION, POWDER, FOR SOLUTION INTRAMUSCULAR; INTRAVENOUS at 01:39

## 2018-07-20 RX ADMIN — HEPARIN SODIUM 5000 UNIT(S): 5000 INJECTION INTRAVENOUS; SUBCUTANEOUS at 17:13

## 2018-07-20 RX ADMIN — HEPARIN SODIUM 5000 UNIT(S): 5000 INJECTION INTRAVENOUS; SUBCUTANEOUS at 22:04

## 2018-07-20 RX ADMIN — SODIUM CHLORIDE 3 MILLILITER(S): 9 INJECTION INTRAMUSCULAR; INTRAVENOUS; SUBCUTANEOUS at 00:15

## 2018-07-20 RX ADMIN — Medication 975 MILLIGRAM(S): at 00:15

## 2018-07-20 RX ADMIN — Medication 5 UNIT(S): at 17:33

## 2018-07-20 RX ADMIN — Medication 3 MILLILITER(S): at 02:13

## 2018-07-20 RX ADMIN — Medication 3 MILLILITER(S): at 02:59

## 2018-07-20 RX ADMIN — Medication 8 UNIT(S): at 23:02

## 2018-07-20 RX ADMIN — SODIUM CHLORIDE 500 MILLILITER(S): 9 INJECTION INTRAMUSCULAR; INTRAVENOUS; SUBCUTANEOUS at 04:01

## 2018-07-20 RX ADMIN — Medication 250 MILLIGRAM(S): at 02:25

## 2018-07-20 RX ADMIN — ALBUTEROL 2 PUFF(S): 90 AEROSOL, METERED ORAL at 10:15

## 2018-07-20 RX ADMIN — Medication 81 MILLIGRAM(S): at 17:13

## 2018-07-20 RX ADMIN — Medication 50 MILLIGRAM(S): at 22:04

## 2018-07-20 RX ADMIN — Medication 5 UNIT(S): at 11:51

## 2018-07-20 RX ADMIN — Medication 5 MILLIGRAM(S): at 23:24

## 2018-07-20 RX ADMIN — Medication 25 MILLIGRAM(S): at 23:48

## 2018-07-20 RX ADMIN — Medication 50 MILLIGRAM(S): at 17:13

## 2018-07-20 RX ADMIN — INSULIN GLARGINE 15 UNIT(S): 100 INJECTION, SOLUTION SUBCUTANEOUS at 22:19

## 2018-07-20 RX ADMIN — Medication 250 MILLIGRAM(S): at 17:12

## 2018-07-20 RX ADMIN — Medication 100 MILLIGRAM(S): at 22:18

## 2018-07-20 RX ADMIN — CARVEDILOL PHOSPHATE 6.25 MILLIGRAM(S): 80 CAPSULE, EXTENDED RELEASE ORAL at 22:04

## 2018-07-20 RX ADMIN — Medication 600 MILLIGRAM(S): at 20:20

## 2018-07-20 RX ADMIN — Medication 3 MILLILITER(S): at 02:26

## 2018-07-20 RX ADMIN — ALBUTEROL 2 PUFF(S): 90 AEROSOL, METERED ORAL at 17:17

## 2018-07-20 NOTE — H&P ADULT - NSHPPHYSICALEXAM_GEN_ALL_CORE
GENERAL: NAD   EYES: EOMI, PERRLA   NECK: Mild pharyngeal erythema with no exudates. Uvula midline  CHEST/LUNG: crackles and wheeze b/l, good air entry, no chest wall retractions.  Port for dialysis   HEART: No reproducible chest wall tenderness. rrr no m/r/g   ABDOMEN: soft, NT, +BS   EXTREMITIES: Radial pules present B/L.  LLE amputated  PSYCH: AAOx3, cooperative, appropriate  NEUROLOGY: AAOx3. CN II - XII intact, strength intact

## 2018-07-20 NOTE — CONSULT NOTE ADULT - SUBJECTIVE AND OBJECTIVE BOX
NEPHROLOGY CONSULTATION NOTE    56 yo F with PMH: DM, HTN, ESRD (MWF), CHF, DLD, LLE amputation, presented 7/19 with fever, generalized weakness, cough with yellow sputum   Found w/ white count 13.12 CXR pending  Was d/c July 9'th after admission for N/V    PAST MEDICAL & SURGICAL HISTORY:  Type 2 diabetes mellitus  CHF (congestive heart failure)  End stage renal failure on dialysis  Hypertension  Hyperlipemia  Heart failure  Amputation of leg: left  History of femoral angiogram: left angiogram  Port-a-cath in place: right chest wall    Allergies:  No Known Allergies    Home Medications Reviewed  Hospital Medications:   MEDICATIONS  (STANDING):  aspirin  chewable 81 milliGRAM(s) Oral daily  calcium acetate 667 milliGRAM(s) Oral three times a day with meals  carvedilol 6.25 milliGRAM(s) Oral every 12 hours  darbepoetin Injectable Syringe 40 MICROGram(s) SubCutaneous <User Schedule>  dextrose 5%. 1000 milliLiter(s) (50 mL/Hr) IV Continuous <Continuous>  heparin  Injectable 5000 Unit(s) SubCutaneous every 8 hours  hydrALAZINE 50 milliGRAM(s) Oral three times a day  insulin glargine Injectable (LANTUS) 15 Unit(s) SubCutaneous at bedtime  insulin lispro Injectable (HumaLOG) 5 Unit(s) SubCutaneous three times a day before meals  lisinopril 20 milliGRAM(s) Oral daily  pregabalin 100 milliGRAM(s) Oral three times a day      SOCIAL HISTORY:  Denies ETOH,Smoking,   FAMILY HISTORY:  No pertinent family history in first degree relatives        REVIEW OF SYSTEMS:  CONSTITUTIONAL: +weakness, fevers no chills  EYES/ENT: No visual changes;  No vertigo or throat pain   NECK: No pain or stiffness  RESPIRATORY: No cough, wheezing, hemoptysis; +shortness of breath  CARDIOVASCULAR: No chest pain or palpitations.  GASTROINTESTINAL: No abdominal or epigastric pain. No nausea, vomiting, or hematemesis; No diarrhea or constipation. No melena or hematochezia.  GENITOURINARY: No dysuria, frequency, foamy urine, urinary urgency, incontinence or hematuria  NEUROLOGICAL: No numbness or weakness  SKIN: No itching, burning, rashes, or lesions   VASCULAR: No bilateral lower extremity edema.   All other review of systems is negative unless indicated above.    VITALS:  T(F): 96.8 (07-20-18 @ 07:46), Max: 101.6 (07-19-18 @ 21:36)  HR: 55 (07-20-18 @ 07:46)  BP: 128/61 (07-20-18 @ 07:46)  RR: 18 (07-20-18 @ 07:46)  SpO2: 97% (07-20-18 @ 07:46)        I&O's Detail    Creatine Kinase, Serum: 180 U/L (07-19-18 @ 23:28)      PHYSICAL EXAM:  Constitutional: NAD  HEENT: anicteric sclera, oropharynx clear, MMM  Neck: No JVD  Respiratory:  +b/l wheezes, no rales or rhonchi  Cardiovascular: S1, S2, RRR  Gastrointestinal: BS+, soft, NT/ND  Extremities: No cyanosis or clubbing. No peripheral edema LLE amputation  Neurological: A/O x 3, no focal deficits  Psychiatric: Normal mood, normal affect  : No CVA tenderness. No anderson.   Skin: No rashes  Vascular Access: Rt tessio    LABS:  07-19    129<L>  |  84<L>  |  46<H>  ----------------------------<  356<H>  9.0<HH>   |  26  |  5.8<HH>    Ca    8.6      19 Jul 2018 23:28    TPro  7.1  /  Alb  3.2<L>  /  TBili  0.4  /  DBili      /  AST  26  /  ALT  24  /  AlkPhos  85  07-19    Creatinine Trend: 5.8 <--, 6.6 <--, 4.9 <--, 7.3 <--, 7.1 <--, 9.2 <--, 8.7 <--                        10.0   13.12 )-----------( 160      ( 19 Jul 2018 23:28 )             29.7     Urine Studies:              RADIOLOGY & ADDITIONAL STUDIES:

## 2018-07-20 NOTE — H&P ADULT - NSHPLABSRESULTS_GEN_ALL_CORE
Labs:                        10.0   13.12 )-----------( 160      ( 19 Jul 2018 23:28 )             29.7             07-19    129<L>  |  84<L>  |  46<H>  ----------------------------<  356<H>  9.0<HH>   |  26  |  5.8<HH>    (K+ hemolyzed)     Ca    8.6      19 Jul 2018 23:28    TPro  7.1  /  Alb  3.2<L>  /  TBili  0.4  /  DBili  x   /  AST  26  /  ALT  24  /  AlkPhos  85  07-19    LIVER FUNCTIONS - ( 19 Jul 2018 23:28 )  Alb: 3.2 g/dL / Pro: 7.1 g/dL / ALK PHOS: 85 U/L / ALT: 24 U/L / AST: 26 U/L / GGT: x                 PT/INR - ( 19 Jul 2018 23:28 )   PT: 11.90 sec;   INR: 1.10 ratio           CARDIAC MARKERS ( 19 Jul 2018 23:28 )  x     / 0.45 ng/mL / 180 U/L / x     / 1.4 ng/mL

## 2018-07-20 NOTE — H&P ADULT - ASSESSMENT
58 yo F with PMH: DM, HTN, ESRD, CHF, DLD, LLE amputation, port in place R chest for dialysis (MWF)  admitted with fever, generalized weakness, cough with yellow sputum     #community acquired pneumonia   -RLL consolidation on CXR  -IV abx, c/w vancomycin (750mg post HD) and cefepime (500mg q24)  -no IV fluids for now, received bolus, normotensive, pt on dialysis  -f/u blood culture and urine culture  -f/u cmp, cbc    #HTN  -currently 128/61  -c/w hydrazlazine, amlodipine, lisinopril  -hold clonidine for now, restart if BP increasing  -monitor BP closely     #ESRD  -dialysis MWF  -c/w darbopoetin   -nephrology consult (pt unsure who her nephrologist is but receives dialysis at [facility])    #DM  -regular FS checks  -if FS > 180 start insulin subcut     #hx of persistent vomiting  -c/w home dose of metoclopramide 0.5mg tid    #heparin sub cut for dvt ppx  #renal/carb consistent diet

## 2018-07-20 NOTE — ED ADULT NURSE REASSESSMENT NOTE - NS ED NURSE REASSESS COMMENT FT1
Patient reassessed - no complaints of pain or discomfort at this time, resting comfortably in bed. tolerated respiratory treatments. IVF and ABX infusing. Patient repositioned for comfort. Will continue to monitor for safety.

## 2018-07-20 NOTE — ED PROVIDER NOTE - ATTENDING CONTRIBUTION TO CARE
I personally evaluated the patient. I reviewed the Resident’s or Physician Assistant’s note (as assigned above), and agree with the findings and plan except as documented in my note.  Pt with hx of renal failure on HD, last time yesterday with 1 day of fever and associated coughing. Still makes urine and no dysuria. + body aches. No CP, SOB. VS reviewed, pt febrile, mildly toxic on exam. Head ncat, normal s1s2 without any murmurs, Lungs CTAB with normal work of breathing. abd +BS, s/nd/nt, extremities wnl, neuro exam grossly normal. No acute skin rashes. Plan is IV, O2, monitor, labs, CXR, UA, abx and likely admission

## 2018-07-20 NOTE — ED PROVIDER NOTE - PSH
Amputation of leg  left  History of femoral angiogram  left angiogram  Port-a-cath in place  right chest wall

## 2018-07-20 NOTE — CONSULT NOTE ADULT - ASSESSMENT
8 yo F with PMH: DM, HTN, ESRD MWF  CHF, DLD, LLE amputation, presenting with fever, generalized weakness, cough with yellow sputum   found w/ Leukocytocis likely PNA    1) ESRD on HD  HD today as usual MWF [place]   2K bath 3hrs 2 L UF as burt       2)HTN  - controlled on home regimen  Caution given PNA     3) Anemia  -Hgb at goal    4) MBD  - check phos, cont Ca acetate    5) PNA -   agree w/ broad spectrum abx was recently discharged from  hospital and is ESRD on HD   -dose vanco by levels   --f/u blood culture and urine culture

## 2018-07-20 NOTE — ED PROVIDER NOTE - OBJECTIVE STATEMENT
58 yo female with PMH of DM, HTN, ESRD, CHF, HLD, left leg ambutation, port in place (right chest) presents to the ED c/o fever, productive cough with clear sputum and nasal congestion x 1 day.  Patient receives dialysis on Mon-Wed-Fri and states she received full dialysis yesterday.  Patient appears tired and weak.  Patient denies chills, chest pain, SOB, dyspnea with exertion, abdominal pain, N/V/D, back pain, dysuria, headache, dizziness, or vision changes. 58 yo female with PMH of DM, HTN, ESRD, CHF, HLD, left leg amputation, port in place (right chest) presents to the ED c/o fever, productive cough with clear sputum and nasal congestion x 1 day.  Patient receives dialysis on Mon-Wed-Fri and states she received full dialysis yesterday.  Patient appears tired and weak.  Patient denies chills, chest pain, SOB, dyspnea with exertion, abdominal pain, N/V/D, back pain, dysuria, headache, dizziness, or vision changes.

## 2018-07-20 NOTE — H&P ADULT - NSHPREVIEWOFSYSTEMS_GEN_ALL_CORE
CONSTITUTIONAL: + generalized weakness + fever No weight loss.  NAD   EYES/ENT: + nasal congestion No visual changes. No ear pain. No sore throat.    NECK: supple, no JVD   RESPIRATORY: + productive cough. SOB on admission, currently denies SOB   CARDIOVASCULAR: No chest pain or palpitations  GASTROINTESTINAL: No abdominal or epigastric pain. No nausea or vomiting, no constipation  NEUROLOGICAL: No headache. No dizziness. No AMS.  SKIN: No itching, rashes

## 2018-07-20 NOTE — H&P ADULT - HISTORY OF PRESENT ILLNESS
56 yo F with PMH: DM, HTN, ESRD, CHF, DLD, LLE amputation, port in place R chest for dialysis (MWF)  admitted with fever, generalized weakness, cough with yellow sputum

## 2018-07-20 NOTE — ED PROVIDER NOTE - NS ED ROS FT
CONSTITUTIONAL: + weakness + fever + malaise No diaphoresis. No weight loss.  EYES/ENT: + nasal congestion No visual changes. No ear pain. No sore throat.    NECK: No pain or stiffness  RESPIRATORY: + productive cough. No shortness of breath  CARDIOVASCULAR: No chest pain or palpitations  GASTROINTESTINAL: No abdominal or epigastric pain. No nausea or vomiting  NEUROLOGICAL: No headache. No dizziness. No AMS.  SKIN: No itching, rashes

## 2018-07-21 RX ORDER — FERROUS SULFATE 325(65) MG
325 TABLET ORAL THREE TIMES A DAY
Qty: 0 | Refills: 0 | Status: DISCONTINUED | OUTPATIENT
Start: 2018-07-21 | End: 2018-08-03

## 2018-07-21 RX ORDER — INSULIN LISPRO 100/ML
5 VIAL (ML) SUBCUTANEOUS ONCE
Qty: 0 | Refills: 0 | Status: COMPLETED | OUTPATIENT
Start: 2018-07-21 | End: 2018-07-21

## 2018-07-21 RX ADMIN — Medication 100 MILLIGRAM(S): at 13:15

## 2018-07-21 RX ADMIN — Medication 100 MILLIGRAM(S): at 05:25

## 2018-07-21 RX ADMIN — Medication 50 MILLIGRAM(S): at 13:15

## 2018-07-21 RX ADMIN — Medication 325 MILLIGRAM(S): at 16:01

## 2018-07-21 RX ADMIN — Medication 81 MILLIGRAM(S): at 13:15

## 2018-07-21 RX ADMIN — LISINOPRIL 20 MILLIGRAM(S): 2.5 TABLET ORAL at 05:22

## 2018-07-21 RX ADMIN — Medication 25 MILLIGRAM(S): at 22:45

## 2018-07-21 RX ADMIN — Medication 50 MILLIGRAM(S): at 22:17

## 2018-07-21 RX ADMIN — Medication 667 MILLIGRAM(S): at 17:58

## 2018-07-21 RX ADMIN — CARVEDILOL PHOSPHATE 6.25 MILLIGRAM(S): 80 CAPSULE, EXTENDED RELEASE ORAL at 17:58

## 2018-07-21 RX ADMIN — CARVEDILOL PHOSPHATE 6.25 MILLIGRAM(S): 80 CAPSULE, EXTENDED RELEASE ORAL at 05:23

## 2018-07-21 RX ADMIN — Medication 50 MILLIGRAM(S): at 05:22

## 2018-07-21 RX ADMIN — Medication 325 MILLIGRAM(S): at 22:15

## 2018-07-21 RX ADMIN — Medication 100 MILLIGRAM(S): at 22:16

## 2018-07-21 RX ADMIN — Medication 5 UNIT(S): at 00:57

## 2018-07-21 RX ADMIN — HEPARIN SODIUM 5000 UNIT(S): 5000 INJECTION INTRAVENOUS; SUBCUTANEOUS at 05:22

## 2018-07-21 RX ADMIN — Medication 667 MILLIGRAM(S): at 09:25

## 2018-07-21 RX ADMIN — HEPARIN SODIUM 5000 UNIT(S): 5000 INJECTION INTRAVENOUS; SUBCUTANEOUS at 22:15

## 2018-07-21 RX ADMIN — INSULIN GLARGINE 15 UNIT(S): 100 INJECTION, SOLUTION SUBCUTANEOUS at 22:14

## 2018-07-21 RX ADMIN — AMLODIPINE BESYLATE 10 MILLIGRAM(S): 2.5 TABLET ORAL at 05:23

## 2018-07-21 RX ADMIN — HEPARIN SODIUM 5000 UNIT(S): 5000 INJECTION INTRAVENOUS; SUBCUTANEOUS at 13:16

## 2018-07-21 RX ADMIN — Medication 5 UNIT(S): at 09:24

## 2018-07-21 RX ADMIN — Medication 5 UNIT(S): at 17:57

## 2018-07-21 NOTE — PROGRESS NOTE ADULT - ASSESSMENT
58 yo F with PMH: DM, HTN, ESRD, CHF, DLD, LLE amputation, port in place R chest for dialysis (MWF)  admitted with fever, generalized weakness, cough with yellow sputum. She still has the cough it is continuous in nature not aggravated or alleviated by any factors and is associated with subjective fever with yellow sputum.    #community acquired pneumonia   -RLL consolidation on CXR  -IV abx, c/w vancomycin (750mg post HD) and cefepime (500mg q24)  -no IV fluids for now, received bolus, normotensive, pt on dialysis  -f/u blood culture and urine culture  -f/u cmp, cbc    #HTN  -currently 128/61  -c/w hydrazlazine, amlodipine, lisinopril  -hold clonidine for now, restart if BP increasing  -monitor BP closely     #ESRD  -dialysis MWF  -c/w darbopoetin   -nephrology consult (pt unsure who her nephrologist is but receives dialysis at [facility])    #DM  -regular FS checks  -if FS > 180 start insulin subcut     #hx of persistent vomiting  -c/w home dose of metoclopramide 0.5mg tid    #heparin sub cut for dvt ppx  #renal/carb consistent diet 56 yo F with PMH: DM, HTN, ESRD, CHF, DLD, LLE amputation, port in place R chest for dialysis (MWF)  admitted with fever, generalized weakness, cough with yellow sputum. She still has the cough it is continuous in nature not aggravated or alleviated by any factors and is associated with subjective fever with yellow sputum. She denied any hx of recent ill contacts. She refused the blood work this morning and insisted on leaving to go home. I counselled her in detail regarding the current medical condition and the increased potassium which needs to be confirmed with a repeat BMP but she still refused the blood work. Pt was seen by nephrology and will undergo HD on Monday. Will try to repeat BMP and get the lab work done if the pt is convinced.    #community acquired pneumonia   -RLL consolidation on CXR  -IV abx, c/w vancomycin (750mg post HD) and cefepime (500mg q24)  -no IV fluids for now, received bolus, normotensive, pt on dialysis  -f/u blood culture and urine culture  -f/u cmp, cbc    #HTN  -currently 128/61  -c/w hydrazlazine, amlodipine, lisinopril  -hold clonidine for now, restart if BP increasing  -monitor BP closely     #ESRD  - dialysis MWF  - c/w darbopoetin   - nephrology consult (pt unsure who her nephrologist is but receives dialysis at [facility])  - pt was seen by nephro and was scheduled for HD on Monday no need for HD today   - Will try to convince the pt for blood work.    #DM  -regular FS checks  -if FS > 180 start insulin subcut     #hx of persistent vomiting  -c/w home dose of metoclopramide 0.5mg tid    #heparin sub cut for dvt ppx  #renal/carb consistent diet

## 2018-07-21 NOTE — PROGRESS NOTE ADULT - SUBJECTIVE AND OBJECTIVE BOX
PHILIPPE SMITH 57y Female  MRN#: [id number]   CODE STATUS:________      SUBJECTIVE  Patient is a 57y old Female who presents with a chief complaint of fever, cough/sputum (20 Jul 2018 08:53)  Currently admitted to medicine with the primary diagnosis of Right lower lobe pneumonia  Hospital course has been complicated by _______.   Today is hospital day 1d, and this morning she is _________ and reports ________ overnight events.     Present Today:           Bolanos Catheter ()No/ ()Yes? Indication:          Central Line ()No/ ()Yes? Indication:          IV Fluids ()No/ ()Yes? Type:  Rate:  Indication:      OBJECTIVE  PAST MEDICAL & SURGICAL HISTORY  Type 2 diabetes mellitus  CHF (congestive heart failure)  End stage renal failure on dialysis  Hypertension  Hyperlipemia  Heart failure  Amputation of leg: left  History of femoral angiogram: left angiogram  Port-a-cath in place: right chest wall    ALLERGIES:  No Known Allergies    MEDICATIONS:  STANDING MEDICATIONS  amLODIPine   Tablet 10 milliGRAM(s) Oral daily  aspirin  chewable 81 milliGRAM(s) Oral daily  calcium acetate 667 milliGRAM(s) Oral three times a day with meals  carvedilol 6.25 milliGRAM(s) Oral every 12 hours  darbepoetin Injectable Syringe 40 MICROGram(s) IV Push <User Schedule>  dextrose 5%. 1000 milliLiter(s) IV Continuous <Continuous>  dextrose 50% Injectable 12.5 Gram(s) IV Push once  dextrose 50% Injectable 25 Gram(s) IV Push once  dextrose 50% Injectable 25 Gram(s) IV Push once  ferrous    sulfate 325 milliGRAM(s) Oral three times a day  heparin  Injectable 5000 Unit(s) SubCutaneous every 8 hours  hydrALAZINE 50 milliGRAM(s) Oral three times a day  insulin glargine Injectable (LANTUS) 15 Unit(s) SubCutaneous at bedtime  insulin lispro Injectable (HumaLOG) 5 Unit(s) SubCutaneous three times a day before meals  lisinopril 20 milliGRAM(s) Oral daily  pregabalin 100 milliGRAM(s) Oral three times a day  vancomycin  IVPB 750 milliGRAM(s) IV Intermittent <User Schedule>    PRN MEDICATIONS  ALBUTerol    90 MICROgram(s) HFA Inhaler 2 Puff(s) Inhalation every 6 hours PRN  dextrose 40% Gel 15 Gram(s) Oral once PRN  diphenhydrAMINE   Capsule 25 milliGRAM(s) Oral every 6 hours PRN  glucagon  Injectable 1 milliGRAM(s) IntraMuscular once PRN  guaiFENesin  milliGRAM(s) Oral every 12 hours PRN  melatonin 5 milliGRAM(s) Oral at bedtime PRN  metoclopramide 5 milliGRAM(s) Oral every 8 hours PRN      VITAL SIGNS: Last 24 Hours  T(C): 37.3 (21 Jul 2018 12:50), Max: 37.3 (21 Jul 2018 12:50)  T(F): 99.2 (21 Jul 2018 12:50), Max: 99.2 (21 Jul 2018 12:50)  HR: 68 (21 Jul 2018 12:50) (68 - 88)  BP: 110/54 (21 Jul 2018 12:50) (110/54 - 196/79)  BP(mean): --  RR: 18 (21 Jul 2018 12:50) (18 - 18)  SpO2: 98% (21 Jul 2018 05:17) (98% - 98%)    LABS:                        10.0   13.12 )-----------( 160      ( 19 Jul 2018 23:28 )             29.7     07-19    129<L>  |  84<L>  |  46<H>  ----------------------------<  356<H>  9.0<HH>   |  26  |  5.8<HH>    Ca    8.6      19 Jul 2018 23:28    TPro  7.1  /  Alb  3.2<L>  /  TBili  0.4  /  DBili  x   /  AST  26  /  ALT  24  /  AlkPhos  85  07-19    PT/INR - ( 19 Jul 2018 23:28 )   PT: 11.90 sec;   INR: 1.10 ratio                   Culture - Blood (collected 19 Jul 2018 23:29)  Source: .Blood Blood  Preliminary Report (21 Jul 2018 06:01):    No growth to date.    Culture - Blood (collected 19 Jul 2018 23:29)  Source: .Blood Blood  Preliminary Report (21 Jul 2018 06:01):    No growth to date.      CARDIAC MARKERS ( 19 Jul 2018 23:28 )  x     / 0.45 ng/mL / 180 U/L / x     / 1.4 ng/mL      RADIOLOGY:      PHYSICAL EXAM:    GENERAL: NAD, well-developed, AAOx3  HEENT:  Atraumatic, Normocephalic. EOMI, PERRLA, conjunctiva and sclera clear, No JVD  PULMONARY: Clear to auscultation bilaterally; No wheeze  CARDIOVASCULAR: Regular rate and rhythm; No murmurs, rubs, or gallops  GASTROINTESTINAL: Soft, Nontender, Nondistended; Bowel sounds present  MUSCULOSKELETAL:  2+ Peripheral Pulses, No clubbing, cyanosis, or edema  NEUROLOGY: non-focal  SKIN: No rashes or lesions      ADMISSION SUMMARY  Patient is a 57y old Female who presents with a chief complaint of fever, cough/sputum (20 Jul 2018 08:53)  Currently admitted to medicine with the primary diagnosis of Right lower lobe pneumonia  Hospital course has been complicated by _______.       ASSESSMENT & PLAN    1. RIGHT LOWER LOBE PNEUMONIA;SEPSIS      2.    3. Type 2 diabetes mellitus  CHF (congestive heart failure)  End stage renal failure on dialysis  Hypertension  Hyperlipemia  Heart failure        Present today:  ( ) Congestive Heart Failure, Yes? ( )Acute / ( )Acute on Chronic / ( )Chronic  :  ( )Systolic / ( )Diastolic               Plan:  ( ) Complicated Pneumonia, Type?  ( )Parapneumonic effusion / ( )Abscess / ( ) Multilobar / ( )Other               Plan:  ( ) Morbid Obesity, Yes? BMI:               Plan:  ( ) Functional Quadriplegia               Plan:  ( ) Encephalopathy               Plan:    ( ) Discussion with patient and/or family regarding goals of care  ( ) Discussed Case and Plan with Medical Attending, Name:      # Planned Disposition: ________ PHILIPPE SMITH 57y Female  MRN#: [id number]   CODE STATUS:       SUBJECTIVE  Patient is a 57y old Female who presents with a chief complaint of fever, cough/sputum (20 Jul 2018 08:53)  Currently admitted to medicine with the primary diagnosis of Right lower lobe pneumonia  Today is hospital day 1d, and this morning she is still complaining of productive cough. She is refusing blood work and wants to go home.      OBJECTIVE  PAST MEDICAL & SURGICAL HISTORY  Type 2 diabetes mellitus  CHF (congestive heart failure)  End stage renal failure on dialysis  Hypertension  Hyperlipemia  Heart failure  Amputation of leg: left  History of femoral angiogram: left angiogram  Port-a-cath in place: right chest wall    ALLERGIES:  No Known Allergies    MEDICATIONS:  STANDING MEDICATIONS  amLODIPine   Tablet 10 milliGRAM(s) Oral daily  aspirin  chewable 81 milliGRAM(s) Oral daily  calcium acetate 667 milliGRAM(s) Oral three times a day with meals  carvedilol 6.25 milliGRAM(s) Oral every 12 hours  darbepoetin Injectable Syringe 40 MICROGram(s) IV Push <User Schedule>  dextrose 5%. 1000 milliLiter(s) IV Continuous <Continuous>  dextrose 50% Injectable 12.5 Gram(s) IV Push once  dextrose 50% Injectable 25 Gram(s) IV Push once  dextrose 50% Injectable 25 Gram(s) IV Push once  ferrous    sulfate 325 milliGRAM(s) Oral three times a day  heparin  Injectable 5000 Unit(s) SubCutaneous every 8 hours  hydrALAZINE 50 milliGRAM(s) Oral three times a day  insulin glargine Injectable (LANTUS) 15 Unit(s) SubCutaneous at bedtime  insulin lispro Injectable (HumaLOG) 5 Unit(s) SubCutaneous three times a day before meals  lisinopril 20 milliGRAM(s) Oral daily  pregabalin 100 milliGRAM(s) Oral three times a day  vancomycin  IVPB 750 milliGRAM(s) IV Intermittent <User Schedule>    PRN MEDICATIONS  ALBUTerol    90 MICROgram(s) HFA Inhaler 2 Puff(s) Inhalation every 6 hours PRN  dextrose 40% Gel 15 Gram(s) Oral once PRN  diphenhydrAMINE   Capsule 25 milliGRAM(s) Oral every 6 hours PRN  glucagon  Injectable 1 milliGRAM(s) IntraMuscular once PRN  guaiFENesin  milliGRAM(s) Oral every 12 hours PRN  melatonin 5 milliGRAM(s) Oral at bedtime PRN  metoclopramide 5 milliGRAM(s) Oral every 8 hours PRN      VITAL SIGNS: Last 24 Hours  T(C): 37.3 (21 Jul 2018 12:50), Max: 37.3 (21 Jul 2018 12:50)  T(F): 99.2 (21 Jul 2018 12:50), Max: 99.2 (21 Jul 2018 12:50)  HR: 68 (21 Jul 2018 12:50) (68 - 88)  BP: 110/54 (21 Jul 2018 12:50) (110/54 - 196/79)  BP(mean): --  RR: 18 (21 Jul 2018 12:50) (18 - 18)  SpO2: 98% (21 Jul 2018 05:17) (98% - 98%)    LABS:                        10.0   13.12 )-----------( 160      ( 19 Jul 2018 23:28 )             29.7     07-19    129<L>  |  84<L>  |  46<H>  ----------------------------<  356<H>  9.0<HH>   |  26  |  5.8<HH>    Ca    8.6      19 Jul 2018 23:28    TPro  7.1  /  Alb  3.2<L>  /  TBili  0.4  /  DBili  x   /  AST  26  /  ALT  24  /  AlkPhos  85  07-19    PT/INR - ( 19 Jul 2018 23:28 )   PT: 11.90 sec;   INR: 1.10 ratio                   Culture - Blood (collected 19 Jul 2018 23:29)  Source: .Blood Blood  Preliminary Report (21 Jul 2018 06:01):    No growth to date.    Culture - Blood (collected 19 Jul 2018 23:29)  Source: .Blood Blood  Preliminary Report (21 Jul 2018 06:01):    No growth to date.      CARDIAC MARKERS ( 19 Jul 2018 23:28 )  x     / 0.45 ng/mL / 180 U/L / x     / 1.4 ng/mL      RADIOLOGY:      PHYSICAL EXAM:    Patient did not allow me to examine her and insisted on going home.

## 2018-07-21 NOTE — PROGRESS NOTE ADULT - ASSESSMENT
PMH: DM, HTN, ESRD MWF  CHF, DLD, LLE amputation, presenting with fever, generalized weakness, cough with yellow sputum   found w/ Leukocytocis likely PNA    1) ESRD on HD  s/p hd yesterday, hd on monday   2)HTN  - controlled on home regimen  Caution given PNA   3) Anemia  -Hgb at goal, on BRYON   4) MBD  - check phos, cont Ca acetate  5) PNA -   agree w/ broad spectrum abx was recently discharged from  hospital and is ESRD on HD   -dose vanco by levels   6) K : 9 hemolyzed, on VBG : 4.3, check repeat  7) hyponatremia ? secondary to hypervolemia, check repeat today

## 2018-07-21 NOTE — PROGRESS NOTE ADULT - SUBJECTIVE AND OBJECTIVE BOX
56 yo F with PMH: DM, HTN, ESRD, CHF, DLD, LLE amputation, port in place R chest for dialysis (MWF)  admitted with fever, generalized weakness, cough with yellow sputum (20 Jul 2018 08:53)  today she is feeling better, still has cough but less severe and sputum is less. no fever    Vital Signs Last 24 Hrs  T(C): 37.3 (21 Jul 2018 12:50), Max: 37.3 (21 Jul 2018 12:50)  T(F): 99.2 (21 Jul 2018 12:50), Max: 99.2 (21 Jul 2018 12:50)  HR: 68 (21 Jul 2018 12:50) (62 - 88)  BP: 110/54 (21 Jul 2018 12:50) (110/54 - 196/79)  BP(mean): --  RR: 18 (21 Jul 2018 12:50) (16 - 18)  SpO2: 98% (21 Jul 2018 05:17) (98% - 98%)    Physican exam:   constitutional NAD, AAOX3, Respiratory  lungs bilat few crackles, CVS heart RRR, GI: abdomen Soft NT, ND, BS+, skin: intact  pt looks tired today but no distress                          10.0   13.12 )-----------( 160      ( 19 Jul 2018 23:28 )             29.7     07-19    129<L>  |  84<L>  |  46<H>  ----------------------------<  356<H>  9.0<HH>   |  26  |  5.8<HH>    Ca    8.6      19 Jul 2018 23:28    TPro  7.1  /  Alb  3.2<L>  /  TBili  0.4  /  DBili  x   /  AST  26  /  ALT  24  /  AlkPhos  85  07-19    a/p    1- sepsis pna  cont abx    2- esrd cont hd    3- anemia, mild and acceptable for esrd  cont epo,   Iron Total, Serum: 19 ug/dL (03.01.18 @ 10:19)  replace iron    4- hyperkalemia, hemolyzed, repeat labs    5- hypnatremia, monitor, could be due to pna.

## 2018-07-21 NOTE — PROGRESS NOTE ADULT - SUBJECTIVE AND OBJECTIVE BOX
seen and examined  no distress     Standing Inpatient Medications  amLODIPine   Tablet 10 milliGRAM(s) Oral daily  aspirin  chewable 81 milliGRAM(s) Oral daily  calcium acetate 667 milliGRAM(s) Oral three times a day with meals  carvedilol 6.25 milliGRAM(s) Oral every 12 hours  darbepoetin Injectable Syringe 40 MICROGram(s) IV Push <User Schedule>  dextrose 5%. 1000 milliLiter(s) IV Continuous <Continuous>  dextrose 50% Injectable 12.5 Gram(s) IV Push once  dextrose 50% Injectable 25 Gram(s) IV Push once  dextrose 50% Injectable 25 Gram(s) IV Push once  heparin  Injectable 5000 Unit(s) SubCutaneous every 8 hours  hydrALAZINE 50 milliGRAM(s) Oral three times a day  insulin glargine Injectable (LANTUS) 15 Unit(s) SubCutaneous at bedtime  insulin lispro Injectable (HumaLOG) 5 Unit(s) SubCutaneous three times a day before meals  lisinopril 20 milliGRAM(s) Oral daily  pregabalin 100 milliGRAM(s) Oral three times a day  vancomycin  IVPB 750 milliGRAM(s) IV Intermittent <User Schedule>          VITALS/PHYSICAL EXAM  --------------------------------------------------------------------------------  T(C): 36.8 (07-21-18 @ 05:17), Max: 37.1 (07-20-18 @ 20:59)  HR: 78 (07-21-18 @ 05:17) (62 - 88)  BP: 150/67 (07-21-18 @ 05:17) (133/79 - 196/79)  RR: 18 (07-21-18 @ 05:17) (16 - 18)  SpO2: 98% (07-21-18 @ 05:17) (98% - 98%)  Wt(kg): --  Height (cm): 152.4 (07-20-18 @ 20:59)      07-20-18 @ 07:01  -  07-21-18 @ 07:00  --------------------------------------------------------  IN: 0 mL / OUT: 2500 mL / NET: -2500 mL      Physical Exam:  	Gen: NAD  	Pulm: decrease BS  B/L  	CV:  S1S2; no rub  	Abd: +distended  	LE: bka   	Vascular access: chest wall tesio     LABS/STUDIES  --------------------------------------------------------------------------------              10.0   13.12 >-----------<  160      [07-19-18 @ 23:28]              29.7     129  |  84  |  46  ----------------------------<  356      [07-19-18 @ 23:28]  9.0   |  26  |  5.8        Ca     8.6     [07-19-18 @ 23:28]    TPro  7.1  /  Alb  3.2  /  TBili  0.4  /  DBili  x   /  AST  26  /  ALT  24  /  AlkPhos  85  [07-19-18 @ 23:28]    PT/INR: PT 11.90, INR 1.10       [07-19-18 @ 23:28]    Troponin 0.45      [07-19-18 @ 23:28]        [07-19-18 @ 23:28]    Creatinine Trend:  SCr 5.8 [07-19 @ 23:28]  SCr 6.6 [07-07 @ 06:46]  SCr 4.9 [07-06 @ 07:32]  SCr 7.3 [07-05 @ 08:06]  SCr 7.1 [07-05 @ 05:41]        Iron 19, TIBC 116, %sat 16      [03-01-18 @ 10:19]  Ferritin 1091.0      [03-01-18 @ 10:19]

## 2018-07-22 RX ORDER — ACETAMINOPHEN 500 MG
650 TABLET ORAL ONCE
Qty: 0 | Refills: 0 | Status: COMPLETED | OUTPATIENT
Start: 2018-07-22 | End: 2018-07-22

## 2018-07-22 RX ADMIN — Medication 667 MILLIGRAM(S): at 08:18

## 2018-07-22 RX ADMIN — Medication 100 MILLIGRAM(S): at 17:51

## 2018-07-22 RX ADMIN — CARVEDILOL PHOSPHATE 6.25 MILLIGRAM(S): 80 CAPSULE, EXTENDED RELEASE ORAL at 06:03

## 2018-07-22 RX ADMIN — Medication 5 UNIT(S): at 08:18

## 2018-07-22 RX ADMIN — Medication 5 UNIT(S): at 17:52

## 2018-07-22 RX ADMIN — Medication 50 MILLIGRAM(S): at 13:30

## 2018-07-22 RX ADMIN — AMLODIPINE BESYLATE 10 MILLIGRAM(S): 2.5 TABLET ORAL at 06:04

## 2018-07-22 RX ADMIN — Medication 650 MILLIGRAM(S): at 22:11

## 2018-07-22 RX ADMIN — Medication 600 MILLIGRAM(S): at 08:23

## 2018-07-22 RX ADMIN — Medication 50 MILLIGRAM(S): at 06:05

## 2018-07-22 RX ADMIN — Medication 200 MILLIGRAM(S): at 22:11

## 2018-07-22 RX ADMIN — Medication 100 MILLIGRAM(S): at 21:28

## 2018-07-22 RX ADMIN — Medication 650 MILLIGRAM(S): at 22:40

## 2018-07-22 RX ADMIN — LISINOPRIL 20 MILLIGRAM(S): 2.5 TABLET ORAL at 06:04

## 2018-07-22 RX ADMIN — Medication 325 MILLIGRAM(S): at 06:04

## 2018-07-22 RX ADMIN — CARVEDILOL PHOSPHATE 6.25 MILLIGRAM(S): 80 CAPSULE, EXTENDED RELEASE ORAL at 17:52

## 2018-07-22 RX ADMIN — Medication 100 MILLIGRAM(S): at 06:06

## 2018-07-22 RX ADMIN — Medication 667 MILLIGRAM(S): at 17:52

## 2018-07-22 RX ADMIN — HEPARIN SODIUM 5000 UNIT(S): 5000 INJECTION INTRAVENOUS; SUBCUTANEOUS at 21:25

## 2018-07-22 RX ADMIN — Medication 50 MILLIGRAM(S): at 21:26

## 2018-07-22 RX ADMIN — HEPARIN SODIUM 5000 UNIT(S): 5000 INJECTION INTRAVENOUS; SUBCUTANEOUS at 13:31

## 2018-07-22 RX ADMIN — Medication 325 MILLIGRAM(S): at 21:25

## 2018-07-22 RX ADMIN — Medication 5 UNIT(S): at 13:28

## 2018-07-22 RX ADMIN — Medication 667 MILLIGRAM(S): at 13:30

## 2018-07-22 RX ADMIN — Medication 81 MILLIGRAM(S): at 13:30

## 2018-07-22 RX ADMIN — Medication 325 MILLIGRAM(S): at 13:31

## 2018-07-22 RX ADMIN — INSULIN GLARGINE 15 UNIT(S): 100 INJECTION, SOLUTION SUBCUTANEOUS at 21:26

## 2018-07-22 NOTE — PROGRESS NOTE ADULT - SUBJECTIVE AND OBJECTIVE BOX
PROGRESS NOTE  Chief Complaint:  Patient is a 57y old  Female who presents with a chief complaint of fever, cough/sputum (2018 08:53)  HPI:  58 yo F with PMH: DM, HTN, ESRD, CHF, DLD, LLE amputation, port in place R chest for dialysis (MWF)  admitted with fever, generalized weakness, cough with yellow sputum (2018 08:53)      ALLERGIES:  No Known Allergies      HOSPITAL MEDICATIONS:  MEDICATIONS  (STANDING):  amLODIPine   Tablet 10 milliGRAM(s) Oral daily  aspirin  chewable 81 milliGRAM(s) Oral daily  calcium acetate 667 milliGRAM(s) Oral three times a day with meals  carvedilol 6.25 milliGRAM(s) Oral every 12 hours  darbepoetin Injectable Syringe 40 MICROGram(s) IV Push <User Schedule>  dextrose 5%. 1000 milliLiter(s) (50 mL/Hr) IV Continuous <Continuous>  dextrose 50% Injectable 12.5 Gram(s) IV Push once  dextrose 50% Injectable 25 Gram(s) IV Push once  dextrose 50% Injectable 25 Gram(s) IV Push once  ferrous    sulfate 325 milliGRAM(s) Oral three times a day  heparin  Injectable 5000 Unit(s) SubCutaneous every 8 hours  hydrALAZINE 50 milliGRAM(s) Oral three times a day  insulin glargine Injectable (LANTUS) 15 Unit(s) SubCutaneous at bedtime  insulin lispro Injectable (HumaLOG) 5 Unit(s) SubCutaneous three times a day before meals  lisinopril 20 milliGRAM(s) Oral daily  pregabalin 100 milliGRAM(s) Oral three times a day  vancomycin  IVPB 750 milliGRAM(s) IV Intermittent <User Schedule>    MEDICATIONS  (PRN):  ALBUTerol    90 MICROgram(s) HFA Inhaler 2 Puff(s) Inhalation every 6 hours PRN Shortness of Breath and/or Wheezing  dextrose 40% Gel 15 Gram(s) Oral once PRN Blood Glucose LESS THAN 70 milliGRAM(s)/deciliter  diphenhydrAMINE   Capsule 25 milliGRAM(s) Oral every 6 hours PRN Rash and/or Itching  glucagon  Injectable 1 milliGRAM(s) IntraMuscular once PRN Glucose LESS THAN 70 milligrams/deciliter  guaiFENesin  milliGRAM(s) Oral every 12 hours PRN Cough  melatonin 5 milliGRAM(s) Oral at bedtime PRN Insomnia  metoclopramide 5 milliGRAM(s) Oral every 8 hours PRN n/v      PMHX/PSHX:  Type 2 diabetes mellitus  CHF (congestive heart failure)  End stage renal failure on dialysis  Hypertension  Hyperlipemia  Heart failure  Acute heart failure, unspecified heart failure type  Heart failure  Amputation of leg  History of femoral angiogram  Port-a-cath in place      FAMILY HISTORY:  No pertinent family history in first degree relatives      SOCIAL HISTORY: non smoker as pr patient     REVIEW OF SYSTEMS:     General:  No wt loss, fevers, chills, night sweats, fatigue,   Eyes:  Good vision, no reported pain  ENT:  No sore throat, pain, runny nose, dysphagia  CV:  No pain, palpitations, hypo/hypertension  Resp:  No dyspnea, cough, tachypnea, wheezing  GI:  No pain, No nausea, No vomiting, No diarrhea, No constipation, No weight loss, No fever, No pruritis, No rectal bleeding, No tarry stools, No dysphagia,  :  No pain, bleeding, incontinence, nocturia  Muscle:  No pain, weakness  Neuro:  No weakness, tingling, memory problems  Psych:  No fatigue, insomnia, mood problems, depression  Endocrine:  No polyuria, polydipsia, cold/heat intolerance  Heme:  No petechiae, ecchymosis, easy bruisability  Skin:  No rash, tattoos, scars, edema      PHYSICAL EXAM:   Vital Signs:  Vital Signs Last 24 Hrs  T(C): 36.3 (2018 14:14), Max: 37.5 (2018 06:13)  T(F): 97.3 (2018 14:14), Max: 99.5 (2018 06:13)  HR: 65 (2018 14:14) (65 - 76)  BP: 131/61 (2018 14:14) (109/55 - 144/65)  BP(mean): --  RR: 17 (2018 14:14) (16 - 18)  SpO2: 97% (2018 13:27) (97% - 98%)  Daily     Daily Weight in k (2018 06:13)    GENERAL:  Appears stated age, well-groomed, well-nourished, no distress  HEENT:  NC/AT,  conjunctivae clear and pink, no thyromegaly, nodules, adenopathy, no JVD, sclera -anicteric  CHEST:  Full & symmetric excursion, no increased effort, breath sounds , wheezing   HEART / CVS : Regular rhythm, S1, S2, no murmur/rub/S3/S4, no abdominal bruit, no edema  ABDOMEN:  Soft, non-tender, non-distended, normoactive bowel sounds,  no masses ,no hepato-splenomegaly, no signs of chronic liver disease  EXTEREMITIES:  no cyanosis,clubbing or edema  SKIN:  No rash/erythema/ecchymoses/petechiae/wounds/abscess/warm/dry  NEURO:  Alert, oriented, no asterixis, no tremor, no encephalopathy      ASSESSMENT & PLAN:

## 2018-07-22 NOTE — PROGRESS NOTE ADULT - ASSESSMENT
56 yo F with PMH: DM, HTN, ESRD, CHF, DLD, LLE amputation, port in place R chest for dialysis (MWF)  admitted with fever, generalized weakness, cough with yellow sputum. She still has the cough it is continuous in nature not aggravated or alleviated by any factors and is associated with subjective fever with yellow sputum. She denied any hx of recent ill contacts. She refused the blood work this morning and insisted on leaving to go home. I counselled her in detail regarding the current medical condition and the increased potassium which needs to be confirmed with a repeat BMP but she still refused the blood work. Pt was seen by nephrology and will undergo HD on Monday. Will try to repeat BMP and get the lab work done if the pt is convinced.    #community acquired pneumonia   -RLL consolidation on CXR  elevated white count 13 plus  -IV abx, c/w vancomycin (750mg post HD) and cefepime (500mg q24)  -no IV fluids for now, received bolus, normotensive, pt on dialysis  -f/u blood culture and urine culture  -f/u cmp, cbc    #HTN  -currently 131/60  -c/w hydrazlazine, amlodipine, lisinopril  -hold clonidine for now, restart if BP increasing  -monitor BP closely     #ESRD  - dialysis MWF  - c/w darbopoetin   - nephrology consult (pt unsure who her nephrologist is but receives dialysis at [facility])  - pt was seen by nephro and was scheduled for HD on Monday no need for HD today   - Will try to convince the pt for blood work.  follow up cbc tomorrow     #DM  -regular FS checks  -if FS > 180 start insulin subcut     #hx of persistent vomiting  -c/w home dose of metoclopramide 0.5mg tid    #heparin sub cut for dvt ppx  #renal/carb consistent diet

## 2018-07-23 LAB
ALBUMIN SERPL ELPH-MCNC: 2.8 G/DL — LOW (ref 3.5–5.2)
ALP SERPL-CCNC: 94 U/L — SIGNIFICANT CHANGE UP (ref 30–115)
ALT FLD-CCNC: 12 U/L — SIGNIFICANT CHANGE UP (ref 0–41)
ANION GAP SERPL CALC-SCNC: 25 MMOL/L — HIGH (ref 7–14)
AST SERPL-CCNC: 8 U/L — SIGNIFICANT CHANGE UP (ref 0–41)
BASOPHILS # BLD AUTO: 0.02 K/UL — SIGNIFICANT CHANGE UP (ref 0–0.2)
BASOPHILS NFR BLD AUTO: 0.2 % — SIGNIFICANT CHANGE UP (ref 0–1)
BILIRUB SERPL-MCNC: 0.2 MG/DL — SIGNIFICANT CHANGE UP (ref 0.2–1.2)
BUN SERPL-MCNC: 80 MG/DL — CRITICAL HIGH (ref 10–20)
CALCIUM SERPL-MCNC: 8.3 MG/DL — LOW (ref 8.5–10.1)
CHLORIDE SERPL-SCNC: 91 MMOL/L — LOW (ref 98–110)
CO2 SERPL-SCNC: 24 MMOL/L — SIGNIFICANT CHANGE UP (ref 17–32)
CREAT SERPL-MCNC: 7.8 MG/DL — CRITICAL HIGH (ref 0.7–1.5)
EOSINOPHIL # BLD AUTO: 0.19 K/UL — SIGNIFICANT CHANGE UP (ref 0–0.7)
EOSINOPHIL NFR BLD AUTO: 1.8 % — SIGNIFICANT CHANGE UP (ref 0–8)
GLUCOSE SERPL-MCNC: 262 MG/DL — HIGH (ref 70–99)
HCT VFR BLD CALC: 24.3 % — LOW (ref 37–47)
HGB BLD-MCNC: 8.3 G/DL — LOW (ref 12–16)
IMM GRANULOCYTES NFR BLD AUTO: 0.7 % — HIGH (ref 0.1–0.3)
LYMPHOCYTES # BLD AUTO: 1.76 K/UL — SIGNIFICANT CHANGE UP (ref 1.2–3.4)
LYMPHOCYTES # BLD AUTO: 17.1 % — LOW (ref 20.5–51.1)
MCHC RBC-ENTMCNC: 28.2 PG — SIGNIFICANT CHANGE UP (ref 27–31)
MCHC RBC-ENTMCNC: 34.2 G/DL — SIGNIFICANT CHANGE UP (ref 32–37)
MCV RBC AUTO: 82.7 FL — SIGNIFICANT CHANGE UP (ref 81–99)
MONOCYTES # BLD AUTO: 0.79 K/UL — HIGH (ref 0.1–0.6)
MONOCYTES NFR BLD AUTO: 7.7 % — SIGNIFICANT CHANGE UP (ref 1.7–9.3)
MRSA PCR RESULT.: POSITIVE
NEUTROPHILS # BLD AUTO: 7.49 K/UL — HIGH (ref 1.4–6.5)
NEUTROPHILS NFR BLD AUTO: 72.5 % — SIGNIFICANT CHANGE UP (ref 42.2–75.2)
NRBC # BLD: 0 /100 WBCS — SIGNIFICANT CHANGE UP (ref 0–0)
PLATELET # BLD AUTO: 130 K/UL — SIGNIFICANT CHANGE UP (ref 130–400)
POTASSIUM SERPL-MCNC: 4.7 MMOL/L — SIGNIFICANT CHANGE UP (ref 3.5–5)
POTASSIUM SERPL-SCNC: 4.7 MMOL/L — SIGNIFICANT CHANGE UP (ref 3.5–5)
PROT SERPL-MCNC: 6.7 G/DL — SIGNIFICANT CHANGE UP (ref 6–8)
RBC # BLD: 2.94 M/UL — LOW (ref 4.2–5.4)
RBC # FLD: 15.7 % — HIGH (ref 11.5–14.5)
SODIUM SERPL-SCNC: 140 MMOL/L — SIGNIFICANT CHANGE UP (ref 135–146)
WBC # BLD: 10.32 K/UL — SIGNIFICANT CHANGE UP (ref 4.8–10.8)
WBC # FLD AUTO: 10.32 K/UL — SIGNIFICANT CHANGE UP (ref 4.8–10.8)

## 2018-07-23 RX ORDER — ACETAMINOPHEN 500 MG
650 TABLET ORAL ONCE
Qty: 0 | Refills: 0 | Status: COMPLETED | OUTPATIENT
Start: 2018-07-23 | End: 2018-07-23

## 2018-07-23 RX ORDER — CEFEPIME 1 G/1
1000 INJECTION, POWDER, FOR SOLUTION INTRAMUSCULAR; INTRAVENOUS ONCE
Qty: 0 | Refills: 0 | Status: COMPLETED | OUTPATIENT
Start: 2018-07-23 | End: 2018-07-23

## 2018-07-23 RX ORDER — CEFEPIME 1 G/1
INJECTION, POWDER, FOR SOLUTION INTRAMUSCULAR; INTRAVENOUS
Qty: 0 | Refills: 0 | Status: DISCONTINUED | OUTPATIENT
Start: 2018-07-23 | End: 2018-07-31

## 2018-07-23 RX ORDER — CEFEPIME 1 G/1
1000 INJECTION, POWDER, FOR SOLUTION INTRAMUSCULAR; INTRAVENOUS EVERY 24 HOURS
Qty: 0 | Refills: 0 | Status: DISCONTINUED | OUTPATIENT
Start: 2018-07-24 | End: 2018-07-31

## 2018-07-23 RX ADMIN — Medication 25 MILLIGRAM(S): at 21:41

## 2018-07-23 RX ADMIN — Medication 250 MILLIGRAM(S): at 13:49

## 2018-07-23 RX ADMIN — Medication 650 MILLIGRAM(S): at 07:53

## 2018-07-23 RX ADMIN — Medication 325 MILLIGRAM(S): at 21:41

## 2018-07-23 RX ADMIN — CARVEDILOL PHOSPHATE 6.25 MILLIGRAM(S): 80 CAPSULE, EXTENDED RELEASE ORAL at 05:54

## 2018-07-23 RX ADMIN — Medication 100 MILLIGRAM(S): at 21:40

## 2018-07-23 RX ADMIN — Medication 667 MILLIGRAM(S): at 07:53

## 2018-07-23 RX ADMIN — Medication 100 MILLIGRAM(S): at 05:55

## 2018-07-23 RX ADMIN — INSULIN GLARGINE 15 UNIT(S): 100 INJECTION, SOLUTION SUBCUTANEOUS at 21:41

## 2018-07-23 RX ADMIN — Medication 200 MILLIGRAM(S): at 17:55

## 2018-07-23 RX ADMIN — Medication 50 MILLIGRAM(S): at 05:54

## 2018-07-23 RX ADMIN — HEPARIN SODIUM 5000 UNIT(S): 5000 INJECTION INTRAVENOUS; SUBCUTANEOUS at 05:54

## 2018-07-23 RX ADMIN — Medication 5 UNIT(S): at 17:41

## 2018-07-23 RX ADMIN — Medication 200 MILLIGRAM(S): at 06:14

## 2018-07-23 RX ADMIN — CARVEDILOL PHOSPHATE 6.25 MILLIGRAM(S): 80 CAPSULE, EXTENDED RELEASE ORAL at 17:42

## 2018-07-23 RX ADMIN — Medication 667 MILLIGRAM(S): at 17:42

## 2018-07-23 RX ADMIN — Medication 50 MILLIGRAM(S): at 21:41

## 2018-07-23 RX ADMIN — Medication 40 MICROGRAM(S): at 15:17

## 2018-07-23 RX ADMIN — Medication 5 UNIT(S): at 07:52

## 2018-07-23 RX ADMIN — Medication 325 MILLIGRAM(S): at 05:54

## 2018-07-23 RX ADMIN — CEFEPIME 100 MILLIGRAM(S): 1 INJECTION, POWDER, FOR SOLUTION INTRAMUSCULAR; INTRAVENOUS at 15:46

## 2018-07-23 RX ADMIN — Medication 81 MILLIGRAM(S): at 15:46

## 2018-07-23 RX ADMIN — LISINOPRIL 20 MILLIGRAM(S): 2.5 TABLET ORAL at 05:54

## 2018-07-23 RX ADMIN — Medication 650 MILLIGRAM(S): at 06:52

## 2018-07-23 RX ADMIN — HEPARIN SODIUM 5000 UNIT(S): 5000 INJECTION INTRAVENOUS; SUBCUTANEOUS at 21:42

## 2018-07-23 RX ADMIN — AMLODIPINE BESYLATE 10 MILLIGRAM(S): 2.5 TABLET ORAL at 05:54

## 2018-07-23 NOTE — PROGRESS NOTE ADULT - ASSESSMENT
#PNA: resolving   will continue with vanc and cefepime for now   will change to po in 24 hours   repeat chest xray today     #HTN  c.w current meds     #ESRD  HD per renal   repeat labd today     #DM  monitor cbg       #heparin sub cut for dvt ppx    #renal/carb consistent diet

## 2018-07-23 NOTE — PROGRESS NOTE ADULT - ASSESSMENT
DM, HTN, ESRD MWF  CHF, DLD, LLE amputation, presenting with fever, generalized weakness, cough with yellow sputum   found w/ Leukocytocis likely PNA    # ESRD: HD today,standard bath, uf 2 liters as tolerated   # pneumonia patient only on vanco ?, check level , ? add cefepime 1 g q 24  # BP at goal  # anemia : on darbo, check repeat h/h, no venofer elevated ferritin   # check ph  # ? d/c plan

## 2018-07-23 NOTE — PROGRESS NOTE ADULT - ASSESSMENT
58 yo F with PMH: DM, HTN, ESRD, CHF, DLD, LLE amputation, port in place R chest for dialysis (MWF)  admitted with fever, generalized weakness, cough with yellow sputum. She still has the cough it is continuous in nature not aggravated or alleviated by any factors and is associated with subjective fever with yellow sputum. She denied any hx of recent ill contacts. She refused the blood work this morning and insisted on leaving to go home. I counselled her in detail regarding the current medical condition and the increased potassium which needs to be confirmed with a repeat BMP but she still refused the blood work. Pt was seen by nephrology and will undergo HD on Monday. Will try to repeat BMP and get the lab work done if the pt is convinced.    #community acquired pneumonia. mrsa pcr swab +.   - RLL consolidation on CXR  - c/w vancomycin (750mg post HD) and cefepime (500mg q24) IV  - no IV fluids for now, received bolus, normotensive, pt on dialysis  - f/u BCx (no growth from 7/19)  - f/u cmp, cbc    #HTN  -currently 128/61  -c/w hydrazlazine, amlodipine, lisinopril  -hold clonidine for now, restart if BP increasing  -monitor BP closely     #ESRD  - dialysis MWF  - c/w darbopoetin   - nephrology consult (pt unsure who her nephrologist is but receives dialysis at [facility])  - pt was seen by nephro and was scheduled for HD on Monday no need for HD today   - Will try to convince the pt for blood work.    #DM  -regular FS checks  -if FS > 180 start insulin subcut     #hx of persistent vomiting  -c/w home dose of metoclopramide 0.5mg tid    #heparin subq for dvt ppx  #renal/carb consistent diet

## 2018-07-23 NOTE — PROGRESS NOTE ADULT - SUBJECTIVE AND OBJECTIVE BOX
seen and examined  c/o cough       Standing Inpatient Medications  amLODIPine   Tablet 10 milliGRAM(s) Oral daily  aspirin  chewable 81 milliGRAM(s) Oral daily  calcium acetate 667 milliGRAM(s) Oral three times a day with meals  carvedilol 6.25 milliGRAM(s) Oral every 12 hours  darbepoetin Injectable Syringe 40 MICROGram(s) IV Push <User Schedule>  dextrose 5%. 1000 milliLiter(s) IV Continuous <Continuous>  dextrose 50% Injectable 12.5 Gram(s) IV Push once  dextrose 50% Injectable 25 Gram(s) IV Push once  dextrose 50% Injectable 25 Gram(s) IV Push once  ferrous    sulfate 325 milliGRAM(s) Oral three times a day  heparin  Injectable 5000 Unit(s) SubCutaneous every 8 hours  hydrALAZINE 50 milliGRAM(s) Oral three times a day  insulin glargine Injectable (LANTUS) 15 Unit(s) SubCutaneous at bedtime  insulin lispro Injectable (HumaLOG) 5 Unit(s) SubCutaneous three times a day before meals  lisinopril 20 milliGRAM(s) Oral daily  pregabalin 100 milliGRAM(s) Oral three times a day  vancomycin  IVPB 750 milliGRAM(s) IV Intermittent <User Schedule>        VITALS/PHYSICAL EXAM  --------------------------------------------------------------------------------  T(C): 37.4 (07-23-18 @ 06:00), Max: 37.4 (07-22-18 @ 20:41)  HR: 74 (07-23-18 @ 06:00) (64 - 74)  BP: 154/67 (07-23-18 @ 06:00) (131/61 - 154/67)  RR: 16 (07-23-18 @ 06:00) (16 - 18)  SpO2: 94% (07-23-18 @ 06:49) (94% - 97%)  Wt(kg): --        Physical Exam:  	Gen: NAD  	Pulm:  B/L ronchi  	CV: S1S2; no rub  	Abd: +distended  	LE: bka  	Vascular access: chest wall tesio     LABS/STUDIES  --------------------------------------------------------------------------------                Creatinine Trend:  SCr 5.8 [07-19 @ 23:28]  SCr 6.6 [07-07 @ 06:46]  SCr 4.9 [07-06 @ 07:32]  SCr 7.3 [07-05 @ 08:06]  SCr 7.1 [07-05 @ 05:41]        Iron 19, TIBC 116, %sat 16      [03-01-18 @ 10:19]  Ferritin 1091.0      [03-01-18 @ 10:19]

## 2018-07-23 NOTE — PROGRESS NOTE ADULT - SUBJECTIVE AND OBJECTIVE BOX
CHIEF COMPLAINT:    Patient is a 57y old Female who presents with a chief complaint of fever, cough/sputum (20 Jul 2018 08:53)    Currently admitted to medicine with the primary diagnosis of Right lower lobe pneumonia     Today is hospital day 3d. This morning she is resting comfortably in bed and reports no new issues or overnight events.     PAST MEDICAL & SURGICAL HISTORY  Type 2 diabetes mellitus  CHF (congestive heart failure)  End stage renal failure on dialysis  Hypertension  Hyperlipemia  Heart failure  Amputation of leg: left  History of femoral angiogram: left angiogram  Port-a-cath in place: right chest wall    SOCIAL HISTORY:  Negative for smoking/alcohol/drug use.     ALLERGIES:  No Known Allergies    MEDICATIONS:  STANDING MEDICATIONS  amLODIPine   Tablet 10 milliGRAM(s) Oral daily  aspirin  chewable 81 milliGRAM(s) Oral daily  calcium acetate 667 milliGRAM(s) Oral three times a day with meals  carvedilol 6.25 milliGRAM(s) Oral every 12 hours  cefepime   IVPB      darbepoetin Injectable Syringe 40 MICROGram(s) IV Push <User Schedule>  dextrose 5%. 1000 milliLiter(s) IV Continuous <Continuous>  dextrose 50% Injectable 12.5 Gram(s) IV Push once  dextrose 50% Injectable 25 Gram(s) IV Push once  dextrose 50% Injectable 25 Gram(s) IV Push once  ferrous    sulfate 325 milliGRAM(s) Oral three times a day  heparin  Injectable 5000 Unit(s) SubCutaneous every 8 hours  hydrALAZINE 50 milliGRAM(s) Oral three times a day  insulin glargine Injectable (LANTUS) 15 Unit(s) SubCutaneous at bedtime  insulin lispro Injectable (HumaLOG) 5 Unit(s) SubCutaneous three times a day before meals  lisinopril 20 milliGRAM(s) Oral daily  pregabalin 100 milliGRAM(s) Oral three times a day  vancomycin  IVPB 750 milliGRAM(s) IV Intermittent <User Schedule>    PRN MEDICATIONS  ALBUTerol    90 MICROgram(s) HFA Inhaler 2 Puff(s) Inhalation every 6 hours PRN  dextrose 40% Gel 15 Gram(s) Oral once PRN  diphenhydrAMINE   Capsule 25 milliGRAM(s) Oral every 6 hours PRN  glucagon  Injectable 1 milliGRAM(s) IntraMuscular once PRN  guaiFENesin    Syrup 200 milliGRAM(s) Oral every 6 hours PRN  melatonin 5 milliGRAM(s) Oral at bedtime PRN  metoclopramide 5 milliGRAM(s) Oral every 8 hours PRN    VITALS:   T(F): 98.3  HR: 70  BP: 115/52  RR: 16  SpO2: 99%    LABS:                        8.3    10.32 )-----------( 130      ( 23 Jul 2018 14:00 )             24.3     07-23    140  |  91<L>  |  80<HH>  ----------------------------<  262<H>  4.7   |  24  |  7.8<HH>    Ca    8.3<L>      23 Jul 2018 14:00    TPro  6.7  /  Alb  2.8<L>  /  TBili  0.2  /  DBili  x   /  AST  8   /  ALT  12  /  AlkPhos  94  07-23    RADIOLOGY:  < from: Xray Chest 1 View AP/PA (07.19.18 @ 23:58) >  Bilateral lower lobe opacities, effusions/atelectasis.  < end of copied text >    PHYSICAL EXAM:  GEN: No acute distress  PULM: Clear to auscultation bilaterally   CARD: S1/S2 present. RRR.   GI: Soft, non-tender, non-distended. Bowel sounds present  EXT: left BKA  NEURO: AAOX3

## 2018-07-23 NOTE — CONSULT NOTE ADULT - ASSESSMENT
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, R Heel cord contracture/foot drop    PRECAUTIONS: [  x  ] Cardiac  [ x  ] Respiratory  [    ] Seizures [    ] Contact Isolation  [    ] Droplet Isolation  [    ] Other    Weight Bearing Status:     RECOMMENDATION:    Out of Bed to Chair     DVT/Decubiti Prophylaxis    REHAB PLAN:     [     ] Bedside P/T 3-5 times a week   [     ] Bedside O/T  2-3 times a week   [  x   ] No Rehab Therapy Indicated   [     ]  Speech Therapy   Conditioning/ROM                                 ADL  Bed Mobility                                            Conditioning/ROM  Transfers                                                  Bed Mobility  Sitting /Standing Balance                      Transfers                                        Gait Training                                            Sitting/Standing Balance  Stair Training [   ]Applicable                 Home equipment Eval                                                                     Splinting  [   ] Only      GOALS:   ADL   [    ]   Independent         Transfers  [    ] Independent            Ambulation  [     ] Independent     [     ] With device                            [    ]  CG                                               [    ]  CG                                                    [     ] CG                            [    ] Min A                                          [    ] Min A                                                [     ] Min  A          DISCHARGE PLAN:   [     ]  Good candidate for Intensive Rehabilitation/Hospital based-[facility]A [facility]                                             Will tolerate 3hrs Intensive Rehab Daily                                       [      ]  Short Term Rehab in Skilled Nursing Facility                                       [     x ]  Home with Outpatient or VN services  FU AS OPD WITH PROSTHETIST-NO NEED FOR ACUTE CARE PT                                         [      ]  Possible Candidate for Intensive Hospital based Rehab

## 2018-07-23 NOTE — CONSULT NOTE ADULT - SUBJECTIVE AND OBJECTIVE BOX
Patient is a 57y old  Female who presents with a chief complaint of fever, cough/sputum (20 Jul 2018 08:53)    HPI:  56 yo F with PMH: DM, HTN, ESRD, CHF, DLD, LLE amputation, port in place R chest for dialysis (MWF)  admitted with fever, generalized weakness, cough with yellow sputum (20 Jul 2018 08:53)      PAST MEDICAL & SURGICAL HISTORY:  Type 2 diabetes mellitus  CHF (congestive heart failure)  End stage renal failure on dialysis  Hypertension  Hyperlipemia  Heart failure  Amputation of leg: left  History of femoral angiogram: left angiogram  Port-a-cath in place: right chest wall      Hospital Course:     TODAY'S SUBJECTIVE & REVIEW OF SYMPTOMS:     Constitutional Weakness   Cardio WNL   Resp WNL   GI WNL  Heme WNL  Endo WNL  Skin L BKA  MSK WNL  Neuro WNL  Cognitive WNL  Psych WNL      MEDICATIONS  (STANDING):  amLODIPine   Tablet 10 milliGRAM(s) Oral daily  aspirin  chewable 81 milliGRAM(s) Oral daily  calcium acetate 667 milliGRAM(s) Oral three times a day with meals  carvedilol 6.25 milliGRAM(s) Oral every 12 hours  cefepime   IVPB      cefepime   IVPB 1000 milliGRAM(s) IV Intermittent once  darbepoetin Injectable Syringe 40 MICROGram(s) IV Push <User Schedule>  dextrose 5%. 1000 milliLiter(s) (50 mL/Hr) IV Continuous <Continuous>  dextrose 50% Injectable 12.5 Gram(s) IV Push once  dextrose 50% Injectable 25 Gram(s) IV Push once  dextrose 50% Injectable 25 Gram(s) IV Push once  ferrous    sulfate 325 milliGRAM(s) Oral three times a day  heparin  Injectable 5000 Unit(s) SubCutaneous every 8 hours  hydrALAZINE 50 milliGRAM(s) Oral three times a day  insulin glargine Injectable (LANTUS) 15 Unit(s) SubCutaneous at bedtime  insulin lispro Injectable (HumaLOG) 5 Unit(s) SubCutaneous three times a day before meals  lisinopril 20 milliGRAM(s) Oral daily  pregabalin 100 milliGRAM(s) Oral three times a day  vancomycin  IVPB 750 milliGRAM(s) IV Intermittent <User Schedule>    MEDICATIONS  (PRN):  ALBUTerol    90 MICROgram(s) HFA Inhaler 2 Puff(s) Inhalation every 6 hours PRN Shortness of Breath and/or Wheezing  dextrose 40% Gel 15 Gram(s) Oral once PRN Blood Glucose LESS THAN 70 milliGRAM(s)/deciliter  diphenhydrAMINE   Capsule 25 milliGRAM(s) Oral every 6 hours PRN Rash and/or Itching  glucagon  Injectable 1 milliGRAM(s) IntraMuscular once PRN Glucose LESS THAN 70 milligrams/deciliter  guaiFENesin    Syrup 200 milliGRAM(s) Oral every 6 hours PRN Cough  melatonin 5 milliGRAM(s) Oral at bedtime PRN Insomnia  metoclopramide 5 milliGRAM(s) Oral every 8 hours PRN n/v      FAMILY HISTORY:  No pertinent family history in first degree relatives      Allergies    No Known Allergies    Intolerances        SOCIAL HISTORY:    [    ] Etoh  [    ] Smoking  [    ] Substance abuse     Home Environment:  [    ] Home Alone  [ x   ] Lives with Family  [  x  ] Home Health Aid 8x7    Dwelling:  [  x  ] Apartment  [    ] Private House  [    ] Adult Home  [    ] Skilled Nursing Facility      [    ] Short Term  [    ] Long Term  [    ] Stairs                           [ x ] Elevator     FUNCTIONAL STATUS PTA: (Check all that apply)  Ambulation: [     ]Independent    [    ] Dependent     [   x ] Non-Ambulatory  Assistive Device: [    ] SA Cane  [    ]  Q Cane  [    ] Walker  [   x ]  Wheelchair  ADL : [    ] Independent  [ x   ]  Dependent       Vital Signs Last 24 Hrs  T(C): 36.8 (23 Jul 2018 11:41), Max: 37.4 (22 Jul 2018 20:41)  T(F): 98.3 (23 Jul 2018 11:41), Max: 99.4 (22 Jul 2018 20:41)  HR: 70 (23 Jul 2018 14:50) (64 - 74)  BP: 115/52 (23 Jul 2018 14:50) (114/64 - 154/67)  BP(mean): --  RR: 16 (23 Jul 2018 14:50) (16 - 18)  SpO2: 99% (23 Jul 2018 11:41) (94% - 99%)      PHYSICAL EXAM: Alert & Oriented X3  GENERAL: NAD, well-groomed, well-developed  HEAD:  Atraumatic, Normocephalic  EYES: EOMI, PERRLA, conjunctiva and sclera clear  NECK: Supple, No JVD, Normal thyroid  CHEST/LUNG: Clear bilaterally; No rales, rhonchi, wheezing, or rubs  HEART: Regular rate and rhythm; No murmurs, rubs, or gallops  ABDOMEN: Soft, Nontender, Nondistended; Bowel sounds present  EXTREMITIES:  L BKA intact, No calf tenderness    NERVOUS SYSTEM:  Cranial Nerves 2-12 intact [   x ] Abnormal  [    ]  ROM: WFL all extremities [    ]  Abnormal [  x   ] L heel cord contracture with minus 45 degrees plantigrade  Motor Strength: WFL all extremities  [    ]  Abnormal [   x ]grossly 4/5 except l foot 2-3/5  Sensation: intact to light touch [    ] Abnormal [x    ]diminished R Foot      FUNCTIONAL STATUS:  Bed Mobility: [   ]  Independent [    ]  Supervision [  x  ]  Needs Assistance [  ]  N/A  Transfers: [    ]  Independent [    ]  Supervision [  x ]  Needs Assistance [    ]  N/A    Ambulation:  [    ]  Independent [    ]  Supervision [    ]  Needs Assistance [  x  ]  N/A   ADL:  [    ]   Independent [  x  ] Requires Assistance [    ] N/A       LABS:                        8.3    10.32 )-----------( 130      ( 23 Jul 2018 14:00 )             24.3     07-23    140  |  91<L>  |  80<HH>  ----------------------------<  262<H>  4.7   |  24  |  7.8<HH>    Ca    8.3<L>      23 Jul 2018 14:00    TPro  6.7  /  Alb  2.8<L>  /  TBili  0.2  /  DBili  x   /  AST  8   /  ALT  12  /  AlkPhos  94  07-23          RADIOLOGY & ADDITIONAL STUDIES:

## 2018-07-23 NOTE — PROGRESS NOTE ADULT - SUBJECTIVE AND OBJECTIVE BOX
no chest pain and sob resolved     Vital Signs Last 24 Hrs  T(C): 37.4 (23 Jul 2018 06:00), Max: 37.4 (22 Jul 2018 20:41)  T(F): 99.4 (23 Jul 2018 06:00), Max: 99.4 (22 Jul 2018 20:41)  HR: 74 (23 Jul 2018 06:00) (64 - 74)  BP: 154/67 (23 Jul 2018 06:00) (131/61 - 154/67)  BP(mean): --  RR: 16 (23 Jul 2018 06:00) (16 - 18)  SpO2: 94% (23 Jul 2018 06:49) (94% - 97%)    PHYSICAL EXAM:  GENERAL: NAD, well-developed  HEAD:  Atraumatic, Normocephalic  EYES: EOMI, PERRLA, conjunctiva and sclera clear  NECK: Supple, No JVD  Pulm: Clear to auscultation bilaterally; No wheeze  CV: Regular rate and rhythm; No murmurs, rubs, or gallops  GI: Soft, Nontender, Nondistended; Bowel sounds present  EXTREMITIES:  left BKA   PSYCH: AAOx3  NEUROLOGY: non-focal  SKIN: No rashes or lesions

## 2018-07-24 RX ORDER — MUPIROCIN 20 MG/G
1 OINTMENT TOPICAL EVERY 12 HOURS
Qty: 0 | Refills: 0 | Status: DISCONTINUED | OUTPATIENT
Start: 2018-07-24 | End: 2018-07-30

## 2018-07-24 RX ADMIN — Medication 325 MILLIGRAM(S): at 13:58

## 2018-07-24 RX ADMIN — Medication 50 MILLIGRAM(S): at 22:18

## 2018-07-24 RX ADMIN — Medication 200 MILLIGRAM(S): at 05:57

## 2018-07-24 RX ADMIN — AMLODIPINE BESYLATE 10 MILLIGRAM(S): 2.5 TABLET ORAL at 05:57

## 2018-07-24 RX ADMIN — Medication 5 UNIT(S): at 11:39

## 2018-07-24 RX ADMIN — Medication 81 MILLIGRAM(S): at 11:39

## 2018-07-24 RX ADMIN — Medication 25 MILLIGRAM(S): at 22:28

## 2018-07-24 RX ADMIN — Medication 667 MILLIGRAM(S): at 11:39

## 2018-07-24 RX ADMIN — INSULIN GLARGINE 15 UNIT(S): 100 INJECTION, SOLUTION SUBCUTANEOUS at 22:18

## 2018-07-24 RX ADMIN — CARVEDILOL PHOSPHATE 6.25 MILLIGRAM(S): 80 CAPSULE, EXTENDED RELEASE ORAL at 05:57

## 2018-07-24 RX ADMIN — LISINOPRIL 20 MILLIGRAM(S): 2.5 TABLET ORAL at 05:57

## 2018-07-24 RX ADMIN — Medication 667 MILLIGRAM(S): at 08:43

## 2018-07-24 RX ADMIN — CEFEPIME 100 MILLIGRAM(S): 1 INJECTION, POWDER, FOR SOLUTION INTRAMUSCULAR; INTRAVENOUS at 08:51

## 2018-07-24 RX ADMIN — Medication 50 MILLIGRAM(S): at 13:59

## 2018-07-24 RX ADMIN — Medication 50 MILLIGRAM(S): at 05:57

## 2018-07-24 RX ADMIN — Medication 325 MILLIGRAM(S): at 05:57

## 2018-07-24 RX ADMIN — MUPIROCIN 1 APPLICATION(S): 20 OINTMENT TOPICAL at 17:22

## 2018-07-24 RX ADMIN — CARVEDILOL PHOSPHATE 6.25 MILLIGRAM(S): 80 CAPSULE, EXTENDED RELEASE ORAL at 17:22

## 2018-07-24 RX ADMIN — Medication 100 MILLIGRAM(S): at 06:01

## 2018-07-24 RX ADMIN — Medication 325 MILLIGRAM(S): at 22:18

## 2018-07-24 RX ADMIN — Medication 100 MILLIGRAM(S): at 14:01

## 2018-07-24 RX ADMIN — HEPARIN SODIUM 5000 UNIT(S): 5000 INJECTION INTRAVENOUS; SUBCUTANEOUS at 22:19

## 2018-07-24 RX ADMIN — HEPARIN SODIUM 5000 UNIT(S): 5000 INJECTION INTRAVENOUS; SUBCUTANEOUS at 13:58

## 2018-07-24 RX ADMIN — HEPARIN SODIUM 5000 UNIT(S): 5000 INJECTION INTRAVENOUS; SUBCUTANEOUS at 05:57

## 2018-07-24 RX ADMIN — Medication 667 MILLIGRAM(S): at 17:22

## 2018-07-24 RX ADMIN — Medication 100 MILLIGRAM(S): at 22:21

## 2018-07-24 NOTE — CONSULT NOTE ADULT - ASSESSMENT
Impression:  Right effusion  ESRD on HD    Recommendation:  Right side USG for effusion evaluation  Continue with antibiotic for now  Wean off oxygen  PRN albuterol for mild intermittent Asthma  DVT prophylaxis  Avoid volume overload Impression:  Right effusion: Rule out parapneumonic versus fluid overload  ESRD on HD    Recommendation:  Right side USG for effusion evaluation  Continue with antibiotic for now  Wean off oxygen  PRN albuterol for mild intermittent Asthma  DVT prophylaxis  Avoid volume overload Impression:  Right effusion: Rule out parapneumonic versus fluid overload  ESRD on HD    Recommendation:  Right side USG for effusion evaluation  Continue with antibiotic for now. FU Cultures  Check Vanc level  Wean off oxygen  PRN albuterol for mild intermittent Asthma  DVT prophylaxis  Avoid volume overload Impression:  Right effusion: Rule out parapneumonic versus fluid overload  ESRD on HD    Recommendation:  Right side USG for effusion evaluation: Minimal effusion present. Patient however refused thoracentesis at this time  Repeat CXR today  Continue with antibiotic for now. FU Cultures  Check Vanc level  Wean off oxygen  PRN albuterol for mild intermittent Asthma  DVT prophylaxis  Avoid volume overload

## 2018-07-24 NOTE — CONSULT NOTE ADULT - SUBJECTIVE AND OBJECTIVE BOX
PROGRESS NOTE   Patient is a 57y old  Female who presents with a chief complaint of fever, cough/sputum (20 Jul 2018 08:53)      HPI:  58 yo F with PMH: DM, HTN, ESRD, CHF, DLD, LLE amputation, port in place R chest for dialysis (MWF)  admitted with fever, generalized weakness, cough with yellow sputum (20 Jul 2018 08:53)      RIGHT LOWER LOBE PNEUMONIA;SEPSIS  ^RIGHT LOWER LOBE PNEUMONIA;SEPSIS  H/o or current diagnosis of HF- Contraindication to ACEI/ARBs  H/o or current diagnosis of HF- ACEI/ARB contraindication unknown  No pertinent family history in first degree relatives  Handoff  MEWS Score  Type 2 diabetes mellitus  CHF (congestive heart failure)  End stage renal failure on dialysis  Hypertension  Hyperlipemia  Heart failure  Acute heart failure, unspecified heart failure type  Heart failure  Right lower lobe pneumonia  Amputation of leg  History of femoral angiogram  Port-a-cath in place  AMS  10  Sepsis    REVIEW OF SYSTEMS:  CONSTITUTIONAL: No fever, weight loss, or fatigue  EYES: No eye pain, visual disturbances, or discharge  ENMT:  No difficulty hearing, tinnitus, vertigo; No sinus or throat pain  NECK: No neck pain or neck stiffness  BREASTS: No pain, masses, or nipple discharge  RESPIRATORY: No cough, wheezing, chills or hemoptysis; No shortness of breath  CARDIOVASCULAR: No chest pain, palpitations, dizziness, or leg swelling  GASTROINTESTINAL: No abdominal pain, No nausea, vomiting, or hematemesis; No diarrhea or constipation  GENITOURINARY: No dysuria, frequency, hematuria, or incontinence  NEUROLOGICAL: No headaches, memory loss, loss of strength, numbness, or tremors  SKIN: No itching, burning, rashes, or lesions   LYMPH NODES: No enlarged glands  ENDOCRINE: No heat or cold intolerance; No hair loss  MUSCULOSKELETAL: No joint pain or swelling; No muscle, back, or extremity pain  PSYCHIATRIC: No depression, anxiety, mood swings, or difficulty sleeping  HEME/LYMPH: No easy bruising or bleeding  ALLERY AND IMMUNOLOGIC: No hives or eczema    All other ROS reviewed and negative except as otherwise stated        PHYSICAL EXAM:  GENERAL: NAD, well-groomed, well-developed  HEAD:  Atraumatic, Normocephalic  EYES: EOMI, PERRLA, conjunctiva and sclera clear  ENMT: Moist mucous membranes, Good dentition  NECK: Supple, No JVD  CHEST/LUNG: Wheezing B/L   HEART: Regular rate and rhythm; S1/S2, No murmurs  ABDOMEN: Soft, Nontender, Nondistended; Bowel sounds present  VASCULAR: Normal pulses, Normal capillary refill  EXTREMITIES: No calf tenderness, No cyanosis, No edema  LYMPH: No lymphadenopathy noted  SKIN: Warm, Intact  PSYCH: Normal mood and affect  NERVOUS SYSTEM:  A/O x3, Good concentration; CN 2-12 intact, No focal deficits  HR: 77 (24 Jul 2018 12:54) (70 - 83)  BP: 148/66 (24 Jul 2018 12:54) (115/52 - 148/66)  BP(mean): --  RR: 17 (24 Jul 2018 12:54) (16 - 18)  SpO2: --    VITALS:  Vital Signs Last 24 Hrs  T(C): 37.8 (24 Jul 2018 12:54), Max: 38.2 (24 Jul 2018 06:02)  T(F): 100 (24 Jul 2018 12:54), Max: 100.8 (24 Jul 2018 06:02)  Vital Signs Last 24 Hrs  T(F): 100 (24 Jul 2018 12:54), Max: 100.8 (24 Jul 2018 06:02)  HR: 77 (24 Jul 2018 12:54) (70 - 83)  BP: 148/66 (24 Jul 2018 12:54) (115/52 - 148/66)  RR: 17 (24 Jul 2018 12:54) (16 - 18)  SpO2: --      LABS:                        8.3    10.32 )-----------( 130      ( 23 Jul 2018 14:00 )             24.3     07-23    140  |  91<L>  |  80<HH>  ----------------------------<  262<H>  4.7   |  24  |  7.8<HH>    Ca    8.3<L>      23 Jul 2018 14:00    TPro  6.7  /  Alb  2.8<L>  /  TBili  0.2  /  DBili  x   /  AST  8   /  ALT  12  /  AlkPhos  94  07-23    Medication(s):   ALBUTerol    90 MICROgram(s) HFA Inhaler 2 Puff(s) Inhalation every 6 hours PRN  amLODIPine   Tablet 10 milliGRAM(s) Oral daily  aspirin  chewable 81 milliGRAM(s) Oral daily  calcium acetate 667 milliGRAM(s) Oral three times a day with meals  carvedilol 6.25 milliGRAM(s) Oral every 12 hours  cefepime   IVPB      cefepime   IVPB 1000 milliGRAM(s) IV Intermittent every 24 hours  darbepoetin Injectable Syringe 40 MICROGram(s) IV Push <User Schedule>  dextrose 40% Gel 15 Gram(s) Oral once PRN  dextrose 5%. 1000 milliLiter(s) IV Continuous <Continuous>  dextrose 50% Injectable 12.5 Gram(s) IV Push once  dextrose 50% Injectable 25 Gram(s) IV Push once  dextrose 50% Injectable 25 Gram(s) IV Push once  diphenhydrAMINE   Capsule 25 milliGRAM(s) Oral every 6 hours PRN  ferrous    sulfate 325 milliGRAM(s) Oral three times a day  glucagon  Injectable 1 milliGRAM(s) IntraMuscular once PRN  guaiFENesin    Syrup 200 milliGRAM(s) Oral every 6 hours PRN  heparin  Injectable 5000 Unit(s) SubCutaneous every 8 hours  hydrALAZINE 50 milliGRAM(s) Oral three times a day  insulin glargine Injectable (LANTUS) 15 Unit(s) SubCutaneous at bedtime  insulin lispro Injectable (HumaLOG) 5 Unit(s) SubCutaneous three times a day before meals  lisinopril 20 milliGRAM(s) Oral daily  melatonin 5 milliGRAM(s) Oral at bedtime PRN  metoclopramide 5 milliGRAM(s) Oral every 8 hours PRN  mupirocin 2% Nasal 1 Application(s) Nasal every 12 hours  pregabalin 100 milliGRAM(s) Oral three times a day  vancomycin  IVPB 750 milliGRAM(s) IV Intermittent <User Schedule>

## 2018-07-24 NOTE — PROGRESS NOTE ADULT - ASSESSMENT
#PNA:   C/W Vanco and cefepime   pulmonary note appreciated   patient declined thorocentesis and blood work today   Chest xray looks better today   repeat chest xray in am     #HTN  c.w current meds     #ESRD  HD per renal   repeat labd today     #DM  monitor cbg     #heparin sub cut for dvt ppx    #renal/carb consistent diet

## 2018-07-24 NOTE — PROGRESS NOTE ADULT - SUBJECTIVE AND OBJECTIVE BOX
sob better   no chest pain   denies coughing     Vital Signs Last 24 Hrs  T(C): 37.8 (24 Jul 2018 12:54), Max: 38.2 (24 Jul 2018 06:02)  T(F): 100 (24 Jul 2018 12:54), Max: 100.8 (24 Jul 2018 06:02)  HR: 77 (24 Jul 2018 12:54) (70 - 83)  BP: 148/66 (24 Jul 2018 12:54) (115/52 - 148/66)  BP(mean): --  RR: 17 (24 Jul 2018 12:54) (16 - 18)  SpO2: --    PHYSICAL EXAM:  GENERAL: NAD, well-developed  HEAD:  Atraumatic, Normocephalic  EYES: EOMI, PERRLA, conjunctiva and sclera clear  NECK: Supple, No JVD  Pulm Clear to auscultation bilaterally; No wheeze  CV: Regular rate and rhythm; No murmurs, rubs, or gallops  GI: Soft, Nontender, Nondistended; Bowel sounds present  EXTREMITIES:  2+ Peripheral Pulses, No clubbing, cyanosis, or edema  PSYCH: AAOx3  NEUROLOGY: non-focal  SKIN: No rashes or lesions                          8.3    10.32 )-----------( 130      ( 23 Jul 2018 14:00 )             24.3     07-23    140  |  91<L>  |  80<HH>  ----------------------------<  262<H>  4.7   |  24  |  7.8<HH>    Ca    8.3<L>      23 Jul 2018 14:00    TPro  6.7  /  Alb  2.8<L>  /  TBili  0.2  /  DBili  x   /  AST  8   /  ALT  12  /  AlkPhos  94  07-23    LIVER FUNCTIONS - ( 23 Jul 2018 14:00 )  Alb: 2.8 g/dL / Pro: 6.7 g/dL / ALK PHOS: 94 U/L / ALT: 12 U/L / AST: 8 U/L / GGT: x

## 2018-07-24 NOTE — CONSULT NOTE ADULT - SUBJECTIVE AND OBJECTIVE BOX
Patient is a 57y old  Female who presents with a chief complaint of fever, cough/sputum (20 Jul 2018 08:53)      HPI:  58 yo F with PMH: DM, HTN, ESRD, CHF, DLD, LLE amputation, port in place R chest for dialysis (MWF)  admitted with fever, generalized weakness, cough with yellow sputum (20 Jul 2018 08:53)      PAST MEDICAL & SURGICAL HISTORY:  Type 2 diabetes mellitus  CHF (congestive heart failure)  End stage renal failure on dialysis  Hypertension  Hyperlipemia  Heart failure  Amputation of leg: left  History of femoral angiogram: left angiogram  Port-a-cath in place: right chest wall      SOCIAL HX: Ex  Smoking                             FAMILY HISTORY:  No pertinent family history in first degree relatives    Allergies    No Known Allergies    PHYSICAL EXAM  Vital Signs Last 24 Hrs  T(C): 38.2 (24 Jul 2018 06:02), Max: 38.2 (24 Jul 2018 06:02)  T(F): 100.8 (24 Jul 2018 06:02), Max: 100.8 (24 Jul 2018 06:02)  HR: 80 (24 Jul 2018 06:02) (67 - 83)  BP: 141/68 (24 Jul 2018 06:02) (114/64 - 148/65)  BP(mean): --  RR: 18 (24 Jul 2018 06:02) (16 - 18)  SpO2: 99% (23 Jul 2018 11:41) (99% - 99%)    General:    HEENT: AAO x3            Lymph Nodes: No lymphadenopathy  Neck:  Supple  Lungs: Clear bilaterally  Cardiovascular: S1S2  Abdomen: Soft, non tender  Extremities: NO edema or cyanosis  Skin: Intact  Neuro: non focal      07-23-18 @ 07:01  -  07-24-18 @ 07:00  --------------------------------------------------------  IN: 330 mL / OUT: 3001 mL / NET: -2671 mL    07-24-18 @ 07:01  -  07-24-18 @ 09:43  --------------------------------------------------------  IN: 360 mL / OUT: 0 mL / NET: 360 mL          LAB:                        8.3    10.32 )-----------( 130      ( 23 Jul 2018 14:00 )             24.3                                               07-23    140  |  91<L>  |  80<HH>  ----------------------------<  262<H>  4.7   |  24  |  7.8<HH>    Ca    8.3<L>      23 Jul 2018 14:00    TPro  6.7  /  Alb  2.8<L>  /  TBili  0.2  /  DBili  x   /  AST  8   /  ALT  12  /  AlkPhos  94  07-23                                                LIVER FUNCTIONS - ( 23 Jul 2018 14:00 )  Alb: 2.8 g/dL / Pro: 6.7 g/dL / ALK PHOS: 94 U/L / ALT: 12 U/L / AST: 8 U/L / GGT: x                                                          MEDICATIONS  (STANDING):  amLODIPine   Tablet 10 milliGRAM(s) Oral daily  aspirin  chewable 81 milliGRAM(s) Oral daily  calcium acetate 667 milliGRAM(s) Oral three times a day with meals  carvedilol 6.25 milliGRAM(s) Oral every 12 hours  cefepime   IVPB      cefepime   IVPB 1000 milliGRAM(s) IV Intermittent every 24 hours  darbepoetin Injectable Syringe 40 MICROGram(s) IV Push <User Schedule>  dextrose 5%. 1000 milliLiter(s) (50 mL/Hr) IV Continuous <Continuous>  dextrose 50% Injectable 12.5 Gram(s) IV Push once  dextrose 50% Injectable 25 Gram(s) IV Push once  dextrose 50% Injectable 25 Gram(s) IV Push once  ferrous    sulfate 325 milliGRAM(s) Oral three times a day  heparin  Injectable 5000 Unit(s) SubCutaneous every 8 hours  hydrALAZINE 50 milliGRAM(s) Oral three times a day  insulin glargine Injectable (LANTUS) 15 Unit(s) SubCutaneous at bedtime  insulin lispro Injectable (HumaLOG) 5 Unit(s) SubCutaneous three times a day before meals  lisinopril 20 milliGRAM(s) Oral daily  mupirocin 2% Nasal 1 Application(s) Nasal every 12 hours  pregabalin 100 milliGRAM(s) Oral three times a day  vancomycin  IVPB 750 milliGRAM(s) IV Intermittent <User Schedule>    MEDICATIONS  (PRN):  ALBUTerol    90 MICROgram(s) HFA Inhaler 2 Puff(s) Inhalation every 6 hours PRN Shortness of Breath and/or Wheezing  dextrose 40% Gel 15 Gram(s) Oral once PRN Blood Glucose LESS THAN 70 milliGRAM(s)/deciliter  diphenhydrAMINE   Capsule 25 milliGRAM(s) Oral every 6 hours PRN Rash and/or Itching  glucagon  Injectable 1 milliGRAM(s) IntraMuscular once PRN Glucose LESS THAN 70 milligrams/deciliter  guaiFENesin    Syrup 200 milliGRAM(s) Oral every 6 hours PRN Cough  melatonin 5 milliGRAM(s) Oral at bedtime PRN Insomnia  metoclopramide 5 milliGRAM(s) Oral every 8 hours PRN n/v Patient is a 57y old  Female who presents with a chief complaint of fever, cough/sputum (20 Jul 2018 08:53)      HPI:  58 yo F with PMH: DM, HTN, ESRD, CHF, DLD, LLE amputation, port in place R chest for dialysis (MWF)  admitted with fever, generalized weakness, cough with yellow sputum (20 Jul 2018 08:53)      PAST MEDICAL & SURGICAL HISTORY:  Type 2 diabetes mellitus  CHF (congestive heart failure)  End stage renal failure on dialysis  Hypertension  Hyperlipemia  Heart failure  Amputation of leg: left  History of femoral angiogram: left angiogram  Port-a-cath in place: right chest wall      SOCIAL HX: Ex  Smoking                             FAMILY HISTORY:  No pertinent family history in first degree relatives    Allergies    No Known Allergies    PHYSICAL EXAM  Vital Signs Last 24 Hrs  T(C): 38.2 (24 Jul 2018 06:02), Max: 38.2 (24 Jul 2018 06:02)  T(F): 100.8 (24 Jul 2018 06:02), Max: 100.8 (24 Jul 2018 06:02)  HR: 80 (24 Jul 2018 06:02) (67 - 83)  BP: 141/68 (24 Jul 2018 06:02) (114/64 - 148/65)  BP(mean): --  RR: 18 (24 Jul 2018 06:02) (16 - 18)  SpO2: 99% (23 Jul 2018 11:41) (99% - 99%)    General:    HEENT: AAO x3            Lymph Nodes: No lymphadenopathy  Neck:  Supple  Lungs: Clear bilaterally. Dec BS right lung   Cardiovascular: S1S2  Abdomen: Soft, non tender  Extremities: NO edema or cyanosis  Skin: Intact  Neuro: non focal      07-23-18 @ 07:01  -  07-24-18 @ 07:00  --------------------------------------------------------  IN: 330 mL / OUT: 3001 mL / NET: -2671 mL    07-24-18 @ 07:01  - 07-24-18 @ 09:43  --------------------------------------------------------  IN: 360 mL / OUT: 0 mL / NET: 360 mL          LAB:                        8.3    10.32 )-----------( 130      ( 23 Jul 2018 14:00 )             24.3                                               07-23    140  |  91<L>  |  80<HH>  ----------------------------<  262<H>  4.7   |  24  |  7.8<HH>    Ca    8.3<L>      23 Jul 2018 14:00    TPro  6.7  /  Alb  2.8<L>  /  TBili  0.2  /  DBili  x   /  AST  8   /  ALT  12  /  AlkPhos  94  07-23                                                LIVER FUNCTIONS - ( 23 Jul 2018 14:00 )  Alb: 2.8 g/dL / Pro: 6.7 g/dL / ALK PHOS: 94 U/L / ALT: 12 U/L / AST: 8 U/L / GGT: x                                                          MEDICATIONS  (STANDING):  amLODIPine   Tablet 10 milliGRAM(s) Oral daily  aspirin  chewable 81 milliGRAM(s) Oral daily  calcium acetate 667 milliGRAM(s) Oral three times a day with meals  carvedilol 6.25 milliGRAM(s) Oral every 12 hours  cefepime   IVPB      cefepime   IVPB 1000 milliGRAM(s) IV Intermittent every 24 hours  darbepoetin Injectable Syringe 40 MICROGram(s) IV Push <User Schedule>  dextrose 5%. 1000 milliLiter(s) (50 mL/Hr) IV Continuous <Continuous>  dextrose 50% Injectable 12.5 Gram(s) IV Push once  dextrose 50% Injectable 25 Gram(s) IV Push once  dextrose 50% Injectable 25 Gram(s) IV Push once  ferrous    sulfate 325 milliGRAM(s) Oral three times a day  heparin  Injectable 5000 Unit(s) SubCutaneous every 8 hours  hydrALAZINE 50 milliGRAM(s) Oral three times a day  insulin glargine Injectable (LANTUS) 15 Unit(s) SubCutaneous at bedtime  insulin lispro Injectable (HumaLOG) 5 Unit(s) SubCutaneous three times a day before meals  lisinopril 20 milliGRAM(s) Oral daily  mupirocin 2% Nasal 1 Application(s) Nasal every 12 hours  pregabalin 100 milliGRAM(s) Oral three times a day  vancomycin  IVPB 750 milliGRAM(s) IV Intermittent <User Schedule>    MEDICATIONS  (PRN):  ALBUTerol    90 MICROgram(s) HFA Inhaler 2 Puff(s) Inhalation every 6 hours PRN Shortness of Breath and/or Wheezing  dextrose 40% Gel 15 Gram(s) Oral once PRN Blood Glucose LESS THAN 70 milliGRAM(s)/deciliter  diphenhydrAMINE   Capsule 25 milliGRAM(s) Oral every 6 hours PRN Rash and/or Itching  glucagon  Injectable 1 milliGRAM(s) IntraMuscular once PRN Glucose LESS THAN 70 milligrams/deciliter  guaiFENesin    Syrup 200 milliGRAM(s) Oral every 6 hours PRN Cough  melatonin 5 milliGRAM(s) Oral at bedtime PRN Insomnia  metoclopramide 5 milliGRAM(s) Oral every 8 hours PRN n/v

## 2018-07-24 NOTE — CONSULT NOTE ADULT - ASSESSMENT
Assessment:   - 57F Pmhx DM, HTN, ESRD, CHF, Left BKA w/ pneumonia vs fluid overload  - MRSA nasal swab + on 7/23/18      Plan:   - c/w Vancomycin 750mg PHD and Cefepime 1g q24  - repeat CXR after Wednesday hemodialysis in order to better visualize lungs to r/o fluid overload vs pneumonia  - get vanc trough  - ID will follow

## 2018-07-24 NOTE — PROGRESS NOTE ADULT - SUBJECTIVE AND OBJECTIVE BOX
CHIEF COMPLAINT:    Patient is a 57y old Female who presents with a chief complaint of fever, cough/sputum (20 Jul 2018 08:53)    Currently admitted to medicine with the primary diagnosis of Right lower lobe pneumonia     Today is hospital day 4d. This morning she is resting comfortably in bed and reports no new issues or overnight events.     PAST MEDICAL & SURGICAL HISTORY  Type 2 diabetes mellitus  CHF (congestive heart failure)  End stage renal failure on dialysis  Hypertension  Hyperlipemia  Heart failure  Amputation of leg: left  History of femoral angiogram: left angiogram  Port-a-cath in place: right chest wall    SOCIAL HISTORY:  Negative for smoking/alcohol/drug use.     ALLERGIES:  No Known Allergies    MEDICATIONS:  STANDING MEDICATIONS  amLODIPine   Tablet 10 milliGRAM(s) Oral daily  aspirin  chewable 81 milliGRAM(s) Oral daily  calcium acetate 667 milliGRAM(s) Oral three times a day with meals  carvedilol 6.25 milliGRAM(s) Oral every 12 hours  cefepime   IVPB      cefepime   IVPB 1000 milliGRAM(s) IV Intermittent every 24 hours  darbepoetin Injectable Syringe 40 MICROGram(s) IV Push <User Schedule>  dextrose 5%. 1000 milliLiter(s) IV Continuous <Continuous>  dextrose 50% Injectable 12.5 Gram(s) IV Push once  dextrose 50% Injectable 25 Gram(s) IV Push once  dextrose 50% Injectable 25 Gram(s) IV Push once  ferrous    sulfate 325 milliGRAM(s) Oral three times a day  heparin  Injectable 5000 Unit(s) SubCutaneous every 8 hours  hydrALAZINE 50 milliGRAM(s) Oral three times a day  insulin glargine Injectable (LANTUS) 15 Unit(s) SubCutaneous at bedtime  insulin lispro Injectable (HumaLOG) 5 Unit(s) SubCutaneous three times a day before meals  lisinopril 20 milliGRAM(s) Oral daily  mupirocin 2% Nasal 1 Application(s) Nasal every 12 hours  pregabalin 100 milliGRAM(s) Oral three times a day  vancomycin  IVPB 750 milliGRAM(s) IV Intermittent <User Schedule>    PRN MEDICATIONS  ALBUTerol    90 MICROgram(s) HFA Inhaler 2 Puff(s) Inhalation every 6 hours PRN  dextrose 40% Gel 15 Gram(s) Oral once PRN  diphenhydrAMINE   Capsule 25 milliGRAM(s) Oral every 6 hours PRN  glucagon  Injectable 1 milliGRAM(s) IntraMuscular once PRN  guaiFENesin    Syrup 200 milliGRAM(s) Oral every 6 hours PRN  melatonin 5 milliGRAM(s) Oral at bedtime PRN  metoclopramide 5 milliGRAM(s) Oral every 8 hours PRN    VITALS:   T(F): 100  HR: 77  BP: 148/66  RR: 17  SpO2: --    LABS:                        8.3    10.32 )-----------( 130      ( 23 Jul 2018 14:00 )             24.3     07-23    140  |  91<L>  |  80<HH>  ----------------------------<  262<H>  4.7   |  24  |  7.8<HH>    Ca    8.3<L>      23 Jul 2018 14:00    TPro  6.7  /  Alb  2.8<L>  /  TBili  0.2  /  DBili  x   /  AST  8   /  ALT  12  /  AlkPhos  94  07-23    REFUSING BLOOD DRAWS    RADIOLOGY:    PHYSICAL EXAM:  GEN: No acute distress  PULM: Clear to auscultation bilaterally   CARD: S1/S2 present. RRR.   GI: Soft, non-tender, non-distended. Bowel sounds present  MSK: NC/NC/NE/2+PP/BOWEN  NEURO: AAOX3

## 2018-07-24 NOTE — PROGRESS NOTE ADULT - ASSESSMENT
56 yo F with PMH: DM, HTN, ESRD, CHF, DLD, LLE amputation, port in place R chest for dialysis (MWF)  admitted with fever, generalized weakness, cough with yellow sputum. She still has the cough it is continuous in nature not aggravated or alleviated by any factors and is associated with subjective fever with yellow sputum.     She denied any hx of recent ill contacts. She refused the blood work this morning and insisted on leaving to go home. I counselled her in detail regarding the current medical condition but she still refused the blood work. Only willing to provide blood on HD days.    #community acquired pneumonia. mrsa pcr swab +.  - RLL consolidation on CXR 7/19, worsened on 7/23, improved on 7/24  - c/w vancomycin (750mg post HD) and cefepime (500mg q24) IV  - no IV fluids for now, received bolus, normotensive, pt on dialysis  - bactroban  - f/u BCx (no growth from 7/19)  - f/u sputum Cx  - f/u bmp, cbc, vanc  - f/u pulm recs  - f/u ID recs    #HTN  -currently 128/61  -c/w hydrazlazine, amlodipine, lisinopril  -hold clonidine for now, restart if BP increasing  -monitor BP closely     #ESRD  - dialysis MWF  - c/w darbopoetin   - nephrology consult (pt unsure who her nephrologist is but receives dialysis at [facility])  - pt was seen by nephro and was scheduled for HD on Monday no need for HD today   - Will try to convince the pt for blood work.    #DM  -regular FS checks  -if FS > 180 start insulin subcut     #hx of persistent vomiting  -c/w home dose of metoclopramide 0.5mg tid    #heparin subq for dvt ppx  #renal/carb consistent diet  #dispo: Home with Outpatient or VN services, FU AS OP WITH PROSTHETIST-NO NEED FOR ACUTE CARE PT per physiatry

## 2018-07-25 LAB
ANION GAP SERPL CALC-SCNC: 16 MMOL/L — HIGH (ref 7–14)
BUN SERPL-MCNC: 49 MG/DL — HIGH (ref 10–20)
CALCIUM SERPL-MCNC: 9.2 MG/DL — SIGNIFICANT CHANGE UP (ref 8.5–10.1)
CHLORIDE SERPL-SCNC: 99 MMOL/L — SIGNIFICANT CHANGE UP (ref 98–110)
CO2 SERPL-SCNC: 27 MMOL/L — SIGNIFICANT CHANGE UP (ref 17–32)
CREAT SERPL-MCNC: 6.5 MG/DL — CRITICAL HIGH (ref 0.7–1.5)
CULTURE RESULTS: SIGNIFICANT CHANGE UP
CULTURE RESULTS: SIGNIFICANT CHANGE UP
GLUCOSE SERPL-MCNC: 82 MG/DL — SIGNIFICANT CHANGE UP (ref 70–99)
HCT VFR BLD CALC: 25.9 % — LOW (ref 37–47)
HGB BLD-MCNC: 8.6 G/DL — LOW (ref 12–16)
MAGNESIUM SERPL-MCNC: 1.9 MG/DL — SIGNIFICANT CHANGE UP (ref 1.8–2.4)
MCHC RBC-ENTMCNC: 27.7 PG — SIGNIFICANT CHANGE UP (ref 27–31)
MCHC RBC-ENTMCNC: 33.2 G/DL — SIGNIFICANT CHANGE UP (ref 32–37)
MCV RBC AUTO: 83.5 FL — SIGNIFICANT CHANGE UP (ref 81–99)
NRBC # BLD: 0 /100 WBCS — SIGNIFICANT CHANGE UP (ref 0–0)
PHOSPHATE SERPL-MCNC: 3.4 MG/DL — SIGNIFICANT CHANGE UP (ref 2.1–4.9)
PLATELET # BLD AUTO: 140 K/UL — SIGNIFICANT CHANGE UP (ref 130–400)
POTASSIUM SERPL-MCNC: 4.4 MMOL/L — SIGNIFICANT CHANGE UP (ref 3.5–5)
POTASSIUM SERPL-SCNC: 4.4 MMOL/L — SIGNIFICANT CHANGE UP (ref 3.5–5)
RBC # BLD: 3.1 M/UL — LOW (ref 4.2–5.4)
RBC # FLD: 15.9 % — HIGH (ref 11.5–14.5)
SODIUM SERPL-SCNC: 142 MMOL/L — SIGNIFICANT CHANGE UP (ref 135–146)
SPECIMEN SOURCE: SIGNIFICANT CHANGE UP
SPECIMEN SOURCE: SIGNIFICANT CHANGE UP
VANCOMYCIN FLD-MCNC: 21.7 UG/ML — HIGH (ref 5–10)
WBC # BLD: 10.67 K/UL — SIGNIFICANT CHANGE UP (ref 4.8–10.8)
WBC # FLD AUTO: 10.67 K/UL — SIGNIFICANT CHANGE UP (ref 4.8–10.8)

## 2018-07-25 RX ORDER — VANCOMYCIN HCL 1 G
1000 VIAL (EA) INTRAVENOUS
Qty: 0 | Refills: 0 | Status: DISCONTINUED | OUTPATIENT
Start: 2018-07-25 | End: 2018-07-27

## 2018-07-25 RX ORDER — PHYTONADIONE (VIT K1) 5 MG
2.5 TABLET ORAL ONCE
Qty: 0 | Refills: 0 | Status: DISCONTINUED | OUTPATIENT
Start: 2018-07-25 | End: 2018-07-25

## 2018-07-25 RX ADMIN — Medication 325 MILLIGRAM(S): at 05:12

## 2018-07-25 RX ADMIN — AMLODIPINE BESYLATE 10 MILLIGRAM(S): 2.5 TABLET ORAL at 05:12

## 2018-07-25 RX ADMIN — Medication 81 MILLIGRAM(S): at 13:30

## 2018-07-25 RX ADMIN — Medication 325 MILLIGRAM(S): at 21:12

## 2018-07-25 RX ADMIN — Medication 200 MILLIGRAM(S): at 03:50

## 2018-07-25 RX ADMIN — MUPIROCIN 1 APPLICATION(S): 20 OINTMENT TOPICAL at 05:14

## 2018-07-25 RX ADMIN — INSULIN GLARGINE 15 UNIT(S): 100 INJECTION, SOLUTION SUBCUTANEOUS at 21:11

## 2018-07-25 RX ADMIN — Medication 100 MILLIGRAM(S): at 21:13

## 2018-07-25 RX ADMIN — CEFEPIME 100 MILLIGRAM(S): 1 INJECTION, POWDER, FOR SOLUTION INTRAMUSCULAR; INTRAVENOUS at 13:17

## 2018-07-25 RX ADMIN — Medication 50 MILLIGRAM(S): at 05:12

## 2018-07-25 RX ADMIN — Medication 50 MILLIGRAM(S): at 13:30

## 2018-07-25 RX ADMIN — LISINOPRIL 20 MILLIGRAM(S): 2.5 TABLET ORAL at 05:12

## 2018-07-25 RX ADMIN — Medication 250 MILLIGRAM(S): at 11:24

## 2018-07-25 RX ADMIN — Medication 100 MILLIGRAM(S): at 13:30

## 2018-07-25 RX ADMIN — Medication 25 MILLIGRAM(S): at 20:58

## 2018-07-25 RX ADMIN — MUPIROCIN 1 APPLICATION(S): 20 OINTMENT TOPICAL at 17:17

## 2018-07-25 RX ADMIN — Medication 200 MILLIGRAM(S): at 20:57

## 2018-07-25 RX ADMIN — HEPARIN SODIUM 5000 UNIT(S): 5000 INJECTION INTRAVENOUS; SUBCUTANEOUS at 13:30

## 2018-07-25 RX ADMIN — Medication 667 MILLIGRAM(S): at 13:30

## 2018-07-25 RX ADMIN — HEPARIN SODIUM 5000 UNIT(S): 5000 INJECTION INTRAVENOUS; SUBCUTANEOUS at 05:12

## 2018-07-25 RX ADMIN — CARVEDILOL PHOSPHATE 6.25 MILLIGRAM(S): 80 CAPSULE, EXTENDED RELEASE ORAL at 05:12

## 2018-07-25 RX ADMIN — Medication 325 MILLIGRAM(S): at 13:30

## 2018-07-25 RX ADMIN — HEPARIN SODIUM 5000 UNIT(S): 5000 INJECTION INTRAVENOUS; SUBCUTANEOUS at 21:12

## 2018-07-25 RX ADMIN — CARVEDILOL PHOSPHATE 6.25 MILLIGRAM(S): 80 CAPSULE, EXTENDED RELEASE ORAL at 17:18

## 2018-07-25 RX ADMIN — Medication 100 MILLIGRAM(S): at 05:15

## 2018-07-25 RX ADMIN — Medication 667 MILLIGRAM(S): at 17:17

## 2018-07-25 RX ADMIN — Medication 50 MILLIGRAM(S): at 21:12

## 2018-07-25 NOTE — PROGRESS NOTE ADULT - SUBJECTIVE AND OBJECTIVE BOX
CHIEF COMPLAINT:    Patient is a 57y old Female who presents with a chief complaint of fever, cough/sputum (20 Jul 2018 08:53)    Currently admitted to medicine with the primary diagnosis of Right lower lobe pneumonia     Today is hospital day 5d. This morning she is resting comfortably in bed and reports no new issues or overnight events.     PAST MEDICAL & SURGICAL HISTORY  Type 2 diabetes mellitus  CHF (congestive heart failure)  End stage renal failure on dialysis  Hypertension  Hyperlipemia  Heart failure  Amputation of leg: left  History of femoral angiogram: left angiogram  Port-a-cath in place: right chest wall    SOCIAL HISTORY:  Negative for smoking/alcohol/drug use.     ALLERGIES:  No Known Allergies    MEDICATIONS:  STANDING MEDICATIONS  amLODIPine   Tablet 10 milliGRAM(s) Oral daily  aspirin  chewable 81 milliGRAM(s) Oral daily  calcium acetate 667 milliGRAM(s) Oral three times a day with meals  carvedilol 6.25 milliGRAM(s) Oral every 12 hours  cefepime   IVPB      cefepime   IVPB 1000 milliGRAM(s) IV Intermittent every 24 hours  darbepoetin Injectable Syringe 40 MICROGram(s) IV Push <User Schedule>  dextrose 5%. 1000 milliLiter(s) IV Continuous <Continuous>  dextrose 50% Injectable 12.5 Gram(s) IV Push once  dextrose 50% Injectable 25 Gram(s) IV Push once  dextrose 50% Injectable 25 Gram(s) IV Push once  ferrous    sulfate 325 milliGRAM(s) Oral three times a day  heparin  Injectable 5000 Unit(s) SubCutaneous every 8 hours  hydrALAZINE 50 milliGRAM(s) Oral three times a day  insulin glargine Injectable (LANTUS) 15 Unit(s) SubCutaneous at bedtime  insulin lispro Injectable (HumaLOG) 5 Unit(s) SubCutaneous three times a day before meals  lisinopril 20 milliGRAM(s) Oral daily  mupirocin 2% Nasal 1 Application(s) Nasal every 12 hours  pregabalin 100 milliGRAM(s) Oral three times a day  vancomycin  IVPB 750 milliGRAM(s) IV Intermittent <User Schedule>    PRN MEDICATIONS  ALBUTerol    90 MICROgram(s) HFA Inhaler 2 Puff(s) Inhalation every 6 hours PRN  dextrose 40% Gel 15 Gram(s) Oral once PRN  diphenhydrAMINE   Capsule 25 milliGRAM(s) Oral every 6 hours PRN  glucagon  Injectable 1 milliGRAM(s) IntraMuscular once PRN  guaiFENesin    Syrup 200 milliGRAM(s) Oral every 6 hours PRN  melatonin 5 milliGRAM(s) Oral at bedtime PRN  metoclopramide 5 milliGRAM(s) Oral every 8 hours PRN    VITALS:   T(F): 99.4  HR: 73  BP: 134/74  RR: 18  SpO2: 95%    LABS:                        8.6    10.67 )-----------( 140      ( 25 Jul 2018 09:10 )             25.9     07-23    140  |  91<L>  |  80<HH>  ----------------------------<  262<H>  4.7   |  24  |  7.8<HH>    Ca    8.3<L>      23 Jul 2018 14:00    TPro  6.7  /  Alb  2.8<L>  /  TBili  0.2  /  DBili  x   /  AST  8   /  ALT  12  /  AlkPhos  94  07-23    RADIOLOGY:  < from: Xray Chest 1 View-PORTABLE IMMEDIATE (07.24.18 @ 10:21) >  Right pleural effusion, slightly improved.  < end of copied text >    PHYSICAL EXAM:  GEN: No acute distress  PULM: Clear to auscultation bilaterally   CARD: S1/S2 present. RRR.   GI: Soft, non-tender, non-distended. Bowel sounds present  MSK: NC/NC/NE/2+PP/BOEWN  NEURO: AAOX3

## 2018-07-25 NOTE — PROGRESS NOTE ADULT - SUBJECTIVE AND OBJECTIVE BOX
no chest pain and no sob . has cough on and off     Vital Signs Last 24 Hrs  T(C): 37.4 (25 Jul 2018 05:46), Max: 37.8 (24 Jul 2018 12:54)  T(F): 99.4 (25 Jul 2018 05:46), Max: 100 (24 Jul 2018 12:54)  HR: 73 (25 Jul 2018 08:30) (73 - 77)  BP: 134/74 (25 Jul 2018 08:30) (123/60 - 148/66)  BP(mean): --  RR: 18 (25 Jul 2018 08:30) (17 - 18)  SpO2: 95% (25 Jul 2018 06:48) (84% - 95%)    PHYSICAL EXAM:  GENERAL: NAD, well-developed  HEAD:  Atraumatic, Normocephalic  EYES: EOMI, PERRLA, conjunctiva and sclera clear  NECK: Supple, No JVD  Pulm: Clear to auscultation bilaterally; No wheeze  CV: Regular rate and rhythm; No murmurs, rubs, or gallops  GI: Soft, Nontender, Nondistended; Bowel sounds present  EXTREMITIES:  2+ Peripheral Pulses, No clubbing, cyanosis, or edema  PSYCH: AAOx3  NEUROLOGY: non-focal  SKIN: No rashes or lesions                          8.6    10.67 )-----------( 140      ( 25 Jul 2018 09:10 )             25.9     07-23    140  |  91<L>  |  80<HH>  ----------------------------<  262<H>  4.7   |  24  |  7.8<HH>    Ca    8.3<L>      23 Jul 2018 14:00    TPro  6.7  /  Alb  2.8<L>  /  TBili  0.2  /  DBili  x   /  AST  8   /  ALT  12  /  AlkPhos  94  07-23    LIVER FUNCTIONS - ( 23 Jul 2018 14:00 )  Alb: 2.8 g/dL / Pro: 6.7 g/dL / ALK PHOS: 94 U/L / ALT: 12 U/L / AST: 8 U/L / GGT: x

## 2018-07-25 NOTE — PROGRESS NOTE ADULT - SUBJECTIVE AND OBJECTIVE BOX
SUBJECTIVE: NO new complaints.  HD today       REVIEW OF SYSTEMS:  See HPI    PHYSICAL EXAM  Vital Signs Last 24 Hrs  T(C): 37.7 (25 Jul 2018 20:57), Max: 37.7 (25 Jul 2018 20:57)  T(F): 99.8 (25 Jul 2018 20:57), Max: 99.8 (25 Jul 2018 20:57)  HR: 80 (25 Jul 2018 20:57) (73 - 80)  BP: 118/57 (25 Jul 2018 20:57) (118/57 - 165/70)  BP(mean): --  RR: 18 (25 Jul 2018 20:57) (18 - 18)  SpO2: 94% (25 Jul 2018 21:43) (84% - 95%)    General: In NAD  HEENT: DORITA               Lymph Nodes: No Cervical LN    Lungs: John Paul BS  Cardiovascular: Regular  Abdomen: Soft. + BS  Extremities: No clubbing   Skin: Warm  Neurological: Non focal      07-24-18 @ 07:01  -  07-25-18 @ 07:00  --------------------------------------------------------  IN:    Oral Fluid: 600 mL  Total IN: 600 mL    OUT:  Total OUT: 0 mL    Total NET: 600 mL          LABS:                          8.6    10.67 )-----------( 140      ( 25 Jul 2018 09:10 )             25.9                                               07-25    142  |  99  |  49<H>  ----------------------------<  82  4.4   |  27  |  6.5<HH>    Ca    9.2      25 Jul 2018 09:10  Phos  3.4     07-25  Mg     1.9     07-25                                                                                                                                                                                    MEDICATIONS  (STANDING):  amLODIPine   Tablet 10 milliGRAM(s) Oral daily  aspirin  chewable 81 milliGRAM(s) Oral daily  calcium acetate 667 milliGRAM(s) Oral three times a day with meals  carvedilol 6.25 milliGRAM(s) Oral every 12 hours  cefepime   IVPB      cefepime   IVPB 1000 milliGRAM(s) IV Intermittent every 24 hours  darbepoetin Injectable Syringe 40 MICROGram(s) IV Push <User Schedule>  dextrose 5%. 1000 milliLiter(s) (50 mL/Hr) IV Continuous <Continuous>  dextrose 50% Injectable 12.5 Gram(s) IV Push once  dextrose 50% Injectable 25 Gram(s) IV Push once  dextrose 50% Injectable 25 Gram(s) IV Push once  ferrous    sulfate 325 milliGRAM(s) Oral three times a day  heparin  Injectable 5000 Unit(s) SubCutaneous every 8 hours  hydrALAZINE 50 milliGRAM(s) Oral three times a day  insulin glargine Injectable (LANTUS) 15 Unit(s) SubCutaneous at bedtime  insulin lispro Injectable (HumaLOG) 5 Unit(s) SubCutaneous three times a day before meals  lisinopril 20 milliGRAM(s) Oral daily  mupirocin 2% Nasal 1 Application(s) Nasal every 12 hours  pregabalin 100 milliGRAM(s) Oral three times a day  vancomycin  IVPB 1000 milliGRAM(s) IV Intermittent <User Schedule>    MEDICATIONS  (PRN):  ALBUTerol    90 MICROgram(s) HFA Inhaler 2 Puff(s) Inhalation every 6 hours PRN Shortness of Breath and/or Wheezing  dextrose 40% Gel 15 Gram(s) Oral once PRN Blood Glucose LESS THAN 70 milliGRAM(s)/deciliter  diphenhydrAMINE   Capsule 25 milliGRAM(s) Oral every 6 hours PRN Rash and/or Itching  glucagon  Injectable 1 milliGRAM(s) IntraMuscular once PRN Glucose LESS THAN 70 milligrams/deciliter  guaiFENesin    Syrup 200 milliGRAM(s) Oral every 6 hours PRN Cough  melatonin 5 milliGRAM(s) Oral at bedtime PRN Insomnia  metoclopramide 5 milliGRAM(s) Oral every 8 hours PRN n/v

## 2018-07-25 NOTE — PROGRESS NOTE ADULT - ASSESSMENT
IMPRESSION:  Possibly PNA vs CHF.  Clinically not very symptomatic.    RECOMMENDATIONS:  Repeat CXR post HD.  Vancomycin 1 gm post HD  Cefepime 1 gm iv q24h

## 2018-07-25 NOTE — PROGRESS NOTE ADULT - SUBJECTIVE AND OBJECTIVE BOX
seen and examined  no distress  still c/o cough       Standing Inpatient Medications  amLODIPine   Tablet 10 milliGRAM(s) Oral daily  aspirin  chewable 81 milliGRAM(s) Oral daily  calcium acetate 667 milliGRAM(s) Oral three times a day with meals  carvedilol 6.25 milliGRAM(s) Oral every 12 hours  cefepime   IVPB      cefepime   IVPB 1000 milliGRAM(s) IV Intermittent every 24 hours  darbepoetin Injectable Syringe 40 MICROGram(s) IV Push <User Schedule>  dextrose 5%. 1000 milliLiter(s) IV Continuous <Continuous>  dextrose 50% Injectable 12.5 Gram(s) IV Push once  dextrose 50% Injectable 25 Gram(s) IV Push once  dextrose 50% Injectable 25 Gram(s) IV Push once  ferrous    sulfate 325 milliGRAM(s) Oral three times a day  heparin  Injectable 5000 Unit(s) SubCutaneous every 8 hours  hydrALAZINE 50 milliGRAM(s) Oral three times a day  insulin glargine Injectable (LANTUS) 15 Unit(s) SubCutaneous at bedtime  insulin lispro Injectable (HumaLOG) 5 Unit(s) SubCutaneous three times a day before meals  lisinopril 20 milliGRAM(s) Oral daily  mupirocin 2% Nasal 1 Application(s) Nasal every 12 hours  pregabalin 100 milliGRAM(s) Oral three times a day  vancomycin  IVPB 750 milliGRAM(s) IV Intermittent <User Schedule>      VITALS/PHYSICAL EXAM  --------------------------------------------------------------------------------  T(C): 37.4 (07-25-18 @ 05:46), Max: 37.8 (07-24-18 @ 12:54)  HR: 74 (07-25-18 @ 05:46) (74 - 77)  BP: 137/64 (07-25-18 @ 05:46) (123/60 - 148/66)  RR: 18 (07-25-18 @ 05:46) (17 - 18)  SpO2: 95% (07-25-18 @ 06:48) (84% - 95%)  Wt(kg): --        07-24-18 @ 07:01  -  07-25-18 @ 07:00  --------------------------------------------------------  IN: 600 mL / OUT: 0 mL / NET: 600 mL      Physical Exam:  	Gen: NAD  	Pulm:  B/L swathi   	CV:  S1S2; no rub  	Abd: +distended  	LE: bka  	Vascular access: chest wall tesio     LABS/STUDIES  --------------------------------------------------------------------------------              8.3    10.32 >-----------<  130      [07-23-18 @ 14:00]              24.3     140  |  91  |  80  ----------------------------<  262      [07-23-18 @ 14:00]  4.7   |  24  |  7.8        Ca     8.3     [07-23-18 @ 14:00]    TPro  6.7  /  Alb  2.8  /  TBili  0.2  /  DBili  x   /  AST  8   /  ALT  12  /  AlkPhos  94  [07-23-18 @ 14:00]          Creatinine Trend:  SCr 7.8 [07-23 @ 14:00]  SCr 5.8 [07-19 @ 23:28]  SCr 6.6 [07-07 @ 06:46]  SCr 4.9 [07-06 @ 07:32]  SCr 7.3 [07-05 @ 08:06]        Iron 19, TIBC 116, %sat 16      [03-01-18 @ 10:19]  Ferritin 1091.0      [03-01-18 @ 10:19] seen and examined  no distress  still c/o cough       Standing Inpatient Medications  amLODIPine   Tablet 10 milliGRAM(s) Oral daily  aspirin  chewable 81 milliGRAM(s) Oral daily  calcium acetate 667 milliGRAM(s) Oral three times a day with meals  carvedilol 6.25 milliGRAM(s) Oral every 12 hours  cefepime   IVPB      cefepime   IVPB 1000 milliGRAM(s) IV Intermittent every 24 hours  darbepoetin Injectable Syringe 40 MICROGram(s) IV Push <User Schedule>  dextrose 5%. 1000 milliLiter(s) IV Continuous <Continuous>  dextrose 50% Injectable 12.5 Gram(s) IV Push once  dextrose 50% Injectable 25 Gram(s) IV Push once  dextrose 50% Injectable 25 Gram(s) IV Push once  ferrous    sulfate 325 milliGRAM(s) Oral three times a day  heparin  Injectable 5000 Unit(s) SubCutaneous every 8 hours  hydrALAZINE 50 milliGRAM(s) Oral three times a day  insulin glargine Injectable (LANTUS) 15 Unit(s) SubCutaneous at bedtime  insulin lispro Injectable (HumaLOG) 5 Unit(s) SubCutaneous three times a day before meals  lisinopril 20 milliGRAM(s) Oral daily  mupirocin 2% Nasal 1 Application(s) Nasal every 12 hours  pregabalin 100 milliGRAM(s) Oral three times a day  vancomycin  IVPB 750 milliGRAM(s) IV Intermittent <User Schedule>      VITALS/PHYSICAL EXAM  --------------------------------------------------------------------------------  T(C): 37.4 (07-25-18 @ 05:46), Max: 37.8 (07-24-18 @ 12:54)  HR: 74 (07-25-18 @ 05:46) (74 - 77)  BP: 137/64 (07-25-18 @ 05:46) (123/60 - 148/66)  RR: 18 (07-25-18 @ 05:46) (17 - 18)  SpO2: 95% (07-25-18 @ 06:48) (84% - 95%)  Wt(kg): --          07-24-18 @ 07:01  -  07-25-18 @ 07:00  --------------------------------------------------------  IN: 600 mL / OUT: 0 mL / NET: 600 mL      Physical Exam:  	Gen: NAD  	Pulm:  B/L swathi   	CV:  S1S2; no rub  	Abd: +distended  	LE: bka  	Vascular access: chest wall tesio     LABS/STUDIES  --------------------------------------------------------------------------------              8.3    10.32 >-----------<  130      [07-23-18 @ 14:00]              24.3     140  |  91  |  80  ----------------------------<  262      [07-23-18 @ 14:00]  4.7   |  24  |  7.8        Ca     8.3     [07-23-18 @ 14:00]    TPro  6.7  /  Alb  2.8  /  TBili  0.2  /  DBili  x   /  AST  8   /  ALT  12  /  AlkPhos  94  [07-23-18 @ 14:00]          Creatinine Trend:  SCr 7.8 [07-23 @ 14:00]  SCr 5.8 [07-19 @ 23:28]  SCr 6.6 [07-07 @ 06:46]  SCr 4.9 [07-06 @ 07:32]  SCr 7.3 [07-05 @ 08:06]        Iron 19, TIBC 116, %sat 16      [03-01-18 @ 10:19]  Ferritin 1091.0      [03-01-18 @ 10:19]

## 2018-07-25 NOTE — PROGRESS NOTE ADULT - ASSESSMENT
Impression:  Right effusion: Rule out parapneumonic versus fluid overload  ESRD on HD    Recommendation:    Repeat CXR in am   Continue with antibiotic for now. FU Cultures  Check Vanc level  Wean off oxygen  PRN albuterol for mild intermittent Asthma  DVT prophylaxis  Avoid volume overload

## 2018-07-25 NOTE — PROGRESS NOTE ADULT - ASSESSMENT
56 yo F with PMH: DM, HTN, ESRD, CHF, DLD, LLE amputation, port in place R chest for dialysis (MWF)  admitted with fever, generalized weakness, cough with yellow sputum. She still has the cough it is continuous in nature not aggravated or alleviated by any factors and is associated with subjective fever with yellow sputum. She denied any hx of recent ill contacts. She refused the blood work this morning and insisted on leaving to go home. I counselled her in detail regarding the current medical condition and the increased potassium which needs to be confirmed with a repeat BMP but she still refused the blood work. Pt was seen by nephrology and will undergo HD on Monday. Will try to repeat BMP and get the lab work done if the pt is convinced.    #community acquired pneumonia. mrsa pcr swab +. s/p nasal bactroban  - RLL consolidation on CXR 7/19, worsened on 7/23, improved on 7/24  - f/u repeat CXR  - increase vancomycin 1g post HD from 750mg post HD   - increase cefepime 1g q24 IV from 500mg  - f/u BCx (no growth from 7/19)  - f/u sputum Cx  - f/u bmp, cbc, vanc  - f/u pulm recs  - f/u ID recs    #HTN. Well controlled (SBP <140)  -c/w hydrazlazine, amlodipine, lisinopril  -hold clonidine for now, restart if BP increasing  -monitor BP closely     #ESRD. Dialysis MWF.  - c/w darbopoetin   - nephrology consult (pt unsure who her nephrologist is but receives dialysis at [facility])  - pt was seen by nephro and was scheduled for HD on Monday no need for HD today   - will try to convince the pt for blood work, only willing to give blood with dialysis    #DM  -regular FS checks  -if FS > 180 start insulin subcut     #hx of persistent vomiting  -c/w home dose of metoclopramide 0.5mg tid    #heparin subq for dvt ppx  #renal/carb consistent diet  #dispo: Home with Outpatient or VN services, FU AS OP WITH PROSTHETIST-NO NEED FOR ACUTE CARE PT per physiatry

## 2018-07-25 NOTE — PROGRESS NOTE ADULT - SUBJECTIVE AND OBJECTIVE BOX
PHILIPPE SMITH  57y, Female      OVERNIGHT EVENTS:    feels well, off and on cough.    VITALS:  T(F): 99.4, Max: 100 (07-24-18 @ 12:54)  HR: 74  BP: 137/64  RR: 18Vital Signs Last 24 Hrs  T(C): 37.4 (25 Jul 2018 05:46), Max: 37.8 (24 Jul 2018 12:54)  T(F): 99.4 (25 Jul 2018 05:46), Max: 100 (24 Jul 2018 12:54)  HR: 74 (25 Jul 2018 05:46) (74 - 77)  BP: 137/64 (25 Jul 2018 05:46) (123/60 - 148/66)  BP(mean): --  RR: 18 (25 Jul 2018 05:46) (17 - 18)  SpO2: --    TESTS & MEASUREMENTS:                        8.3    10.32 )-----------( 130      ( 23 Jul 2018 14:00 )             24.3     07-23    140  |  91<L>  |  80<HH>  ----------------------------<  262<H>  4.7   |  24  |  7.8<HH>    Ca    8.3<L>      23 Jul 2018 14:00    TPro  6.7  /  Alb  2.8<L>  /  TBili  0.2  /  DBili  x   /  AST  8   /  ALT  12  /  AlkPhos  94  07-23    LIVER FUNCTIONS - ( 23 Jul 2018 14:00 )  Alb: 2.8 g/dL / Pro: 6.7 g/dL / ALK PHOS: 94 U/L / ALT: 12 U/L / AST: 8 U/L / GGT: x             Culture - Blood (collected 07-19-18 @ 23:29)  Source: .Blood Blood  Final Report (07-25-18 @ 06:00):    No growth at 5 days.    Culture - Blood (collected 07-19-18 @ 23:29)  Source: .Blood Blood  Final Report (07-25-18 @ 06:00):    No growth at 5 days.            RADIOLOGY & ADDITIONAL TESTS:    ANTIBIOTICS:  cefepime   IVPB      cefepime   IVPB 1000 milliGRAM(s) IV Intermittent every 24 hours  vancomycin  IVPB 750 milliGRAM(s) IV Intermittent <User Schedule>

## 2018-07-25 NOTE — PROGRESS NOTE ADULT - ASSESSMENT
DM, HTN, ESRD MWF  CHF, DLD, LLE amputation, presenting with fever, generalized weakness, cough with yellow sputum   found w/ Leukocytocis likely PNA    # ESRD: HD today,standard bath, uf 2 liters as tolerated   # pneumonia patient only on vanco /cefepime, check vanco level, followed by ID   # BP at goal  # anemia : on darbo, check repeat h/h, no venofer elevated ferritin   # check ph  # will follow

## 2018-07-25 NOTE — PROGRESS NOTE ADULT - ASSESSMENT
#PNA:   C/W Vanco and cefepime   pulmonary note appreciated   patient declined thorocentesis   repeat chest xray after HD     #HTN  c.w current meds     #Anemia stable compared to yesterday   Hgb was normal earlier this month. patient has no evidence of active GI Bleeding. last admission patient had coffee gorund emesis and refused EGD at that time. will discuss with patient again   check iron studies   check folic acid   B12. check serum elelctropheresis. and if patient agrees on GI work will call GI     #positive troponin last admission: would have proceeded with stress test but if positive and with acute anemia. would to need to assess if we can do dual antiplatelets therapy if patient needs PCI by doing EGD and possible c-scope.    #ESRD  HD per renal   recheck phosphate     #DM  monitor cbg keep between 120-180     #heparin sub cut for dvt ppx    #renal/carb consistent diet

## 2018-07-26 RX ADMIN — Medication 5 UNIT(S): at 17:36

## 2018-07-26 RX ADMIN — Medication 81 MILLIGRAM(S): at 14:28

## 2018-07-26 RX ADMIN — HEPARIN SODIUM 5000 UNIT(S): 5000 INJECTION INTRAVENOUS; SUBCUTANEOUS at 06:01

## 2018-07-26 RX ADMIN — CARVEDILOL PHOSPHATE 6.25 MILLIGRAM(S): 80 CAPSULE, EXTENDED RELEASE ORAL at 17:34

## 2018-07-26 RX ADMIN — Medication 667 MILLIGRAM(S): at 17:37

## 2018-07-26 RX ADMIN — Medication 50 MILLIGRAM(S): at 14:29

## 2018-07-26 RX ADMIN — Medication 100 MILLIGRAM(S): at 06:05

## 2018-07-26 RX ADMIN — AMLODIPINE BESYLATE 10 MILLIGRAM(S): 2.5 TABLET ORAL at 05:54

## 2018-07-26 RX ADMIN — Medication 50 MILLIGRAM(S): at 05:55

## 2018-07-26 RX ADMIN — Medication 325 MILLIGRAM(S): at 05:55

## 2018-07-26 RX ADMIN — Medication 667 MILLIGRAM(S): at 14:28

## 2018-07-26 RX ADMIN — Medication 325 MILLIGRAM(S): at 14:29

## 2018-07-26 RX ADMIN — INSULIN GLARGINE 15 UNIT(S): 100 INJECTION, SOLUTION SUBCUTANEOUS at 21:25

## 2018-07-26 RX ADMIN — Medication 325 MILLIGRAM(S): at 21:26

## 2018-07-26 RX ADMIN — Medication 100 MILLIGRAM(S): at 21:29

## 2018-07-26 RX ADMIN — CARVEDILOL PHOSPHATE 6.25 MILLIGRAM(S): 80 CAPSULE, EXTENDED RELEASE ORAL at 05:55

## 2018-07-26 RX ADMIN — LISINOPRIL 20 MILLIGRAM(S): 2.5 TABLET ORAL at 05:54

## 2018-07-26 RX ADMIN — Medication 50 MILLIGRAM(S): at 21:25

## 2018-07-26 RX ADMIN — Medication 5 UNIT(S): at 08:03

## 2018-07-26 RX ADMIN — Medication 100 MILLIGRAM(S): at 14:29

## 2018-07-26 RX ADMIN — Medication 667 MILLIGRAM(S): at 08:03

## 2018-07-26 RX ADMIN — CEFEPIME 100 MILLIGRAM(S): 1 INJECTION, POWDER, FOR SOLUTION INTRAMUSCULAR; INTRAVENOUS at 09:39

## 2018-07-26 RX ADMIN — HEPARIN SODIUM 5000 UNIT(S): 5000 INJECTION INTRAVENOUS; SUBCUTANEOUS at 21:26

## 2018-07-26 RX ADMIN — HEPARIN SODIUM 5000 UNIT(S): 5000 INJECTION INTRAVENOUS; SUBCUTANEOUS at 14:29

## 2018-07-26 RX ADMIN — Medication 5 UNIT(S): at 14:27

## 2018-07-26 RX ADMIN — MUPIROCIN 1 APPLICATION(S): 20 OINTMENT TOPICAL at 17:39

## 2018-07-26 NOTE — PROGRESS NOTE ADULT - SUBJECTIVE AND OBJECTIVE BOX
PHILIPPE SMITH  57y, Female      OVERNIGHT EVENTS:    no fevers, feels well, minimal SOB. No chest pain.    VITALS:  T(F): 98.6, Max: 99.8 (07-25-18 @ 20:57)  HR: 77  BP: 131/60  RR: 18Vital Signs Last 24 Hrs  T(C): 37 (26 Jul 2018 05:54), Max: 37.7 (25 Jul 2018 20:57)  T(F): 98.6 (26 Jul 2018 05:54), Max: 99.8 (25 Jul 2018 20:57)  HR: 77 (26 Jul 2018 05:54) (74 - 80)  BP: 131/60 (26 Jul 2018 05:54) (118/57 - 165/70)  BP(mean): --  RR: 18 (26 Jul 2018 05:54) (18 - 18)  SpO2: 94% (25 Jul 2018 21:43) (94% - 94%)    TESTS & MEASUREMENTS:                        8.6    10.67 )-----------( 140      ( 25 Jul 2018 09:10 )             25.9     07-25    142  |  99  |  49<H>  ----------------------------<  82  4.4   |  27  |  6.5<HH>    Ca    9.2      25 Jul 2018 09:10  Phos  3.4     07-25  Mg     1.9     07-25          Culture - Blood (collected 07-19-18 @ 23:29)  Source: .Blood Blood  Final Report (07-25-18 @ 06:00):    No growth at 5 days.    Culture - Blood (collected 07-19-18 @ 23:29)  Source: .Blood Blood  Final Report (07-25-18 @ 06:00):    No growth at 5 days.            RADIOLOGY & ADDITIONAL TESTS:    ANTIBIOTICS:  cefepime   IVPB      cefepime   IVPB 1000 milliGRAM(s) IV Intermittent every 24 hours  vancomycin  IVPB 1000 milliGRAM(s) IV Intermittent <User Schedule>

## 2018-07-26 NOTE — PROGRESS NOTE ADULT - ASSESSMENT
58 yo F with PMH: DM, HTN, ESRD, CHF, DLD, LLE amputation, port in place R chest for dialysis (MWF)  admitted with fever, generalized weakness, cough with yellow sputum. She still has the cough it is continuous in nature not aggravated or alleviated by any factors and is associated with subjective fever with yellow sputum. She denied any hx of recent ill contacts. She refused the blood work this morning and insisted on leaving to go home. I counselled her in detail regarding the current medical condition and the increased potassium which needs to be confirmed with a repeat BMP but she still refused the blood work. Pt was seen by nephrology and will undergo HD on Monday. Will try to repeat BMP and get the lab work done if the pt is convinced.    #community acquired pneumonia. mrsa pcr swab +. s/p nasal bactroban  - RLL consolidation on CXR 7/19, worsened on 7/23, improved on 7/24  - f/u repeat CXR  - increase vancomycin 1g post HD from 750mg post HD   - increase cefepime 1g q24 IV from 500mg  - f/u Saturday CXR  - f/u BCx (no growth from 7/19)  - f/u sputum Cx  - f/u bmp, cbc, vanc  - f/u pulm recs  - f/u ID recs    #HTN. Well controlled (SBP <140)  -c/w hydrazlazine, amlodipine, lisinopril  -hold clonidine for now, restart if BP increasing  -monitor BP closely     #ESRD. Dialysis MWF.  - c/w darbopoetin   - nephrology consult (pt unsure who her nephrologist is but receives dialysis at [facility])  - pt was seen by nephro and was scheduled for HD on Monday no need for HD today   - will try to convince the pt for blood work, only willing to give blood with dialysis    #DM  -regular FS checks  -if FS > 180 start insulin subcut     #hx of persistent vomiting  -c/w home dose of metoclopramide 0.5mg tid    #heparin subq for dvt ppx  #renal/carb consistent diet  #dispo: Home with Outpatient or VN services, FU AS OP WITH PROSTHETIST-NO NEED FOR ACUTE CARE PT per physiatry 58 yo F with PMH: DM, HTN, ESRD, CHF, DLD, LLE amputation, port in place R chest for dialysis (MWF)  admitted with fever, generalized weakness, cough with yellow sputum. She still has the cough it is continuous in nature not aggravated or alleviated by any factors and is associated with subjective fever with yellow sputum. She denied any hx of recent ill contacts. She refused the blood work this morning and insisted on leaving to go home. I counselled her in detail regarding the current medical condition and the increased potassium which needs to be confirmed with a repeat BMP but she still refused the blood work. Pt was seen by nephrology and will undergo HD on Monday. Will try to repeat BMP and get the lab work done if the pt is convinced.    #community acquired pneumonia. mrsa pcr swab +. s/p nasal bactroban  - RLL consolidation on CXR 7/19, worsened on 7/23, improved on 7/24  - f/u repeat CXR  - increase vancomycin 1g post HD from 750mg post HD   - increase cefepime 1g q24 IV from 500mg  - f/u Saturday CXR  - f/u BCx (no growth from 7/19)  - f/u sputum Cx  - f/u bmp, cbc, vanc  - f/u pulm recs  - f/u ID recs    #HTN. Well controlled (SBP <140)  -c/w hydrazlazine, amlodipine, lisinopril  -hold clonidine for now, restart if BP increasing  -monitor BP closely     #Anemia stable. Likely due to ESRD. Hgb was normal earlier this month. patient has no evidence of active GI Bleeding. last admission patient had coffee gorund emesis and refused EGD at that time.I discussed that will need need EGD and C-scope possibly for anemia work up but she declined and understands the risks.  - f/u iron studies   - f/u folic acid   - f/u B12. check serum elelctropheresis  - patient refuses labs on dialysis days    #ESRD. Dialysis MWF.  - c/w darbopoetin   - nephrology consult (pt unsure who her nephrologist is but receives dialysis at [facility])  - pt was seen by nephro and was scheduled for HD on Monday no need for HD today   - will try to convince the pt for blood work, only willing to give blood with dialysis    #DM  -regular FS checks  -if FS > 180 start insulin subcut     #hx of persistent vomiting  -c/w home dose of metoclopramide 0.5mg tid    #heparin subq for dvt ppx  #renal/carb consistent diet  #dispo: Home with Outpatient or VN services, FU AS OP WITH PROSTHETIST-NO NEED FOR ACUTE CARE PT per physiatry 56 yo F with PMH: DM, HTN, ESRD, CHF, DLD, LLE amputation, port in place R chest for dialysis (MWF)  admitted with fever, generalized weakness, cough with yellow sputum. She still has the cough it is continuous in nature not aggravated or alleviated by any factors and is associated with subjective fever with yellow sputum. She denied any hx of recent ill contacts. She refused the blood work this morning and insisted on leaving to go home. I counselled her in detail regarding the current medical condition and the increased potassium which needs to be confirmed with a repeat BMP but she still refused the blood work. Pt was seen by nephrology and will undergo HD on Monday. Will try to repeat BMP and get the lab work done if the pt is convinced.    #community acquired pneumonia. mrsa pcr swab +. s/p nasal bactroban  - RLL consolidation on CXR 7/19, worsened on 7/23, improved on 7/24  - f/u repeat CXR  - c/w vancomycin 1g post HD  - c/w cefepime 1g q24 IV  - f/u Saturday CXR  - f/u BCx (no growth from 7/19)  - f/u sputum Cx  - f/u bmp, cbc, vanc  - f/u pulm recs  - f/u ID recs    #HTN. Well controlled (SBP <140)  -c/w hydrazlazine, amlodipine, lisinopril  -hold clonidine for now, restart if BP increasing  -monitor BP closely     #Anemia stable. Likely due to ESRD. Hgb was normal earlier this month. patient has no evidence of active GI Bleeding. last admission patient had coffee gorund emesis and refused EGD at that time.I discussed that will need need EGD and C-scope possibly for anemia work up but she declined and understands the risks.  - f/u iron studies   - f/u folic acid   - f/u B12. check serum elelctropheresis  - patient refuses labs on dialysis days    #ESRD. Dialysis MWF.  - c/w darbopoetin   - nephrology consult (pt unsure who her nephrologist is but receives dialysis at [facility])  - pt was seen by nephro and was scheduled for HD on Monday no need for HD today   - will try to convince the pt for blood work, only willing to give blood with dialysis    #DM  -regular FS checks  -if FS > 180 start insulin subcut     #hx of persistent vomiting  -c/w home dose of metoclopramide 0.5mg tid    #heparin subq for dvt ppx  #renal/carb consistent diet  #dispo: Home with Outpatient or VN services, FU AS OP WITH PROSTHETIST-NO NEED FOR ACUTE CARE PT per physiatry 58 yo F with PMH: DM, HTN, ESRD, CHF, DLD, LLE amputation, port in place R chest for dialysis (MWF)  admitted with fever, generalized weakness, cough with yellow sputum. She still has the cough it is continuous in nature not aggravated or alleviated by any factors and is associated with subjective fever with yellow sputum. She denied any hx of recent ill contacts. She refused the blood work this morning and insisted on leaving to go home. I counselled her in detail regarding the current medical condition and the increased potassium which needs to be confirmed with a repeat BMP but she still refused the blood work. Pt was seen by nephrology and will undergo HD on Monday. Will try to repeat BMP and get the lab work done if the pt is convinced.    #community acquired pneumonia. mrsa pcr swab +. s/p nasal bactroban. Refuses thorocentesis.  - RLL consolidation on CXR 7/19, worsened on 7/23, improved on 7/24  - f/u repeat CXR  - c/w vancomycin 1g post HD  - c/w cefepime 1g q24 IV  - f/u Saturday CXR  - f/u BCx (no growth from 7/19)  - f/u sputum Cx  - f/u pulm recs  - f/u ID recs    #HTN. Well controlled (SBP <140)  -c/w hydrazlazine, amlodipine, lisinopril  -hold clonidine for now, restart if BP increasing  -monitor BP closely     #Anemia stable. Likely due to ESRD. Hgb was normal earlier this month. patient has no evidence of active GI Bleeding. last admission patient had coffee ground emesis and refused EGD at that time. discussed that will need need EGD and C-scope possibly for anemia work up but she declined and understands the risks.  - f/u iron studies   - f/u folic acid   - f/u B12. check serum elelctropheresis  - patient refuses labs on dialysis days    #ESRD. Dialysis MWF.  - c/w darbopoetin   - nephrology consult (pt unsure who her nephrologist is but receives dialysis at [facility])  - pt was seen by nephro and was scheduled for HD on Monday no need for HD today   - will try to convince the pt for blood work, only willing to give blood with dialysis    #DM  -regular FS checks  -if FS > 180 start insulin subcut     #hx of persistent vomiting  -c/w home dose of metoclopramide 0.5mg tid    #heparin subq for dvt ppx  #renal/carb consistent diet  #dispo: Home with Outpatient or VN services, FU AS OP WITH PROSTHETIST-NO NEED FOR ACUTE CARE PT per physiatry

## 2018-07-26 NOTE — PROGRESS NOTE ADULT - ASSESSMENT
DM, HTN, ESRD MWF  CHF, DLD, LLE amputation, presenting with fever, generalized weakness, cough with yellow sputum   found w/ Leukocytocis likely PNA    # ESRD: s/p hd yesterday, hd in am   # pneumonia patient only on vanco /cefepime, vanco level noted, decrease to 750 q hd    # BP at goal  # pulmonary notes appreciated, check repeat xray today   # anemia : on darbo, will not increase in view of infection  no venofer elevated ferritin   # check ph  # will follow

## 2018-07-26 NOTE — PROGRESS NOTE ADULT - SUBJECTIVE AND OBJECTIVE BOX
seen and examined  s/p hd yesterday       Standing Inpatient Medications  amLODIPine   Tablet 10 milliGRAM(s) Oral daily  aspirin  chewable 81 milliGRAM(s) Oral daily  calcium acetate 667 milliGRAM(s) Oral three times a day with meals  carvedilol 6.25 milliGRAM(s) Oral every 12 hours  cefepime   IVPB      cefepime   IVPB 1000 milliGRAM(s) IV Intermittent every 24 hours  darbepoetin Injectable Syringe 40 MICROGram(s) IV Push <User Schedule>  dextrose 5%. 1000 milliLiter(s) IV Continuous <Continuous>  dextrose 50% Injectable 12.5 Gram(s) IV Push once  dextrose 50% Injectable 25 Gram(s) IV Push once  dextrose 50% Injectable 25 Gram(s) IV Push once  ferrous    sulfate 325 milliGRAM(s) Oral three times a day  heparin  Injectable 5000 Unit(s) SubCutaneous every 8 hours  hydrALAZINE 50 milliGRAM(s) Oral three times a day  insulin glargine Injectable (LANTUS) 15 Unit(s) SubCutaneous at bedtime  insulin lispro Injectable (HumaLOG) 5 Unit(s) SubCutaneous three times a day before meals  lisinopril 20 milliGRAM(s) Oral daily  mupirocin 2% Nasal 1 Application(s) Nasal every 12 hours  pregabalin 100 milliGRAM(s) Oral three times a day  vancomycin  IVPB 1000 milliGRAM(s) IV Intermittent <User Schedule>          VITALS/PHYSICAL EXAM  --------------------------------------------------------------------------------  T(C): 37 (07-26-18 @ 05:54), Max: 37.7 (07-25-18 @ 20:57)  HR: 77 (07-26-18 @ 05:54) (74 - 80)  BP: 131/60 (07-26-18 @ 05:54) (118/57 - 165/70)  RR: 18 (07-26-18 @ 05:54) (18 - 18)  SpO2: 94% (07-25-18 @ 21:43) (94% - 94%)  Wt(kg): --    Weight (kg): 77.5 (07-26-18 @ 05:54)      07-25-18 @ 07:01  -  07-26-18 @ 07:00  --------------------------------------------------------  IN: 360 mL / OUT: 0 mL / NET: 360 mL      Physical Exam:  	Gen: NAD  	Pulm:  B/L swathi   	CV:  S1S2; no rub  	Abd: +distended  	LE:BKA  	Vascular access: chest wall tesio     LABS/STUDIES  --------------------------------------------------------------------------------              8.6    10.67 >-----------<  140      [07-25-18 @ 09:10]              25.9     142  |  99  |  49  ----------------------------<  82      [07-25-18 @ 09:10]  4.4   |  27  |  6.5        Ca     9.2     [07-25-18 @ 09:10]      Mg     1.9     [07-25-18 @ 09:10]      Phos  3.4     [07-25-18 @ 09:10]            Creatinine Trend:  SCr 6.5 [07-25 @ 09:10]  SCr 7.8 [07-23 @ 14:00]  SCr 5.8 [07-19 @ 23:28]  SCr 6.6 [07-07 @ 06:46]  SCr 4.9 [07-06 @ 07:32]        Iron 19, TIBC 116, %sat 16      [03-01-18 @ 10:19]  Ferritin 1091.0      [03-01-18 @ 10:19]

## 2018-07-26 NOTE — PROGRESS NOTE ADULT - SUBJECTIVE AND OBJECTIVE BOX
CHIEF COMPLAINT:    Patient is a 57y old Female who presents with a chief complaint of fever, cough/sputum (20 Jul 2018 08:53)    Currently admitted to medicine with the primary diagnosis of Right lower lobe pneumonia     Today is hospital day 6d. This morning she is resting comfortably in bed and reports no new issues or overnight events.     PAST MEDICAL & SURGICAL HISTORY  Type 2 diabetes mellitus  CHF (congestive heart failure)  End stage renal failure on dialysis  Hypertension  Hyperlipemia  Heart failure  Amputation of leg: left  History of femoral angiogram: left angiogram  Port-a-cath in place: right chest wall    SOCIAL HISTORY:  Negative for smoking/alcohol/drug use.     ALLERGIES:  No Known Allergies    MEDICATIONS:  STANDING MEDICATIONS  amLODIPine   Tablet 10 milliGRAM(s) Oral daily  aspirin  chewable 81 milliGRAM(s) Oral daily  calcium acetate 667 milliGRAM(s) Oral three times a day with meals  carvedilol 6.25 milliGRAM(s) Oral every 12 hours  cefepime   IVPB      cefepime   IVPB 1000 milliGRAM(s) IV Intermittent every 24 hours  darbepoetin Injectable Syringe 40 MICROGram(s) IV Push <User Schedule>  dextrose 5%. 1000 milliLiter(s) IV Continuous <Continuous>  dextrose 50% Injectable 12.5 Gram(s) IV Push once  dextrose 50% Injectable 25 Gram(s) IV Push once  dextrose 50% Injectable 25 Gram(s) IV Push once  ferrous    sulfate 325 milliGRAM(s) Oral three times a day  heparin  Injectable 5000 Unit(s) SubCutaneous every 8 hours  hydrALAZINE 50 milliGRAM(s) Oral three times a day  insulin glargine Injectable (LANTUS) 15 Unit(s) SubCutaneous at bedtime  insulin lispro Injectable (HumaLOG) 5 Unit(s) SubCutaneous three times a day before meals  lisinopril 20 milliGRAM(s) Oral daily  mupirocin 2% Nasal 1 Application(s) Nasal every 12 hours  pregabalin 100 milliGRAM(s) Oral three times a day  vancomycin  IVPB 1000 milliGRAM(s) IV Intermittent <User Schedule>    PRN MEDICATIONS  ALBUTerol    90 MICROgram(s) HFA Inhaler 2 Puff(s) Inhalation every 6 hours PRN  dextrose 40% Gel 15 Gram(s) Oral once PRN  diphenhydrAMINE   Capsule 25 milliGRAM(s) Oral every 6 hours PRN  glucagon  Injectable 1 milliGRAM(s) IntraMuscular once PRN  guaiFENesin    Syrup 200 milliGRAM(s) Oral every 6 hours PRN  melatonin 5 milliGRAM(s) Oral at bedtime PRN  metoclopramide 5 milliGRAM(s) Oral every 8 hours PRN    VITALS:   T(F): 97.9  HR: 68  BP: 127/60  RR: 18  SpO2: 94%    LABS:                        8.6    10.67 )-----------( 140      ( 25 Jul 2018 09:10 )             25.9     07-25    142  |  99  |  49<H>  ----------------------------<  82  4.4   |  27  |  6.5<HH>    Ca    9.2      25 Jul 2018 09:10  Phos  3.4     07-25  Mg     1.9     07-25    RADIOLOGY:    PHYSICAL EXAM:  GEN: No acute distress  PULM: Clear to auscultation bilaterally   CARD: S1/S2 present. RRR.   GI: Soft, non-tender, non-distended. Bowel sounds present  MSK: NC/NC/NE/2+PP/BOWEN  NEURO: AAOX3

## 2018-07-26 NOTE — PROGRESS NOTE ADULT - ASSESSMENT
Impression:  Right effusion likely volume overload.   ESRD on HD    Recommendation:    Repeat CXR in 48 hours  ABX per ID.  FU cultures   Maximize HD therapy   Wean off oxygen  PRN albuterol for mild intermittent Asthma  DVT prophylaxis  Avoid volume overload  Patinet absolutely refuses any form of thoracentesis

## 2018-07-26 NOTE — PROGRESS NOTE ADULT - SUBJECTIVE AND OBJECTIVE BOX
no chest pain   has some sob     Vital Signs Last 24 Hrs  T(C): 37 (26 Jul 2018 05:54), Max: 37.7 (25 Jul 2018 20:57)  T(F): 98.6 (26 Jul 2018 05:54), Max: 99.8 (25 Jul 2018 20:57)  HR: 77 (26 Jul 2018 05:54) (74 - 80)  BP: 131/60 (26 Jul 2018 05:54) (118/57 - 165/70)  BP(mean): --  RR: 18 (26 Jul 2018 05:54) (18 - 18)  SpO2: 94% (25 Jul 2018 21:43) (94% - 94%)    PHYSICAL EXAM:  GENERAL: NAD, well-developed  HEAD:  Atraumatic, Normocephalic  EYES: EOMI, PERRLA, conjunctiva and sclera clear  NECK: Supple, No JVD  Pulm: decreased air entry at bases   CV: Regular rate and rhythm; No murmurs, rubs, or gallops  GI: Soft, Nontender, Nondistended; Bowel sounds present  EXTREMITIES:  left BKA   PSYCH: AAOx3  NEUROLOGY: non-focal  SKIN: No rashes or lesions                          8.6    10.67 )-----------( 140      ( 25 Jul 2018 09:10 )             25.9     07-25    142  |  99  |  49<H>  ----------------------------<  82  4.4   |  27  |  6.5<HH>    Ca    9.2      25 Jul 2018 09:10  Phos  3.4     07-25  Mg     1.9     07-25

## 2018-07-26 NOTE — PROGRESS NOTE ADULT - SUBJECTIVE AND OBJECTIVE BOX
SUBJECTIVE: SPO HD yesterday.  NO SOB At rest.  No cough, expectorations, or fevers      REVIEW OF SYSTEMS:  See HPI    PHYSICAL EXAM  Vital Signs Last 24 Hrs  T(C): 37 (26 Jul 2018 05:54), Max: 37.7 (25 Jul 2018 20:57)  T(F): 98.6 (26 Jul 2018 05:54), Max: 99.8 (25 Jul 2018 20:57)  HR: 77 (26 Jul 2018 05:54) (74 - 80)  BP: 131/60 (26 Jul 2018 05:54) (118/57 - 165/70)  BP(mean): --  RR: 18 (26 Jul 2018 05:54) (18 - 18)  SpO2: 94% (25 Jul 2018 21:43) (94% - 94%)    General: In NAD  HEENT: DORITA               Lymph Nodes: No Cervical LN    Lungs: John Paul BS  Cardiovascular: Regular  Abdomen: Soft. + BS  Extremities: No clubbing   Skin: Warm  Neurological: Non focal      07-25-18 @ 07:01  -  07-26-18 @ 07:00  --------------------------------------------------------  IN:    Oral Fluid: 360 mL  Total IN: 360 mL    OUT:  Total OUT: 0 mL    Total NET: 360 mL          LABS:                          8.6    10.67 )-----------( 140      ( 25 Jul 2018 09:10 )             25.9                                               07-25    142  |  99  |  49<H>  ----------------------------<  82  4.4   |  27  |  6.5<HH>    Ca    9.2      25 Jul 2018 09:10  Phos  3.4     07-25  Mg     1.9     07-25                                                                                                                                                                                    MEDICATIONS  (STANDING):  amLODIPine   Tablet 10 milliGRAM(s) Oral daily  aspirin  chewable 81 milliGRAM(s) Oral daily  calcium acetate 667 milliGRAM(s) Oral three times a day with meals  carvedilol 6.25 milliGRAM(s) Oral every 12 hours  cefepime   IVPB      cefepime   IVPB 1000 milliGRAM(s) IV Intermittent every 24 hours  darbepoetin Injectable Syringe 40 MICROGram(s) IV Push <User Schedule>  dextrose 5%. 1000 milliLiter(s) (50 mL/Hr) IV Continuous <Continuous>  dextrose 50% Injectable 12.5 Gram(s) IV Push once  dextrose 50% Injectable 25 Gram(s) IV Push once  dextrose 50% Injectable 25 Gram(s) IV Push once  ferrous    sulfate 325 milliGRAM(s) Oral three times a day  heparin  Injectable 5000 Unit(s) SubCutaneous every 8 hours  hydrALAZINE 50 milliGRAM(s) Oral three times a day  insulin glargine Injectable (LANTUS) 15 Unit(s) SubCutaneous at bedtime  insulin lispro Injectable (HumaLOG) 5 Unit(s) SubCutaneous three times a day before meals  lisinopril 20 milliGRAM(s) Oral daily  mupirocin 2% Nasal 1 Application(s) Nasal every 12 hours  pregabalin 100 milliGRAM(s) Oral three times a day  vancomycin  IVPB 1000 milliGRAM(s) IV Intermittent <User Schedule>    MEDICATIONS  (PRN):  ALBUTerol    90 MICROgram(s) HFA Inhaler 2 Puff(s) Inhalation every 6 hours PRN Shortness of Breath and/or Wheezing  dextrose 40% Gel 15 Gram(s) Oral once PRN Blood Glucose LESS THAN 70 milliGRAM(s)/deciliter  diphenhydrAMINE   Capsule 25 milliGRAM(s) Oral every 6 hours PRN Rash and/or Itching  glucagon  Injectable 1 milliGRAM(s) IntraMuscular once PRN Glucose LESS THAN 70 milligrams/deciliter  guaiFENesin    Syrup 200 milliGRAM(s) Oral every 6 hours PRN Cough  melatonin 5 milliGRAM(s) Oral at bedtime PRN Insomnia  metoclopramide 5 milliGRAM(s) Oral every 8 hours PRN n/v

## 2018-07-26 NOTE — PROGRESS NOTE ADULT - ASSESSMENT
IMPRESSION:  Possibly PNA vs CHF favor fluid overload.  CXR today with increasing effusions on the right side  Clinically not very symptomatic.    RECOMMENDATIONS:  Consider a therapeutic thoracocentesis on the right.  Maintain on antibiotics.

## 2018-07-26 NOTE — PROGRESS NOTE ADULT - ASSESSMENT
#PNA:   C/W Vanco and cefepime   pulmonary note appreciated   patient declined thorocentesis   xray shows worsening effusion   per pulm repeat chest xray in 48 hours     #HTN  c.w current meds     #Anemia stable   Hgb was normal earlier this month. patient has no evidence of active GI Bleeding. last admission patient had coffee gorund emesis and refused EGD at that time.I discussed that will need need EGD and C-scope possibly for anemia work up but she declined and understands the risks  check iron studies   check folic acid   B12. check serum elelctropheresis  patient refused labs for today     #positive troponin last admission: would have proceeded with stress but patient declined. she just wants to finish her abx and go home. she said she would probably agree to a stress test as OPT     #ESRD  HD per renal   recheck phosphate     #DM  monitor cbg keep between 120-180     #heparin sub cut for dvt ppx    #renal/carb consistent diet

## 2018-07-27 LAB — IRON SATN MFR SERPL: 50 UG/DL — SIGNIFICANT CHANGE UP (ref 35–150)

## 2018-07-27 RX ORDER — VANCOMYCIN HCL 1 G
750 VIAL (EA) INTRAVENOUS
Qty: 0 | Refills: 0 | Status: DISCONTINUED | OUTPATIENT
Start: 2018-07-27 | End: 2018-07-31

## 2018-07-27 RX ADMIN — CARVEDILOL PHOSPHATE 6.25 MILLIGRAM(S): 80 CAPSULE, EXTENDED RELEASE ORAL at 06:03

## 2018-07-27 RX ADMIN — AMLODIPINE BESYLATE 10 MILLIGRAM(S): 2.5 TABLET ORAL at 06:03

## 2018-07-27 RX ADMIN — MUPIROCIN 1 APPLICATION(S): 20 OINTMENT TOPICAL at 06:04

## 2018-07-27 RX ADMIN — Medication 100 MILLIGRAM(S): at 22:06

## 2018-07-27 RX ADMIN — CEFEPIME 100 MILLIGRAM(S): 1 INJECTION, POWDER, FOR SOLUTION INTRAMUSCULAR; INTRAVENOUS at 08:08

## 2018-07-27 RX ADMIN — Medication 200 MILLIGRAM(S): at 06:03

## 2018-07-27 RX ADMIN — Medication 50 MILLIGRAM(S): at 14:08

## 2018-07-27 RX ADMIN — Medication 5 UNIT(S): at 08:07

## 2018-07-27 RX ADMIN — Medication 325 MILLIGRAM(S): at 14:05

## 2018-07-27 RX ADMIN — Medication 325 MILLIGRAM(S): at 06:03

## 2018-07-27 RX ADMIN — INSULIN GLARGINE 15 UNIT(S): 100 INJECTION, SOLUTION SUBCUTANEOUS at 22:06

## 2018-07-27 RX ADMIN — Medication 25 MILLIGRAM(S): at 22:53

## 2018-07-27 RX ADMIN — HEPARIN SODIUM 5000 UNIT(S): 5000 INJECTION INTRAVENOUS; SUBCUTANEOUS at 06:03

## 2018-07-27 RX ADMIN — Medication 50 MILLIGRAM(S): at 06:03

## 2018-07-27 RX ADMIN — Medication 667 MILLIGRAM(S): at 08:06

## 2018-07-27 RX ADMIN — Medication 325 MILLIGRAM(S): at 22:06

## 2018-07-27 RX ADMIN — MUPIROCIN 1 APPLICATION(S): 20 OINTMENT TOPICAL at 17:52

## 2018-07-27 RX ADMIN — HEPARIN SODIUM 5000 UNIT(S): 5000 INJECTION INTRAVENOUS; SUBCUTANEOUS at 14:08

## 2018-07-27 RX ADMIN — CARVEDILOL PHOSPHATE 6.25 MILLIGRAM(S): 80 CAPSULE, EXTENDED RELEASE ORAL at 17:51

## 2018-07-27 RX ADMIN — HEPARIN SODIUM 5000 UNIT(S): 5000 INJECTION INTRAVENOUS; SUBCUTANEOUS at 22:06

## 2018-07-27 RX ADMIN — Medication 100 MILLIGRAM(S): at 06:03

## 2018-07-27 RX ADMIN — Medication 50 MILLIGRAM(S): at 22:06

## 2018-07-27 RX ADMIN — Medication 81 MILLIGRAM(S): at 14:05

## 2018-07-27 RX ADMIN — Medication 100 MILLIGRAM(S): at 14:18

## 2018-07-27 RX ADMIN — LISINOPRIL 20 MILLIGRAM(S): 2.5 TABLET ORAL at 06:03

## 2018-07-27 NOTE — PROGRESS NOTE ADULT - SUBJECTIVE AND OBJECTIVE BOX
seen and examined  cough improving   no other complaints     Standing Inpatient Medications  amLODIPine   Tablet 10 milliGRAM(s) Oral daily  aspirin  chewable 81 milliGRAM(s) Oral daily  calcium acetate 667 milliGRAM(s) Oral three times a day with meals  carvedilol 6.25 milliGRAM(s) Oral every 12 hours  cefepime   IVPB      cefepime   IVPB 1000 milliGRAM(s) IV Intermittent every 24 hours  darbepoetin Injectable Syringe 40 MICROGram(s) IV Push <User Schedule>  dextrose 5%. 1000 milliLiter(s) IV Continuous <Continuous>  dextrose 50% Injectable 12.5 Gram(s) IV Push once  dextrose 50% Injectable 25 Gram(s) IV Push once  dextrose 50% Injectable 25 Gram(s) IV Push once  ferrous    sulfate 325 milliGRAM(s) Oral three times a day  heparin  Injectable 5000 Unit(s) SubCutaneous every 8 hours  hydrALAZINE 50 milliGRAM(s) Oral three times a day  insulin glargine Injectable (LANTUS) 15 Unit(s) SubCutaneous at bedtime  insulin lispro Injectable (HumaLOG) 5 Unit(s) SubCutaneous three times a day before meals  lisinopril 20 milliGRAM(s) Oral daily  mupirocin 2% Nasal 1 Application(s) Nasal every 12 hours  pregabalin 100 milliGRAM(s) Oral three times a day  vancomycin  IVPB 1000 milliGRAM(s) IV Intermittent <User Schedule>      VITALS/PHYSICAL EXAM  --------------------------------------------------------------------------------  T(C): 36.6 (07-27-18 @ 05:26), Max: 36.7 (07-26-18 @ 20:44)  HR: 76 (07-27-18 @ 08:50) (68 - 82)  BP: 132/72 (07-27-18 @ 08:50) (121/57 - 142/65)  RR: 17 (07-26-18 @ 20:44) (17 - 18)  SpO2: 96% (07-26-18 @ 22:26) (95% - 96%)  Wt(kg): --    Weight (kg): 77.5 (07-26-18 @ 05:54)      07-26-18 @ 07:01  -  07-27-18 @ 07:00  --------------------------------------------------------  IN: 530 mL / OUT: 0 mL / NET: 530 mL      Physical Exam:  	Gen: NAD  	Pulm:  B/L swathi   	CV:  S1S2; no rub  	Abd:  soft, nontender/nondistended  	LE: bka  	Vascular access: chest wall tesio     LABS/STUDIES  --------------------------------------------------------------------------------                Creatinine Trend:  SCr 6.5 [07-25 @ 09:10]  SCr 7.8 [07-23 @ 14:00]  SCr 5.8 [07-19 @ 23:28]  SCr 6.6 [07-07 @ 06:46]  SCr 4.9 [07-06 @ 07:32]        Iron 19, TIBC 116, %sat 16      [03-01-18 @ 10:19]  Ferritin 1091.0      [03-01-18 @ 10:19]

## 2018-07-27 NOTE — PROGRESS NOTE ADULT - SUBJECTIVE AND OBJECTIVE BOX
patient feels ok no chest pain and no sob     I expalined to her in details that she needs to get thorocentesis done but she refused. she also declines blood work     Vital Signs Last 24 Hrs  T(C): 36.9 (27 Jul 2018 14:15), Max: 36.9 (27 Jul 2018 14:15)  T(F): 98.4 (27 Jul 2018 14:15), Max: 98.4 (27 Jul 2018 14:15)  HR: 76 (27 Jul 2018 14:15) (76 - 82)  BP: 150/68 (27 Jul 2018 14:15) (121/57 - 166/72)  BP(mean): --  RR: 18 (27 Jul 2018 14:15) (17 - 18)  SpO2: 96% (26 Jul 2018 22:26) (95% - 96%)    PHYSICAL EXAM:  GENERAL: NAD, well-developed  HEAD:  Atraumatic, Normocephalic  EYES: EOMI, PERRLA, conjunctiva and sclera clear  NECK: Supple, No JVD  Pulm: decreased air entry right side   CV: Regular rate and rhythm; No murmurs, rubs, or gallops  GI: Soft, Nontender, Nondistended; Bowel sounds present  EXTREMITIES:  2+ Peripheral Pulses, No clubbing, cyanosis, or edema  PSYCH: AAOx3  NEUROLOGY: non-focal  SKIN: No rashes or lesions

## 2018-07-27 NOTE — PROGRESS NOTE ADULT - SUBJECTIVE AND OBJECTIVE BOX
CHIEF COMPLAINT:    Patient is a 57y old Female who presents with a chief complaint of fever, cough/sputum (20 Jul 2018 08:53)    Currently admitted to medicine with the primary diagnosis of Right lower lobe pneumonia     Today is hospital day 7d. This morning she is resting comfortably in bed and reports no new issues or overnight events.     PAST MEDICAL & SURGICAL HISTORY  Type 2 diabetes mellitus  CHF (congestive heart failure)  End stage renal failure on dialysis  Hypertension  Hyperlipemia  Heart failure  Amputation of leg: left  History of femoral angiogram: left angiogram  Port-a-cath in place: right chest wall    SOCIAL HISTORY:  Negative for smoking/alcohol/drug use.     ALLERGIES:  No Known Allergies    MEDICATIONS:  STANDING MEDICATIONS  amLODIPine   Tablet 10 milliGRAM(s) Oral daily  aspirin  chewable 81 milliGRAM(s) Oral daily  calcium acetate 667 milliGRAM(s) Oral three times a day with meals  carvedilol 6.25 milliGRAM(s) Oral every 12 hours  cefepime   IVPB      cefepime   IVPB 1000 milliGRAM(s) IV Intermittent every 24 hours  darbepoetin Injectable Syringe 40 MICROGram(s) IV Push <User Schedule>  dextrose 5%. 1000 milliLiter(s) IV Continuous <Continuous>  dextrose 50% Injectable 12.5 Gram(s) IV Push once  dextrose 50% Injectable 25 Gram(s) IV Push once  dextrose 50% Injectable 25 Gram(s) IV Push once  ferrous    sulfate 325 milliGRAM(s) Oral three times a day  heparin  Injectable 5000 Unit(s) SubCutaneous every 8 hours  hydrALAZINE 50 milliGRAM(s) Oral three times a day  insulin glargine Injectable (LANTUS) 15 Unit(s) SubCutaneous at bedtime  insulin lispro Injectable (HumaLOG) 5 Unit(s) SubCutaneous three times a day before meals  lisinopril 20 milliGRAM(s) Oral daily  mupirocin 2% Nasal 1 Application(s) Nasal every 12 hours  pregabalin 100 milliGRAM(s) Oral three times a day  vancomycin  IVPB 750 milliGRAM(s) IV Intermittent <User Schedule>    PRN MEDICATIONS  ALBUTerol    90 MICROgram(s) HFA Inhaler 2 Puff(s) Inhalation every 6 hours PRN  dextrose 40% Gel 15 Gram(s) Oral once PRN  diphenhydrAMINE   Capsule 25 milliGRAM(s) Oral every 6 hours PRN  glucagon  Injectable 1 milliGRAM(s) IntraMuscular once PRN  guaiFENesin    Syrup 200 milliGRAM(s) Oral every 6 hours PRN  melatonin 5 milliGRAM(s) Oral at bedtime PRN  metoclopramide 5 milliGRAM(s) Oral every 8 hours PRN    VITALS:   T(F): 98.4  HR: 76  BP: 150/68  RR: 18  SpO2: 96%    LABS:    RADIOLOGY:      PHYSICAL EXAM:  GEN: No acute distress  PULM: Clear to auscultation bilaterally   CARD: S1/S2 present. RRR.   GI: Soft, non-tender, non-distended. Bowel sounds present  MSK: NC/NC/NE/2+PP/BOWEN  NEURO: AAOX3 CHIEF COMPLAINT:    Patient is a 57y old Female who presents with a chief complaint of fever, cough/sputum (20 Jul 2018 08:53)    Currently admitted to medicine with the primary diagnosis of Right lower lobe pneumonia     Today is hospital day 7d. This morning she is resting comfortably in bed and reports no new issues or overnight events.     PAST MEDICAL & SURGICAL HISTORY  Type 2 diabetes mellitus  CHF (congestive heart failure)  End stage renal failure on dialysis  Hypertension  Hyperlipemia  Heart failure  Amputation of leg: left  History of femoral angiogram: left angiogram  Port-a-cath in place: right chest wall    SOCIAL HISTORY:  Negative for smoking/alcohol/drug use.     ALLERGIES:  No Known Allergies    MEDICATIONS:  STANDING MEDICATIONS  amLODIPine   Tablet 10 milliGRAM(s) Oral daily  aspirin  chewable 81 milliGRAM(s) Oral daily  calcium acetate 667 milliGRAM(s) Oral three times a day with meals  carvedilol 6.25 milliGRAM(s) Oral every 12 hours  cefepime   IVPB      cefepime   IVPB 1000 milliGRAM(s) IV Intermittent every 24 hours  darbepoetin Injectable Syringe 40 MICROGram(s) IV Push <User Schedule>  dextrose 5%. 1000 milliLiter(s) IV Continuous <Continuous>  dextrose 50% Injectable 12.5 Gram(s) IV Push once  dextrose 50% Injectable 25 Gram(s) IV Push once  dextrose 50% Injectable 25 Gram(s) IV Push once  ferrous    sulfate 325 milliGRAM(s) Oral three times a day  heparin  Injectable 5000 Unit(s) SubCutaneous every 8 hours  hydrALAZINE 50 milliGRAM(s) Oral three times a day  insulin glargine Injectable (LANTUS) 15 Unit(s) SubCutaneous at bedtime  insulin lispro Injectable (HumaLOG) 5 Unit(s) SubCutaneous three times a day before meals  lisinopril 20 milliGRAM(s) Oral daily  mupirocin 2% Nasal 1 Application(s) Nasal every 12 hours  pregabalin 100 milliGRAM(s) Oral three times a day  vancomycin  IVPB 750 milliGRAM(s) IV Intermittent <User Schedule>    PRN MEDICATIONS  ALBUTerol    90 MICROgram(s) HFA Inhaler 2 Puff(s) Inhalation every 6 hours PRN  dextrose 40% Gel 15 Gram(s) Oral once PRN  diphenhydrAMINE   Capsule 25 milliGRAM(s) Oral every 6 hours PRN  glucagon  Injectable 1 milliGRAM(s) IntraMuscular once PRN  guaiFENesin    Syrup 200 milliGRAM(s) Oral every 6 hours PRN  melatonin 5 milliGRAM(s) Oral at bedtime PRN  metoclopramide 5 milliGRAM(s) Oral every 8 hours PRN    VITALS:   T(F): 98.4  HR: 76  BP: 150/68  RR: 18  SpO2: 96%    LABS:    RADIOLOGY:      PHYSICAL EXAM:  GEN: No acute distress  PULM: Clear to auscultation bilaterally   CARD: S1/S2 present. RRR.   GI: Soft, non-tender, non-distended. Bowel sounds present  EXT: left BKA  NEURO: AAOX3

## 2018-07-27 NOTE — CHART NOTE - NSCHARTNOTEFT_GEN_A_CORE
Registered Dietitian Note    LOS due today. Pt is currently off the unit. On PO diet, w/good appetite as per CA. No skin breakdown noted.  RD to see pt w/in 24-48hrs.

## 2018-07-27 NOTE — PROGRESS NOTE ADULT - ASSESSMENT
58 yo F with PMH: DM, HTN, ESRD, CHF, DLD, LLE amputation, port in place R chest for dialysis (MWF)  admitted with fever, generalized weakness, cough with yellow sputum. She still has the cough it is continuous in nature not aggravated or alleviated by any factors and is associated with subjective fever with yellow sputum. She denied any hx of recent ill contacts. She refused the blood work this morning and insisted on leaving to go home. I counselled her in detail regarding the current medical condition and the increased potassium which needs to be confirmed with a repeat BMP but she still refused the blood work. Pt was seen by nephrology and will undergo HD on Monday. Will try to repeat BMP and get the lab work done if the pt is convinced.    #community acquired pneumonia. mrsa pcr swab +. s/p nasal bactroban. Refuses thorocentesis.  - RLL consolidation on CXR 7/19, worsened on 7/23, improved on 7/24  - f/u repeat CXR  - c/w vancomycin 1g post HD  - c/w cefepime 1g q24 IV  - f/u CXR  - f/u BCx (no growth from 7/19)  - f/u sputum Cx  - f/u pulm recs  - f/u ID recs    #HTN. Well controlled (SBP <140)  -c/w hydrazlazine, amlodipine, lisinopril  -hold clonidine for now, restart if BP increasing  -monitor BP closely     #Anemia stable. Likely due to ESRD. Hgb was normal earlier this month. patient has no evidence of active GI Bleeding. last admission patient had coffee ground emesis and refused EGD at that time. discussed that will need need EGD and C-scope possibly for anemia work up but she declined and understands the risks.  - f/u iron studies   - f/u folic acid   - f/u B12. check serum elelctropheresis  - patient refuses labs on dialysis days    #ESRD. Dialysis MWF.  - c/w darbopoetin   - nephrology consult (pt unsure who her nephrologist is but receives dialysis at [facility])  - pt was seen by nephro and was scheduled for HD on Monday no need for HD today   - will try to convince the pt for blood work, only willing to give blood with dialysis    #DM  -regular FS checks  -if FS > 180 start insulin subcut     #hx of persistent vomiting  -c/w home dose of metoclopramide 0.5mg tid    #heparin subq for dvt ppx  #renal/carb consistent diet  #dispo: Home with Outpatient or VN services, FU AS OP WITH PROSTHETIST-NO NEED FOR ACUTE CARE PT per physiatry 58 yo F with PMH: DM, HTN, ESRD, CHF, DLD, LLE amputation, port in place R chest for dialysis (MWF)  admitted with fever, generalized weakness, cough with yellow sputum. She still has the cough it is continuous in nature not aggravated or alleviated by any factors and is associated with subjective fever with yellow sputum. She denied any hx of recent ill contacts. She refused the blood work this morning and insisted on leaving to go home. I counselled her in detail regarding the current medical condition and the increased potassium which needs to be confirmed with a repeat BMP but she still refused the blood work. Pt was seen by nephrology and will undergo HD on Monday. Will try to repeat BMP and get the lab work done if the pt is convinced.    #community acquired pneumonia. mrsa pcr swab +. s/p nasal bactroban. Refuses thorocentesis.  - RLL consolidation on CXR 7/19, worsened on 7/23, improved on 7/24, stable on 7/25, 7/26, 7/27  - c/w vancomycin 750mg post HD, switched from 1g  - c/w cefepime 1g q24 IV  - f/u CXR  - f/u BCx (no growth from 7/19)  - f/u sputum Cx  - f/u pulm recs  - f/u ID recs    #HTN. Well controlled (SBP <140)  -c/w hydrazlazine, amlodipine, lisinopril  -hold clonidine for now, restart if BP increasing  -monitor BP closely     #Anemia stable. Likely due to ESRD. Hgb was normal earlier this month. patient has no evidence of active GI Bleeding. last admission patient had coffee ground emesis and refused EGD at that time. discussed that will need need EGD and C-scope possibly for anemia work up but she declined and understands the risks.  - f/u iron studies   - f/u folic acid   - f/u B12. check serum elelctropheresis  - patient refuses labs on dialysis days    #ESRD. Dialysis MWF.  - c/w darbopoetin   - nephrology consult (pt unsure who her nephrologist is but receives dialysis at [facility])  - pt was seen by nephro and was scheduled for HD on Monday no need for HD today   - will try to convince the pt for blood work, only willing to give blood with dialysis    #DM  -regular FS checks  -if FS > 180 start insulin subcut     #hx of persistent vomiting  -c/w home dose of metoclopramide 0.5mg tid    #heparin subq for dvt ppx  #renal/carb consistent diet  #dispo: Home with Outpatient or VN services, FU AS OP WITH PROSTHETIST-NO NEED FOR ACUTE CARE PT per physiatry

## 2018-07-27 NOTE — PROGRESS NOTE ADULT - ASSESSMENT
#PNA:   C/W Vanco and cefepime   pulmonary note appreciated   repeat xray shows no improvement with effusions especially on right side   patient declined thorocentesis again today. I spent 30 minutes explaining why she needs a throcentesis, that pleural effusion not getting better with HD and ABX that we  need to sent for testing because this possibly could be an empyema but she declined and will think about it. also spoke to pulm fellow and said if she agrees to have it done,  then they can do it tomorrow     #HTN  c.w current meds     #Anemia stable   Hgb was normal earlier this month. patient has no evidence of active GI Bleeding. last admission patient had coffee gorund emesis and refused EGD at that time.I discussed that will need need EGD and C-scope possibly for anemia work up but she declined and understands the risks  check iron studies   check folic acid   B12. check serum elelctropheresis  patient refused labs for today     #positive troponin last admission: would have proceeded with stress but patient declined. she just wants to finish her abx and go home. she said she would probably agree to a stress test as OPT     #ESRD  HD per renal   check labs when patient allows     #DM  monitor cbg keep between 120-180     #heparin sub cut for dvt ppx    #renal/carb consistent diet

## 2018-07-27 NOTE — PROGRESS NOTE ADULT - ASSESSMENT
DM, HTN, ESRD MWF  CHF, DLD, LLE amputation, presenting with fever, generalized weakness, cough with yellow sputum   found w/ Leukocytocis likely PNA    # ESRD: hd today, standard bath, uf 2 liters as tolerated   # pneumonia patient only on vanco /cefepime, vanco level noted, decrease to 750 q hd    # BP at goal  # check repeat labs   # pulmonary notes appreciated, patent refusing thoracentesis , repeat chest xray   # anemia : on darbo, will not increase in view of infection  no venofer elevated ferritin   # check ph  # will follow

## 2018-07-28 LAB
FERRITIN SERPL-MCNC: 873 NG/ML — HIGH (ref 15–150)
FOLATE SERPL-MCNC: 5.5 NG/ML — SIGNIFICANT CHANGE UP
PROT SERPL-MCNC: 6.7 G/DL — SIGNIFICANT CHANGE UP (ref 6–8.3)
PROT SERPL-MCNC: 6.7 G/DL — SIGNIFICANT CHANGE UP (ref 6–8.3)
VIT B12 SERPL-MCNC: 691 PG/ML — SIGNIFICANT CHANGE UP (ref 232–1245)

## 2018-07-28 RX ADMIN — Medication 5 UNIT(S): at 13:02

## 2018-07-28 RX ADMIN — Medication 325 MILLIGRAM(S): at 22:16

## 2018-07-28 RX ADMIN — Medication 667 MILLIGRAM(S): at 08:14

## 2018-07-28 RX ADMIN — INSULIN GLARGINE 15 UNIT(S): 100 INJECTION, SOLUTION SUBCUTANEOUS at 22:15

## 2018-07-28 RX ADMIN — Medication 5 UNIT(S): at 08:13

## 2018-07-28 RX ADMIN — Medication 50 MILLIGRAM(S): at 06:22

## 2018-07-28 RX ADMIN — Medication 50 MILLIGRAM(S): at 22:15

## 2018-07-28 RX ADMIN — HEPARIN SODIUM 5000 UNIT(S): 5000 INJECTION INTRAVENOUS; SUBCUTANEOUS at 22:16

## 2018-07-28 RX ADMIN — CARVEDILOL PHOSPHATE 6.25 MILLIGRAM(S): 80 CAPSULE, EXTENDED RELEASE ORAL at 06:22

## 2018-07-28 RX ADMIN — MUPIROCIN 1 APPLICATION(S): 20 OINTMENT TOPICAL at 06:21

## 2018-07-28 RX ADMIN — Medication 667 MILLIGRAM(S): at 16:51

## 2018-07-28 RX ADMIN — Medication 50 MILLIGRAM(S): at 13:38

## 2018-07-28 RX ADMIN — Medication 5 UNIT(S): at 16:50

## 2018-07-28 RX ADMIN — HEPARIN SODIUM 5000 UNIT(S): 5000 INJECTION INTRAVENOUS; SUBCUTANEOUS at 06:21

## 2018-07-28 RX ADMIN — CARVEDILOL PHOSPHATE 6.25 MILLIGRAM(S): 80 CAPSULE, EXTENDED RELEASE ORAL at 17:35

## 2018-07-28 RX ADMIN — Medication 325 MILLIGRAM(S): at 06:22

## 2018-07-28 RX ADMIN — HEPARIN SODIUM 5000 UNIT(S): 5000 INJECTION INTRAVENOUS; SUBCUTANEOUS at 13:38

## 2018-07-28 RX ADMIN — CEFEPIME 100 MILLIGRAM(S): 1 INJECTION, POWDER, FOR SOLUTION INTRAMUSCULAR; INTRAVENOUS at 08:31

## 2018-07-28 RX ADMIN — LISINOPRIL 20 MILLIGRAM(S): 2.5 TABLET ORAL at 06:22

## 2018-07-28 RX ADMIN — Medication 81 MILLIGRAM(S): at 13:01

## 2018-07-28 RX ADMIN — Medication 325 MILLIGRAM(S): at 13:00

## 2018-07-28 RX ADMIN — MUPIROCIN 1 APPLICATION(S): 20 OINTMENT TOPICAL at 17:37

## 2018-07-28 RX ADMIN — AMLODIPINE BESYLATE 10 MILLIGRAM(S): 2.5 TABLET ORAL at 06:22

## 2018-07-28 RX ADMIN — Medication 667 MILLIGRAM(S): at 13:00

## 2018-07-28 NOTE — DIETITIAN INITIAL EVALUATION ADULT. - OTHER INFO
58y/o female with pmhx listed below presented with fever, generalized weakness, cough with yellow sputum. Admitted with right lower lobe pneumonia. Found to have right pleural effusion and MRSA. Skin is intact (Seb Score-15).

## 2018-07-28 NOTE — DIETITIAN INITIAL EVALUATION ADULT. - PERTINENT MEDS FT
D5 at 50mL/hr, Lisinopril, Reglan, Hydralazine, Coreg, Aranesp, Humalog, Heparin, Vancomycin, Maxipime, Lantus, Norvasc, Ferrous Sulfate, Proventil HFA, Phoslo

## 2018-07-28 NOTE — PROGRESS NOTE ADULT - SUBJECTIVE AND OBJECTIVE BOX
PHILIPPE SMITH  57y Female    INTERVAL HPI/OVERNIGHT EVENTS:    Pt seen earlier today. States she is still thinking about thoracentesis.   No chest pain or SOB. Was sleeping comfortably today.    T(F): 99.1 (07-28-18 @ 06:12), Max: 99.1 (07-28-18 @ 06:12)  HR: 73 (07-28-18 @ 06:12) (73 - 78)  BP: 142/67 (07-28-18 @ 06:12) (142/67 - 173/76)  RR: 16 (07-28-18 @ 06:12) (16 - 18)  SpO2: 99% (07-27-18 @ 21:20) (99% - 99%) on 3L NC    I&O's Summary    27 Jul 2018 07:01  -  28 Jul 2018 07:00  --------------------------------------------------------  IN: 0 mL / OUT: 2000 mL / NET: -2000 mL    28 Jul 2018 07:01  -  28 Jul 2018 13:42  --------------------------------------------------------  IN: 1060 mL / OUT: 950 mL / NET: 110 mL      CAPILLARY BLOOD GLUCOSE  130 (28 Jul 2018 11:36)  117 (28 Jul 2018 08:00)  116 (27 Jul 2018 22:00)  117 (27 Jul 2018 17:23)      PHYSICAL EXAM:  GENERAL: NAD  HEAD:  Normocephalic  EYES:  conjunctiva and sclera clear  ENMT: Moist mucous membranes  NECK: Supple  NERVOUS SYSTEM:  Alert, awake, Good concentration  CHEST/LUNG: decreased BS in right mid to lower lung fields  HD catheter right chest wall  HEART: Regular rate and rhythm  ABDOMEN: Soft, Nontender, Nondistended  EXTREMITIES:   No edema right leg  s/p Left AKA      Consultant(s) Notes Reviewed:  [x ] YES  [ ] NO  Care Discussed with Consultants/Other Providers [ x] YES  [ ] NO    MEDICATIONS  (STANDING):  amLODIPine   Tablet 10 milliGRAM(s) Oral daily  aspirin  chewable 81 milliGRAM(s) Oral daily  calcium acetate 667 milliGRAM(s) Oral three times a day with meals  carvedilol 6.25 milliGRAM(s) Oral every 12 hours  cefepime   IVPB      cefepime   IVPB 1000 milliGRAM(s) IV Intermittent every 24 hours  darbepoetin Injectable Syringe 40 MICROGram(s) IV Push <User Schedule>  dextrose 5%. 1000 milliLiter(s) (50 mL/Hr) IV Continuous <Continuous>  dextrose 50% Injectable 12.5 Gram(s) IV Push once  dextrose 50% Injectable 25 Gram(s) IV Push once  dextrose 50% Injectable 25 Gram(s) IV Push once  ferrous    sulfate 325 milliGRAM(s) Oral three times a day  heparin  Injectable 5000 Unit(s) SubCutaneous every 8 hours  hydrALAZINE 50 milliGRAM(s) Oral three times a day  insulin glargine Injectable (LANTUS) 15 Unit(s) SubCutaneous at bedtime  insulin lispro Injectable (HumaLOG) 5 Unit(s) SubCutaneous three times a day before meals  lisinopril 20 milliGRAM(s) Oral daily  mupirocin 2% Nasal 1 Application(s) Nasal every 12 hours  vancomycin  IVPB 750 milliGRAM(s) IV Intermittent <User Schedule>    MEDICATIONS  (PRN):  Comprehensive Metabolic Panel (07.23.18 @ 14:00)    Sodium, Serum: 140 mmol/L    Potassium, Serum: 4.7 mmol/L    Chloride, Serum: 91 mmol/L    Carbon Dioxide, Serum: 24 mmol/L    Anion Gap, Serum: 25 mmol/L    Blood Urea Nitrogen, Serum: 80: Critical value: mg/dL    Creatinine, Serum: 7.8: Critical value: mg/dL    Glucose, Serum: 262 mg/dL    Calcium, Total Serum: 8.3 mg/dL    Protein Total, Serum: 6.7 g/dL    Albumin, Serum: 2.8 g/dL    Bilirubin Total, Serum: 0.2 mg/dL    Alkaline Phosphatase, Serum: 94 U/L    Aspartate Aminotransferase (AST/SGOT): 8 U/L    Alanine Aminotransferase (ALT/SGPT): 12 U/L    eGFR if Non : 5: Interpretative comment  The units for eGFR are ml/min/1.73m2 (normalized body surface area). The  eGFR is calculated from a serum creatinine using the CKD-EPI equation.  Other variables required for calculation are race, age and sex. Among  patients with chronic kidney disease (CKD), the eGFR is useful in  determining the stage of disease according to KDOQI CKD classification.  All eGFR results are reported numerically with the following  interpretation.          GFR                    With                 Without     (ml/min/1.73 m2)    Kidney Damage       Kidney Damage        >= 90                    Stage 1                     Normal        60-89                    Stage 2                     Decreased GFR        30-59     Stage 3                     Stage 3        15-29                    Stage 4                     Stage 4        < 15                      Stage 5                     Stage 5  Each stage of CKD assumes that the associated GFR level has been in  effect for at least 3 months. Determination of stages one and two (with  eGFR > 59 ml/min/m2) requires estimation of kidney damage for at least 3  months as defined by structural or functional abnormalities.  Limitations: All estimates of GFR will be less accurate for patients at  extremes of muscle mass (including but not limited to frail elderly,  critically ill, or cancer patients), those with unusual diets, and those  with conditions associated with reduced secretion or extrarenal  elimination of creatinine. The eGFR equation is not recommended for use  in patients with unstable creatinine levels. mL/min/1.73M2    eGFR if African American: 6 mL/min/1.73M2    ALBUTerol    90 MICROgram(s) HFA Inhaler 2 Puff(s) Inhalation every 6 hours PRN Shortness of Breath and/or Wheezing  dextrose 40% Gel 15 Gram(s) Oral once PRN Blood Glucose LESS THAN 70 milliGRAM(s)/deciliter  diphenhydrAMINE   Capsule 25 milliGRAM(s) Oral every 6 hours PRN Rash and/or Itching  glucagon  Injectable 1 milliGRAM(s) IntraMuscular once PRN Glucose LESS THAN 70 milligrams/deciliter  guaiFENesin    Syrup 200 milliGRAM(s) Oral every 6 hours PRN Cough  melatonin 5 milliGRAM(s) Oral at bedtime PRN Insomnia  metoclopramide 5 milliGRAM(s) Oral every 8 hours PRN n/v      LABS:    Complete Blood Count (07.25.18 @ 09:10)    Nucleated RBC: 0 /100 WBCs    WBC Count: 10.67 K/uL    RBC Count: 3.10 M/uL    Hemoglobin: 8.6 g/dL    Hematocrit: 25.9 %    Mean Cell Volume: 83.5 fL    Mean Cell Hemoglobin: 27.7 pg    Mean Cell Hemoglobin Conc: 33.2 g/dL    Red Cell Distrib Width: 15.9 %    Platelet Count - Automated: 140 K/uL      Culture - Blood (07.19.18 @ 23:29)    Specimen Source: .Blood Blood    Culture Results:   No growth at 5 days.    Culture - Blood (07.19.18 @ 23:29)    Specimen Source: .Blood Blood    Culture Results:   No growth at 5 days.          TPro  6.7  /  Alb  x   /  TBili  x   /  DBili  x   /  AST  x   /  ALT  x   /  AlkPhos  x   07-26          RADIOLOGY & ADDITIONAL TESTS:    Imaging or report Personally Reviewed:  [x ] YES  [ ] NO    < from: Xray Chest 1 View AP/PA (07.27.18 @ 12:44) >  IMPRESSION:     Unchanged bilateral opacities and right pleural effusion.    < end of copied text >        Care discussed with pt PHILIPPE SMITH  57y Female    INTERVAL HPI/OVERNIGHT EVENTS:    Pt seen earlier today. States she is still thinking about thoracentesis.   No chest pain or SOB. Was sleeping comfortably today.    T(F): 99.1 (07-28-18 @ 06:12), Max: 99.1 (07-28-18 @ 06:12)  HR: 73 (07-28-18 @ 06:12) (73 - 78)  BP: 142/67 (07-28-18 @ 06:12) (142/67 - 173/76)  RR: 16 (07-28-18 @ 06:12) (16 - 18)  SpO2: 99% (07-27-18 @ 21:20) (99% - 99%) on 3L NC    I&O's Summary    27 Jul 2018 07:01  -  28 Jul 2018 07:00  --------------------------------------------------------  IN: 0 mL / OUT: 2000 mL / NET: -2000 mL    28 Jul 2018 07:01  -  28 Jul 2018 13:42  --------------------------------------------------------  IN: 1060 mL / OUT: 950 mL / NET: 110 mL      CAPILLARY BLOOD GLUCOSE  130 (28 Jul 2018 11:36)  117 (28 Jul 2018 08:00)  116 (27 Jul 2018 22:00)  117 (27 Jul 2018 17:23)      PHYSICAL EXAM:  GENERAL: NAD  HEAD:  Normocephalic  EYES:  conjunctiva and sclera clear  ENMT: Moist mucous membranes  NECK: Supple  NERVOUS SYSTEM:  Alert, awake, Good concentration  CHEST/LUNG: decreased BS in right mid to lower lung fields  HD catheter right chest wall  HEART: Regular rate and rhythm  ABDOMEN: Soft, Nontender, Nondistended  EXTREMITIES:   No edema right leg  s/p Left BKA      Consultant(s) Notes Reviewed:  [x ] YES  [ ] NO  Care Discussed with Consultants/Other Providers [ x] YES  [ ] NO    MEDICATIONS  (STANDING):  amLODIPine   Tablet 10 milliGRAM(s) Oral daily  aspirin  chewable 81 milliGRAM(s) Oral daily  calcium acetate 667 milliGRAM(s) Oral three times a day with meals  carvedilol 6.25 milliGRAM(s) Oral every 12 hours  cefepime   IVPB      cefepime   IVPB 1000 milliGRAM(s) IV Intermittent every 24 hours  darbepoetin Injectable Syringe 40 MICROGram(s) IV Push <User Schedule>  dextrose 5%. 1000 milliLiter(s) (50 mL/Hr) IV Continuous <Continuous>  dextrose 50% Injectable 12.5 Gram(s) IV Push once  dextrose 50% Injectable 25 Gram(s) IV Push once  dextrose 50% Injectable 25 Gram(s) IV Push once  ferrous    sulfate 325 milliGRAM(s) Oral three times a day  heparin  Injectable 5000 Unit(s) SubCutaneous every 8 hours  hydrALAZINE 50 milliGRAM(s) Oral three times a day  insulin glargine Injectable (LANTUS) 15 Unit(s) SubCutaneous at bedtime  insulin lispro Injectable (HumaLOG) 5 Unit(s) SubCutaneous three times a day before meals  lisinopril 20 milliGRAM(s) Oral daily  mupirocin 2% Nasal 1 Application(s) Nasal every 12 hours  vancomycin  IVPB 750 milliGRAM(s) IV Intermittent <User Schedule>    MEDICATIONS  (PRN):  Comprehensive Metabolic Panel (07.23.18 @ 14:00)    Sodium, Serum: 140 mmol/L    Potassium, Serum: 4.7 mmol/L    Chloride, Serum: 91 mmol/L    Carbon Dioxide, Serum: 24 mmol/L    Anion Gap, Serum: 25 mmol/L    Blood Urea Nitrogen, Serum: 80: Critical value: mg/dL    Creatinine, Serum: 7.8: Critical value: mg/dL    Glucose, Serum: 262 mg/dL    Calcium, Total Serum: 8.3 mg/dL    Protein Total, Serum: 6.7 g/dL    Albumin, Serum: 2.8 g/dL    Bilirubin Total, Serum: 0.2 mg/dL    Alkaline Phosphatase, Serum: 94 U/L    Aspartate Aminotransferase (AST/SGOT): 8 U/L    Alanine Aminotransferase (ALT/SGPT): 12 U/L    eGFR if Non : 5: Interpretative comment  The units for eGFR are ml/min/1.73m2 (normalized body surface area). The  eGFR is calculated from a serum creatinine using the CKD-EPI equation.  Other variables required for calculation are race, age and sex. Among  patients with chronic kidney disease (CKD), the eGFR is useful in  determining the stage of disease according to KDOQI CKD classification.  All eGFR results are reported numerically with the following  interpretation.          GFR                    With                 Without     (ml/min/1.73 m2)    Kidney Damage       Kidney Damage        >= 90                    Stage 1                     Normal        60-89                    Stage 2                     Decreased GFR        30-59     Stage 3                     Stage 3        15-29                    Stage 4                     Stage 4        < 15                      Stage 5                     Stage 5  Each stage of CKD assumes that the associated GFR level has been in  effect for at least 3 months. Determination of stages one and two (with  eGFR > 59 ml/min/m2) requires estimation of kidney damage for at least 3  months as defined by structural or functional abnormalities.  Limitations: All estimates of GFR will be less accurate for patients at  extremes of muscle mass (including but not limited to frail elderly,  critically ill, or cancer patients), those with unusual diets, and those  with conditions associated with reduced secretion or extrarenal  elimination of creatinine. The eGFR equation is not recommended for use  in patients with unstable creatinine levels. mL/min/1.73M2    eGFR if African American: 6 mL/min/1.73M2    ALBUTerol    90 MICROgram(s) HFA Inhaler 2 Puff(s) Inhalation every 6 hours PRN Shortness of Breath and/or Wheezing  dextrose 40% Gel 15 Gram(s) Oral once PRN Blood Glucose LESS THAN 70 milliGRAM(s)/deciliter  diphenhydrAMINE   Capsule 25 milliGRAM(s) Oral every 6 hours PRN Rash and/or Itching  glucagon  Injectable 1 milliGRAM(s) IntraMuscular once PRN Glucose LESS THAN 70 milligrams/deciliter  guaiFENesin    Syrup 200 milliGRAM(s) Oral every 6 hours PRN Cough  melatonin 5 milliGRAM(s) Oral at bedtime PRN Insomnia  metoclopramide 5 milliGRAM(s) Oral every 8 hours PRN n/v      LABS:    Complete Blood Count (07.25.18 @ 09:10)    Nucleated RBC: 0 /100 WBCs    WBC Count: 10.67 K/uL    RBC Count: 3.10 M/uL    Hemoglobin: 8.6 g/dL    Hematocrit: 25.9 %    Mean Cell Volume: 83.5 fL    Mean Cell Hemoglobin: 27.7 pg    Mean Cell Hemoglobin Conc: 33.2 g/dL    Red Cell Distrib Width: 15.9 %    Platelet Count - Automated: 140 K/uL      Culture - Blood (07.19.18 @ 23:29)    Specimen Source: .Blood Blood    Culture Results:   No growth at 5 days.    Culture - Blood (07.19.18 @ 23:29)    Specimen Source: .Blood Blood    Culture Results:   No growth at 5 days.          TPro  6.7  /  Alb  x   /  TBili  x   /  DBili  x   /  AST  x   /  ALT  x   /  AlkPhos  x   07-26          RADIOLOGY & ADDITIONAL TESTS:    Imaging or report Personally Reviewed:  [x ] YES  [ ] NO    < from: Xray Chest 1 View AP/PA (07.27.18 @ 12:44) >  IMPRESSION:     Unchanged bilateral opacities and right pleural effusion.    < end of copied text >        Care discussed with pt

## 2018-07-28 NOTE — DIETITIAN INITIAL EVALUATION ADULT. - ADHERENCE
good/The patient reports following a renal diet at home; consumed three meals at 100%; denies MVI use.

## 2018-07-28 NOTE — PROGRESS NOTE ADULT - ASSESSMENT
58 yo woman with PMH of DM, HTN, ESRD on HD (M/W/F), CHF, DLD, left LE amputation admitted with fever, generalized weakness and cough with yellow sputum. Workup revealed pneumonia and right pleural effusion - effusion not improving with HD so need to rule out infectious etiology (although ID and Pulm think effusion is more related to volume overload).  Pt currently refuses thoracentesis but she is thinking about it today.     1. Pneumonia with right pleural effusion (Sputum + for MRSA)  on Vanco and cefepime  recall pulm for thoracentesis if pt consents  ID following    2. CHF - fluid removal with HD    3. DM - on insulin - controlled    4. ESRD - HD per renal  check labs with HD    5. Anemia on FeSO4 and darbepoetin    6. HTN - continue current management    7. DVT prophylaxis

## 2018-07-28 NOTE — DIETITIAN INITIAL EVALUATION ADULT. - PHYSICAL APPEARANCE
BMI-34; Based on nutrition focused physical examination, the patient appears well nourished with no physical signs of muscle and subcutaneous fat wasting./well nourished

## 2018-07-28 NOTE — DIETITIAN INITIAL EVALUATION ADULT. - ENERGY NEEDS
Estimated Calorie Needs: 1530-1785kcal/day (30-35kcal/kg of adjusted weight for left BKA-51kg)  Estimated Protein Needs: 61-71grams/day (1.2-1.4grams/kg of adjusted weight for left BKA-51kg)  Estimated Fluid Needs: UOP + 1000mL

## 2018-07-29 LAB
ANION GAP SERPL CALC-SCNC: 15 MMOL/L — HIGH (ref 7–14)
BUN SERPL-MCNC: 47 MG/DL — HIGH (ref 10–20)
CALCIUM SERPL-MCNC: 8.5 MG/DL — SIGNIFICANT CHANGE UP (ref 8.5–10.1)
CHLORIDE SERPL-SCNC: 97 MMOL/L — LOW (ref 98–110)
CO2 SERPL-SCNC: 27 MMOL/L — SIGNIFICANT CHANGE UP (ref 17–32)
CREAT SERPL-MCNC: 5.8 MG/DL — CRITICAL HIGH (ref 0.7–1.5)
GLUCOSE SERPL-MCNC: 121 MG/DL — HIGH (ref 70–99)
HCT VFR BLD CALC: 24.9 % — LOW (ref 37–47)
HGB BLD-MCNC: 8.1 G/DL — LOW (ref 12–16)
MCHC RBC-ENTMCNC: 27.6 PG — SIGNIFICANT CHANGE UP (ref 27–31)
MCHC RBC-ENTMCNC: 32.5 G/DL — SIGNIFICANT CHANGE UP (ref 32–37)
MCV RBC AUTO: 84.7 FL — SIGNIFICANT CHANGE UP (ref 81–99)
NRBC # BLD: 0 /100 WBCS — SIGNIFICANT CHANGE UP (ref 0–0)
PLATELET # BLD AUTO: 220 K/UL — SIGNIFICANT CHANGE UP (ref 130–400)
POTASSIUM SERPL-MCNC: 4.6 MMOL/L — SIGNIFICANT CHANGE UP (ref 3.5–5)
POTASSIUM SERPL-SCNC: 4.6 MMOL/L — SIGNIFICANT CHANGE UP (ref 3.5–5)
RBC # BLD: 2.94 M/UL — LOW (ref 4.2–5.4)
RBC # FLD: 16.4 % — HIGH (ref 11.5–14.5)
SODIUM SERPL-SCNC: 139 MMOL/L — SIGNIFICANT CHANGE UP (ref 135–146)
WBC # BLD: 8.01 K/UL — SIGNIFICANT CHANGE UP (ref 4.8–10.8)
WBC # FLD AUTO: 8.01 K/UL — SIGNIFICANT CHANGE UP (ref 4.8–10.8)

## 2018-07-29 RX ADMIN — HEPARIN SODIUM 5000 UNIT(S): 5000 INJECTION INTRAVENOUS; SUBCUTANEOUS at 14:10

## 2018-07-29 RX ADMIN — CARVEDILOL PHOSPHATE 6.25 MILLIGRAM(S): 80 CAPSULE, EXTENDED RELEASE ORAL at 18:21

## 2018-07-29 RX ADMIN — LISINOPRIL 20 MILLIGRAM(S): 2.5 TABLET ORAL at 05:58

## 2018-07-29 RX ADMIN — AMLODIPINE BESYLATE 10 MILLIGRAM(S): 2.5 TABLET ORAL at 05:58

## 2018-07-29 RX ADMIN — Medication 50 MILLIGRAM(S): at 14:10

## 2018-07-29 RX ADMIN — HEPARIN SODIUM 5000 UNIT(S): 5000 INJECTION INTRAVENOUS; SUBCUTANEOUS at 21:53

## 2018-07-29 RX ADMIN — Medication 50 MILLIGRAM(S): at 05:58

## 2018-07-29 RX ADMIN — CEFEPIME 100 MILLIGRAM(S): 1 INJECTION, POWDER, FOR SOLUTION INTRAMUSCULAR; INTRAVENOUS at 09:00

## 2018-07-29 RX ADMIN — Medication 5 UNIT(S): at 08:44

## 2018-07-29 RX ADMIN — CARVEDILOL PHOSPHATE 6.25 MILLIGRAM(S): 80 CAPSULE, EXTENDED RELEASE ORAL at 05:58

## 2018-07-29 RX ADMIN — Medication 325 MILLIGRAM(S): at 21:53

## 2018-07-29 RX ADMIN — HEPARIN SODIUM 5000 UNIT(S): 5000 INJECTION INTRAVENOUS; SUBCUTANEOUS at 05:59

## 2018-07-29 RX ADMIN — Medication 667 MILLIGRAM(S): at 08:41

## 2018-07-29 RX ADMIN — MUPIROCIN 1 APPLICATION(S): 20 OINTMENT TOPICAL at 18:22

## 2018-07-29 RX ADMIN — MUPIROCIN 1 APPLICATION(S): 20 OINTMENT TOPICAL at 08:50

## 2018-07-29 RX ADMIN — Medication 325 MILLIGRAM(S): at 05:58

## 2018-07-29 RX ADMIN — Medication 325 MILLIGRAM(S): at 14:10

## 2018-07-29 RX ADMIN — Medication 667 MILLIGRAM(S): at 18:21

## 2018-07-29 RX ADMIN — MUPIROCIN 1 APPLICATION(S): 20 OINTMENT TOPICAL at 05:59

## 2018-07-29 RX ADMIN — Medication 81 MILLIGRAM(S): at 14:10

## 2018-07-29 RX ADMIN — Medication 50 MILLIGRAM(S): at 21:53

## 2018-07-29 RX ADMIN — Medication 25 MILLIGRAM(S): at 08:41

## 2018-07-29 NOTE — PROGRESS NOTE ADULT - ASSESSMENT
56 yo F with PMH: DM, HTN, ESRD, CHF, DLD, LLE amputation, port in place R chest for dialysis (MWF)  admitted with fever, generalized weakness, cough with yellow sputum. She still has the cough it is continuous in nature not aggravated or alleviated by any factors and is associated with subjective fever with yellow sputum. She denied any hx of recent ill contacts. She refused the blood work this morning and insisted on leaving to go home. I counselled her in detail regarding the current medical condition and the increased potassium which needs to be confirmed with a repeat BMP but she still refused the blood work. Pt was seen by nephrology and will undergo HD on Monday. Will try to repeat BMP and get the lab work done if the pt is convinced.    #community acquired pneumonia. mrsa pcr swab +. s/p nasal bactroban. Refuses thorocentesis. No growth on BCx x5 days. WBC wnl.   - RLL consolidation on CXR 7/19, worsened on 7/23, improved on 7/24, stable on 7/25, 7/26, 7/27  - c/w vancomycin 750mg post HD, switched from 1g  - c/w cefepime 1g q24 IV  - f/u pulm recs  - f/u ID recs    #HTN. Well controlled (SBP <140)  - c/w hydrazlazine, amlodipine, lisinopril  - hold clonidine for now, restart if BP increasing  - monitor BP closely     #Anemia stable. Likely due to ESRD. Hgb was normal earlier this month. patient has no evidence of active GI Bleeding. last admission patient had coffee ground emesis and refused EGD at that time. discussed that will need need EGD and C-scope possibly for anemia work up but she declined and understands the risks. Folate, B12 wnl.   - f/u iron studies   - patient refuses labs except on dialysis days    #ESRD. Dialysis MWF.  - c/w darbopoetin  - c/w ferrous sulfate 325mg tid  - c/w phoslo 667mg tid    #DM  -regular FS checks  -if FS > 180 start insulin subcut     #hx of persistent vomiting  -c/w home dose of metoclopramide 0.5mg tid    #heparin subq for dvt ppx  #renal/carb consistent diet  #dispo: Home with Outpatient or VN services, F/U AS OP WITH PROSTHETIST-NO NEED FOR ACUTE CARE PT per physiatry 58 yo F with PMH: DM, HTN, ESRD, CHF, DLD, LLE amputation, port in place R chest for dialysis (MWF)  admitted with fever, generalized weakness, cough with yellow sputum. She still has the cough it is continuous in nature not aggravated or alleviated by any factors and is associated with subjective fever with yellow sputum. She denied any hx of recent ill contacts. She refused the blood work this morning and insisted on leaving to go home. I counselled her in detail regarding the current medical condition and the increased potassium which needs to be confirmed with a repeat BMP but she still refused the blood work. Pt was seen by nephrology and will undergo HD on Monday. Will try to repeat BMP and get the lab work done if the pt is convinced.    # pneumonia. mrsa pcr swab +. s/p nasal bactroban. Refuses thorocentesis. No growth on BCx x5 days. WBC wnl.   - RLL consolidation on CXR 7/19, worsened on 7/23, improved on 7/24, stable on 7/25, 7/26, 7/27  - c/w vancomycin 750mg post HD, switched from 1g  - c/w cefepime 1g q24 IV  - f/u pulm recs  - f/u ID recs  - repeat Vanco level    #HTN. Well controlled (SBP <140)  - c/w hydrazlazine, amlodipine, lisinopril  - hold clonidine for now, restart if BP increasing  - monitor BP closely     #Anemia stable. Likely due to ESRD. Hgb was normal earlier this month. patient has no evidence of active GI Bleeding. last admission patient had coffee ground emesis and refused EGD at that time. discussed that will need need EGD and C-scope possibly for anemia work up but she declined and understands the risks. Folate, B12 wnl.   - f/u iron studies   - patient refuses labs except on dialysis days    #ESRD. Dialysis MWF.  - c/w darbopoetin  - c/w ferrous sulfate 325mg tid  - c/w phoslo 667mg tid    #DM  -regular FS checks  -if FS > 180 start insulin subcut     #hx of persistent vomiting  -c/w home dose of metoclopramide 0.5mg tid    #heparin subq for dvt ppx  #renal/carb consistent diet  #dispo: Home with Outpatient or VN services, F/U AS OP WITH PROSTHETIST-NO NEED FOR ACUTE CARE PT per physiatry

## 2018-07-29 NOTE — PROGRESS NOTE ADULT - SUBJECTIVE AND OBJECTIVE BOX
CHIEF COMPLAINT:    Patient is a 57y old Female who presents with a chief complaint of fever, cough/sputum (20 Jul 2018 08:53)    Currently admitted to medicine with the primary diagnosis of Right lower lobe pneumonia     Today is hospital day 9d. This morning she is resting comfortably in bed and reports no new issues or overnight events.     PAST MEDICAL & SURGICAL HISTORY  Type 2 diabetes mellitus  CHF (congestive heart failure)  End stage renal failure on dialysis  Hypertension  Hyperlipemia  Heart failure  Amputation of leg: left  History of femoral angiogram: left angiogram  Port-a-cath in place: right chest wall    SOCIAL HISTORY:  Negative for smoking/alcohol/drug use.     ALLERGIES:  No Known Allergies    MEDICATIONS:  STANDING MEDICATIONS  amLODIPine   Tablet 10 milliGRAM(s) Oral daily  aspirin  chewable 81 milliGRAM(s) Oral daily  calcium acetate 667 milliGRAM(s) Oral three times a day with meals  carvedilol 6.25 milliGRAM(s) Oral every 12 hours  cefepime   IVPB      cefepime   IVPB 1000 milliGRAM(s) IV Intermittent every 24 hours  darbepoetin Injectable Syringe 40 MICROGram(s) IV Push <User Schedule>  dextrose 5%. 1000 milliLiter(s) IV Continuous <Continuous>  dextrose 50% Injectable 12.5 Gram(s) IV Push once  dextrose 50% Injectable 25 Gram(s) IV Push once  dextrose 50% Injectable 25 Gram(s) IV Push once  ferrous    sulfate 325 milliGRAM(s) Oral three times a day  heparin  Injectable 5000 Unit(s) SubCutaneous every 8 hours  hydrALAZINE 50 milliGRAM(s) Oral three times a day  insulin glargine Injectable (LANTUS) 15 Unit(s) SubCutaneous at bedtime  insulin lispro Injectable (HumaLOG) 5 Unit(s) SubCutaneous three times a day before meals  lisinopril 20 milliGRAM(s) Oral daily  mupirocin 2% Nasal 1 Application(s) Nasal every 12 hours  vancomycin  IVPB 750 milliGRAM(s) IV Intermittent <User Schedule>    PRN MEDICATIONS  ALBUTerol    90 MICROgram(s) HFA Inhaler 2 Puff(s) Inhalation every 6 hours PRN  dextrose 40% Gel 15 Gram(s) Oral once PRN  diphenhydrAMINE   Capsule 25 milliGRAM(s) Oral every 6 hours PRN  glucagon  Injectable 1 milliGRAM(s) IntraMuscular once PRN  guaiFENesin    Syrup 200 milliGRAM(s) Oral every 6 hours PRN  melatonin 5 milliGRAM(s) Oral at bedtime PRN  metoclopramide 5 milliGRAM(s) Oral every 8 hours PRN    VITALS:   T(F): 96.6  HR: 76  BP: 135/65  RR: 18  SpO2: 99%    LABS:    Refusing labs aside from HD    RADIOLOGY:    PHYSICAL EXAM:  GEN: No acute distress  PULM: Clear to auscultation bilaterally   CARD: S1/S2 present. RRR.   GI: Soft, non-tender, non-distended. Bowel sounds present  EXT: left BKA  NEURO: AAOX3 CHIEF COMPLAINT:    Patient is a 57y old Female who presents with a chief complaint of fever, cough/sputum (20 Jul 2018 08:53)    Currently admitted to medicine with the primary diagnosis of Right lower lobe pneumonia     Today is hospital day 9d. This morning she is resting comfortably in bed and reports no new issues or overnight events.     Still refuses thoracentesis.    PAST MEDICAL & SURGICAL HISTORY  Type 2 diabetes mellitus  CHF (congestive heart failure)  End stage renal failure on dialysis  Hypertension  Hyperlipemia  Heart failure  Amputation of leg: left  History of femoral angiogram: left angiogram  Port-a-cath in place: right chest wall    SOCIAL HISTORY:  Negative for smoking/alcohol/drug use.     ALLERGIES:  No Known Allergies    MEDICATIONS:  STANDING MEDICATIONS  amLODIPine   Tablet 10 milliGRAM(s) Oral daily  aspirin  chewable 81 milliGRAM(s) Oral daily  calcium acetate 667 milliGRAM(s) Oral three times a day with meals  carvedilol 6.25 milliGRAM(s) Oral every 12 hours  cefepime   IVPB      cefepime   IVPB 1000 milliGRAM(s) IV Intermittent every 24 hours  darbepoetin Injectable Syringe 40 MICROGram(s) IV Push <User Schedule>  dextrose 5%. 1000 milliLiter(s) IV Continuous <Continuous>  dextrose 50% Injectable 12.5 Gram(s) IV Push once  dextrose 50% Injectable 25 Gram(s) IV Push once  dextrose 50% Injectable 25 Gram(s) IV Push once  ferrous    sulfate 325 milliGRAM(s) Oral three times a day  heparin  Injectable 5000 Unit(s) SubCutaneous every 8 hours  hydrALAZINE 50 milliGRAM(s) Oral three times a day  insulin glargine Injectable (LANTUS) 15 Unit(s) SubCutaneous at bedtime  insulin lispro Injectable (HumaLOG) 5 Unit(s) SubCutaneous three times a day before meals  lisinopril 20 milliGRAM(s) Oral daily  mupirocin 2% Nasal 1 Application(s) Nasal every 12 hours  vancomycin  IVPB 750 milliGRAM(s) IV Intermittent <User Schedule>    PRN MEDICATIONS  ALBUTerol    90 MICROgram(s) HFA Inhaler 2 Puff(s) Inhalation every 6 hours PRN  dextrose 40% Gel 15 Gram(s) Oral once PRN  diphenhydrAMINE   Capsule 25 milliGRAM(s) Oral every 6 hours PRN  glucagon  Injectable 1 milliGRAM(s) IntraMuscular once PRN  guaiFENesin    Syrup 200 milliGRAM(s) Oral every 6 hours PRN  melatonin 5 milliGRAM(s) Oral at bedtime PRN  metoclopramide 5 milliGRAM(s) Oral every 8 hours PRN    VITALS:   T(F): 96.6  HR: 76  BP: 135/65  RR: 18  SpO2: 99%    LABS:    Refusing labs aside from HD    RADIOLOGY:    PHYSICAL EXAM:  GEN: No acute distress  PULM: Decreased BS at bases  CARD: S1/S2 present. RRR.   GI: Soft, non-tender, non-distended. Bowel sounds present  EXT: left BKA  NEURO: AAOX3  HD catheter right chest wall

## 2018-07-30 LAB
% ALBUMIN: 44.6 % — SIGNIFICANT CHANGE UP
% ALPHA 1: 7.6 % — SIGNIFICANT CHANGE UP
% ALPHA 2: 14.1 % — SIGNIFICANT CHANGE UP
% BETA: 13.5 % — SIGNIFICANT CHANGE UP
% GAMMA: 20.2 % — SIGNIFICANT CHANGE UP
% M SPIKE: SIGNIFICANT CHANGE UP %
ALBUMIN SERPL ELPH-MCNC: 3 G/DL — LOW (ref 3.6–5.5)
ALBUMIN/GLOB SERPL ELPH: 0.8 RATIO — SIGNIFICANT CHANGE UP
ALPHA1 GLOB SERPL ELPH-MCNC: 0.5 G/DL — HIGH (ref 0.1–0.4)
ALPHA2 GLOB SERPL ELPH-MCNC: 0.9 G/DL — SIGNIFICANT CHANGE UP (ref 0.5–1)
ANION GAP SERPL CALC-SCNC: 15 MMOL/L — HIGH (ref 7–14)
B-GLOBULIN SERPL ELPH-MCNC: 0.9 G/DL — SIGNIFICANT CHANGE UP (ref 0.5–1)
BLD GP AB SCN SERPL QL: SIGNIFICANT CHANGE UP
BUN SERPL-MCNC: 41 MG/DL — HIGH (ref 10–20)
CALCIUM SERPL-MCNC: 8.6 MG/DL — SIGNIFICANT CHANGE UP (ref 8.5–10.1)
CHLORIDE SERPL-SCNC: 97 MMOL/L — LOW (ref 98–110)
CO2 SERPL-SCNC: 27 MMOL/L — SIGNIFICANT CHANGE UP (ref 17–32)
CREAT SERPL-MCNC: 5 MG/DL — CRITICAL HIGH (ref 0.7–1.5)
GAMMA GLOBULIN: 1.4 G/DL — SIGNIFICANT CHANGE UP (ref 0.6–1.6)
GLUCOSE SERPL-MCNC: 127 MG/DL — HIGH (ref 70–99)
HCT VFR BLD CALC: 22.9 % — LOW (ref 37–47)
HGB BLD-MCNC: 7.6 G/DL — LOW (ref 12–16)
INTERPRETATION SERPL IFE-IMP: SIGNIFICANT CHANGE UP
IRON SATN MFR SERPL: 43 % — SIGNIFICANT CHANGE UP (ref 15–50)
IRON SATN MFR SERPL: 71 UG/DL — SIGNIFICANT CHANGE UP (ref 35–150)
M-SPIKE: SIGNIFICANT CHANGE UP G/DL (ref 0–0)
MAGNESIUM SERPL-MCNC: 1.9 MG/DL — SIGNIFICANT CHANGE UP (ref 1.8–2.4)
MCHC RBC-ENTMCNC: 28 PG — SIGNIFICANT CHANGE UP (ref 27–31)
MCHC RBC-ENTMCNC: 33.2 G/DL — SIGNIFICANT CHANGE UP (ref 32–37)
MCV RBC AUTO: 84.5 FL — SIGNIFICANT CHANGE UP (ref 81–99)
NRBC # BLD: 0 /100 WBCS — SIGNIFICANT CHANGE UP (ref 0–0)
PHOSPHATE SERPL-MCNC: 4.9 MG/DL — SIGNIFICANT CHANGE UP (ref 2.1–4.9)
PLATELET # BLD AUTO: 241 K/UL — SIGNIFICANT CHANGE UP (ref 130–400)
POTASSIUM SERPL-MCNC: 3.9 MMOL/L — SIGNIFICANT CHANGE UP (ref 3.5–5)
POTASSIUM SERPL-SCNC: 3.9 MMOL/L — SIGNIFICANT CHANGE UP (ref 3.5–5)
PROT PATTERN SERPL ELPH-IMP: SIGNIFICANT CHANGE UP
RBC # BLD: 2.71 M/UL — LOW (ref 4.2–5.4)
RBC # FLD: 16.7 % — HIGH (ref 11.5–14.5)
SODIUM SERPL-SCNC: 139 MMOL/L — SIGNIFICANT CHANGE UP (ref 135–146)
TIBC SERPL-MCNC: 165 UG/DL — LOW (ref 220–430)
TYPE + AB SCN PNL BLD: SIGNIFICANT CHANGE UP
UIBC SERPL-MCNC: 94 UG/DL — LOW (ref 110–370)
WBC # BLD: 8.08 K/UL — SIGNIFICANT CHANGE UP (ref 4.8–10.8)
WBC # FLD AUTO: 8.08 K/UL — SIGNIFICANT CHANGE UP (ref 4.8–10.8)

## 2018-07-30 RX ADMIN — AMLODIPINE BESYLATE 10 MILLIGRAM(S): 2.5 TABLET ORAL at 06:01

## 2018-07-30 RX ADMIN — HEPARIN SODIUM 5000 UNIT(S): 5000 INJECTION INTRAVENOUS; SUBCUTANEOUS at 06:01

## 2018-07-30 RX ADMIN — Medication 667 MILLIGRAM(S): at 13:10

## 2018-07-30 RX ADMIN — Medication 325 MILLIGRAM(S): at 06:01

## 2018-07-30 RX ADMIN — Medication 5 UNIT(S): at 13:10

## 2018-07-30 RX ADMIN — Medication 50 MILLIGRAM(S): at 14:48

## 2018-07-30 RX ADMIN — CARVEDILOL PHOSPHATE 6.25 MILLIGRAM(S): 80 CAPSULE, EXTENDED RELEASE ORAL at 17:33

## 2018-07-30 RX ADMIN — Medication 667 MILLIGRAM(S): at 08:20

## 2018-07-30 RX ADMIN — MUPIROCIN 1 APPLICATION(S): 20 OINTMENT TOPICAL at 06:02

## 2018-07-30 RX ADMIN — Medication 325 MILLIGRAM(S): at 21:26

## 2018-07-30 RX ADMIN — LISINOPRIL 20 MILLIGRAM(S): 2.5 TABLET ORAL at 06:02

## 2018-07-30 RX ADMIN — Medication 50 MILLIGRAM(S): at 06:02

## 2018-07-30 RX ADMIN — Medication 5 UNIT(S): at 08:20

## 2018-07-30 RX ADMIN — CEFEPIME 100 MILLIGRAM(S): 1 INJECTION, POWDER, FOR SOLUTION INTRAMUSCULAR; INTRAVENOUS at 10:48

## 2018-07-30 RX ADMIN — Medication 250 MILLIGRAM(S): at 10:05

## 2018-07-30 RX ADMIN — Medication 50 MILLIGRAM(S): at 21:26

## 2018-07-30 RX ADMIN — Medication 325 MILLIGRAM(S): at 14:48

## 2018-07-30 RX ADMIN — Medication 81 MILLIGRAM(S): at 13:10

## 2018-07-30 RX ADMIN — Medication 100 MILLIGRAM(S): at 03:08

## 2018-07-30 RX ADMIN — CARVEDILOL PHOSPHATE 6.25 MILLIGRAM(S): 80 CAPSULE, EXTENDED RELEASE ORAL at 06:01

## 2018-07-30 RX ADMIN — Medication 25 MILLIGRAM(S): at 15:30

## 2018-07-30 RX ADMIN — HEPARIN SODIUM 5000 UNIT(S): 5000 INJECTION INTRAVENOUS; SUBCUTANEOUS at 21:26

## 2018-07-30 RX ADMIN — Medication 40 MICROGRAM(S): at 10:49

## 2018-07-30 RX ADMIN — HEPARIN SODIUM 5000 UNIT(S): 5000 INJECTION INTRAVENOUS; SUBCUTANEOUS at 14:48

## 2018-07-30 RX ADMIN — Medication 667 MILLIGRAM(S): at 17:33

## 2018-07-30 RX ADMIN — INSULIN GLARGINE 15 UNIT(S): 100 INJECTION, SOLUTION SUBCUTANEOUS at 21:26

## 2018-07-30 NOTE — PROGRESS NOTE ADULT - ASSESSMENT
#PNA:   C/W Vanco and cefepime for now   pulmonary note appreciated   repeat xray shows no improvement with effusions especially on right side   patient declined thorocentesis again today.   will repeat chest xray today     #HTN  c.w current meds     #Anemia  Hgb was normal earlier this month. patient has no evidence of active GI Bleeding. last admission patient had coffee gorund emesis and refused EGD at that time.I discussed that will need  EGD and C-scope possibly for anemia work up but she declined and understands the risks  per renal recs recheck k/l ratio   follow cbc daily     #positive troponin last admission: would have proceeded with stress but patient declined. she just wants to finish her abx and go home. she said she would probably agree to a stress test as OPT     #ESRD  HD per renal   check labs when patient allows     #DM  monitor cbg keep between 120-180     #heparin sub cut for dvt ppx    #renal/carb consistent diet

## 2018-07-30 NOTE — PROGRESS NOTE ADULT - SUBJECTIVE AND OBJECTIVE BOX
CHIEF COMPLAINT:  Patient is a 57y old Female who presents with a chief complaint of fever, cough/sputum (20 Jul 2018 08:53)    Currently admitted to medicine with the primary diagnosis of Right lower lobe pneumonia     Today is hospital day 10d. This morning she is resting comfortably in bed and reports no new issues or overnight events.     PAST MEDICAL & SURGICAL HISTORY  Type 2 diabetes mellitus  CHF (congestive heart failure)  End stage renal failure on dialysis  Hypertension  Hyperlipemia  Heart failure  Amputation of leg: left  History of femoral angiogram: left angiogram  Port-a-cath in place: right chest wall    SOCIAL HISTORY:  Negative for smoking/alcohol/drug use.     ALLERGIES:  No Known Allergies    MEDICATIONS:  STANDING MEDICATIONS  amLODIPine   Tablet 10 milliGRAM(s) Oral daily  aspirin  chewable 81 milliGRAM(s) Oral daily  calcium acetate 667 milliGRAM(s) Oral three times a day with meals  carvedilol 6.25 milliGRAM(s) Oral every 12 hours  cefepime   IVPB      cefepime   IVPB 1000 milliGRAM(s) IV Intermittent every 24 hours  darbepoetin Injectable Syringe 40 MICROGram(s) IV Push <User Schedule>  dextrose 5%. 1000 milliLiter(s) IV Continuous <Continuous>  dextrose 50% Injectable 12.5 Gram(s) IV Push once  dextrose 50% Injectable 25 Gram(s) IV Push once  dextrose 50% Injectable 25 Gram(s) IV Push once  ferrous    sulfate 325 milliGRAM(s) Oral three times a day  heparin  Injectable 5000 Unit(s) SubCutaneous every 8 hours  hydrALAZINE 50 milliGRAM(s) Oral three times a day  insulin glargine Injectable (LANTUS) 15 Unit(s) SubCutaneous at bedtime  insulin lispro Injectable (HumaLOG) 5 Unit(s) SubCutaneous three times a day before meals  lisinopril 20 milliGRAM(s) Oral daily  vancomycin  IVPB 750 milliGRAM(s) IV Intermittent <User Schedule>    PRN MEDICATIONS  ALBUTerol    90 MICROgram(s) HFA Inhaler 2 Puff(s) Inhalation every 6 hours PRN  dextrose 40% Gel 15 Gram(s) Oral once PRN  diphenhydrAMINE   Capsule 25 milliGRAM(s) Oral every 6 hours PRN  glucagon  Injectable 1 milliGRAM(s) IntraMuscular once PRN  guaiFENesin    Syrup 200 milliGRAM(s) Oral every 6 hours PRN  melatonin 5 milliGRAM(s) Oral at bedtime PRN  metoclopramide 5 milliGRAM(s) Oral every 8 hours PRN    VITALS:   T(F): 98.1  HR: 85  BP: 142/63  RR: 18  SpO2: 92%    LABS:                        7.6    8.08  )-----------( 241      ( 30 Jul 2018 09:45 )             22.9     07-30    139  |  97<L>  |  41<H>  ----------------------------<  127<H>  3.9   |  27  |  5.0<HH>    Ca    8.6      30 Jul 2018 09:45  Phos  4.9     07-30  Mg     1.9     07-30    RADIOLOGY:    PHYSICAL EXAM:  GEN: No acute distress  PULM: Decreased BS at bases  CARD: S1/S2 present. RRR.   GI: Soft, non-tender, non-distended. Bowel sounds present  EXT: left BKA  NEURO: AAOX3  HD catheter right chest wall CHIEF COMPLAINT:  Patient is a 57y old Female who presents with a chief complaint of fever, cough/sputum (20 Jul 2018 08:53)    Currently admitted to medicine with the primary diagnosis of Right lower lobe pneumonia     Today is hospital day 10d. This morning she is resting comfortably in bed and reports no new issues or overnight events.     PAST MEDICAL & SURGICAL HISTORY  Type 2 diabetes mellitus  CHF (congestive heart failure)  End stage renal failure on dialysis  Hypertension  Hyperlipemia  Heart failure  Amputation of leg: left  History of femoral angiogram: left angiogram  Port-a-cath in place: right chest wall    SOCIAL HISTORY:  Negative for smoking/alcohol/drug use.     ALLERGIES:  No Known Allergies    MEDICATIONS:  STANDING MEDICATIONS  amLODIPine   Tablet 10 milliGRAM(s) Oral daily  aspirin  chewable 81 milliGRAM(s) Oral daily  calcium acetate 667 milliGRAM(s) Oral three times a day with meals  carvedilol 6.25 milliGRAM(s) Oral every 12 hours  cefepime   IVPB      cefepime   IVPB 1000 milliGRAM(s) IV Intermittent every 24 hours  darbepoetin Injectable Syringe 40 MICROGram(s) IV Push <User Schedule>  dextrose 5%. 1000 milliLiter(s) IV Continuous <Continuous>  dextrose 50% Injectable 12.5 Gram(s) IV Push once  dextrose 50% Injectable 25 Gram(s) IV Push once  dextrose 50% Injectable 25 Gram(s) IV Push once  ferrous    sulfate 325 milliGRAM(s) Oral three times a day  heparin  Injectable 5000 Unit(s) SubCutaneous every 8 hours  hydrALAZINE 50 milliGRAM(s) Oral three times a day  insulin glargine Injectable (LANTUS) 15 Unit(s) SubCutaneous at bedtime  insulin lispro Injectable (HumaLOG) 5 Unit(s) SubCutaneous three times a day before meals  lisinopril 20 milliGRAM(s) Oral daily  vancomycin  IVPB 750 milliGRAM(s) IV Intermittent <User Schedule>    PRN MEDICATIONS  ALBUTerol    90 MICROgram(s) HFA Inhaler 2 Puff(s) Inhalation every 6 hours PRN  dextrose 40% Gel 15 Gram(s) Oral once PRN  diphenhydrAMINE   Capsule 25 milliGRAM(s) Oral every 6 hours PRN  glucagon  Injectable 1 milliGRAM(s) IntraMuscular once PRN  guaiFENesin    Syrup 200 milliGRAM(s) Oral every 6 hours PRN  melatonin 5 milliGRAM(s) Oral at bedtime PRN  metoclopramide 5 milliGRAM(s) Oral every 8 hours PRN    VITALS:   T(F): 98.1  HR: 85  BP: 142/63  RR: 18  SpO2: 92%    LABS:                        7.6    8.08  )-----------( 241      ( 30 Jul 2018 09:45 )             22.9     07-30    139  |  97<L>  |  41<H>  ----------------------------<  127<H>  3.9   |  27  |  5.0<HH>    Ca    8.6      30 Jul 2018 09:45  Phos  4.9     07-30  Mg     1.9     07-30    RADIOLOGY:    PHYSICAL EXAM:  GEN: No acute distress  PULM: Decreased BS at bases  CARD: S1/S2 present. RRR.   GI: Soft, non-tender, non-distended. Bowel sounds present  EXT: left BKA, right foot hyperextension  NEURO: AAOX3  HD catheter right chest wall

## 2018-07-30 NOTE — PROGRESS NOTE ADULT - SUBJECTIVE AND OBJECTIVE BOX
no chest pain and no sob     Vital Signs Last 24 Hrs  T(C): 37.2 (30 Jul 2018 05:47), Max: 37.2 (30 Jul 2018 05:47)  T(F): 98.9 (30 Jul 2018 05:47), Max: 98.9 (30 Jul 2018 05:47)  HR: 80 (30 Jul 2018 11:55) (70 - 81)  BP: 170/69 (30 Jul 2018 11:55) (113/54 - 170/69)  BP(mean): --  RR: 18 (30 Jul 2018 11:55) (16 - 18)  SpO2: 92% (30 Jul 2018 08:44) (91% - 92%)    PHYSICAL EXAM:  GENERAL: NAD, well-developed  HEAD:  Atraumatic, Normocephalic  EYES: EOMI, PERRLA, conjunctiva and sclera clear  NECK: Supple, No JVD  Pulm: Clear to auscultation bilaterally; No wheeze  CV: Regular rate and rhythm; No murmurs, rubs, or gallops  GI Soft, Nontender, Nondistended; Bowel sounds present  EXTREMITIES:  left BKA  NEUROLOGY: non-focal  SKIN: No rashes or lesions                          7.6    8.08  )-----------( 241      ( 30 Jul 2018 09:45 )             22.9     07-30    139  |  97<L>  |  41<H>  ----------------------------<  127<H>  3.9   |  27  |  5.0<HH>    Ca    8.6      30 Jul 2018 09:45  Phos  4.9     07-30  Mg     1.9     07-30

## 2018-07-30 NOTE — PROGRESS NOTE ADULT - ASSESSMENT
58 yo F with PMH: DM, HTN, ESRD, CHF, DLD, LLE amputation, port in place R chest for dialysis (MWF)  admitted with fever, generalized weakness, cough with yellow sputum. She still has the cough it is continuous in nature not aggravated or alleviated by any factors and is associated with subjective fever with yellow sputum. She denied any hx of recent ill contacts. She refused the blood work this morning and insisted on leaving to go home. I counselled her in detail regarding the current medical condition and the increased potassium which needs to be confirmed with a repeat BMP but she still refused the blood work. Pt was seen by nephrology and will undergo HD on Monday. Will try to repeat BMP and get the lab work done if the pt is convinced.    # pneumonia. mrsa pcr swab +. s/p nasal bactroban. Refuses thorocentesis. No growth on BCx x5 days. WBC wnl.   - RLL consolidation on CXR 7/19, worsened on 7/23, improved on 7/24, stable on 7/25, 7/26, 7/27  - c/w vancomycin 750mg post HD, switched from 1g  - c/w cefepime 1g q24 IV  - f/u pulm recs  - f/u ID recs  - repeat Vanco level    #HTN. Well controlled (SBP <140)  - c/w hydrazlazine, amlodipine, lisinopril  - hold clonidine for now, restart if BP increasing  - monitor BP closely     #Anemia stable. Likely due to ESRD. Hgb was normal earlier this month. patient has no evidence of active GI Bleeding. last admission patient had coffee ground emesis and refused EGD at that time. discussed that will need EGD and C-scope possibly for anemia work up but she declined and understands the risks. Folate, B12 wnl.   - f/u iron studies   - patient refuses labs except on dialysis days    #ESRD. Dialysis MWF.  - c/w darbopoetin  - c/w ferrous sulfate 325mg tid  - c/w phoslo 667mg tid    #DM  -regular FS checks  -if FS > 180 start insulin subcut     #hx of persistent vomiting  -c/w home dose of metoclopramide 0.5mg tid    #heparin subq for dvt ppx  #renal/carb consistent diet  #dispo: Home with Outpatient or VN services, F/U AS OP WITH PROSTHETIST-NO NEED FOR ACUTE CARE PT per physiatry 58 yo F with PMH: DM, HTN, ESRD, CHF, DLD, LLE amputation, port in place R chest for dialysis (MWF)  admitted with fever, generalized weakness, cough with yellow sputum. She still has the cough it is continuous in nature not aggravated or alleviated by any factors and is associated with subjective fever with yellow sputum. She denied any hx of recent ill contacts. She refused the blood work this morning and insisted on leaving to go home. I counselled her in detail regarding the current medical condition and the increased potassium which needs to be confirmed with a repeat BMP but she still refused the blood work. Pt was seen by nephrology and will undergo HD on Monday. Will try to repeat BMP and get the lab work done if the pt is convinced.    #Pneumonia. mrsa pcr swab +. s/p nasal bactroban. Refuses thorocentesis. No growth on BCx x5 days. WBC wnl.   - RLL consolidation on CXR 7/19, worsened on 7/23, improved on 7/24, stable on 7/25, 7/26, 7/27  - c/w vancomycin 750mg post HD, switched from 1g  - c/w cefepime 1g q24 IV  - f/u pulm recs  - f/u ID recs  - repeat Vanco level    #HTN. Well controlled (SBP <140)  - c/w hydrazlazine, amlodipine, lisinopril  - hold clonidine for now, restart if BP increasing  - monitor BP closely     #Anemia stable. Likely due to ESRD. Hgb was normal earlier this month. patient has no evidence of active GI Bleeding. last admission patient had coffee ground emesis and refused EGD at that time. discussed that will need EGD and C-scope possibly for anemia work up but she declined and understands the risks. Folate, B12 wnl.   - f/u iron studies   - patient refuses labs except on dialysis days    #ESRD. Dialysis MWF.  - c/w darbopoetin  - c/w ferrous sulfate 325mg tid  - c/w phoslo 667mg tid    #DM  -regular FS checks  -if FS > 180 start insulin subq    #hx of persistent vomiting  -c/w home dose of metoclopramide 0.5mg tid    #heparin subq for dvt ppx  #renal/carb consistent diet  #dispo: Home with Outpatient or VN services, F/U AS OP WITH PROSTHETIST-NO NEED FOR ACUTE CARE PT per physiatry

## 2018-07-30 NOTE — PROGRESS NOTE ADULT - ASSESSMENT
DM, HTN, ESRD MWF  CHF, DLD, LLE amputation, presenting with fever, generalized weakness, cough with yellow sputum   found w/ Leukocytocis likely PNA    # ESRD: hd today, standard bath, uf 2 liters as tolerated   # pneumonia patient only on vanco /cefepime, vanco level noted,  check repeat  # BP at goal  # check repeat labs   # pulmonary notes appreciated, patent refusing thoracentesis   # anemia : on darbo, will not increase in view of infection  no venofer elevated ferritin   # check ph  # check k/l ratio   # will follow

## 2018-07-30 NOTE — PROGRESS NOTE ADULT - SUBJECTIVE AND OBJECTIVE BOX
seen and examined  no new complaints       Standing Inpatient Medications  amLODIPine   Tablet 10 milliGRAM(s) Oral daily  aspirin  chewable 81 milliGRAM(s) Oral daily  calcium acetate 667 milliGRAM(s) Oral three times a day with meals  carvedilol 6.25 milliGRAM(s) Oral every 12 hours  cefepime   IVPB      cefepime   IVPB 1000 milliGRAM(s) IV Intermittent every 24 hours  darbepoetin Injectable Syringe 40 MICROGram(s) IV Push <User Schedule>  dextrose 5%. 1000 milliLiter(s) IV Continuous <Continuous>  dextrose 50% Injectable 12.5 Gram(s) IV Push once  dextrose 50% Injectable 25 Gram(s) IV Push once  dextrose 50% Injectable 25 Gram(s) IV Push once  ferrous    sulfate 325 milliGRAM(s) Oral three times a day  heparin  Injectable 5000 Unit(s) SubCutaneous every 8 hours  hydrALAZINE 50 milliGRAM(s) Oral three times a day  insulin glargine Injectable (LANTUS) 15 Unit(s) SubCutaneous at bedtime  insulin lispro Injectable (HumaLOG) 5 Unit(s) SubCutaneous three times a day before meals  lisinopril 20 milliGRAM(s) Oral daily  mupirocin 2% Nasal 1 Application(s) Nasal every 12 hours  vancomycin  IVPB 750 milliGRAM(s) IV Intermittent <User Schedule>          VITALS/PHYSICAL EXAM  --------------------------------------------------------------------------------  T(C): 37.2 (07-30-18 @ 05:47), Max: 37.2 (07-30-18 @ 05:47)  HR: 81 (07-30-18 @ 05:47) (70 - 81)  BP: 144/66 (07-30-18 @ 05:47) (113/54 - 144/66)  RR: 18 (07-30-18 @ 05:47) (16 - 18)  SpO2: 92% (07-30-18 @ 08:44) (91% - 92%)  Wt(kg): --        07-29-18 @ 07:01  -  07-30-18 @ 07:00  --------------------------------------------------------  IN: 480 mL / OUT: 0 mL / NET: 480 mL      Physical Exam:  	Gen: NAD  	Pulm: decrease BS  B/L  	CV:  S1S2; no rub  	Abd: +distended  	LE: bka  	Vascular access: chest wall tesio     LABS/STUDIES  --------------------------------------------------------------------------------              8.1    8.01  >-----------<  220      [07-29-18 @ 07:33]              24.9     139  |  97  |  47  ----------------------------<  121      [07-29-18 @ 07:33]  4.6   |  27  |  5.8        Ca     8.5     [07-29-18 @ 07:33]            Creatinine Trend:  SCr 5.8 [07-29 @ 07:33]  SCr 6.5 [07-25 @ 09:10]  SCr 7.8 [07-23 @ 14:00]  SCr 5.8 [07-19 @ 23:28]  SCr 6.6 [07-07 @ 06:46]        Iron 50, TIBC --, %sat --      [07-27-18 @ 09:05]  Ferritin 873      [07-26-18 @ 17:34]      Immunofixation Serum:   Weak  IgG Kappa Band Identified    Reference Range: None Detected      [07-26-18 @ 17:34]  SPEP Interpretation: Weak Gamma-Migrating Paraprotein Identified      [07-26-18 @ 17:34]

## 2018-07-31 ENCOUNTER — TRANSCRIPTION ENCOUNTER (OUTPATIENT)
Age: 58
End: 2018-07-31

## 2018-07-31 LAB — FERRITIN SERPL-MCNC: 807 NG/ML — HIGH (ref 15–150)

## 2018-07-31 RX ORDER — FERROUS SULFATE 325(65) MG
1 TABLET ORAL
Qty: 30 | Refills: 0
Start: 2018-07-31 | End: 2018-08-29

## 2018-07-31 RX ORDER — HYDRALAZINE HCL 50 MG
10 TABLET ORAL ONCE
Qty: 0 | Refills: 0 | Status: COMPLETED | OUTPATIENT
Start: 2018-07-31 | End: 2018-07-31

## 2018-07-31 RX ORDER — CARVEDILOL PHOSPHATE 80 MG/1
1 CAPSULE, EXTENDED RELEASE ORAL
Qty: 0 | Refills: 0 | COMMUNITY
Start: 2018-07-31

## 2018-07-31 RX ORDER — AMLODIPINE BESYLATE 2.5 MG/1
1 TABLET ORAL
Qty: 0 | Refills: 0 | DISCHARGE
Start: 2018-07-31

## 2018-07-31 RX ORDER — LISINOPRIL 2.5 MG/1
1 TABLET ORAL
Qty: 0 | Refills: 0 | COMMUNITY

## 2018-07-31 RX ORDER — ONDANSETRON 8 MG/1
4 TABLET, FILM COATED ORAL ONCE
Qty: 0 | Refills: 0 | Status: COMPLETED | OUTPATIENT
Start: 2018-07-31 | End: 2018-07-31

## 2018-07-31 RX ORDER — LISINOPRIL 2.5 MG/1
1 TABLET ORAL
Qty: 0 | Refills: 0 | DISCHARGE
Start: 2018-07-31

## 2018-07-31 RX ORDER — METOCLOPRAMIDE HCL 10 MG
1 TABLET ORAL
Qty: 0 | Refills: 0 | COMMUNITY
Start: 2018-07-31

## 2018-07-31 RX ORDER — CARVEDILOL PHOSPHATE 80 MG/1
1 CAPSULE, EXTENDED RELEASE ORAL
Qty: 0 | Refills: 0 | COMMUNITY

## 2018-07-31 RX ORDER — ONDANSETRON 8 MG/1
1 TABLET, FILM COATED ORAL
Qty: 30 | Refills: 0 | OUTPATIENT
Start: 2018-07-31 | End: 2018-08-29

## 2018-07-31 RX ADMIN — Medication 25 MILLIGRAM(S): at 12:38

## 2018-07-31 RX ADMIN — Medication 325 MILLIGRAM(S): at 05:45

## 2018-07-31 RX ADMIN — Medication 325 MILLIGRAM(S): at 21:59

## 2018-07-31 RX ADMIN — LISINOPRIL 20 MILLIGRAM(S): 2.5 TABLET ORAL at 05:45

## 2018-07-31 RX ADMIN — HEPARIN SODIUM 5000 UNIT(S): 5000 INJECTION INTRAVENOUS; SUBCUTANEOUS at 15:08

## 2018-07-31 RX ADMIN — CARVEDILOL PHOSPHATE 6.25 MILLIGRAM(S): 80 CAPSULE, EXTENDED RELEASE ORAL at 05:45

## 2018-07-31 RX ADMIN — Medication 50 MILLIGRAM(S): at 21:59

## 2018-07-31 RX ADMIN — INSULIN GLARGINE 15 UNIT(S): 100 INJECTION, SOLUTION SUBCUTANEOUS at 21:59

## 2018-07-31 RX ADMIN — AMLODIPINE BESYLATE 10 MILLIGRAM(S): 2.5 TABLET ORAL at 05:45

## 2018-07-31 RX ADMIN — Medication 5 MILLIGRAM(S): at 05:47

## 2018-07-31 RX ADMIN — Medication 667 MILLIGRAM(S): at 18:18

## 2018-07-31 RX ADMIN — Medication 5 MILLIGRAM(S): at 18:19

## 2018-07-31 RX ADMIN — ONDANSETRON 4 MILLIGRAM(S): 8 TABLET, FILM COATED ORAL at 09:41

## 2018-07-31 RX ADMIN — HEPARIN SODIUM 5000 UNIT(S): 5000 INJECTION INTRAVENOUS; SUBCUTANEOUS at 05:46

## 2018-07-31 RX ADMIN — Medication 325 MILLIGRAM(S): at 15:08

## 2018-07-31 RX ADMIN — CARVEDILOL PHOSPHATE 6.25 MILLIGRAM(S): 80 CAPSULE, EXTENDED RELEASE ORAL at 18:18

## 2018-07-31 RX ADMIN — Medication 50 MILLIGRAM(S): at 15:08

## 2018-07-31 RX ADMIN — Medication 50 MILLIGRAM(S): at 05:45

## 2018-07-31 RX ADMIN — Medication 667 MILLIGRAM(S): at 12:35

## 2018-07-31 RX ADMIN — HEPARIN SODIUM 5000 UNIT(S): 5000 INJECTION INTRAVENOUS; SUBCUTANEOUS at 21:59

## 2018-07-31 RX ADMIN — Medication 81 MILLIGRAM(S): at 12:36

## 2018-07-31 RX ADMIN — Medication 10 MILLIGRAM(S): at 03:48

## 2018-07-31 NOTE — DISCHARGE NOTE ADULT - PROVIDER TOKENS
TOKEN:'[id number]' TOKEN:'[id number]:'[id number]:'[id number]:'[id number]'

## 2018-07-31 NOTE — DISCHARGE NOTE ADULT - FUNCTIONAL SCREEN CURRENT LEVEL: AMBULATION, MLM
(3) assistive equipment and person (3) assistive equipment and person/Follow up for LLE prosthesis outpatient

## 2018-07-31 NOTE — DISCHARGE NOTE ADULT - MEDICATION SUMMARY - MEDICATIONS TO STOP TAKING
I will STOP taking the medications listed below when I get home from the hospital:    metoclopramide 5 mg oral tablet  -- 0.5 tab(s) by mouth 3 times a day MDD:1.5 tabs    Half a tab, three times a day  -- May cause drowsiness.  Alcohol may intensify this effect.  Use care when operating dangerous machinery.  Take medication on an empty stomach 1 hour before or 2 to 3 hours after a meal unless otherwise directed by your doctor. I will STOP taking the medications listed below when I get home from the hospital:    Melatonin 3 mg oral tablet  -- 1 tab(s) by mouth once (at bedtime)    oxyCODONE 5 mg oral tablet  -- 1 tab(s) by mouth every 4 hours, As needed, Moderate Pain (4 - 6)    diphenhydrAMINE 25 mg oral capsule  -- 1 cap(s) by mouth every 6 hours, As needed, Rash and/or Itching    metoclopramide 5 mg oral tablet  -- 0.5 tab(s) by mouth 3 times a day MDD:1.5 tabs    Half a tab, three times a day  -- May cause drowsiness.  Alcohol may intensify this effect.  Use care when operating dangerous machinery.  Take medication on an empty stomach 1 hour before or 2 to 3 hours after a meal unless otherwise directed by your doctor.

## 2018-07-31 NOTE — DISCHARGE NOTE ADULT - ADDITIONAL INSTRUCTIONS
Continue with ESRD  CXR in 1 week and possible thoracentesis   Follow up for EGD/ colonoscopy as outpatient.

## 2018-07-31 NOTE — DISCHARGE NOTE ADULT - MEDICATION SUMMARY - MEDICATIONS TO TAKE
I will START or STAY ON the medications listed below when I get home from the hospital:    Aspir 81  -- orally once a day  -- Indication: For CAD    lisinopril 20 mg oral tablet  -- 1 tab(s) by mouth once a day  -- Indication: For HTN    pregabalin 25 mg oral capsule  -- 1 cap(s) by mouth once a day  -- Indication: For Neuropathy    diphenhydrAMINE 25 mg oral capsule  -- 1 cap(s) by mouth every 6 hours, As needed, Rash and/or Itching  -- Indication: For Itching    Zofran 4 mg oral tablet  -- 1 tab(s) by mouth prn MDD:Max 2 per day  -- Indication: For Nausea    carvedilol 6.25 mg oral tablet  -- 1 tab(s) by mouth every 12 hours  -- Indication: For HTN    amLODIPine 10 mg oral tablet  -- 1 tab(s) by mouth once a day  -- Indication: For HTN    darbepoetin carol[place] 40 mcg/mL injectable solution  --  injectable every 7 days post dialysis (hold if BP>160/90)  -- Indication: For ESRD    ferrous sulfate 325 mg (65 mg elemental iron) oral tablet  -- 1 tab(s) by mouth 3 times a day  -- Indication: For Anemia    Melatonin 3 mg oral tablet  -- 1 tab(s) by mouth once (at bedtime)  -- Indication: For Insomnia    calcium acetate 667 mg oral tablet  -- orally 3 times a day  -- Indication: For ESRD    hydrALAZINE 50 mg oral tablet  -- 50 milligram(s) by mouth 3 times a day  -- Indication: For HTN I will START or STAY ON the medications listed below when I get home from the hospital:    Aspir 81  -- orally once a day  -- Indication: For CAD    lisinopril 20 mg oral tablet  -- 1 tab(s) by mouth once a day  -- Indication: For HTN    pregabalin 25 mg oral capsule  -- 50 milligram(s) by mouth once a day  -- Indication: For neuropathic pain    diphenhydrAMINE 25 mg oral capsule  -- 1 cap(s) by mouth every 6 hours, As needed, Rash and/or Itching  -- Indication: For itching    carvedilol 6.25 mg oral tablet  -- 1 tab(s) by mouth every 12 hours  -- Indication: For HTN    amLODIPine 10 mg oral tablet  -- 1 tab(s) by mouth once a day  -- Indication: For HTN    darbepoetin carolina 40 mcg/mL injectable solution  --  injectable every 7 days post dialysis (hold if BP>160/90)  -- Indication: For ESRD    ferrous sulfate 325 mg (65 mg elemental iron) oral tablet  -- 1 tab(s) by mouth 3 times a day  -- Indication: For Anemia    calcium acetate 667 mg oral tablet  -- orally 3 times a day  -- Indication: For ESRD    hydrALAZINE 50 mg oral tablet  -- 50 milligram(s) by mouth 3 times a day  -- Indication: For HTN

## 2018-07-31 NOTE — DISCHARGE NOTE ADULT - CARE PLAN
Principal Discharge DX:	Pneumonia of right lower lobe due to infectious organism  Goal:	Medical management and outpatient follow-up  Assessment and plan of treatment:	Please continue to take medication as prescribed. Arrange follow-up with your primary care provider and nephrologist within two weeks. If you experience any worrying symptoms such as syncope, difficulty breathing, chest pain, fever, or chills, please contact emergency personnel immediately. Principal Discharge DX:	Pneumonia of right lower lobe due to infectious organism  Goal:	Medical management and outpatient follow-up  Assessment and plan of treatment:	Please continue to take medication as prescribed. Arrange follow-up with your primary care provider (at nursing home) and nephrologist within two weeks. If you experience any worrying symptoms such as syncope, difficulty breathing, chest pain, fever, or chills, please contact emergency personnel immediately. Principal Discharge DX:	Pneumonia of right lower lobe due to infectious organism  Goal:	Medical management and outpatient follow-up  Assessment and plan of treatment:	Please continue to take medication as prescribed. Arrange follow-up with your primary care provider (at nursing home) and nephrologist within two weeks. If you experience any worrying symptoms such as syncope, difficulty breathing, chest pain, fever, or chills, please contact emergency personnel immediately.  Goal:	Anemia  Assessment and plan of treatment:	Please follow up with Gastroenterologists for outpatient EGD / Colonoscopy  Assessment and plan of treatment:	Outpatient Follow up for cardiac work up cardia catheterization or nuclear stress testing for elevated troponin. Principal Discharge DX:	Pneumonia of right lower lobe due to infectious organism  Goal:	Medical management and outpatient follow-up  Assessment and plan of treatment:	Please continue to take medication as prescribed. Arrange follow-up with your primary care provider (at nursing home) and nephrologist within two weeks. If you experience any worrying symptoms such as syncope, difficulty breathing, chest pain, fever, or chills, please contact emergency personnel immediately. Associated with ipsilateral effusion, Pt refused diagnostic thoracentesis. Repeat CXR in 1 week. If effusion persists encourage outpatient thoracentesis with Pulmonologist  Secondary Diagnosis:	Anemia  Goal:	Anemia  Assessment and plan of treatment:	Please follow up with Gastroenterologists for outpatient EGD / Colonoscopy  Secondary Diagnosis:	Troponin level elevated  Assessment and plan of treatment:	Outpatient Follow up for cardiac work up cardia catheterization or nuclear stress testing for elevated troponin.

## 2018-07-31 NOTE — CHART NOTE - NSCHARTNOTEFT_GEN_A_CORE
Received call from nurse stating patients insurance didn't go through, so she wasn't a safe discharge.  Order for discharge was cancelled for tonight. To be re-addressed tomorrow AM.

## 2018-07-31 NOTE — DISCHARGE NOTE ADULT - CARE PROVIDERS DIRECT ADDRESSES
,[email or web address]

## 2018-07-31 NOTE — DISCHARGE NOTE ADULT - PATIENT PORTAL LINK FT
You can access the [email or web address] Patient Portal, offered by [facility], by registering with the following website: [email or web address]

## 2018-07-31 NOTE — PROGRESS NOTE ADULT - ASSESSMENT
58 yo F with PMH: DM, HTN, ESRD, CHF, DLD, LLE amputation, port in place R chest for dialysis (MWF)  admitted with fever, generalized weakness, cough with yellow sputum. She still has the cough it is continuous in nature not aggravated or alleviated by any factors and is associated with subjective fever with yellow sputum. She denied any hx of recent ill contacts.     # pneumonia. mrsa pcr swab +. s/p nasal bactroban. Refuses thorocentesis. No growth on BCx x5 days. WBC wnl.   - RLL consolidation on CXR 7/19, worsened on 7/23, improved on 7/24, stable on 7/25, 7/26, 7/27  - c/w vancomycin 750mg post HD, switched from 1g  - c/w cefepime 1g q24 IV  - f/u pulm recs  - f/u ID recs  - repeat Vanco level    #HTN. Well controlled (SBP <140)  - c/w hydrazlazine, amlodipine, lisinopril  - hold clonidine for now, restart if BP increasing  - monitor BP closely     #Anemia stable. Likely due to ESRD. Hgb was normal earlier this month. patient has no evidence of active GI Bleeding. last admission patient had coffee ground emesis and refused EGD at that time. discussed that will need need EGD and C-scope possibly for anemia work up but she declined and understands the risks. Folate, B12 wnl.   - patient refuses labs except on dialysis days    #ESRD. Dialysis MWF.  - c/w darbopoetin  - c/w ferrous sulfate 325mg tid  - c/w phoslo 667mg tid    #DM  -regular FS checks  -if FS > 180 start insulin subcut     #hx of persistent vomiting  -c/w home dose of metoclopramide 0.5mg tid    #heparin subq for dvt ppx  #renal/carb consistent diet  #dispo: Home with Outpatient or VN services, F/U AS OP WITH PROSTHETIST-NO NEED FOR ACUTE CARE PT per physiatry

## 2018-07-31 NOTE — PROGRESS NOTE ADULT - SUBJECTIVE AND OBJECTIVE BOX
no chest pain and no sob   patient declined thorocentesis     Vital Signs Last 24 Hrs  T(C): 35.8 (31 Jul 2018 05:52), Max: 37 (30 Jul 2018 20:51)  T(F): 96.4 (31 Jul 2018 05:52), Max: 98.6 (30 Jul 2018 20:51)  HR: 80 (31 Jul 2018 06:35) (75 - 85)  BP: 161/72 (31 Jul 2018 06:35) (142/63 - 186/79)  BP(mean): --  RR: 17 (31 Jul 2018 05:52) (17 - 18)  SpO2: 93% (31 Jul 2018 06:35) (92% - 94%)    PHYSICAL EXAM:  GENERAL: NAD, well-developed  HEAD:  Atraumatic, Normocephalic  EYES: EOMI, PERRLA, conjunctiva and sclera clear  NECK: Supple, No JVD  Pulm: Clear to auscultation bilaterally; No wheeze  CV: Regular rate and rhythm; No murmurs, rubs, or gallops  GI: Soft, Nontender, Nondistended; Bowel sounds present  EXTREMITIES:  left BKA   PSYCH: AAOx3  NEUROLOGY: non-focal  SKIN: No rashes or lesions                          7.6    8.08  )-----------( 241      ( 30 Jul 2018 09:45 )             22.9     07-30    139  |  97<L>  |  41<H>  ----------------------------<  127<H>  3.9   |  27  |  5.0<HH>    Ca    8.6      30 Jul 2018 09:45  Phos  4.9     07-30  Mg     1.9     07-30

## 2018-07-31 NOTE — DISCHARGE NOTE ADULT - HOSPITAL COURSE
56 yo F with PMH: DM, HTN, ESRD, CHF, DLD, LLE amputation, access in place R chest for dialysis (MWF)  admitted with fever, generalized weakness, cough with yellow sputum. On presentations, she described the cough as continuous in nature not aggravated or alleviated by any factors and is associated with subjective fever with yellow sputum. She denied any hx of recent ill contacts.     On admission, patient repeatedly refused blood work aside from HD days. Was found to be MRSA+ on nasal swab and bactroban was applied. BCx negative x5 days. RLL consolidation on CXR 7/19, worsened on 7/23, improved on 7/24, stable onwards. Pt refused thoracentesis for pleural effusion. s/p cefepime, vancomycin x 10 days. Pt describes symptomatic improvement. Also notable on exam is hyperextension of the right foot with neuropathic pain. Pt advised on pursuing outpatient followup. Pt agrees with discharge plan. 56 yo F with PMH: DM, HTN, ESRD, CHF, DLD, LLE amputation, access in place R chest for dialysis (MWF) admitted with fever, generalized weakness, cough with yellow sputum.    On admission, patient repeatedly refused blood work aside from HD days. Was found to be MRSA+ on nasal swab and Bactroban was applied. BCx negative x5 days. RLL consolidation on CXR 7/19, worsened on 7/23, improved on 7/24, stable onwards. Pt refused thoracentesis for pleural effusion despite multiple attempts to explain need to rule out parapneumonic effusion vs malignancy. Pt is  s/p cefepime, vancomycin x 10 days for pneumonia. Pt describes symptomatic improvement. No sign of cyanosis, use of expiratory muscles, wheezes or crackles on exam upon discharge.  Pt noted to have chronic Anemia. Hgb was normal earlier this month. Pt has no evidence of active GI Bleeding. Last admission patient had coffee ground emesis and refused EGD at that time. The need for workup including  EGD and C-scope possibly for anemia discussed and she deferred to outpatient follow up. Risks and benefits explained.     Pt also had positive troponin last admission: would have proceeded with stress but patient declined. Pt explicitly states she just wants to finish her abx and go home. She said she would probably agree to a stress test as OPT     Also notable on exam is hyperextension of the right foot with neuropathic pain. Pt advised on pursuing outpatient followup. Pt agrees with discharge plan.

## 2018-07-31 NOTE — PROGRESS NOTE ADULT - SUBJECTIVE AND OBJECTIVE BOX
CHIEF COMPLAINT:    Patient is a 57y old Female who presents with a chief complaint of fever, generalized weakness, cough (31 Jul 2018 09:33)    Currently admitted to medicine with the primary diagnosis of Pneumonia of right lower lobe due to infectious organism     Today is hospital day 11d. This morning she is resting comfortably in bed and reports no new issues or overnight events.     PAST MEDICAL & SURGICAL HISTORY  Type 2 diabetes mellitus  CHF (congestive heart failure)  End stage renal failure on dialysis  Hypertension  Hyperlipemia  Heart failure  Amputation of leg: left  History of femoral angiogram: left angiogram  Port-a-cath in place: right chest wall    SOCIAL HISTORY:  Negative for smoking/alcohol/drug use.     ALLERGIES:  No Known Allergies    MEDICATIONS:  STANDING MEDICATIONS  amLODIPine   Tablet 10 milliGRAM(s) Oral daily  aspirin  chewable 81 milliGRAM(s) Oral daily  calcium acetate 667 milliGRAM(s) Oral three times a day with meals  carvedilol 6.25 milliGRAM(s) Oral every 12 hours  darbepoetin Injectable Syringe 40 MICROGram(s) IV Push <User Schedule>  dextrose 5%. 1000 milliLiter(s) IV Continuous <Continuous>  dextrose 50% Injectable 12.5 Gram(s) IV Push once  dextrose 50% Injectable 25 Gram(s) IV Push once  dextrose 50% Injectable 25 Gram(s) IV Push once  ferrous    sulfate 325 milliGRAM(s) Oral three times a day  heparin  Injectable 5000 Unit(s) SubCutaneous every 8 hours  hydrALAZINE 50 milliGRAM(s) Oral three times a day  insulin glargine Injectable (LANTUS) 15 Unit(s) SubCutaneous at bedtime  insulin lispro Injectable (HumaLOG) 5 Unit(s) SubCutaneous three times a day before meals  lisinopril 20 milliGRAM(s) Oral daily  pregabalin 25 milliGRAM(s) Oral daily    PRN MEDICATIONS  ALBUTerol    90 MICROgram(s) HFA Inhaler 2 Puff(s) Inhalation every 6 hours PRN  dextrose 40% Gel 15 Gram(s) Oral once PRN  diphenhydrAMINE   Capsule 25 milliGRAM(s) Oral every 6 hours PRN  glucagon  Injectable 1 milliGRAM(s) IntraMuscular once PRN  guaiFENesin    Syrup 200 milliGRAM(s) Oral every 6 hours PRN  melatonin 5 milliGRAM(s) Oral at bedtime PRN  metoclopramide 5 milliGRAM(s) Oral every 8 hours PRN    VITALS:   T(F): 96.4  HR: 84  BP: 178/77  RR: 17  SpO2: 93%    LABS:                        7.6    8.08  )-----------( 241      ( 30 Jul 2018 09:45 )             22.9     07-30    139  |  97<L>  |  41<H>  ----------------------------<  127<H>  3.9   |  27  |  5.0<HH>    Ca    8.6      30 Jul 2018 09:45  Phos  4.9     07-30  Mg     1.9     07-30      RADIOLOGY:    PHYSICAL EXAM:  GEN: No acute distress  PULM: CTA b/l  CARD: S1/S2 present. RRR.   GI: Soft, non-tender, non-distended. Bowel sounds present  EXT: left BKA, right foot hyperextension  NEURO: AAOX3  HD catheter right chest wall

## 2018-07-31 NOTE — DISCHARGE NOTE ADULT - PLAN OF CARE
Medical management and outpatient follow-up Please continue to take medication as prescribed. Arrange follow-up with your primary care provider and nephrologist within two weeks. If you experience any worrying symptoms such as syncope, difficulty breathing, chest pain, fever, or chills, please contact emergency personnel immediately. Please continue to take medication as prescribed. Arrange follow-up with your primary care provider (at nursing home) and nephrologist within two weeks. If you experience any worrying symptoms such as syncope, difficulty breathing, chest pain, fever, or chills, please contact emergency personnel immediately. Anemia Please follow up with Gastroenterologists for outpatient EGD / Colonoscopy Outpatient Follow up for cardiac work up cardia catheterization or nuclear stress testing for elevated troponin. Please continue to take medication as prescribed. Arrange follow-up with your primary care provider (at nursing home) and nephrologist within two weeks. If you experience any worrying symptoms such as syncope, difficulty breathing, chest pain, fever, or chills, please contact emergency personnel immediately. Associated with ipsilateral effusion, Pt refused diagnostic thoracentesis. Repeat CXR in 1 week. If effusion persists encourage outpatient thoracentesis with Pulmonologist

## 2018-07-31 NOTE — DISCHARGE NOTE ADULT - CARE PROVIDER_API CALL
Joyce Grant), Internal Medicine; Nephrology  [address] [place] [address]  Phone: [phone]  Fax: [phone] Joyce Grant), Internal Medicine; Nephrology  [address] [place] [address]  Phone: [phone]  Fax: [phone]    Suraj Mckay), Internal Medicine  [address] [facility]  [address]  [place] [address]  Phone: [phone]  Fax: [phone]    Daron Hodgson), Critical Care Medicine; Pulmonary Disease; Sleep Medicine  [address] [place] [address]  Phone: [phone]  Fax: [phone]    Rizwana Phelps), Internal Medicine  [address] [place] [address]  Phone: [phone]  Fax: [phone]

## 2018-07-31 NOTE — PROGRESS NOTE ADULT - ASSESSMENT
#PNA GNR   completed abx vanco and cefepime   patient declined thorocentesis  for right sided effusion   I also discussed with daughter and she tried to convince her mom to do thorocentesis and patient declined and she understands that thorocesntesis is indicated to rule out malignancy and parapnemonic effusion     #HTN  c.w current meds     # chronic Anemia  Hgb was normal earlier this month. patient has no evidence of active GI Bleeding. last admission patient had coffee gorund emesis and refused EGD at that time.I discussed that will need  EGD and C-scope possibly for anemia work up but she declined and understands the risks  OPT follow up     #positive troponin last admission: would have proceeded with stress but patient declined. she just wants to finish her abx and go home. she said she would probably agree to a stress test as OPT     #ESRD  HD per renal   check labs when patient allows     #DM  monitor cbg keep between 120-180     #heparin sub cut for dvt ppx    #renal/carb consistent diet    discharge to snf today   spent 33 min on discharge #PNA Gram negative rods   completed abx vanco and cefepime   patient declined thorocentesis  for right sided effusion   I also discussed with daughter and she tried to convince her mom to do thorocentesis and patient declined and she understands that thorocesntesis is indicated to rule out malignancy and parapnemonic effusion     #HTN  c.w current meds     # chronic Anemia  Hgb was normal earlier this month. patient has no evidence of active GI Bleeding. last admission patient had coffee gorund emesis and refused EGD at that time.I discussed that will need  EGD and C-scope possibly for anemia work up but she declined and understands the risks  OPT follow up     #positive troponin last admission: would have proceeded with stress but patient declined. she just wants to finish her abx and go home. she said she would probably agree to a stress test as OPT     #ESRD  HD per renal   check labs when patient allows     #DM  monitor cbg keep between 120-180     #heparin sub cut for dvt ppx    #renal/carb consistent diet    discharge to snf pending auth

## 2018-08-01 LAB
ANION GAP SERPL CALC-SCNC: 23 MMOL/L — HIGH (ref 7–14)
BUN SERPL-MCNC: 38 MG/DL — HIGH (ref 10–20)
CALCIUM SERPL-MCNC: 8.7 MG/DL — SIGNIFICANT CHANGE UP (ref 8.5–10.1)
CHLORIDE SERPL-SCNC: 92 MMOL/L — LOW (ref 98–110)
CO2 SERPL-SCNC: 23 MMOL/L — SIGNIFICANT CHANGE UP (ref 17–32)
CREAT SERPL-MCNC: 6 MG/DL — CRITICAL HIGH (ref 0.7–1.5)
GLUCOSE SERPL-MCNC: 135 MG/DL — HIGH (ref 70–99)
HCT VFR BLD CALC: 22.8 % — LOW (ref 37–47)
HGB BLD-MCNC: 7.5 G/DL — LOW (ref 12–16)
MAGNESIUM SERPL-MCNC: 2 MG/DL — SIGNIFICANT CHANGE UP (ref 1.8–2.4)
MCHC RBC-ENTMCNC: 27.9 PG — SIGNIFICANT CHANGE UP (ref 27–31)
MCHC RBC-ENTMCNC: 32.9 G/DL — SIGNIFICANT CHANGE UP (ref 32–37)
MCV RBC AUTO: 84.8 FL — SIGNIFICANT CHANGE UP (ref 81–99)
NRBC # BLD: 0 /100 WBCS — SIGNIFICANT CHANGE UP (ref 0–0)
PHOSPHATE SERPL-MCNC: 4.7 MG/DL — SIGNIFICANT CHANGE UP (ref 2.1–4.9)
PLATELET # BLD AUTO: 258 K/UL — SIGNIFICANT CHANGE UP (ref 130–400)
POTASSIUM SERPL-MCNC: 4.5 MMOL/L — SIGNIFICANT CHANGE UP (ref 3.5–5)
POTASSIUM SERPL-SCNC: 4.5 MMOL/L — SIGNIFICANT CHANGE UP (ref 3.5–5)
RBC # BLD: 2.69 M/UL — LOW (ref 4.2–5.4)
RBC # FLD: 17.3 % — HIGH (ref 11.5–14.5)
SODIUM SERPL-SCNC: 138 MMOL/L — SIGNIFICANT CHANGE UP (ref 135–146)
WBC # BLD: 7.74 K/UL — SIGNIFICANT CHANGE UP (ref 4.8–10.8)
WBC # FLD AUTO: 7.74 K/UL — SIGNIFICANT CHANGE UP (ref 4.8–10.8)

## 2018-08-01 RX ADMIN — Medication 50 MILLIGRAM(S): at 15:53

## 2018-08-01 RX ADMIN — HEPARIN SODIUM 5000 UNIT(S): 5000 INJECTION INTRAVENOUS; SUBCUTANEOUS at 21:15

## 2018-08-01 RX ADMIN — CARVEDILOL PHOSPHATE 6.25 MILLIGRAM(S): 80 CAPSULE, EXTENDED RELEASE ORAL at 17:44

## 2018-08-01 RX ADMIN — Medication 325 MILLIGRAM(S): at 15:50

## 2018-08-01 RX ADMIN — LISINOPRIL 20 MILLIGRAM(S): 2.5 TABLET ORAL at 05:07

## 2018-08-01 RX ADMIN — Medication 667 MILLIGRAM(S): at 17:44

## 2018-08-01 RX ADMIN — HEPARIN SODIUM 5000 UNIT(S): 5000 INJECTION INTRAVENOUS; SUBCUTANEOUS at 15:50

## 2018-08-01 RX ADMIN — Medication 667 MILLIGRAM(S): at 09:07

## 2018-08-01 RX ADMIN — Medication 325 MILLIGRAM(S): at 05:07

## 2018-08-01 RX ADMIN — Medication 5 MILLIGRAM(S): at 00:56

## 2018-08-01 RX ADMIN — Medication 81 MILLIGRAM(S): at 15:49

## 2018-08-01 RX ADMIN — CARVEDILOL PHOSPHATE 6.25 MILLIGRAM(S): 80 CAPSULE, EXTENDED RELEASE ORAL at 05:07

## 2018-08-01 RX ADMIN — Medication 25 MILLIGRAM(S): at 23:28

## 2018-08-01 RX ADMIN — Medication 50 MILLIGRAM(S): at 15:49

## 2018-08-01 RX ADMIN — HEPARIN SODIUM 5000 UNIT(S): 5000 INJECTION INTRAVENOUS; SUBCUTANEOUS at 05:08

## 2018-08-01 RX ADMIN — Medication 325 MILLIGRAM(S): at 21:14

## 2018-08-01 RX ADMIN — Medication 50 MILLIGRAM(S): at 05:07

## 2018-08-01 RX ADMIN — AMLODIPINE BESYLATE 10 MILLIGRAM(S): 2.5 TABLET ORAL at 05:07

## 2018-08-01 RX ADMIN — Medication 100 MILLIGRAM(S): at 00:56

## 2018-08-01 RX ADMIN — Medication 50 MILLIGRAM(S): at 21:14

## 2018-08-01 RX ADMIN — Medication 667 MILLIGRAM(S): at 15:49

## 2018-08-01 NOTE — PROGRESS NOTE ADULT - SUBJECTIVE AND OBJECTIVE BOX
Nephrology progress note    Patient was seen and examined, events over the last 24 h noted .  Reports d/c to NH today  appears SOB and a bit confused    Allergies:  No Known Allergies    Hospital Medications:   MEDICATIONS  (STANDING):  amLODIPine   Tablet 10 milliGRAM(s) Oral daily  aspirin  chewable 81 milliGRAM(s) Oral daily  calcium acetate 667 milliGRAM(s) Oral three times a day with meals  carvedilol 6.25 milliGRAM(s) Oral every 12 hours  darbepoetin Injectable Syringe 40 MICROGram(s) IV Push <User Schedule>  dextrose 5%. 1000 milliLiter(s) (50 mL/Hr) IV Continuous <Continuous>  dextrose 50% Injectable 12.5 Gram(s) IV Push once  dextrose 50% Injectable 25 Gram(s) IV Push once  dextrose 50% Injectable 25 Gram(s) IV Push once  ferrous    sulfate 325 milliGRAM(s) Oral three times a day  heparin  Injectable 5000 Unit(s) SubCutaneous every 8 hours  hydrALAZINE 50 milliGRAM(s) Oral three times a day  insulin glargine Injectable (LANTUS) 15 Unit(s) SubCutaneous at bedtime  insulin lispro Injectable (HumaLOG) 5 Unit(s) SubCutaneous three times a day before meals  lisinopril 20 milliGRAM(s) Oral daily  pregabalin 25 milliGRAM(s) Oral daily        VITALS:  T(F): 97.6 (08-01-18 @ 05:47), Max: 97.9 (07-31-18 @ 21:00)  HR: 69 (08-01-18 @ 06:48)  BP: 145/65 (08-01-18 @ 06:48)  RR: 16 (08-01-18 @ 05:47)  SpO2: 94% (07-31-18 @ 23:57)  Wt(kg): --    07-30 @ 07:01  -  07-31 @ 07:00  --------------------------------------------------------  IN: 140 mL / OUT: 2000 mL / NET: -1860 mL    08-01 @ 07:01  -  08-01 @ 10:45  --------------------------------------------------------  IN: 600 mL / OUT: 0 mL / NET: 600 mL          PHYSICAL EXAM:    Physical Exam:  	Gen: NAD mild resp distress w/ talking  	Pulm: decrease BS  B/L   	CV:  S1S2; no rub  	Abd: +distended  	LE: bka  	Vascular access: chest wall tesio    LABS:            Urine Studies:      RADIOLOGY & ADDITIONAL STUDIES:

## 2018-08-01 NOTE — PROGRESS NOTE ADULT - ASSESSMENT
58 yo F with PMH: DM, HTN, ESRD, CHF, DLD, LLE amputation, port in place R chest for dialysis (MWF)  admitted with fever, generalized weakness, cough with yellow sputum. She still has the cough it is continuous in nature not aggravated or alleviated by any factors and is associated with subjective fever with yellow sputum. She denied any hx of recent ill contacts.     # pneumonia. mrsa pcr swab +. s/p nasal bactroban. Refuses thorocentesis. No growth on BCx x5 days. WBC wnl.   - RLL consolidation on CXR 7/19, worsened on 7/23, improved on 7/24, stable on 7/25, 7/26, 7/27, improved 7/30.  - c/w vancomycin 750mg post HD, switched from 1g  - c/w cefepime 1g q24 IV  - f/u pulm recs  - f/u ID recs  - repeat Vanco level    #HTN. Well controlled (SBP <140)  - c/w hydrazlazine, amlodipine, lisinopril  - hold clonidine for now, restart if BP increasing  - monitor BP closely     #Anemia stable. Likely due to ESRD. Hgb was normal earlier this month. patient has no evidence of active GI Bleeding. last admission patient had coffee ground emesis and refused EGD at that time. discussed that will need need EGD and C-scope possibly for anemia work up but she declined and understands the risks. Folate, B12 wnl.   - patient refuses labs except on dialysis days    #ESRD. Dialysis MWF.  - c/w darbopoetin  - c/w ferrous sulfate 325mg tid  - c/w phoslo 667mg tid    #DM  -regular FS checks  -if FS > 180 start insulin subcut     #hx of persistent vomiting  -c/w home dose of metoclopramide 0.5mg tid    #heparin subq for dvt ppx  #renal/carb consistent diet  #dispo: Home with Outpatient or VN services, F/U AS OP WITH PROSTHETIST-NO NEED FOR ACUTE CARE PT per physiatry 58 yo F with PMH: DM, HTN, ESRD, CHF, DLD, LLE amputation, port in place R chest for dialysis (MWF)  admitted with fever, generalized weakness, cough with yellow sputum. She still has the cough it is continuous in nature not aggravated or alleviated by any factors and is associated with subjective fever with yellow sputum. She denied any hx of recent ill contacts.     # pneumonia. mrsa pcr swab +. s/p nasal bactroban. Refuses thorocentesis. No growth on BCx x5 days. WBC wnl.   - RLL consolidation on CXR 7/19, worsened on 7/23, improved on 7/24, stable on 7/25, 7/26, 7/27, improved 7/30.  - c/w vancomycin 750mg post HD, switched from 1g  - c/w cefepime 1g q24 IV  - f/u pulm recs  - f/u ID recs    #HTN. Well controlled (SBP <140)  - c/w hydrazlazine, amlodipine, lisinopril  - hold clonidine for now, restart if BP increasing  - monitor BP closely     #Anemia stable. Likely due to ESRD. Hgb was normal earlier this month. patient has no evidence of active GI Bleeding. last admission patient had coffee ground emesis and refused EGD at that time. discussed that will need need EGD and C-scope possibly for anemia work up but she declined and understands the risks. Folate, B12 wnl.   - patient refuses labs except on dialysis days    #ESRD. Dialysis MWF.  - c/w darbopoetin  - c/w ferrous sulfate 325mg tid  - c/w phoslo 667mg tid    #DM  -regular FS checks  -if FS > 180 start insulin subcut     #hx of persistent vomiting  -c/w home dose of metoclopramide 0.5mg tid    #heparin subq for dvt ppx  #renal/carb consistent diet  #dispo: Awaiting authorization, F/U AS OP WITH PROSTHETIST-NO NEED FOR ACUTE CARE PT per physiatry

## 2018-08-01 NOTE — PROGRESS NOTE ADULT - SUBJECTIVE AND OBJECTIVE BOX
CHIEF COMPLAINT:  Patient is a 57y old Female who presents with a chief complaint of fever, generalized weakness, cough (31 Jul 2018 09:33)    Currently admitted to medicine with the primary diagnosis of Pneumonia of right lower lobe due to infectious organism     Today is hospital day 12d. This morning she is resting comfortably in bed and reports no new issues or overnight events.     PAST MEDICAL & SURGICAL HISTORY  Type 2 diabetes mellitus  CHF (congestive heart failure)  End stage renal failure on dialysis  Hypertension  Hyperlipemia  Heart failure  Amputation of leg: left  History of femoral angiogram: left angiogram  Port-a-cath in place: right chest wall    SOCIAL HISTORY:  Negative for smoking/alcohol/drug use.     ALLERGIES:  No Known Allergies    MEDICATIONS:  STANDING MEDICATIONS  amLODIPine   Tablet 10 milliGRAM(s) Oral daily  aspirin  chewable 81 milliGRAM(s) Oral daily  calcium acetate 667 milliGRAM(s) Oral three times a day with meals  carvedilol 6.25 milliGRAM(s) Oral every 12 hours  darbepoetin Injectable Syringe 40 MICROGram(s) IV Push <User Schedule>  dextrose 5%. 1000 milliLiter(s) IV Continuous <Continuous>  dextrose 50% Injectable 12.5 Gram(s) IV Push once  dextrose 50% Injectable 25 Gram(s) IV Push once  dextrose 50% Injectable 25 Gram(s) IV Push once  ferrous    sulfate 325 milliGRAM(s) Oral three times a day  heparin  Injectable 5000 Unit(s) SubCutaneous every 8 hours  hydrALAZINE 50 milliGRAM(s) Oral three times a day  insulin glargine Injectable (LANTUS) 15 Unit(s) SubCutaneous at bedtime  insulin lispro Injectable (HumaLOG) 5 Unit(s) SubCutaneous three times a day before meals  lisinopril 20 milliGRAM(s) Oral daily  pregabalin 50 milliGRAM(s) Oral daily    PRN MEDICATIONS  ALBUTerol    90 MICROgram(s) HFA Inhaler 2 Puff(s) Inhalation every 6 hours PRN  dextrose 40% Gel 15 Gram(s) Oral once PRN  diphenhydrAMINE   Capsule 25 milliGRAM(s) Oral every 6 hours PRN  glucagon  Injectable 1 milliGRAM(s) IntraMuscular once PRN  guaiFENesin    Syrup 200 milliGRAM(s) Oral every 6 hours PRN  melatonin 5 milliGRAM(s) Oral at bedtime PRN  metoclopramide 5 milliGRAM(s) Oral every 8 hours PRN    VITALS:   T(F): 97.6  HR: 76  BP: 126/56  RR: 18  SpO2: 97%    LABS:             7.5    7.74  )-----------( 258      ( 01 Aug 2018 12:31 )             22.8     08-01    138  |  92<L>  |  38<H>  ----------------------------<  135<H>  4.5   |  23  |  6.0<HH>    Ca    8.7      01 Aug 2018 12:31  Phos  4.7     08-01  Mg     2.0     08-01    RADIOLOGY:  < from: Xray Chest 1 View AP/PA (07.30.18 @ 12:46) >  Resolving congestive heart failure, with residual right effusion and   atelectasis.  < end of copied text >      PHYSICAL EXAM:  GEN: No acute distress  PULM: CTA b/l  CARD: S1/S2 present. RRR.   GI: Soft, non-tender, non-distended. Bowel sounds present  EXT: left BKA, right foot hyperextension, full sensation to gross touch on all toes  NEURO: AAOX3  HD catheter right chest wall

## 2018-08-01 NOTE — PROGRESS NOTE ADULT - SUBJECTIVE AND OBJECTIVE BOX
no sob and no chest pain     Vital Signs Last 24 Hrs  T(C): 36.4 (01 Aug 2018 05:47), Max: 36.6 (31 Jul 2018 21:00)  T(F): 97.6 (01 Aug 2018 05:47), Max: 97.9 (31 Jul 2018 21:00)  HR: 69 (01 Aug 2018 06:48) (69 - 84)  BP: 145/65 (01 Aug 2018 06:48) (145/65 - 187/76)  BP(mean): --  RR: 16 (01 Aug 2018 05:47) (16 - 18)  SpO2: 94% (31 Jul 2018 23:57) (94% - 94%)    PHYSICAL EXAM:  GENERAL: NAD, well-developed  HEAD:  Atraumatic, Normocephalic  EYES: EOMI, PERRLA, conjunctiva and sclera clear  NECK: Supple, No JVD  Pulm Clear to auscultation bilaterally; No wheeze  CV Regular rate and rhythm; No murmurs, rubs, or gallops  GI: Soft, Nontender, Nondistended; Bowel sounds present  EXTREMITIES:  left BKA   PSYCH: AAOx3  NEUROLOGY: non-focal  SKIN: No rashes or lesions

## 2018-08-01 NOTE — PROGRESS NOTE ADULT - ASSESSMENT
DM, HTN, ESRD MWF  CHF, DLD, LLE amputation, presenting with fever, generalized weakness, cough with yellow sputum   found w/ Leukocytocis likely PNA    -  ESRD: hd today, standard bath, uf 2-3 liters as tolerated   - pneumonia patient only on vanco /cefepime, vanco level noted,  check repeat  - BP at goal  - pulmonary notes appreciated, patent refusing thoracentesis   - anemia : on darbo, will not increase in view of infection  no venofer elevated ferritin   - phos a t goal

## 2018-08-01 NOTE — PROGRESS NOTE ADULT - ASSESSMENT
PNA gram negative akira: s/p IV abx. patient continues to decline thorocentesis     #HTN  c.w current meds     # chronic Anemia  Hgb was normal earlier this month. patient has no evidence of active GI Bleeding. last admission patient had coffee gorund emesis and refused EGD at that time.I discussed that will need  EGD and C-scope possibly for anemia work up but she declined and understands the risks  OPT follow up     #positive troponin last admission: would have proceeded with stress but patient declined. she just wants to finish her abx and go home. she said she would probably agree to a stress test as OPT     #ESRD  HD per renal   check labs when patient allows     #DM  monitor cbg keep between 120-180     #heparin sub cut for dvt ppx    #renal/carb consistent diet    discharge to snf pending auth   spent 33 min on discharge

## 2018-08-02 LAB
INTERPRETATION SERPL IFE-IMP: SIGNIFICANT CHANGE UP
KAPPA LC SER QL IFE: 17.03 MG/DL — HIGH (ref 0.33–1.94)
KAPPA/LAMBDA FREE LIGHT CHAIN RATIO, SERUM: 1.11 RATIO — SIGNIFICANT CHANGE UP (ref 0.26–1.65)
LAMBDA LC SER QL IFE: 15.31 MG/DL — HIGH (ref 0.57–2.63)

## 2018-08-02 RX ORDER — TRAMADOL HYDROCHLORIDE 50 MG/1
50 TABLET ORAL EVERY 6 HOURS
Qty: 0 | Refills: 0 | Status: DISCONTINUED | OUTPATIENT
Start: 2018-08-02 | End: 2018-08-03

## 2018-08-02 RX ORDER — ACETAMINOPHEN 500 MG
650 TABLET ORAL EVERY 6 HOURS
Qty: 0 | Refills: 0 | Status: DISCONTINUED | OUTPATIENT
Start: 2018-08-02 | End: 2018-08-03

## 2018-08-02 RX ADMIN — Medication 81 MILLIGRAM(S): at 13:21

## 2018-08-02 RX ADMIN — CARVEDILOL PHOSPHATE 6.25 MILLIGRAM(S): 80 CAPSULE, EXTENDED RELEASE ORAL at 05:09

## 2018-08-02 RX ADMIN — Medication 325 MILLIGRAM(S): at 13:28

## 2018-08-02 RX ADMIN — AMLODIPINE BESYLATE 10 MILLIGRAM(S): 2.5 TABLET ORAL at 05:09

## 2018-08-02 RX ADMIN — Medication 5 UNIT(S): at 09:11

## 2018-08-02 RX ADMIN — Medication 50 MILLIGRAM(S): at 21:33

## 2018-08-02 RX ADMIN — Medication 667 MILLIGRAM(S): at 17:42

## 2018-08-02 RX ADMIN — Medication 667 MILLIGRAM(S): at 13:22

## 2018-08-02 RX ADMIN — Medication 5 UNIT(S): at 17:38

## 2018-08-02 RX ADMIN — Medication 667 MILLIGRAM(S): at 09:13

## 2018-08-02 RX ADMIN — Medication 200 MILLIGRAM(S): at 13:20

## 2018-08-02 RX ADMIN — HEPARIN SODIUM 5000 UNIT(S): 5000 INJECTION INTRAVENOUS; SUBCUTANEOUS at 05:09

## 2018-08-02 RX ADMIN — Medication 50 MILLIGRAM(S): at 13:21

## 2018-08-02 RX ADMIN — HEPARIN SODIUM 5000 UNIT(S): 5000 INJECTION INTRAVENOUS; SUBCUTANEOUS at 13:28

## 2018-08-02 RX ADMIN — Medication 325 MILLIGRAM(S): at 05:09

## 2018-08-02 RX ADMIN — Medication 50 MILLIGRAM(S): at 05:09

## 2018-08-02 RX ADMIN — HEPARIN SODIUM 5000 UNIT(S): 5000 INJECTION INTRAVENOUS; SUBCUTANEOUS at 21:33

## 2018-08-02 RX ADMIN — Medication 25 MILLIGRAM(S): at 20:04

## 2018-08-02 RX ADMIN — LISINOPRIL 20 MILLIGRAM(S): 2.5 TABLET ORAL at 05:09

## 2018-08-02 RX ADMIN — CARVEDILOL PHOSPHATE 6.25 MILLIGRAM(S): 80 CAPSULE, EXTENDED RELEASE ORAL at 17:42

## 2018-08-02 RX ADMIN — Medication 325 MILLIGRAM(S): at 21:33

## 2018-08-02 RX ADMIN — Medication 200 MILLIGRAM(S): at 02:43

## 2018-08-02 RX ADMIN — Medication 50 MILLIGRAM(S): at 13:27

## 2018-08-02 NOTE — PROGRESS NOTE ADULT - ASSESSMENT
56yo F c PMHx ESRD, vasculopath here with presumed gram negative pneumonia with pleural effusion, anemia, troponemia, problem with social disposition    PNA gram negative akira: s/p IV abx. patient continues to decline thorocentesis     #HTN  c.w current meds     # chronic Anemia  Hgb was normal earlier this month. patient has no evidence of active GI Bleeding. last admission patient had coffee ground emesis and refused EGD at that time. Deferring EGD/colonoscopy as an inpatient on this admission as well. Should have outpatient evaluation    #positive troponin last admission: refusing inpatient stress test. should have outpatient study performed    #ESRD  HD per renal   check labs when patient allows     #DM  monitor cbg keep between 120-180     #heparin sub cut for dvt ppx    #renal/carb consistent diet    discharge to snf pending auth

## 2018-08-02 NOTE — PROGRESS NOTE ADULT - ASSESSMENT
57y old  Female who presents with a chief complaint of fever, generalized weakness, cough admited for PNA gram negative akira: s/p IV abx. Pt continues to decline thoracentesis     #HTN  c.w current meds     # chronic Anemia  Hgb was normal earlier this month.    no evidence of active GI Bleeding.   last admission patient had coffee ground emesis and refused EGD at that time.  EGD and C-scope possibly for anemia work up but she declined and understands the risks  OPT follow up     #positive troponin last admission:   would have proceeded with stress but patient declined.   States she would probably agree to a stress test as OPT     #ESRD  HD per renal   check labs when patient allows     #DM  monitor cbg keep between 120-180     #heparin sub cut for dvt ppx    #renal/carb consistent diet    discharge to snf pending auth

## 2018-08-02 NOTE — PROGRESS NOTE ADULT - SUBJECTIVE AND OBJECTIVE BOX
PHILIPPE SMITH  57y  Female      Patient is a 57y old  Female who presents with a chief complaint of fever, generalized weakness, cough (31 Jul 2018 09:33)      INTERVAL HPI/OVERNIGHT EVENTS: patient not very conversant upon evaluation. denies pain or dyspnea. prefers to be left alone      REVIEW OF SYSTEMS:  as above  All other review of systems negative    T(C): 37.5 (08-02-18 @ 04:44), Max: 37.5 (08-02-18 @ 04:44)  HR: 74 (08-02-18 @ 04:44) (74 - 80)  BP: 129/58 (08-02-18 @ 04:44) (129/58 - 171/73)  RR: 18 (08-02-18 @ 04:44) (18 - 18)  SpO2: --  Wt(kg): --Vital Signs Last 24 Hrs  T(C): 37.5 (02 Aug 2018 04:44), Max: 37.5 (02 Aug 2018 04:44)  T(F): 99.5 (02 Aug 2018 04:44), Max: 99.5 (02 Aug 2018 04:44)  HR: 74 (02 Aug 2018 04:44) (74 - 80)  BP: 129/58 (02 Aug 2018 04:44) (129/58 - 171/73)  BP(mean): --  RR: 18 (02 Aug 2018 04:44) (18 - 18)  SpO2: --      08-01-18 @ 07:01  -  08-02-18 @ 07:00  --------------------------------------------------------  IN: 600 mL / OUT: 2000 mL / NET: -1400 mL        PHYSICAL EXAM:  GENERAL: NAD  PSYCH: no agitation, baseline mentation  NERVOUS SYSTEM:  Alert & Oriented X3, no new focal deficits  PULMONARY: Clear to percussion bilaterally; unable to appreciate significant rhonchi or crackles  CARDIOVASCULAR: Regular rate and rhythm; No murmurs, rubs, or gallops  GI: Soft, Nontender, Nondistended; Bowel sounds present  EXTREMITIES:  2+ Peripheral Pulses, No clubbing, cyanosis, or edema    Consultant(s) Notes Reviewed:  [x ] YES  [ ] NO    Discussed with Consultants/Other Providers [ x] YES     LABS                          7.5    7.74  )-----------( 258      ( 01 Aug 2018 12:31 )             22.8     08-01    138  |  92<L>  |  38<H>  ----------------------------<  135<H>  4.5   |  23  |  6.0<HH>    Ca    8.7      01 Aug 2018 12:31  Phos  4.7     08-01  Mg     2.0     08-01            Lactate Trend        CAPILLARY BLOOD GLUCOSE  121 (02 Aug 2018 16:39)            RADIOLOGY & ADDITIONAL TESTS:    Imaging Personally Reviewed:  [ ] YES  [ ] NO    HEALTH ISSUES - PROBLEM Dx: PHILIPPE SMITH  57y  Female      Patient is a 57y old  Female who presents with a chief complaint of fever, generalized weakness, cough (31 Jul 2018 09:33)      INTERVAL HPI/OVERNIGHT EVENTS: patient not very conversant upon evaluation. denies pain or dyspnea. prefers to be left alone      REVIEW OF SYSTEMS:  as above  All other review of systems negative    T(C): 37.5 (08-02-18 @ 04:44), Max: 37.5 (08-02-18 @ 04:44)  HR: 74 (08-02-18 @ 04:44) (74 - 80)  BP: 129/58 (08-02-18 @ 04:44) (129/58 - 171/73)  RR: 18 (08-02-18 @ 04:44) (18 - 18)  SpO2: --  Wt(kg): --Vital Signs Last 24 Hrs  T(C): 37.5 (02 Aug 2018 04:44), Max: 37.5 (02 Aug 2018 04:44)  T(F): 99.5 (02 Aug 2018 04:44), Max: 99.5 (02 Aug 2018 04:44)  HR: 74 (02 Aug 2018 04:44) (74 - 80)  BP: 129/58 (02 Aug 2018 04:44) (129/58 - 171/73)  BP(mean): --  RR: 18 (02 Aug 2018 04:44) (18 - 18)  SpO2: --      08-01-18 @ 07:01  -  08-02-18 @ 07:00  --------------------------------------------------------  IN: 600 mL / OUT: 2000 mL / NET: -1400 mL        PHYSICAL EXAM:  GENERAL: NAD  PSYCH: no agitation, baseline mentation, flat affect  NERVOUS SYSTEM:  Alert & Oriented X3, no new focal deficits  PULMONARY: Clear to percussion bilaterally; subtle bibasilar crackles  CARDIOVASCULAR: Regular rate and rhythm; No murmurs, rubs, or gallops  GI: Soft, Nontender, Nondistended; Bowel sounds present  : R anterior chest wall cath    Consultant(s) Notes Reviewed:  [x ] YES  [ ] NO    Discussed with Consultants/Other Providers [ x] YES     LABS                          7.5    7.74  )-----------( 258      ( 01 Aug 2018 12:31 )             22.8     08-01    138  |  92<L>  |  38<H>  ----------------------------<  135<H>  4.5   |  23  |  6.0<HH>    Ca    8.7      01 Aug 2018 12:31  Phos  4.7     08-01  Mg     2.0     08-01    Lactate Trend    CAPILLARY BLOOD GLUCOSE  121 (02 Aug 2018 16:39)      RADIOLOGY & ADDITIONAL TESTS:    Imaging Personally Reviewed:  [ ] YES  [x ] NO    HEALTH ISSUES - PROBLEM Dx:

## 2018-08-02 NOTE — PROGRESS NOTE ADULT - SUBJECTIVE AND OBJECTIVE BOX
PHILIPPE SMITH  57y  Female    Patient is a 57y old  Female who presents with a chief complaint of fever, generalized weakness, cough (31 Jul 2018 09:33)      INTERVAL HPI/OVERNIGHT EVENTS:  Denies chest pain or SOB. Refusing physical exam. Belligerent. Does not appear in distress  Downgrade from telemetry    Vital Signs Last 24 Hrs  T(F): 99.5 (02 Aug 2018 04:44), Max: 99.5 (02 Aug 2018 04:44)  HR: 74 (02 Aug 2018 04:44) (72 - 80)  BP: 129/58 (02 Aug 2018 04:44) (104/51 - 172/71)  RR: 18 (02 Aug 2018 04:44) (18 - 18)  SpO2: 97% (01 Aug 2018 14:38) (97% - 97%)    PHYSICAL EXAM:  Refused Full exam.   GENERAL: The patient is a well-developed, well-nourished in no apparent distress. AOX3.  HEENT: NCAT  LUNGS: No respiratory distress or accessory muscle use.     LABS:                        7.5    7.74  )-----------( 258      ( 01 Aug 2018 12:31 )             22.8     08-01    138  |  92<L>  |  38<H>  ----------------------------<  135<H>  4.5   |  23  |  6.0<HH>    Ca    8.7      01 Aug 2018 12:31  Phos  4.7     08-01  Mg     2.0     08-01    CAPILLARY BLOOD GLUCOSE  190 (02 Aug 2018 07:33)  175 (01 Aug 2018 20:56)  97 (01 Aug 2018 17:05)          RADIOLOGY & ADDITIONAL TESTS:    Imaging Personally Reviewed:  [ ] YES  [ ] NO

## 2018-08-03 VITALS
HEART RATE: 76 BPM | OXYGEN SATURATION: 97 % | RESPIRATION RATE: 18 BRPM | DIASTOLIC BLOOD PRESSURE: 67 MMHG | SYSTOLIC BLOOD PRESSURE: 149 MMHG

## 2018-08-03 RX ORDER — DOCUSATE SODIUM 100 MG
100 CAPSULE ORAL THREE TIMES A DAY
Qty: 0 | Refills: 0 | Status: DISCONTINUED | OUTPATIENT
Start: 2018-08-03 | End: 2018-08-03

## 2018-08-03 RX ORDER — LANOLIN ALCOHOL/MO/W.PET/CERES
1 CREAM (GRAM) TOPICAL
Qty: 0 | Refills: 0 | COMMUNITY

## 2018-08-03 RX ORDER — DIPHENHYDRAMINE HCL 50 MG
1 CAPSULE ORAL
Qty: 0 | Refills: 0 | COMMUNITY
Start: 2018-08-03

## 2018-08-03 RX ORDER — SENNA PLUS 8.6 MG/1
1 TABLET ORAL ONCE
Qty: 0 | Refills: 0 | Status: COMPLETED | OUTPATIENT
Start: 2018-08-03 | End: 2018-08-03

## 2018-08-03 RX ADMIN — Medication 650 MILLIGRAM(S): at 03:36

## 2018-08-03 RX ADMIN — Medication 667 MILLIGRAM(S): at 12:04

## 2018-08-03 RX ADMIN — AMLODIPINE BESYLATE 10 MILLIGRAM(S): 2.5 TABLET ORAL at 05:30

## 2018-08-03 RX ADMIN — Medication 50 MILLIGRAM(S): at 05:30

## 2018-08-03 RX ADMIN — SENNA PLUS 1 TABLET(S): 8.6 TABLET ORAL at 11:25

## 2018-08-03 RX ADMIN — Medication 81 MILLIGRAM(S): at 11:24

## 2018-08-03 RX ADMIN — Medication 667 MILLIGRAM(S): at 17:05

## 2018-08-03 RX ADMIN — Medication 325 MILLIGRAM(S): at 16:11

## 2018-08-03 RX ADMIN — LISINOPRIL 20 MILLIGRAM(S): 2.5 TABLET ORAL at 05:30

## 2018-08-03 RX ADMIN — CARVEDILOL PHOSPHATE 6.25 MILLIGRAM(S): 80 CAPSULE, EXTENDED RELEASE ORAL at 17:05

## 2018-08-03 RX ADMIN — Medication 5 UNIT(S): at 17:06

## 2018-08-03 RX ADMIN — Medication 100 MILLIGRAM(S): at 16:10

## 2018-08-03 RX ADMIN — Medication 25 MILLIGRAM(S): at 12:03

## 2018-08-03 RX ADMIN — Medication 667 MILLIGRAM(S): at 08:09

## 2018-08-03 RX ADMIN — Medication 50 MILLIGRAM(S): at 16:11

## 2018-08-03 RX ADMIN — CARVEDILOL PHOSPHATE 6.25 MILLIGRAM(S): 80 CAPSULE, EXTENDED RELEASE ORAL at 05:30

## 2018-08-03 RX ADMIN — Medication 50 MILLIGRAM(S): at 11:24

## 2018-08-03 RX ADMIN — Medication 325 MILLIGRAM(S): at 05:30

## 2018-08-03 RX ADMIN — Medication 5 UNIT(S): at 11:24

## 2018-08-03 RX ADMIN — Medication 5 UNIT(S): at 08:09

## 2018-08-03 RX ADMIN — HEPARIN SODIUM 5000 UNIT(S): 5000 INJECTION INTRAVENOUS; SUBCUTANEOUS at 16:11

## 2018-08-03 NOTE — PROGRESS NOTE ADULT - ASSESSMENT
DM, HTN, ESRD MWF  CHF, DLD, LLE amputation, presenting with fever, generalized weakness, cough with yellow sputum   found w/ Leukocytocis likely PNA    -  ESRD: hd today, standard bath, uf 2-3 liters as tolerated   - pneumonia patient only on vanco /cefepime, vanco level noted,  check repeat  - BP at goal  - anemia : on darbo, will not increase in view of infection  no venofer elevated ferritin   - phos a t goal

## 2018-08-03 NOTE — PROGRESS NOTE ADULT - PROVIDER SPECIALTY LIST ADULT
Hospitalist
Infectious Disease
Infectious Disease
Internal Medicine
Nephrology
Pulmonology
Pulmonology
Hospitalist
Internal Medicine
Nephrology
Internal Medicine
Internal Medicine

## 2018-08-03 NOTE — PROGRESS NOTE ADULT - ASSESSMENT
56yo F c PMHx ESRD, vasculopath here with presumed gram negative pneumonia with pleural effusion, anemia, troponemia, problem with social disposition    PNA gram negative akira: s/p IV abx. patient continues to decline thorocentesis     #HTN  c.w current meds     # chronic Anemia  Hgb was normal earlier this month. patient has no evidence of active GI Bleeding. last admission patient had coffee ground emesis and refused EGD at that time. Deferring EGD/colonoscopy as an inpatient on this admission as well. Should have outpatient evaluation    #positive troponin last admission: refusing inpatient stress test. should have outpatient study performed    #ESRD  HD per renal   check labs when patient allows     #DM  monitor cbg keep between 120-180     #heparin sub cut for dvt ppx    #renal/carb consistent diet    discharge to snf pending auth   counselled patient regarding need for outpatient f/u, interval cxr, thoracentesis, egd/colonoscopy/ anemia f/u, etc.. patient pending placement

## 2018-08-03 NOTE — PROGRESS NOTE ADULT - SUBJECTIVE AND OBJECTIVE BOX
SMITH, PHILIPPE  57y  Female      Patient is a 57y old  Female who presents with a chief complaint of fever, generalized weakness, cough (31 Jul 2018 09:33)      INTERVAL HPI/OVERNIGHT EVENTS: breathing well. reluctant for thoracentesis in the future but will consider. says she may f/u with gi as outpatient for her anemia w/u, but requests I leave contact information for her. no new complaints      REVIEW OF SYSTEMS:  as above  All other review of systems negative    T(C): 36.8 (08-03-18 @ 05:33), Max: 36.8 (08-03-18 @ 05:33)  HR: 76 (08-03-18 @ 12:30) (71 - 76)  BP: 149/67 (08-03-18 @ 12:30) (149/67 - 189/83)  RR: 18 (08-03-18 @ 12:30) (18 - 18)  SpO2: 97% (08-03-18 @ 12:30) (97% - 97%)  Wt(kg): --Vital Signs Last 24 Hrs  T(C): 36.8 (03 Aug 2018 05:33), Max: 36.8 (03 Aug 2018 05:33)  T(F): 98.3 (03 Aug 2018 05:33), Max: 98.3 (03 Aug 2018 05:33)  HR: 76 (03 Aug 2018 12:30) (71 - 76)  BP: 149/67 (03 Aug 2018 12:30) (149/67 - 189/83)  BP(mean): --  RR: 18 (03 Aug 2018 12:30) (18 - 18)  SpO2: 97% (03 Aug 2018 12:30) (97% - 97%)        PHYSICAL EXAM:  GENERAL: NAD  PSYCH: no agitation, baseline mentation  NERVOUS SYSTEM:  Alert & Oriented X3, no new focal deficits  PULMONARY: subtle bibasilar atelectatic changes; comfortable on NC  CARDIOVASCULAR: Regular rate and rhythm; No murmurs, rubs, or gallops  GI: Soft, Nontender, Nondistended; Bowel sounds present   R ant chest wall cath cdi  EXTREMITIES:  bka    Consultant(s) Notes Reviewed:  [x ] YES  [ ] NO    Discussed with Consultants/Other Providers [ x] YES     LABS  CAPILLARY BLOOD GLUCOSE  187 (03 Aug 2018 16:28)            RADIOLOGY & ADDITIONAL TESTS:    Imaging Personally Reviewed:  [ ] YES  [x ] NO    HEALTH ISSUES - PROBLEM Dx:

## 2018-08-03 NOTE — PROGRESS NOTE ADULT - SUBJECTIVE AND OBJECTIVE BOX
Nephrology progress note    Patient was seen and examined on HD tolerating well    Allergies:  No Known Allergies    Hospital Medications:   MEDICATIONS  (STANDING):  amLODIPine   Tablet 10 milliGRAM(s) Oral daily  aspirin  chewable 81 milliGRAM(s) Oral daily  calcium acetate 667 milliGRAM(s) Oral three times a day with meals  carvedilol 6.25 milliGRAM(s) Oral every 12 hours  darbepoetin Injectable Syringe 40 MICROGram(s) IV Push <User Schedule>  dextrose 5%. 1000 milliLiter(s) (50 mL/Hr) IV Continuous <Continuous>  dextrose 50% Injectable 12.5 Gram(s) IV Push once  dextrose 50% Injectable 25 Gram(s) IV Push once  dextrose 50% Injectable 25 Gram(s) IV Push once  docusate sodium 100 milliGRAM(s) Oral three times a day  ferrous    sulfate 325 milliGRAM(s) Oral three times a day  heparin  Injectable 5000 Unit(s) SubCutaneous every 8 hours  hydrALAZINE 50 milliGRAM(s) Oral three times a day  insulin glargine Injectable (LANTUS) 15 Unit(s) SubCutaneous at bedtime  insulin lispro Injectable (HumaLOG) 5 Unit(s) SubCutaneous three times a day before meals  lisinopril 20 milliGRAM(s) Oral daily  pregabalin 50 milliGRAM(s) Oral daily        VITALS:  T(F): 98.3 (08-03-18 @ 05:33), Max: 98.3 (08-03-18 @ 05:33)  HR: 76 (08-03-18 @ 12:30)  BP: 149/67 (08-03-18 @ 12:30)  RR: 18 (08-03-18 @ 12:30)  SpO2: 97% (08-03-18 @ 12:30)  Wt(kg): --    08-01 @ 07:01  -  08-02 @ 07:00  --------------------------------------------------------  IN: 600 mL / OUT: 2000 mL / NET: -1400 mL          PHYSICAL EXAM:  Gen: NAD mild resp distress w/ talking  	Pulm: decrease BS  B/L   	CV:  S1S2; no rub  	Abd: +distended  	LE: bka  	Vascular access: chest wall tesio    LABS:            Urine Studies:      RADIOLOGY & ADDITIONAL STUDIES:

## 2018-08-03 NOTE — PROGRESS NOTE ADULT - SUBJECTIVE AND OBJECTIVE BOX
PHILIPPE SMITH  57y  Female    Patient is a 57y old  Female who presents with a chief complaint of fever, generalized weakness, cough (31 Jul 2018 09:33)    INTERVAL HPI/OVERNIGHT EVENTS:  No acute events noted. Awaiting placement.     Vital Signs Last 24 Hrs  T(F): 98.3 (03 Aug 2018 05:33), Max: 98.3 (03 Aug 2018 05:33)  HR: 71 (03 Aug 2018 05:33) (71 - 75)  BP: 174/72 (03 Aug 2018 05:33) (167/70 - 189/83)  RR: 18 (03 Aug 2018 05:33) (18 - 18)    PHYSICAL EXAM:  GENERAL: NAD  PSYCH: no agitation, baseline mentation, flat affect  NERVOUS SYSTEM:  Alert & Oriented X3, no new focal deficits  PULMONARY: Clear to percussion bilaterally; subtle bibasilar crackles  CARDIOVASCULAR: Regular rate and rhythm; No murmurs, rubs, or gallops  GI: Soft, Nontender, Nondistended; Bowel sounds present  : R anterior chest wall cath    Consultant(s) Notes Reviewed:  [x ] YES  [ ] NO  Care Discussed with Consultants/Other Providers [ x] YES  [ ] NO    LABS:                    7.5    7.74  )-----------( 258      ( 01 Aug 2018 12:31 )             22.8   08-01    138  |  92<L>  |  38<H>  ----------------------------<  135<H>  4.5   |  23  |  6.0<HH>    Ca    8.7      01 Aug 2018 12:31  Phos  4.7     08-01  Mg     2.0     08-01    CAPILLARY BLOOD GLUCOSE  194 (03 Aug 2018 07:39)  156 (03 Aug 2018 02:15)  103 (02 Aug 2018 20:49)  121 (02 Aug 2018 16:39)  80 (02 Aug 2018 11:43)  59 (02 Aug 2018 11:19)

## 2018-08-03 NOTE — PROGRESS NOTE ADULT - ATTENDING COMMENTS
Patient on dialysis admitted for pneumonia has a port on the chest, On IV antibiotics. elevated white count.  repeat cbc tomorrow.   D/C planning tomorrow, patient in agreement
Patient seen and examined independently of resident. Case discussed with housestaff, nursing and patient
Patient seen and examined independently of resident. Case discussed with housestaff, nursing and patient
Pt seen and examined independently. Still refuses thoracentesis even after procedure was explained in detail.   Afebrile.   Continue current medical management.  Check labs with HD tomorrow.  Guarded prognosis.    I reviewed the resident's note and I agree with the physical exam, assessment and plan with additions as above.

## 2018-08-08 DIAGNOSIS — E87.5 HYPERKALEMIA: ICD-10-CM

## 2018-08-08 DIAGNOSIS — Z99.2 DEPENDENCE ON RENAL DIALYSIS: ICD-10-CM

## 2018-08-08 DIAGNOSIS — E87.1 HYPO-OSMOLALITY AND HYPONATREMIA: ICD-10-CM

## 2018-08-08 DIAGNOSIS — Z79.82 LONG TERM (CURRENT) USE OF ASPIRIN: ICD-10-CM

## 2018-08-08 DIAGNOSIS — D63.1 ANEMIA IN CHRONIC KIDNEY DISEASE: ICD-10-CM

## 2018-08-08 DIAGNOSIS — I50.9 HEART FAILURE, UNSPECIFIED: ICD-10-CM

## 2018-08-08 DIAGNOSIS — Z53.29 PROCEDURE AND TREATMENT NOT CARRIED OUT BECAUSE OF PATIENT'S DECISION FOR OTHER REASONS: ICD-10-CM

## 2018-08-08 DIAGNOSIS — J15.6 PNEUMONIA DUE TO OTHER GRAM-NEGATIVE BACTERIA: ICD-10-CM

## 2018-08-08 DIAGNOSIS — A41.9 SEPSIS, UNSPECIFIED ORGANISM: ICD-10-CM

## 2018-08-08 DIAGNOSIS — E11.40 TYPE 2 DIABETES MELLITUS WITH DIABETIC NEUROPATHY, UNSPECIFIED: ICD-10-CM

## 2018-08-08 DIAGNOSIS — N25.0 RENAL OSTEODYSTROPHY: ICD-10-CM

## 2018-08-08 DIAGNOSIS — Z89.512 ACQUIRED ABSENCE OF LEFT LEG BELOW KNEE: ICD-10-CM

## 2018-08-08 DIAGNOSIS — I13.2 HYPERTENSIVE HEART AND CHRONIC KIDNEY DISEASE WITH HEART FAILURE AND WITH STAGE 5 CHRONIC KIDNEY DISEASE, OR END STAGE RENAL DISEASE: ICD-10-CM

## 2018-08-08 DIAGNOSIS — J90 PLEURAL EFFUSION, NOT ELSEWHERE CLASSIFIED: ICD-10-CM

## 2018-08-08 DIAGNOSIS — N18.6 END STAGE RENAL DISEASE: ICD-10-CM

## 2018-09-14 NOTE — ED ADULT TRIAGE NOTE - CHIEF COMPLAINT QUOTE
Pt BIBA from [facility] for PICC line.  Per EMS "Pt's left foot is necrotic and she's septic.  She needs a PICC line inserted".
Family

## 2018-10-04 ENCOUNTER — APPOINTMENT (OUTPATIENT)
Dept: VASCULAR SURGERY | Facility: CLINIC | Age: 58
End: 2018-10-04
Payer: MEDICARE

## 2018-10-04 VITALS — HEIGHT: 60 IN

## 2018-10-04 VITALS — SYSTOLIC BLOOD PRESSURE: 140 MMHG | DIASTOLIC BLOOD PRESSURE: 70 MMHG | BODY MASS INDEX: 32.03 KG/M2 | WEIGHT: 164 LBS

## 2018-10-04 DIAGNOSIS — Z89.619 ACQUIRED ABSENCE OF UNSPECIFIED LEG ABOVE KNEE: ICD-10-CM

## 2018-10-04 DIAGNOSIS — Z87.891 PERSONAL HISTORY OF NICOTINE DEPENDENCE: ICD-10-CM

## 2018-10-04 PROCEDURE — 99213 OFFICE O/P EST LOW 20 MIN: CPT

## 2018-10-10 ENCOUNTER — OUTPATIENT (OUTPATIENT)
Dept: OUTPATIENT SERVICES | Facility: HOSPITAL | Age: 58
LOS: 1 days | Discharge: HOME | End: 2018-10-10

## 2018-10-10 DIAGNOSIS — Z86.79 PERSONAL HISTORY OF OTHER DISEASES OF THE CIRCULATORY SYSTEM: Chronic | ICD-10-CM

## 2018-10-10 DIAGNOSIS — Z95.828 PRESENCE OF OTHER VASCULAR IMPLANTS AND GRAFTS: Chronic | ICD-10-CM

## 2018-10-10 DIAGNOSIS — Z89.619 ACQUIRED ABSENCE OF UNSPECIFIED LEG ABOVE KNEE: Chronic | ICD-10-CM

## 2018-10-10 DIAGNOSIS — N18.6 END STAGE RENAL DISEASE: ICD-10-CM

## 2018-10-19 ENCOUNTER — APPOINTMENT (OUTPATIENT)
Dept: VASCULAR SURGERY | Facility: HOSPITAL | Age: 58
End: 2018-10-19
Payer: MEDICARE

## 2018-10-19 ENCOUNTER — OUTPATIENT (OUTPATIENT)
Dept: OUTPATIENT SERVICES | Facility: HOSPITAL | Age: 58
LOS: 1 days | Discharge: HOME | End: 2018-10-19

## 2018-10-19 VITALS
TEMPERATURE: 98 F | HEART RATE: 67 BPM | DIASTOLIC BLOOD PRESSURE: 54 MMHG | WEIGHT: 164.02 LBS | RESPIRATION RATE: 18 BRPM | SYSTOLIC BLOOD PRESSURE: 160 MMHG

## 2018-10-19 VITALS
HEART RATE: 71 BPM | DIASTOLIC BLOOD PRESSURE: 72 MMHG | OXYGEN SATURATION: 96 % | SYSTOLIC BLOOD PRESSURE: 156 MMHG | RESPIRATION RATE: 16 BRPM

## 2018-10-19 DIAGNOSIS — Z89.619 ACQUIRED ABSENCE OF UNSPECIFIED LEG ABOVE KNEE: Chronic | ICD-10-CM

## 2018-10-19 DIAGNOSIS — Z86.79 PERSONAL HISTORY OF OTHER DISEASES OF THE CIRCULATORY SYSTEM: Chronic | ICD-10-CM

## 2018-10-19 DIAGNOSIS — Z95.828 PRESENCE OF OTHER VASCULAR IMPLANTS AND GRAFTS: Chronic | ICD-10-CM

## 2018-10-19 PROCEDURE — 37228: CPT | Mod: RT

## 2018-10-19 PROCEDURE — 76937 US GUIDE VASCULAR ACCESS: CPT | Mod: 26

## 2018-10-19 PROCEDURE — 37225: CPT | Mod: RT

## 2018-10-19 PROCEDURE — 36247 INS CATH ABD/L-EXT ART 3RD: CPT | Mod: 59,RT

## 2018-10-19 PROCEDURE — 75710 ARTERY X-RAYS ARM/LEG: CPT | Mod: 26,59

## 2018-10-19 RX ORDER — ACETAMINOPHEN 500 MG
1 TABLET ORAL
Qty: 0 | Refills: 0 | COMMUNITY

## 2018-10-19 RX ORDER — CALCIUM ACETATE 667 MG
0 TABLET ORAL
Qty: 0 | Refills: 0 | COMMUNITY

## 2018-10-19 NOTE — BRIEF OPERATIVE NOTE - PRE-OP DX
Atherosclerosis of native arteries of right leg with ulceration of other part of foot  10/19/2018    Active  Eusebio Carrillo

## 2018-10-19 NOTE — BRIEF OPERATIVE NOTE - POST-OP DX
Atherosclerosis of native artery of right lower extremity with ulceration of other part of foot  10/19/2018    Active  Eusebio Carrillo

## 2018-10-19 NOTE — CHART NOTE - NSCHARTNOTEFT_GEN_A_CORE
PACU ANESTHESIA ADMISSION NOTE      Procedure: Angiogram, extremity, right: leg with AT and pop angioplasty    Post op diagnosis:  Atherosclerosis of native artery of right lower extremity with ulceration of other part of foot      ____  Intubated  TV:______       Rate: ______      FiO2: ______    _x___  Patent Airway    _x___  Full return of protective reflexes    _x___  Full recovery from anesthesia / back to baseline status    Vitals:            T:   98.3             BP :      137/82          R:     17         Sat: 100              P:66      Mental Status:  _x___ Awake   _____ Alert   _____ Drowsy   _____ Sedated    Nausea/Vomiting:  _x___  NO       ______Yes,   See Post - Op Orders         Pain Scale (0-10):  __0___    Treatment: _x___ None    ____ See Post - Op/PCA Orders    Post - Operative Fluids:   __x__ Oral   ____ See Post - Op Orders    Plan: Discharge:   _x___Home       _____Floor     _____Critical Care    _____  Other:_________________    Comments:  No anesthesia issues or complications noted. Hemodialysis catheter flushed with 200 units of heparin in 3cc of ns via the blue port only and fluids d/cd.  Discharge when criteria met.

## 2018-10-19 NOTE — PRE-ANESTHESIA EVALUATION ADULT - NSANTHOSAYNRD_GEN_A_CORE
No. ANITHA screening performed.  STOP BANG Legend: 0-2 = LOW Risk; 3-4 = INTERMEDIATE Risk; 5-8 = HIGH Risk

## 2018-10-19 NOTE — BRIEF OPERATIVE NOTE - PROCEDURE
<<-----Click on this checkbox to enter Procedure Angiogram, extremity, right  10/19/2018  leg with AT and pop angioplasty  Active  JSCHOR

## 2018-10-25 DIAGNOSIS — E11.9 TYPE 2 DIABETES MELLITUS WITHOUT COMPLICATIONS: ICD-10-CM

## 2018-10-25 DIAGNOSIS — I10 ESSENTIAL (PRIMARY) HYPERTENSION: ICD-10-CM

## 2018-10-25 DIAGNOSIS — E78.00 PURE HYPERCHOLESTEROLEMIA, UNSPECIFIED: ICD-10-CM

## 2018-10-25 DIAGNOSIS — L97.419 NON-PRESSURE CHRONIC ULCER OF RIGHT HEEL AND MIDFOOT WITH UNSPECIFIED SEVERITY: ICD-10-CM

## 2018-10-25 DIAGNOSIS — I70.234 ATHEROSCLEROSIS OF NATIVE ARTERIES OF RIGHT LEG WITH ULCERATION OF HEEL AND MIDFOOT: ICD-10-CM

## 2018-11-01 ENCOUNTER — APPOINTMENT (OUTPATIENT)
Dept: VASCULAR SURGERY | Facility: CLINIC | Age: 58
End: 2018-11-01
Payer: MEDICARE

## 2018-11-01 PROCEDURE — 99213 OFFICE O/P EST LOW 20 MIN: CPT

## 2019-02-07 ENCOUNTER — APPOINTMENT (OUTPATIENT)
Dept: VASCULAR SURGERY | Facility: CLINIC | Age: 59
End: 2019-02-07
Payer: MEDICARE

## 2019-02-07 PROCEDURE — 99212 OFFICE O/P EST SF 10 MIN: CPT

## 2019-02-07 NOTE — ASSESSMENT
[FreeTextEntry1] : Ms. Gibson is a 58 year-old female with significant past medical history and bilateral heel decubitus. She is ambulates with a brace to right lower extremity due to right foot drop. She currently resides in a nursing home and is undergoing physical therapy for gait training and transfers. She has had right first toe ulceration and pain and underwent popliteal and AT angioplasty on 10/19/18. The wound has now healed. She requires pain control and follow up with prosthetics for right foot brace. I will see her back in 3 months time for follow up.

## 2019-02-07 NOTE — HISTORY OF PRESENT ILLNESS
[FreeTextEntry1] : Ms. Natasha Gibson presents from a nursing home for evaluation of bilateral foot necrotic and gangrenous ulcers. She has had a local wound debridement performed to her right heel at [facility]. She has been in a nursing home for the past several months and is non-ambulatory.\par \par In 2/17 she was admitted with progressive volume overload and diagnosed with a new cardiomyopathy with EF 35%. She refused ischemic workup and was discharged with a Lifevest. She has been seen for outpatient follow up and continues to refuse  a defibrillator. \par \par She had angioplasty of right SFA, popliteal and AT on 10/10/17 and left SFA, TPT and peroneal atherectomy and angioplasty on 10/17/17. \par \par She has had right first toe ulceration and pain and underwent popliteal and AT angioplasty on 10/19/18. She states that she is still having pain to right foot.\par

## 2019-02-25 ENCOUNTER — OUTPATIENT (OUTPATIENT)
Dept: OUTPATIENT SERVICES | Facility: HOSPITAL | Age: 59
LOS: 1 days | Discharge: HOME | End: 2019-02-25

## 2019-02-25 DIAGNOSIS — Z86.79 PERSONAL HISTORY OF OTHER DISEASES OF THE CIRCULATORY SYSTEM: Chronic | ICD-10-CM

## 2019-02-25 DIAGNOSIS — Z89.619 ACQUIRED ABSENCE OF UNSPECIFIED LEG ABOVE KNEE: Chronic | ICD-10-CM

## 2019-02-25 DIAGNOSIS — B99.9 UNSPECIFIED INFECTIOUS DISEASE: ICD-10-CM

## 2019-02-25 DIAGNOSIS — Z95.828 PRESENCE OF OTHER VASCULAR IMPLANTS AND GRAFTS: Chronic | ICD-10-CM

## 2019-05-16 ENCOUNTER — APPOINTMENT (OUTPATIENT)
Dept: VASCULAR SURGERY | Facility: CLINIC | Age: 59
End: 2019-05-16
Payer: MEDICARE

## 2019-05-16 PROCEDURE — 99213 OFFICE O/P EST LOW 20 MIN: CPT

## 2019-05-16 NOTE — HISTORY OF PRESENT ILLNESS
[FreeTextEntry1] : Ms. Natasha Gibson presents from a nursing home for evaluation of bilateral foot necrotic and gangrenous ulcers. She has had a local wound debridement performed to her right heel at [facility]. She has been in a nursing home for the past several months and is non-ambulatory.\par \par In 2/17 she was admitted with progressive volume overload and diagnosed with a new cardiomyopathy with EF 35%. She refused ischemic workup and was discharged with a Lifevest. She has been seen for outpatient follow up and continues to refuse  a defibrillator. \par \par She had angioplasty of right SFA, popliteal and AT on 10/10/17 and left SFA, TPT and peroneal atherectomy and angioplasty on 10/17/17. \par \par She has had right first toe ulceration and pain and underwent popliteal and AT angioplasty on 10/19/18. She states that she is still having pain to right foot.\par

## 2019-06-14 ENCOUNTER — EMERGENCY (EMERGENCY)
Facility: HOSPITAL | Age: 59
LOS: 0 days | Discharge: HOME | End: 2019-06-14
Attending: EMERGENCY MEDICINE | Admitting: EMERGENCY MEDICINE
Payer: MEDICARE

## 2019-06-14 VITALS
HEART RATE: 72 BPM | SYSTOLIC BLOOD PRESSURE: 133 MMHG | DIASTOLIC BLOOD PRESSURE: 60 MMHG | OXYGEN SATURATION: 94 % | RESPIRATION RATE: 17 BRPM | TEMPERATURE: 98 F

## 2019-06-14 DIAGNOSIS — Z86.79 PERSONAL HISTORY OF OTHER DISEASES OF THE CIRCULATORY SYSTEM: Chronic | ICD-10-CM

## 2019-06-14 DIAGNOSIS — R09.02 HYPOXEMIA: ICD-10-CM

## 2019-06-14 DIAGNOSIS — Z95.828 PRESENCE OF OTHER VASCULAR IMPLANTS AND GRAFTS: Chronic | ICD-10-CM

## 2019-06-14 DIAGNOSIS — Z89.619 ACQUIRED ABSENCE OF UNSPECIFIED LEG ABOVE KNEE: Chronic | ICD-10-CM

## 2019-06-14 PROCEDURE — 93010 ELECTROCARDIOGRAM REPORT: CPT

## 2019-06-14 PROCEDURE — 99291 CRITICAL CARE FIRST HOUR: CPT | Mod: GC

## 2019-06-14 PROCEDURE — 71045 X-RAY EXAM CHEST 1 VIEW: CPT | Mod: 26

## 2019-06-14 NOTE — ED PROVIDER NOTE - PROGRESS NOTE DETAILS
Pt saturating 90-93% on RA. Pt placed on 3L NC with saturation 100%. Pt awake, alert having conversation with full clear sentences. Pt AAOx3. Pt states that she does not want any "to touch her." Pt is refusing to have blood drawn. Patient denies SI/HI, denies any self-harm attempt or OD, denies any other substance abuse/misuse, and denies AV hallucinations. Pt states that she will stay for a CXR but then would like to go back to [place]. We had a long discussion with the patient and explained to her that if she leaves she can die or suffer permanent disability, pt verbalized understanding and does not want to stay in the ED. Discussed with the nurse manager at [facility] [facility], Destiny, who states pt has the capacity at baseline to make decisions for herself. If pt leaves ED AMA, pt will be allowed to go back to [place]. Authored by Marcelle Smiley DO The patient wishes to leave against medical advice.  I have discussed the risks, benefits and alternatives (including the possibility of worsening of disease, pain, permanent disability, and/or death) with the patient and his/her family (if available).  The patient voices understanding of these risks, benefits, and alternatives and still wishes to sign out against medical advice.  The patient is awake, alert, oriented x 3 and has demonstrated capacity to refuse/direct care.  I have advised the patient that they can and should return immediately should they develop any worse/different/additional symptoms, or if they change their mind and want to continue their care. Patient has full capacity and has verbalized understanding.  Given detailed returned precautions.

## 2019-06-14 NOTE — ED ADULT NURSE NOTE - EXPLANATION OF PATIENT'S REASON FOR LEAVING
pt states "I do not want any blood work, I want to go back to [place] now". pt refusing to sign forms or discharge papers

## 2019-06-14 NOTE — ED ADULT TRIAGE NOTE - CHIEF COMPLAINT QUOTE
patient was brought in from dialysis for hypoxic of 70%, not hypoxic at this time, as per EMS, patient is altered mental status, patient states that she does not want to be here, no agitated at this time. patient was brought in from dialysis for hypoxic of 70%, not hypoxic at this time, as per EMS, patient is altered mental status, patient states that she does not want to be here, alert and oriented x3 at this time

## 2019-06-14 NOTE — ED PROVIDER NOTE - PHYSICAL EXAMINATION
CONSTITUTIONAL: Well-developed; well-nourished; in no acute distress.   SKIN: warm, dry, + port in right chest c/d/i  HEAD: Normocephalic; atraumatic.  EYES: no conjunctival injection. PERRL.   ENT: No nasal discharge; airway clear.  NECK: Supple; non tender.  CARD: S1, S2 normal; no murmurs, gallops, or rubs. Regular rate and rhythm.   RESP: No wheezes, rales or rhonchi.  ABD: soft ntnd  EXT: + LLE amputee othewise Normal ROM.  No clubbing, cyanosis or edema.   LYMPH: No acute cervical adenopathy.  NEURO: Alert, oriented, grossly unremarkable  PSYCH: Cooperative, appropriate.

## 2019-06-14 NOTE — ED PROVIDER NOTE - ATTENDING CONTRIBUTION TO CARE
I personally evaluated the patient. I reviewed the Resident’s or Physician Assistant’s note (as assigned above), and agree with the findings and plan except as documented in my note.  57 yo woman was brought to ED from hemodialysis for evaluation of hypoxia.  Patient was angry about our need to evaluate her and that she was in the ED.  On my assessment, patient was hypoxic on RA but on 3 Liters came up as high as 96% on pulse ox.  I spoke to her at length in front of the nurse and resident.  She displayed full decision making capacity.  She had no evidence of delirium.  I explained that it appeared the oxygen levels were low in her blood and this could be the sign of something serious and life threatening.  She said to me "I do not want to be here.  I want to go back to [place] where I live and I want no needles."  I offered to numb her arm for IV placement.  Patient was refusing as she wanted to go home.  I asked if a CXRAY and EKG were ok.  She said yes.  No PTX or signs or AMI were presents.  She signed out AMA.  We spoke to [place] and they agreed she had full decision making capacity.  She understood the risks included but were no limited to death and permanent disability.  Signed out AMA.

## 2019-06-14 NOTE — ED PROVIDER NOTE - NSFOLLOWUPINSTRUCTIONS_ED_ALL_ED_FT
The patient wishes to leave against medical advice.  I have discussed the risks, benefits and alternatives (including the possibility of worsening of disease, pain, permanent disability, and/or death) with the patient and his/her family (if available).  The patient voices understanding of these risks, benefits, and alternatives and still wishes to sign out against medical advice.  The patient is awake, alert, oriented x 3 and has demonstrated capacity to refuse/direct care.  I have advised the patient that they can and should return immediately should they develop any worse/different/additional symptoms, or if they change their mind and want to continue their care. Patient has full capacity and has verbalized understanding.  Given detailed returned precautions.

## 2019-06-14 NOTE — CONSULT NOTE ADULT - SUBJECTIVE AND OBJECTIVE BOX
NEPHROLOGY CONSULTATION NOTE    Patient is a 58y Female whom presented to the hospital with desaturation in the 70s after HD.  Pt was hyporesponsive during HD, had full HD tx with 2.5 L UF, /80 at the end. Pulse Ox was still low and pt was sent to ED.  Pt denies complains of SOB/CP, a little confused, but calm  PAST MEDICAL & SURGICAL HISTORY:  Type 2 diabetes mellitus  CHF (congestive heart failure)  End stage renal failure on dialysis  Hypertension  Hyperlipemia  Heart failure  Amputation of leg: left  History of femoral angiogram: left angiogram  Port-a-cath in place: right chest wall    Allergies:  No Known Allergies    Home Medications Reviewed  Hospital Medications:   MEDICATIONS  (STANDING):      SOCIAL HISTORY:  Denies ETOH,Smoking,   FAMILY HISTORY:  No pertinent family history in first degree relatives        REVIEW OF SYSTEMS:  CONSTITUTIONAL: No weakness, fevers or chills  RESPIRATORY: No cough, wheezing, hemoptysis; No shortness of breath  CARDIOVASCULAR: No chest pain or palpitations.  GASTROINTESTINAL: No abdominal or epigastric pain. No nausea, vomiting,     SKIN: No itching, burning, rashes, or lesions   VASCULAR: No bilateral lower extremity edema.   All other review of systems is negative unless indicated above.    VITALS:  T(F): 97.6 (06-14-19 @ 15:40), Max: 97.6 (06-14-19 @ 15:40)  HR: 72 (06-14-19 @ 15:40)  BP: 133/60 (06-14-19 @ 15:40)  RR: 17 (06-14-19 @ 15:40)  SpO2: 94% (06-14-19 @ 15:40)        I&O's Detail        PHYSICAL EXAM:  Constitutional: NAD  HEENT: anicteric sclera, oropharynx clear, MMM  Neck: No JVD  Respiratory: CTAB, no wheezes, rales or rhonchi  Cardiovascular: S1, S2, RRR  Gastrointestinal: BS+, soft, NT/ND  Extremities: No peripheral edema. Lt BKA  Neurological: Awake alert confused to time and place  Psychiatric: Normal mood, normal affect  : No CVA tenderness. No anderson.   Skin: No rashes  Vascular Access: Rt CW Tesio    LABS:        Creatinine Trend:     Urine Studies:              RADIOLOGY & ADDITIONAL STUDIES:

## 2019-06-14 NOTE — CONSULT NOTE ADULT - ASSESSMENT
Pt with ESRD on HD MWF, DM, HTN, PVD s/p Lt BKA sent from HD for hypoxemia.  Pt's pulse OX self improved from 70% in Triage to 80 in AM in ED  Pt ia awake, not in distress  labs and CXR pending  Admit for eval  may need HD tomorrow  renal diet, fluid restrict  Will follow

## 2019-06-14 NOTE — ED PROVIDER NOTE - CLINICAL SUMMARY MEDICAL DECISION MAKING FREE TEXT BOX
59 yo woman with multiple medical problems presents with hypoxia post hemodialysis.  Improves with oxygen.  Appears comfortable.  Mild tachypnea.  CXRAY was ok.  EKG unchanged.  Patient refusing any further workup despite long discussion of dangers of hypoxia and causes of hypoxia.  She was refusing all further care and wanted to go home.  Displayed full decision making capacity.  D/C AMA.

## 2019-06-14 NOTE — ED PROVIDER NOTE - CRITICAL CARE PROVIDED
documentation/direct patient care (not related to procedure)/additional history taking/conducted a detailed discussion of DNR status/interpretation of diagnostic studies

## 2019-06-14 NOTE — ED ADULT NURSE REASSESSMENT NOTE - NS ED NURSE REASSESS COMMENT FT1
pt assessed. pt refusing blood work despite education on importance. pa and md also attempted. unsuccessful. no s.s acute distress. pt on 3 L nc stating 99%

## 2019-06-14 NOTE — ED ADULT NURSE NOTE - CHIEF COMPLAINT QUOTE
patient was brought in from dialysis for hypoxic of 70%, not hypoxic at this time, as per EMS, patient is altered mental status, patient states that she does not want to be here, alert and oriented x3 at this time

## 2019-06-14 NOTE — ED PROVIDER NOTE - NS ED ROS FT
Review of Systems:  •	CONSTITUTIONAL - No fever, No diaphoresis, No weight change  •	SKIN - No rash  •	HEMATOLOGIC - No abnormal bleeding or bruising  •	EYES - No eye pain, No blurred vision  •	ENT - No change in hearing, No sore throat, No neck pain, No rhinorrhea, No ear pain  •	RESPIRATORY - No shortness of breath, No cough  •	CARDIAC -No chest pain, No palpitations  •	GI - No abdominal pain, No nausea, No vomiting, No diarrhea, No constipation, No bright red blood per rectum or melena. No flank pain  •                 - No dysuria, frequency, hematuria.   •	ENDO - No polydypsia, No polyuria, No heat/cold intolerance  •	MUSCULOSKELETAL - + hx of LLE amputation, No joint paint, No swelling, No back pain  •	NEUROLOGIC - No numbness, No focal weakness, No headache, No dizziness  All other systems negative, unless specified in HPI

## 2019-06-14 NOTE — ED PROVIDER NOTE - OBJECTIVE STATEMENT
59 y/o female with PMH of DM, HTN, ESRD, port in place R chest for dialysis (MWF), CHF, DLD, LLE amputation who presents to ED from dialysis for hypoxia. Pt is unable to report why she is in ED and states she has no complaints. Denies fever, chills, chest pain, SOB, n/v abdominal pain.

## 2019-08-15 ENCOUNTER — APPOINTMENT (OUTPATIENT)
Dept: VASCULAR SURGERY | Facility: CLINIC | Age: 59
End: 2019-08-15
Payer: MEDICARE

## 2019-08-15 PROCEDURE — 99213 OFFICE O/P EST LOW 20 MIN: CPT

## 2019-08-15 NOTE — ASSESSMENT
[FreeTextEntry1] : Ms. Gibson is a 58 year-old female with significant past medical history and bilateral heel decubitus, and h/o left BKA. She is ambulates with a brace to right lower extremity due to right foot drop. She developed a pustule to right foot that was debrided today and bacitracin and clean dressing placed. She has onychomycosis of right first toe and should follow up with Podiatry. I will see him back in 3 months time.

## 2019-08-15 NOTE — HISTORY OF PRESENT ILLNESS
[FreeTextEntry1] : Ms. Natasha Gibson presents from a nursing home for evaluation of bilateral foot necrotic and gangrenous ulcers. She has had a local wound debridement performed to her right heel at [facility]. She has been in a nursing home for the past several months and is non-ambulatory.\par \par In 2/17 she was admitted with progressive volume overload and diagnosed with a new cardiomyopathy with EF 35%. She refused ischemic workup and was discharged with a Lifevest. She has been seen for outpatient follow up and continues to refuse  a defibrillator. \par \par She had angioplasty of right SFA, popliteal and AT on 10/10/17 and left SFA, TPT and peroneal atherectomy and angioplasty on 10/17/17. \par \par She has had right first toe ulceration and pain and underwent popliteal and AT angioplasty on 10/19/18.

## 2019-10-10 ENCOUNTER — APPOINTMENT (OUTPATIENT)
Dept: VASCULAR SURGERY | Facility: CLINIC | Age: 59
End: 2019-10-10
Payer: MEDICARE

## 2019-10-10 DIAGNOSIS — L98.499 UNSPECIFIED ATHEROSCLEROSIS OF NATIVE ARTERIES OF EXTREMITIES, UNSPECIFIED EXTREMITY: ICD-10-CM

## 2019-10-10 DIAGNOSIS — Z89.512 ACQUIRED ABSENCE OF LEFT LEG BELOW KNEE: ICD-10-CM

## 2019-10-10 DIAGNOSIS — I70.209 UNSPECIFIED ATHEROSCLEROSIS OF NATIVE ARTERIES OF EXTREMITIES, UNSPECIFIED EXTREMITY: ICD-10-CM

## 2019-10-10 PROCEDURE — 99213 OFFICE O/P EST LOW 20 MIN: CPT

## 2019-10-10 NOTE — HISTORY OF PRESENT ILLNESS
[FreeTextEntry1] : Ms. Natasha Gibson with PVD, h/o Left BKA. She has been in a nursing home for the past several months and is non-ambulatory.\par \par She had angioplasty of right SFA, popliteal and AT on 10/10/17 and left SFA, TPT and peroneal atherectomy and angioplasty on 10/17/17. \par \par She has had right first toe ulceration and pain and underwent popliteal and AT angioplasty on 10/19/18. She denies any wounds currently.

## 2019-10-10 NOTE — ASSESSMENT
[FreeTextEntry1] : Ms. Gibson is a 58 year-old female with significant past medical history and bilateral heel decubitus, and h/o left BKA. She is ambulates with a brace to right lower extremity due to right foot drop. She is currently without any wounds. I will see her back in 3 months time.

## 2019-12-13 ENCOUNTER — OUTPATIENT (OUTPATIENT)
Dept: OUTPATIENT SERVICES | Facility: HOSPITAL | Age: 59
LOS: 1 days | Discharge: HOME | End: 2019-12-13

## 2019-12-13 DIAGNOSIS — R76.12 NONSPECIFIC REACTION TO CELL MEDIATED IMMUNITY MEASUREMENT OF GAMMA INTERFERON ANTIGEN RESPONSE WITHOUT ACTIVE TUBERCULOSIS: ICD-10-CM

## 2019-12-13 DIAGNOSIS — Z89.619 ACQUIRED ABSENCE OF UNSPECIFIED LEG ABOVE KNEE: Chronic | ICD-10-CM

## 2019-12-13 DIAGNOSIS — Z86.79 PERSONAL HISTORY OF OTHER DISEASES OF THE CIRCULATORY SYSTEM: Chronic | ICD-10-CM

## 2019-12-13 DIAGNOSIS — Z95.828 PRESENCE OF OTHER VASCULAR IMPLANTS AND GRAFTS: Chronic | ICD-10-CM

## 2020-01-07 ENCOUNTER — EMERGENCY (EMERGENCY)
Facility: HOSPITAL | Age: 60
LOS: 0 days | Discharge: HOME | End: 2020-01-08
Attending: EMERGENCY MEDICINE | Admitting: EMERGENCY MEDICINE
Payer: MEDICARE

## 2020-01-07 VITALS
TEMPERATURE: 98 F | OXYGEN SATURATION: 95 % | SYSTOLIC BLOOD PRESSURE: 180 MMHG | DIASTOLIC BLOOD PRESSURE: 84 MMHG | HEART RATE: 68 BPM | RESPIRATION RATE: 18 BRPM

## 2020-01-07 VITALS
DIASTOLIC BLOOD PRESSURE: 94 MMHG | RESPIRATION RATE: 18 BRPM | OXYGEN SATURATION: 95 % | SYSTOLIC BLOOD PRESSURE: 182 MMHG | TEMPERATURE: 98 F | HEART RATE: 70 BPM

## 2020-01-07 DIAGNOSIS — Z89.619 ACQUIRED ABSENCE OF UNSPECIFIED LEG ABOVE KNEE: Chronic | ICD-10-CM

## 2020-01-07 DIAGNOSIS — R05 COUGH: ICD-10-CM

## 2020-01-07 DIAGNOSIS — M25.561 PAIN IN RIGHT KNEE: ICD-10-CM

## 2020-01-07 DIAGNOSIS — Z95.828 PRESENCE OF OTHER VASCULAR IMPLANTS AND GRAFTS: Chronic | ICD-10-CM

## 2020-01-07 DIAGNOSIS — Z86.79 PERSONAL HISTORY OF OTHER DISEASES OF THE CIRCULATORY SYSTEM: Chronic | ICD-10-CM

## 2020-01-07 DIAGNOSIS — M25.569 PAIN IN UNSPECIFIED KNEE: ICD-10-CM

## 2020-01-07 DIAGNOSIS — M79.661 PAIN IN RIGHT LOWER LEG: ICD-10-CM

## 2020-01-07 PROCEDURE — 71045 X-RAY EXAM CHEST 1 VIEW: CPT | Mod: 26

## 2020-01-07 PROCEDURE — 73562 X-RAY EXAM OF KNEE 3: CPT | Mod: 26,RT

## 2020-01-07 PROCEDURE — 99283 EMERGENCY DEPT VISIT LOW MDM: CPT

## 2020-01-07 PROCEDURE — 73600 X-RAY EXAM OF ANKLE: CPT | Mod: 26,RT

## 2020-01-07 PROCEDURE — 73590 X-RAY EXAM OF LOWER LEG: CPT | Mod: 26,RT

## 2020-01-07 NOTE — ED PROVIDER NOTE - PHYSICAL EXAMINATION
VITAL SIGNS: I have reviewed nursing notes and confirm.  CONSTITUTIONAL: Well-developed; well-nourished; in no acute distress.  SKIN: Skin exam is warm and dry, no acute rash.  HEAD: Normocephalic; atraumatic.  EYES: PERRL, EOM intact; conjunctiva and sclera clear.  ENT: No nasal discharge; airway clear.  NECK: Supple; non tender.  CARD: S1, S2 normal; no murmurs, gallops, or rubs. Regular rate and rhythm.  RESP: No wheezes, rales or rhonchi.  ABD: Normal bowel sounds; soft; non-distended; non-tender; no hepatosplenomegaly; no rgr.  BACK: non-tender, no CVAT  EXT: L bka, RLE atraumatic, +ttp of knee, lower leg & ankle, no ecchymosis, rom/strength/sensation intact, dpi.  NEURO: Alert, oriented. Grossly unremarkable. No focal deficits.  PSYCH: Cooperative, appropriate.

## 2020-01-07 NOTE — ED PROVIDER NOTE - NS ED ROS FT
Constitutional: No fever, chills, unintended weight loss.  Eyes:  No visual changes, eye pain or discharge.  ENMT:  No hearing changes, pain, no sore throat or runny nose, no difficulty swallowing  Cardiac:  No chest pain, SOB or edema. No chest pain with exertion.  Respiratory:  +cough no respiratory distress. No hemoptysis. No history of asthma or RAD.  GI:  No nausea, vomiting, diarrhea or abdominal pain.  :  No dysuria, frequency or burning.  MS:  see hpi  Neuro:  No headache or weakness.  No LOC.  Skin:  No skin rash.   Endocrine: No history of thyroid disease +diabetes.

## 2020-01-07 NOTE — ED PROVIDER NOTE - CARE PROVIDER_API CALL
Richi Painting (MD)  Orthopaedic Surgery  [address] [place] [address]  Phone: [phone]  Fax: [phone]  Follow Up Time: 7-10 Days

## 2020-01-07 NOTE — ED PROVIDER NOTE - NSFOLLOWUPINSTRUCTIONS_ED_ALL_ED_FT

## 2020-01-07 NOTE — ED PROVIDER NOTE - CLINICAL SUMMARY MEDICAL DECISION MAKING FREE TEXT BOX
R knee/leg pain s/p trauma - XRs no acute bony injuries - all results d/w pt & copies given, strict return precautions discussed, rec outpt Ortho f/u

## 2020-01-07 NOTE — ED ADULT TRIAGE NOTE - CHIEF COMPLAINT QUOTE
BIBA from [facility] c'o right knee pain pt. states they were putting her on the Hoya lift and banged her knee on the lift had x-rays performed today does not know results and also requesting chest x-ray/ pt. oxygen dependent on 3 lnc

## 2020-01-07 NOTE — ED PROVIDER NOTE - PATIENT PORTAL LINK FT
You can access the FollowMyHealth Patient Portal offered by [facility] by registering at the following website: [email or web address]. By joining [employer]’s FollowMyHealth portal, you will also be able to view your health information using other applications (apps) compatible with our system.

## 2020-01-07 NOTE — ED PROVIDER NOTE - OBJECTIVE STATEMENT
59y f from [facility] h/o esrd on hw mwf on home O2 3lnc, chf, dm, htn, hl, s/p L bka p/w R knee pain bedbound @ baseline s/p trauma. NH staff was lifting pt into bed with kavon, her R knee got caught against side railing. Now with R knee & lower leg pain. Pt states she rec'd R knee xr, as well as cxr @ [facility] for recent complanits of cough, and was told that she would not get xr results until later in evening so she called 911 to come to ED. No f/c, cp/sob, focal numbness or weakness of RLE. PMD Woody.

## 2020-01-08 RX ORDER — DIPHENHYDRAMINE HCL 50 MG
50 CAPSULE ORAL ONCE
Refills: 0 | Status: COMPLETED | OUTPATIENT
Start: 2020-01-08 | End: 2020-01-08

## 2020-01-08 RX ADMIN — Medication 50 MILLIGRAM(S): at 00:45

## 2020-01-08 NOTE — ED ADULT NURSE NOTE - CHIEF COMPLAINT QUOTE
BIBA from [facility] c'o right knee pain pt. states they were putting her on the Hoya lift and banged her knee on the lift had x-rays performed today does not know results and also requesting chest x-ray/ pt. oxygen dependent on 3 lnc

## 2020-01-08 NOTE — ED ADULT NURSE NOTE - OBJECTIVE STATEMENT
BIBA from [facility] c'o right knee pain pt. states they were putting her on the Adrianne lift and banged her knee on the lift had x-rays performed today does not know results and also requesting chest x-ray/ pt. oxygen dependent on 3 lnc

## 2020-01-09 ENCOUNTER — APPOINTMENT (OUTPATIENT)
Dept: VASCULAR SURGERY | Facility: CLINIC | Age: 60
End: 2020-01-09
Payer: MEDICARE

## 2020-01-09 DIAGNOSIS — I70.235 ATHEROSCLEROSIS OF NATIVE ARTERIES OF RIGHT LEG WITH ULCERATION OF OTHER PART OF FOOT: ICD-10-CM

## 2020-01-09 PROCEDURE — 99213 OFFICE O/P EST LOW 20 MIN: CPT

## 2020-01-09 NOTE — HISTORY OF PRESENT ILLNESS
[FreeTextEntry1] : Ms. Natasha Gibson with PVD, h/o Left BKA. She has been in a nursing home for the past several months and is non-ambulatory.\par \par She had angioplasty of right SFA, popliteal and AT on 10/10/17 and left SFA, TPT and peroneal atherectomy and angioplasty on 10/17/17. \par \par She has had right first toe ulceration and pain and underwent popliteal and AT angioplasty on 10/19/18. She denies any wounds currently. She was hurt during transfer with Adrianne lift and now has right knee pain with flexion.

## 2020-01-09 NOTE — ASSESSMENT
[FreeTextEntry1] : Ms. Gibson is a 59 year-old female with significant past medical history and bilateral heel decubitus, and h/o left BKA. She is ambulates with a brace to right lower extremity due to right foot drop. She is currently without any wounds to the right foot. She hurt her right knee during transfer with Adrianne lift at the nursing home. I recommend Orthopedic consultation. I will see her back in 3 months time for follow up.
DISPLAY PLAN FREE TEXT

## 2020-01-10 ENCOUNTER — EMERGENCY (EMERGENCY)
Facility: HOSPITAL | Age: 60
LOS: 0 days | Discharge: HOME | End: 2020-01-10
Attending: EMERGENCY MEDICINE | Admitting: EMERGENCY MEDICINE
Payer: MEDICARE

## 2020-01-10 VITALS
WEIGHT: 199.96 LBS | TEMPERATURE: 99 F | DIASTOLIC BLOOD PRESSURE: 82 MMHG | SYSTOLIC BLOOD PRESSURE: 136 MMHG | OXYGEN SATURATION: 98 % | RESPIRATION RATE: 17 BRPM | HEART RATE: 64 BPM

## 2020-01-10 VITALS
TEMPERATURE: 99 F | RESPIRATION RATE: 18 BRPM | HEART RATE: 66 BPM | DIASTOLIC BLOOD PRESSURE: 57 MMHG | OXYGEN SATURATION: 97 % | SYSTOLIC BLOOD PRESSURE: 101 MMHG

## 2020-01-10 DIAGNOSIS — Z86.79 PERSONAL HISTORY OF OTHER DISEASES OF THE CIRCULATORY SYSTEM: Chronic | ICD-10-CM

## 2020-01-10 DIAGNOSIS — R11.10 VOMITING, UNSPECIFIED: ICD-10-CM

## 2020-01-10 DIAGNOSIS — Z89.619 ACQUIRED ABSENCE OF UNSPECIFIED LEG ABOVE KNEE: Chronic | ICD-10-CM

## 2020-01-10 DIAGNOSIS — R10.11 RIGHT UPPER QUADRANT PAIN: ICD-10-CM

## 2020-01-10 DIAGNOSIS — Z95.828 PRESENCE OF OTHER VASCULAR IMPLANTS AND GRAFTS: Chronic | ICD-10-CM

## 2020-01-10 LAB
FLU A RESULT: NEGATIVE — SIGNIFICANT CHANGE UP
FLU A RESULT: NEGATIVE — SIGNIFICANT CHANGE UP
FLUAV AG NPH QL: NEGATIVE — SIGNIFICANT CHANGE UP
FLUBV AG NPH QL: NEGATIVE — SIGNIFICANT CHANGE UP
RSV RESULT: NEGATIVE — SIGNIFICANT CHANGE UP
RSV RNA RESP QL NAA+PROBE: NEGATIVE — SIGNIFICANT CHANGE UP

## 2020-01-10 PROCEDURE — 99284 EMERGENCY DEPT VISIT MOD MDM: CPT | Mod: GC

## 2020-01-10 PROCEDURE — 71045 X-RAY EXAM CHEST 1 VIEW: CPT | Mod: 26

## 2020-01-10 PROCEDURE — 76705 ECHO EXAM OF ABDOMEN: CPT | Mod: 26

## 2020-01-10 RX ORDER — CEFPODOXIME PROXETIL 100 MG
1 TABLET ORAL
Qty: 20 | Refills: 0
Start: 2020-01-10 | End: 2020-01-19

## 2020-01-10 NOTE — ED ADULT NURSE NOTE - OBJECTIVE STATEMENT
Lt BKA , RT subclavian HMD CATH ,alert and oriented x 3, refused care, refused Flu swab, denied suicidal ideation denied depression

## 2020-01-10 NOTE — ED PROVIDER NOTE - NS ED ROS FT
Constitutional: denies fevers.   Eyes:  No visual changes, eye pain or discharge.  ENMT:  No sore throat or runny nose.  Cardiac:  No chest pain, SOB or edema.   Respiratory:  +pneumonia.   GI:  +vomiting.   :  +esrd.   MS:  No myalgia, muscle weakness, joint pain or back pain.  Neuro:  No headache or weakness.  No LOC.  Skin:  No skin rash.   Endocrine: hx of DM.   Psych: +denies suicidal/homicidal ideations.

## 2020-01-10 NOTE — ED PROVIDER NOTE - NSFOLLOWUPINSTRUCTIONS_ED_ALL_ED_FT
PLEASE SWITCH KEFLEX TO CEFPODOXIME FOR PRESUMED PNEUMONIA.     Pneumonia    Pneumonia is an infection of the lungs. Pneumonia may be caused by bacteria, viruses, or funguses. Symptoms include coughing, fever, chest pain when breathing deeply or coughing, shortness of breath, fatigue, or muscle aches. Pneumonia can be diagnosed with a medical history and physical exam, as well as other tests which may include a chest X-ray. If you were prescribed an antibiotic medicine, take it as told by your health care provider and do not stop taking the antibiotic even if you start to feel better. Do not use tobacco products, including cigarettes, chewing tobacco, and e-cigarettes.    SEEK IMMEDIATE MEDICAL CARE IF YOU HAVE ANY OF THE FOLLOWING SYMPTOMS: worsening shortness of breath, worsening chest pain, coughing up blood, change in mental status, lightheadedness/dizziness.

## 2020-01-10 NOTE — ED PROVIDER NOTE - SKILLED NURSING FACILITY NOTE SUMMARY FREE TEXT FOR MDM OBTAINED AND REVIEWED OLD RECORDS QUESTION
called nursing facility patient has capacity, refuses needles, has various complaints but was agitated at facility.

## 2020-01-10 NOTE — ED PROVIDER NOTE - ATTENDING CONTRIBUTION TO CARE
pt with esrd chf diabetes htn who presents from nursing facility for what they report is agitation, they report that she was recently dx with pneumonia and was recommended to have dose of ceftriaxone but refused it so has now been on keflex. patients admits she on abx and thus after taking abx she developed vomiting which led her to come to ed. . she states shes not sure why she even came here btu she does endorse upper  abd pain and vomiting. no fevers, no sob, on exam she has right catherter in chest, lungs with rhonchi, abd with mild ruq tenderness. she has left bka. plan is to obtain labs. cxr and ruq us.

## 2020-01-10 NOTE — ED PROVIDER NOTE - PATIENT PORTAL LINK FT
You can access the FollowMyHealth Patient Portal offered by [facility] by registering at the following website: [email or web address]. By joining [employer]’s FollowMyHealth portal, you will also be able to view your health information using other applications (apps) compatible with our system.

## 2020-01-10 NOTE — ED PROVIDER NOTE - CLINICAL SUMMARY MEDICAL DECISION MAKING FREE TEXT BOX
patient evaluated for mutliple complaints but refused blood work, she was deemed to have capacity at the time of evaluation and confirmed with nursing facility. she accepted xray and ultrasound, wed sicussed that the etiology of her vomiting would not be fully elucidated with only xray and us. she understands that this limits are treatment and diagnostic abilty, she understands that because of this we might be missing life threatening gi or cardiac causes/infectious causes that could lead to morbidity or mortality. she is of sounds mind aox3 without signs of intoxication. her mental status was confirmed with facility to be at her baseline for which she has capacity also. we will send her back ama to facility os she can obtain dialsysi.

## 2020-01-10 NOTE — ED ADULT NURSE NOTE - NSIMPLEMENTINTERV_GEN_ALL_ED
Implemented All Fall Risk Interventions:  [place] to call system. Call bell, personal items and telephone within reach. Instruct patient to call for assistance. Room bathroom lighting operational. Non-slip footwear when patient is off stretcher. Physically safe environment: no spills, clutter or unnecessary equipment. Stretcher in lowest position, wheels locked, appropriate side rails in place. Provide visual cue, wrist band, yellow gown, etc. Monitor gait and stability. Monitor for mental status changes and reorient to person, place, and time. Review medications for side effects contributing to fall risk. Reinforce activity limits and safety measures with patient and family.

## 2020-01-10 NOTE — ED PROVIDER NOTE - NSFOLLOWUPCLINICS_GEN_ALL_ED_FT
[facility]  [facility]  [address] [place] [id number]  Phone: [phone]  Fax:   Follow Up Time: Routine

## 2020-01-10 NOTE — ED ADULT NURSE NOTE - CHIEF COMPLAINT QUOTE
[facility] "Altered mental status, difficulty breathing, and c/o head and leg pain. Pt also has heavy anxiety, previous suicidal ideation" occurring last night. Pt recently diagnosed with PNA

## 2020-01-10 NOTE — ED PROVIDER NOTE - OBJECTIVE STATEMENT
Patient is a 58 yo F w/ hx of ESRD on HD, CHF, DM, HTN, HLD, s/p left BKA BIBEMS from [facility] for agitation. Per nurse at home health aid, patient herself requested to come to the ED; per nurse patient was diagnosed with pneumonia 2 days prior to presentation, refused ceftriaxone so instead was put on keflex. Patient denies having pneumonia- does not believe the nursing home. Denies SOB, chest pain, and cough. Only endorses few episodes of NBNB vomitus attributing it to the abx she is on. Patient denies abdominal pain. Per nurse, patient has been increasingly agitated and few days prior, made a gesture of wrapping the telephone cord around her neck when angry. Patient denies hx of psychiatric issues, denies suicidal/homicidal ideation, denies hallucinations, denies drug use. Patient agitated with questions and states "I don't know why you keep asking me, Im not crazy". Patient adamantly refusing blood work; she does not want anything involving needles; very difficult with flu swab. Agrees to a repeat xray and ultrasound but refusing blood draws and even mentions "I have my rights".

## 2020-01-10 NOTE — ED PROVIDER NOTE - PHYSICAL EXAMINATION
CONSTITUTIONAL: Well-developed; well-nourished; in no acute distress.   SKIN: warm, dry.  HEAD: Normocephalic; atraumatic.  EYES: PERRL, EOMI, no conjunctival erythema.  ENT: No nasal discharge; airway clear.  NECK: Supple; non tender.  CARD: S1, S2 normal; no murmurs, gallops, or rubs. Regular rate and rhythm.   RESP: No wheezes, rales or rhonchi.  ABD: soft, distended at baseline, tender to palpation in the RUQ without guarding or rebound.   EXT: Normal ROM.  Left BKA. Right leg is contracted. Pulse present. No open sores.   NEURO: Alert, oriented x 3; displaying capacity. Moves all present extremities.   PSYCH: Uncooperative. Normal affect.

## 2020-01-10 NOTE — ED PROVIDER NOTE - TRANSFER CONDITION
hx of copd, anemia, cva, schizo d/o, now with persistent episodes of diarrhea. no fever.   vss, nontoxic, well appearing, pink conj, anicteric, + dry MM, neck supple, CTAB, RRR, equal radial pulses bilat, abd soft/nt/nd, no cva tend. no calves tend, no edema, no fnd. no rashes.  a/p: labs, imaging, reassess
Stable

## 2020-01-10 NOTE — ED PROVIDER NOTE - PROGRESS NOTE DETAILS
Patient seen by psychiatry in ED; cleared by them. The patient wishes to leave against medical advice.  I have discussed the risks, benefits and alternatives (including the possibility of worsening of disease, pain, permanent disability, and/or death).  The patient voices understanding of these risks, benefits, and alternatives and still wishes to sign out against medical advice.  The patient is awake, alert, oriented  x 3 and has demonstrated capacity to refuse/direct care.  I have advised the patient that they can and should return immediately should they develop any worse/different/additional symptoms, or if they change their mind and want to continue their care.

## 2020-01-10 NOTE — ED ADULT NURSE REASSESSMENT NOTE - NS ED NURSE REASSESS COMMENT FT1
Patient  alert and oriented x4, denies wanting to hurt herself. seen by Psychiatry. patient refusing all treatment and labs. MD tilley.

## 2020-01-10 NOTE — ED ADULT TRIAGE NOTE - CHIEF COMPLAINT QUOTE
BIBA from [facility] and [facility] "Altered mental status, difficulty breathing, and c/o head and leg pain. Pt also has heavy anxiety, previous suicidal ideation" occurring last night. Pt recently diagnosed with PNA

## 2020-01-15 NOTE — ED PROVIDER NOTE - INTERNATIONAL TRAVEL
T(C): 36.7 (01-15-20 @ 03:31)  HR: 102 (01-15-20 @ 05:04)  BP: 158/89 (01-15-20 @ 05:04)  RR: 24 (01-15-20 @ 05:04)  SpO2: 100% (01-15-20 @ 05:04)  Wt(kg): --                   PE  Gen: NAD, alert and oriented  Resp: Unlabored breathing  LLE: Skin intact, no ecchymosis,   shortened and internally rotated       SILT DP/SP/T/ Opal/Saph,        +EHL/FHL/TA/Gastroc,        Knee/ankle painless ROM,        hip ROM limited 2/2 pain,       DP+, + PT       soft compartments, no calf ttp,       Secondary:  No TTP over bony landmarks, SILT BL, ROM intact BL, distal pulses palpable.
No

## 2020-04-13 ENCOUNTER — INPATIENT (INPATIENT)
Facility: HOSPITAL | Age: 60
LOS: 10 days | Discharge: NURSING, REHAB OR LONG-TERM CARE | End: 2020-04-24
Attending: INTERNAL MEDICINE | Admitting: INTERNAL MEDICINE
Payer: MEDICARE

## 2020-04-13 VITALS
DIASTOLIC BLOOD PRESSURE: 68 MMHG | HEART RATE: 74 BPM | OXYGEN SATURATION: 97 % | RESPIRATION RATE: 20 BRPM | TEMPERATURE: 103 F | SYSTOLIC BLOOD PRESSURE: 160 MMHG

## 2020-04-13 DIAGNOSIS — Z86.79 PERSONAL HISTORY OF OTHER DISEASES OF THE CIRCULATORY SYSTEM: Chronic | ICD-10-CM

## 2020-04-13 DIAGNOSIS — Z89.619 ACQUIRED ABSENCE OF UNSPECIFIED LEG ABOVE KNEE: Chronic | ICD-10-CM

## 2020-04-13 DIAGNOSIS — Z95.828 PRESENCE OF OTHER VASCULAR IMPLANTS AND GRAFTS: Chronic | ICD-10-CM

## 2020-04-13 LAB
ALBUMIN SERPL ELPH-MCNC: 4.5 G/DL — SIGNIFICANT CHANGE UP (ref 3.5–5.2)
ALP SERPL-CCNC: 55 U/L — SIGNIFICANT CHANGE UP (ref 30–115)
ALT FLD-CCNC: 13 U/L — SIGNIFICANT CHANGE UP (ref 0–41)
AMMONIA BLD-MCNC: 16 UMOL/L — SIGNIFICANT CHANGE UP (ref 11–55)
ANION GAP SERPL CALC-SCNC: 22 MMOL/L — HIGH (ref 7–14)
APTT BLD: 31.1 SEC — SIGNIFICANT CHANGE UP (ref 27–39.2)
AST SERPL-CCNC: 30 U/L — SIGNIFICANT CHANGE UP (ref 0–41)
BASE EXCESS BLDV CALC-SCNC: 1 MMOL/L — SIGNIFICANT CHANGE UP (ref -2–2)
BASOPHILS # BLD AUTO: 0.02 K/UL — SIGNIFICANT CHANGE UP (ref 0–0.2)
BASOPHILS NFR BLD AUTO: 0.2 % — SIGNIFICANT CHANGE UP (ref 0–1)
BILIRUB SERPL-MCNC: 0.5 MG/DL — SIGNIFICANT CHANGE UP (ref 0.2–1.2)
BLD GP AB SCN SERPL QL: SIGNIFICANT CHANGE UP
BUN SERPL-MCNC: 25 MG/DL — HIGH (ref 10–20)
CA-I SERPL-SCNC: 1.09 MMOL/L — LOW (ref 1.12–1.3)
CALCIUM SERPL-MCNC: 8.8 MG/DL — SIGNIFICANT CHANGE UP (ref 8.5–10.1)
CHLORIDE SERPL-SCNC: 89 MMOL/L — LOW (ref 98–110)
CK SERPL-CCNC: 877 U/L — HIGH (ref 0–225)
CO2 SERPL-SCNC: 25 MMOL/L — SIGNIFICANT CHANGE UP (ref 17–32)
CREAT SERPL-MCNC: 5.9 MG/DL — CRITICAL HIGH (ref 0.7–1.5)
D DIMER BLD IA.RAPID-MCNC: 1263 NG/ML DDU — HIGH (ref 0–230)
EOSINOPHIL # BLD AUTO: 0 K/UL — SIGNIFICANT CHANGE UP (ref 0–0.7)
EOSINOPHIL NFR BLD AUTO: 0 % — SIGNIFICANT CHANGE UP (ref 0–8)
ETHANOL SERPL-MCNC: <10 MG/DL — SIGNIFICANT CHANGE UP
FIBRINOGEN PPP-MCNC: >700 MG/DL — HIGH (ref 204.4–570.6)
GAS PNL BLDV: 141 MMOL/L — SIGNIFICANT CHANGE UP (ref 136–145)
GAS PNL BLDV: SIGNIFICANT CHANGE UP
GLUCOSE SERPL-MCNC: 147 MG/DL — HIGH (ref 70–99)
HCO3 BLDV-SCNC: 27 MMOL/L — SIGNIFICANT CHANGE UP (ref 22–29)
HCT VFR BLD CALC: 35.7 % — LOW (ref 37–47)
HCT VFR BLDA CALC: 37.5 % — SIGNIFICANT CHANGE UP (ref 34–44)
HGB BLD CALC-MCNC: 12.2 G/DL — LOW (ref 14–18)
HGB BLD-MCNC: 11.9 G/DL — LOW (ref 12–16)
IMM GRANULOCYTES NFR BLD AUTO: 0.5 % — HIGH (ref 0.1–0.3)
INR BLD: 1.06 RATIO — SIGNIFICANT CHANGE UP (ref 0.65–1.3)
LACTATE BLDV-MCNC: 1.2 MMOL/L — SIGNIFICANT CHANGE UP (ref 0.5–1.6)
LACTATE SERPL-SCNC: 1 MMOL/L — SIGNIFICANT CHANGE UP (ref 0.7–2)
LDH SERPL L TO P-CCNC: 525 — HIGH (ref 50–242)
LYMPHOCYTES # BLD AUTO: 0.73 K/UL — LOW (ref 1.2–3.4)
LYMPHOCYTES # BLD AUTO: 8.6 % — LOW (ref 20.5–51.1)
MAGNESIUM SERPL-MCNC: 2.1 MG/DL — SIGNIFICANT CHANGE UP (ref 1.8–2.4)
MCHC RBC-ENTMCNC: 28.7 PG — SIGNIFICANT CHANGE UP (ref 27–31)
MCHC RBC-ENTMCNC: 33.3 G/DL — SIGNIFICANT CHANGE UP (ref 32–37)
MCV RBC AUTO: 86 FL — SIGNIFICANT CHANGE UP (ref 81–99)
MONOCYTES # BLD AUTO: 0.52 K/UL — SIGNIFICANT CHANGE UP (ref 0.1–0.6)
MONOCYTES NFR BLD AUTO: 6.2 % — SIGNIFICANT CHANGE UP (ref 1.7–9.3)
NEUTROPHILS # BLD AUTO: 7.14 K/UL — HIGH (ref 1.4–6.5)
NEUTROPHILS NFR BLD AUTO: 84.5 % — HIGH (ref 42.2–75.2)
NRBC # BLD: 0 /100 WBCS — SIGNIFICANT CHANGE UP (ref 0–0)
NT-PROBNP SERPL-SCNC: HIGH PG/ML (ref 0–300)
PCO2 BLDV: 45 MMHG — SIGNIFICANT CHANGE UP (ref 41–51)
PH BLDV: 7.38 — SIGNIFICANT CHANGE UP (ref 7.26–7.43)
PLATELET # BLD AUTO: 160 K/UL — SIGNIFICANT CHANGE UP (ref 130–400)
PO2 BLDV: 44 MMHG — HIGH (ref 20–40)
POTASSIUM BLDV-SCNC: 4.7 MMOL/L — SIGNIFICANT CHANGE UP (ref 3.3–5.6)
POTASSIUM SERPL-MCNC: 4.2 MMOL/L — SIGNIFICANT CHANGE UP (ref 3.5–5)
POTASSIUM SERPL-SCNC: 4.2 MMOL/L — SIGNIFICANT CHANGE UP (ref 3.5–5)
PROT SERPL-MCNC: 8 G/DL — SIGNIFICANT CHANGE UP (ref 6–8)
PROTHROM AB SERPL-ACNC: 12.2 SEC — SIGNIFICANT CHANGE UP (ref 9.95–12.87)
RBC # BLD: 4.15 M/UL — LOW (ref 4.2–5.4)
RBC # FLD: 17.2 % — HIGH (ref 11.5–14.5)
SALICYLATES SERPL-MCNC: <0.3 MG/DL — LOW (ref 4–30)
SAO2 % BLDV: 76 % — SIGNIFICANT CHANGE UP
SODIUM SERPL-SCNC: 136 MMOL/L — SIGNIFICANT CHANGE UP (ref 135–146)
TROPONIN T SERPL-MCNC: 0.77 NG/ML — CRITICAL HIGH
WBC # BLD: 8.45 K/UL — SIGNIFICANT CHANGE UP (ref 4.8–10.8)
WBC # FLD AUTO: 8.45 K/UL — SIGNIFICANT CHANGE UP (ref 4.8–10.8)

## 2020-04-13 PROCEDURE — 99285 EMERGENCY DEPT VISIT HI MDM: CPT | Mod: GC

## 2020-04-13 PROCEDURE — 71045 X-RAY EXAM CHEST 1 VIEW: CPT | Mod: 26

## 2020-04-13 PROCEDURE — 70450 CT HEAD/BRAIN W/O DYE: CPT | Mod: 26

## 2020-04-13 PROCEDURE — 93010 ELECTROCARDIOGRAM REPORT: CPT

## 2020-04-13 RX ORDER — CHLORHEXIDINE GLUCONATE 213 G/1000ML
1 SOLUTION TOPICAL
Refills: 0 | Status: DISCONTINUED | OUTPATIENT
Start: 2020-04-13 | End: 2020-04-24

## 2020-04-13 RX ORDER — INSULIN LISPRO 100/ML
4 VIAL (ML) SUBCUTANEOUS
Refills: 0 | Status: DISCONTINUED | OUTPATIENT
Start: 2020-04-13 | End: 2020-04-16

## 2020-04-13 RX ORDER — ASPIRIN/CALCIUM CARB/MAGNESIUM 324 MG
81 TABLET ORAL DAILY
Refills: 0 | Status: DISCONTINUED | OUTPATIENT
Start: 2020-04-13 | End: 2020-04-24

## 2020-04-13 RX ORDER — ACETAMINOPHEN 500 MG
650 TABLET ORAL EVERY 6 HOURS
Refills: 0 | Status: DISCONTINUED | OUTPATIENT
Start: 2020-04-13 | End: 2020-04-24

## 2020-04-13 RX ORDER — ASPIRIN/CALCIUM CARB/MAGNESIUM 324 MG
0 TABLET ORAL
Qty: 0 | Refills: 0 | DISCHARGE

## 2020-04-13 RX ORDER — CEFEPIME 1 G/1
2000 INJECTION, POWDER, FOR SOLUTION INTRAMUSCULAR; INTRAVENOUS ONCE
Refills: 0 | Status: COMPLETED | OUTPATIENT
Start: 2020-04-13 | End: 2020-04-13

## 2020-04-13 RX ORDER — ALBUTEROL 90 UG/1
3 AEROSOL, METERED ORAL
Qty: 0 | Refills: 0 | DISCHARGE

## 2020-04-13 RX ORDER — SODIUM CHLORIDE 9 MG/ML
1000 INJECTION, SOLUTION INTRAVENOUS
Refills: 0 | Status: DISCONTINUED | OUTPATIENT
Start: 2020-04-13 | End: 2020-04-24

## 2020-04-13 RX ORDER — INSULIN GLARGINE 100 [IU]/ML
44 INJECTION, SOLUTION SUBCUTANEOUS
Qty: 0 | Refills: 0 | DISCHARGE

## 2020-04-13 RX ORDER — CARVEDILOL PHOSPHATE 80 MG/1
6.25 CAPSULE, EXTENDED RELEASE ORAL EVERY 12 HOURS
Refills: 0 | Status: DISCONTINUED | OUTPATIENT
Start: 2020-04-13 | End: 2020-04-20

## 2020-04-13 RX ORDER — ACETAMINOPHEN 500 MG
650 TABLET ORAL ONCE
Refills: 0 | Status: COMPLETED | OUTPATIENT
Start: 2020-04-13 | End: 2020-04-13

## 2020-04-13 RX ORDER — INSULIN GLARGINE 100 [IU]/ML
44 INJECTION, SOLUTION SUBCUTANEOUS AT BEDTIME
Refills: 0 | Status: DISCONTINUED | OUTPATIENT
Start: 2020-04-13 | End: 2020-04-16

## 2020-04-13 RX ORDER — DEXTROSE 50 % IN WATER 50 %
25 SYRINGE (ML) INTRAVENOUS ONCE
Refills: 0 | Status: DISCONTINUED | OUTPATIENT
Start: 2020-04-13 | End: 2020-04-24

## 2020-04-13 RX ORDER — ONDANSETRON 8 MG/1
2 TABLET, FILM COATED ORAL
Qty: 0 | Refills: 0 | DISCHARGE

## 2020-04-13 RX ORDER — CALCIUM ACETATE 667 MG
667 TABLET ORAL
Refills: 0 | Status: DISCONTINUED | OUTPATIENT
Start: 2020-04-13 | End: 2020-04-24

## 2020-04-13 RX ORDER — FERROUS SULFATE 325(65) MG
0 TABLET ORAL
Qty: 0 | Refills: 0 | DISCHARGE

## 2020-04-13 RX ORDER — DARBEPOETIN ALFA IN POLYSORBAT 200MCG/0.4
40 PEN INJECTOR (ML) SUBCUTANEOUS
Refills: 0 | Status: DISCONTINUED | OUTPATIENT
Start: 2020-04-13 | End: 2020-04-24

## 2020-04-13 RX ORDER — ALBUTEROL 90 UG/1
2 AEROSOL, METERED ORAL
Qty: 0 | Refills: 0 | DISCHARGE

## 2020-04-13 RX ORDER — DEXTROSE 50 % IN WATER 50 %
15 SYRINGE (ML) INTRAVENOUS ONCE
Refills: 0 | Status: DISCONTINUED | OUTPATIENT
Start: 2020-04-13 | End: 2020-04-24

## 2020-04-13 RX ORDER — ALBUTEROL 90 UG/1
2 AEROSOL, METERED ORAL EVERY 6 HOURS
Refills: 0 | Status: DISCONTINUED | OUTPATIENT
Start: 2020-04-13 | End: 2020-04-24

## 2020-04-13 RX ORDER — GLUCAGON INJECTION, SOLUTION 0.5 MG/.1ML
1 INJECTION, SOLUTION SUBCUTANEOUS ONCE
Refills: 0 | Status: DISCONTINUED | OUTPATIENT
Start: 2020-04-13 | End: 2020-04-24

## 2020-04-13 RX ORDER — INSULIN LISPRO 100/ML
4 VIAL (ML) SUBCUTANEOUS
Qty: 0 | Refills: 0 | DISCHARGE

## 2020-04-13 RX ORDER — DIPHENHYDRAMINE HCL 50 MG
25 CAPSULE ORAL EVERY 6 HOURS
Refills: 0 | Status: DISCONTINUED | OUTPATIENT
Start: 2020-04-13 | End: 2020-04-21

## 2020-04-13 RX ORDER — GABAPENTIN 400 MG/1
300 CAPSULE ORAL DAILY
Refills: 0 | Status: DISCONTINUED | OUTPATIENT
Start: 2020-04-13 | End: 2020-04-21

## 2020-04-13 RX ORDER — DEXTROSE 50 % IN WATER 50 %
12.5 SYRINGE (ML) INTRAVENOUS ONCE
Refills: 0 | Status: DISCONTINUED | OUTPATIENT
Start: 2020-04-13 | End: 2020-04-24

## 2020-04-13 RX ORDER — HEPARIN SODIUM 5000 [USP'U]/ML
5000 INJECTION INTRAVENOUS; SUBCUTANEOUS EVERY 8 HOURS
Refills: 0 | Status: DISCONTINUED | OUTPATIENT
Start: 2020-04-13 | End: 2020-04-24

## 2020-04-13 RX ORDER — HYDRALAZINE HCL 50 MG
25 TABLET ORAL
Refills: 0 | Status: DISCONTINUED | OUTPATIENT
Start: 2020-04-13 | End: 2020-04-16

## 2020-04-13 RX ORDER — ONDANSETRON 8 MG/1
8 TABLET, FILM COATED ORAL
Refills: 0 | Status: DISCONTINUED | OUTPATIENT
Start: 2020-04-13 | End: 2020-04-24

## 2020-04-13 RX ORDER — ACETAMINOPHEN 500 MG
2 TABLET ORAL
Qty: 0 | Refills: 0 | DISCHARGE

## 2020-04-13 RX ORDER — VANCOMYCIN HCL 1 G
1000 VIAL (EA) INTRAVENOUS ONCE
Refills: 0 | Status: COMPLETED | OUTPATIENT
Start: 2020-04-13 | End: 2020-04-13

## 2020-04-13 RX ORDER — ASPIRIN/CALCIUM CARB/MAGNESIUM 324 MG
1 TABLET ORAL
Qty: 0 | Refills: 0 | DISCHARGE

## 2020-04-13 RX ORDER — DOCUSATE SODIUM 100 MG
1 CAPSULE ORAL
Qty: 0 | Refills: 0 | DISCHARGE

## 2020-04-13 RX ORDER — GABAPENTIN 400 MG/1
1 CAPSULE ORAL
Qty: 0 | Refills: 0 | DISCHARGE

## 2020-04-13 RX ORDER — POLYETHYLENE GLYCOL 3350 17 G/17G
17 POWDER, FOR SOLUTION ORAL DAILY
Refills: 0 | Status: DISCONTINUED | OUTPATIENT
Start: 2020-04-13 | End: 2020-04-24

## 2020-04-13 RX ADMIN — Medication 250 MILLIGRAM(S): at 19:45

## 2020-04-13 RX ADMIN — Medication 650 MILLIGRAM(S): at 23:18

## 2020-04-13 RX ADMIN — Medication 650 MILLIGRAM(S): at 18:30

## 2020-04-13 RX ADMIN — CEFEPIME 100 MILLIGRAM(S): 1 INJECTION, POWDER, FOR SOLUTION INTRAMUSCULAR; INTRAVENOUS at 18:30

## 2020-04-13 NOTE — H&P ADULT - ATTENDING COMMENTS
I saw and examined the patient independently. I agree with above history, physical exam and plan of care which I have reviewed and edited where appropriate with following additions.     patient is awake, alert but is confused / reports she is fine and wants to eat but keeps asking to repeatedly heat up breakfast without eating it in the morning.       Vital Signs Last 24 Hrs  T(C): 37.1 (14 Apr 2020 13:00), Max: 39.4 (13 Apr 2020 15:10)  T(F): 98.7 (14 Apr 2020 13:00), Max: 102.9 (13 Apr 2020 15:10)  HR: 62 (14 Apr 2020 13:00) (62 - 89)  BP: 100/50 (14 Apr 2020 13:00) (100/50 - 160/68)  BP(mean): --  RR: 18 (14 Apr 2020 13:00) (18 - 20)  SpO2: 93% (14 Apr 2020 08:58) (93% - 98%)    Physical Examination:  General: Awake, alert, not oriented  , NAD.  HEENT: PERRLA, EOMI.  CVS:  S1 & S2 appreciated,  no murmurs.   Lungs: b/l ronchi  Abdominal Examination: soft, nontender, nondistended  Neuro: moving all extremities spontaneously.   Extremity Examination: No edema peripherally.    Encephalopathy possibly toxic metabolic due to Covid-19 pneumonia vs subacute vs chronic CVA:   still confused.  c/w plaquenil/azithromycin for covid-19 pneumonia.   Procalcitonin is elevated : 3.68/ febrile/ received vanco/cefipime for possible superimposed infection in Ed  ID eval for further need for ABx.   CTH shows subacute infarct.   Neuro eval noted: suggest ASA/statin and echo with bubble /continue tele to r/o PFO/afib.   start high intensity statin as LFT's from admission normal/ fu repeat labs today.     acute hypoxic respiratory failure due to Covid-19 pneumonia vs pulm. edema:  SPO2 93% on 3L NC O2.  CXR shows b/l b/l increased hilar markings could be congestion vs viral pneumonia/ repeat CXR in am.   inflammatory markers: crp of 23, procal: 3.68, ferritin : 1025,LDh, 525. repeat after 48 hours.   c/w plaquenil/azithromycin for covid-19 pneumonia. QTC on baseline ekg is 445ms/ on tele/ monitor Qtc lead II.  BNP is elevated/hold off lasix for now- BP is on lower side/ due for HD in am.   continue isolation(airborne, droplet, contact)    troponemia possibly due to underlying CKD vs demand ischemia with pulm.edema:  trop: 0.77/ trend down.   c/w asa/ start statin.   c/w coreg.    ESRD:   continue HD as per nephro.     dvt ppx on HSQ.     Progress note Handoff:   Pending: clinical improvement.   Disposition: back to NH once stable

## 2020-04-13 NOTE — H&P ADULT - HISTORY OF PRESENT ILLNESS
59 year old female with PMH of ESRD on HD (M/W/F), DM II, HTN, DLD, PVD s/p LLE BKA, anxiety brought in by ambulance from [facility] after completing hemodialysis today for AMS. History was limited due to current mental status. Patient is a permanent resident at [facility]. The NH was contacted and as per staff patient had fever since 4/8/20, was hypoxic to SpO2 87% requiring 3 LPM of O2 was started on Plaquenil + Azithromycin on 4/11. As per staff, patient likely had positive exposure with many COVID+ residents. Today after completing HD she was altered.     In ED: Febrile Tmax 102.9 F. Lymphopenia. D-dimer 1263. Troponin 0.77 (previous 0.44). QTc 445. SpO2 97% on 3 LPM NCO2  CT head: subacute/chronic infarct of the right posterior parietal/occipital lobe  CXR with bilateral increased perihilar markings. Pulmonary edema vs COVID-19 pneumonia  Given: Vancomycin 1 gm and Cefepime 2 gm IV once.

## 2020-04-13 NOTE — CONSULT NOTE ADULT - ATTENDING COMMENTS
Patient examined this morning and has continued cognitive changes and weakness of RLE.  Bilateral upper extremities moderately weak.   Subacute infarct seen on CT head.  Patient unable to get CTA due to poor renal function.  Get lipid panel.  Recommend continue antiplatelet treatment and start high intensity statin.  Cardiac echo with bubble study and telemetry is recommended to exclude  PFO or atrial fibrillation.

## 2020-04-13 NOTE — ED PROVIDER NOTE - ATTENDING CONTRIBUTION TO CARE
I personally evaluated the patient. I reviewed the Resident’s or Physician Assistant’s note (as assigned above), and agree with the findings and plan except as documented in my note.    Pt is a 58 y/o female with PMH of ESRD on HD, DM, HTN, DLD, brought in from dialysis after finishing for AMS. No chest pain, vomiting, diarrhea. Limited hx due to AMS.    Constitutional: Well developed, well nourished. NAD.  Head: Normocephalic, atraumatic.  Eyes: PERRL. EOMI.  ENT: No nasal discharge. Mucous membranes moist.  Neck: Supple. Painless ROM.  Cardiovascular: Normal S1, S2. Regular rate and rhythm. No murmurs, rubs, or gallops.  Pulmonary: Normal respiratory rate and effort. Lungs clear to auscultation bilaterally. No wheezing, rales, or rhonchi.  Abdominal: Soft. Nondistended. Nontender. No rebound, guarding, rigidity.  Extremities. Pelvis stable. No lower extremity edema, symmetric calves.  Skin: No rashes, cyanosis.  Neuro: Able to answer some questions.  Psych: Normal mood. Normal affect.

## 2020-04-13 NOTE — H&P ADULT - NSHPPHYSICALEXAM_GEN_ALL_CORE
Vital Signs Last 24 Hrs  T(C): 38.3 (13 Apr 2020 23:13), Max: 39.4 (13 Apr 2020 15:10)  T(F): 101 (13 Apr 2020 23:13), Max: 102.9 (13 Apr 2020 15:10)  HR: 89 (13 Apr 2020 23:13) (74 - 89)  BP: 122/84 (13 Apr 2020 23:13) (122/84 - 160/68)  RR: 18 (13 Apr 2020 23:13) (18 - 20)  SpO2: 98% (13 Apr 2020 23:13) (97% - 98%)    -----    PHYSICAL EXAM:  GENERAL: NAD, no signs of respiratory distress  HEAD:  Atraumatic, Normocephalic  EYES: EOMI, anicteric sclera  NECK: Supple, No JVD  CHEST/LUNG: Clear to auscultation bilaterally on anterior lung fields; No wheeze; No crackles; No accessory muscles used  HEART: Regular rate and rhythm; No murmurs;   ABDOMEN: Soft, Nontender, Nondistended; Bowel sounds present; No guarding  EXTREMITIES:  2+ Peripheral Pulses, left BKA  PSYCH: AAOx1  NEUROLOGY: non-focal  SKIN: No rashes or lesions

## 2020-04-13 NOTE — CONSULT NOTE ADULT - ASSESSMENT
Pt is a 60 y/o female with PMH of ESRD on HD, DM, HTN, DLD, brought in S/P hemodialysis for AMS. No chest pain, vomiting, diarrhea. Limited hx due to AMS. CTH revealing subacute/chronic cva in the right parietal/ occipital lobe.        AMS could be in the setting of infectious, toxic metabolic causes, seizure      Plan  Admit to COVID isolation unit  Obtain cta h/n  REEG  Echo  Lipid profile , hbaic  Continue  asa 81 mg q 24  Start asa 81 mg q 24  Neuro attending Note will follow

## 2020-04-13 NOTE — CONSULT NOTE ADULT - SUBJECTIVE AND OBJECTIVE BOX
CC :AMS          HPI:  Pt is a 58 y/o female with PMH of ESRD on HD, DM, HTN, DLD, brought in S/P hemodialysis for AMS. No chest pain, vomiting, diarrhea. Limited hx due to AMS. CTH revealing subacute/chronic cva in the right parietal/ occipital lobe      Home Medications:  Amlodipine 10 mg oral tablet: 1 tab(s) orally once a day  Aspirin 81: orally once a day   calcium acetate 667 mg oral tablet: orally 3 times a day   carvedilol 6.25 mg oral tablet: 1 tab(s) orally every 12 hours   darbepoetin [place] 40 mcg/mL injectable solution:  injectable every 7 days post dialysis (hold if BP&gt;160/90)   Diphenhydramine 25 mg oral capsule: 1 cap(s) orally every 6 hours, As needed, Rash and/or Itching  ferrous sulfate 75 mg (15 mg elemental iron) oral tablet:   Hydralazine 50 mg oral tablet: 50 milligram(s) orally 3 times a day   lisinopril 20 mg oral tablet: 1 tab(s) orally once a day   Lyrica 150 mg oral capsule: 1 cap(s) orally 2 times a day  pregabalin 25 mg oral capsule: 50 milligram(s) orally once a day       Family History  Unable to obtain info      Social history  Unable to obtain info      Neuro Exam:  Orientation: Confused not answering questions, following few simple commands.  Cranial Nerves: pupils unable to test as patient not co-operative, able to track, speech is garbled, no obvious facial asymmetry  Motor: RUE 4-/5    RLE 0/5  (Spastic tone of RLE)             LUE 4-/5     LLE (Amputated below knee)  Sensory exam: Decreased on the right  Coordination:. Unable to test  gait: deferred      NIHSS:     Allergies    No Known Allergies    Intolerances      MEDICATIONS  (STANDING):    MEDICATIONS  (PRN):      LABS:                        11.9   8.45  )-----------( 160      ( 13 Apr 2020 16:39 )             35.7     04-13    136  |  89<L>  |  25<H>  ----------------------------<  147<H>  4.2   |  25  |  5.9<HH>    Ca    8.8      13 Apr 2020 16:39  Mg     2.1     04-13    TPro  8.0  /  Alb  4.5  /  TBili  0.5  /  DBili  x   /  AST  30  /  ALT  13  /  AlkPhos  55  04-13    PT/INR - ( 13 Apr 2020 16:39 )   PT: 12.20 sec;   INR: 1.06 ratio         PTT - ( 13 Apr 2020 16:39 )  PTT:31.1 sec        Neuro Imaging:  < from: CT Head No Cont (04.13.20 @ 19:23) >  FINDINGS:    Mild prominence of the ventricles and cortical sulci, likely secondary to age-related parenchymal volume loss.    Approximate 6 cm area of hypoattenuation extending to the cortex in the right posterior parietal/occipital lobes with volume loss, consistent with subacute to chronic infarct.    There is no evidence for acute intracranial hemorrhage, mass effect or midline shift.    Chronic microvascular ischemic changes are noted.    The calvarium is intact. Visualized paranasal sinuses and mastoids are well-aerated.      IMPRESSION:    Subacute/chronic infarct of the right posterior parietal/occipital lobe.          < from: Xray Chest 1 View-PORTABLE IMMEDIATE (04.13.20 @ 17:31) >  Findings:    Support devices: Right-sided hemodialysis catheter is in place with its tip extending to the level of the right atrium.    Cardiac/mediastinum/hilum: The heart size is enlarged.    Lung parenchyma/Pleura: There are bilaterally increased perihilar markings. Fluid overload or pulmonary edema should be ruled out. In the appropriate clinical setting, the possibility of Covid 19 pneumonia should be considered as well in the differential diagnosis.    No pneumothorax. The costophrenic angles are clear.    Skeleton/soft tissues: Unremarkable.    Impression:      There are bilaterally increased perihilar markings. Fluid overload or pulmonary edema should be ruled out. In the appropriate clinical setting, the possibility of Covid 19 pneumonia should be considered as well in the differential diagnosis.                EEG:     Echo:   Carotid Doppler: N/A  Telemetry:

## 2020-04-13 NOTE — ED PROVIDER NOTE - PROGRESS NOTE DETAILS
patient CT head found subacute infarct. case d/w neurology. Patient is febrile and recommend admission to Tele and they will follow the  case.

## 2020-04-13 NOTE — H&P ADULT - ASSESSMENT
59 year old female with PMH of ESRD on HD (M/W/F), DM II, HTN, DLD, PVD s/p LLE BKA, anxiety brought in by ambulance from [facility] after completing hemodialysis today for AMS    # AMS / metabolic encephalopathy / Subacute vs chronic CVA  Possibly multifactorial: SARS-nCov2 pneumonia, subacute/chronic infarct  - CT head: subacute/chronic infarct of the right posterior parietal/occipital lobe  - Neurology eval appreciated  - REEG, CTA head/neck (f/u attending recs), 2D-echo  - Lipid profile, HbA1C  - Continue ASA    # COVID-19 pneumonia;   - Sepsis was not present on admission  - Febrile Tmax 102.9 F. Lymphopenia. D-dimer 1263. Fibrinogen >700.   - CXR with bilateral increased perihilar markings. Pulmonary edema vs COVID-19 pneumonia  - ECG: QTc baseline: 445  - O2 req: 3 LPM NC O2, SpO2 97%  - COVID, Procalcitonin, CRP and blood cultures were sent  - Was started on Plaquenil + Azithromycin at NH  - s/p Vancomycin 1 gm and Cefepime 2 gm IV once in ED  FULL CODE per NH documentation    Treatment:  - Continue Azithromycin + Plaquenil (started on 4/11 at NH) to complete 5 days  - Tylenol PRN for fever  - Cough: Tessalon 100mg po q8 prn cough  - Albuterol MDI 1 puff po q4 prn wheeze (or Combivent); avoid steroids, if possible    # Troponinemia; likely ESRD and demand ischemia from acute viral illness  - Troponin 0.77 (previous 0.44); EKG with no acute ischemic changes  - Repeat Troponin and CKMB    # ESRD on HD (M/W/F)  - HAGMA likely due to ESRD. Bicarb WNL  - Nephro consult for HD  - Continue Calcium acetate  - Check phos    # DM II: check fingersticks and continue same insulin regimen as home    # HTN: continue Carvedilol and Hydralazine    # Anxiety: continue Lorazepam    DVT ppx: Heparin SC    Dispo: Resident at [facility] 59 year old female with PMH of ESRD on HD (M/W/F), DM II, HTN, DLD, PVD s/p LLE BKA, anxiety brought in by ambulance from [facility] after completing hemodialysis today for AMS    # AMS / metabolic encephalopathy / Subacute vs chronic CVA  Possibly multifactorial: SARS-nCov2 pneumonia, subacute/chronic infarct  - CT head: subacute/chronic infarct of the right posterior parietal/occipital lobe  - Neurology eval appreciated  - REEG, CTA head/neck (f/u attending recs), 2D-echo  - Lipid profile, HbA1C  - Continue ASA    # COVID-19 pneumonia / Acute hypoxemic respiratory failure (SpO2 88% on RA at NH)  - Sepsis was not present on admission  - Febrile Tmax 102.9 F. Lymphopenia. D-dimer 1263. Fibrinogen >700.   - CXR with bilateral increased perihilar markings. Pulmonary edema vs COVID-19 pneumonia  - ECG: QTc baseline: 445  - O2 requirement: 3 LPM NC O2, SpO2 97%  - COVID, Procalcitonin, CRP and blood cultures were sent  - Was started on Plaquenil + Azithromycin at NH  - s/p Vancomycin 1 gm and Cefepime 2 gm IV once in ED  FULL CODE per NH documentation    Treatment:  - Continue Azithromycin + Plaquenil (started on 4/11 at NH) to complete 5 days  - Tylenol PRN for fever  - Cough: Tessalon 100mg po q8 prn cough  - Albuterol MDI 1 puff po q4 prn wheeze (or Combivent); avoid steroids, if possible    # Troponinemia; likely ESRD and demand ischemia from acute viral illness  - Troponin 0.77 (previous 0.44); EKG with no acute ischemic changes  - Repeat Troponin and CKMB    # ESRD on HD (M/W/F)  - HAGMA likely due to ESRD. Bicarb WNL  - Nephro consult for HD  - Continue Calcium acetate  - Check phos    # DM II: check fingersticks and continue same insulin regimen as home    # HTN: continue Carvedilol and Hydralazine    # Anxiety: continue Lorazepam    DVT ppx: Heparin SC    Dispo: Resident at [facility]

## 2020-04-13 NOTE — ED PROVIDER NOTE - PHYSICAL EXAMINATION
CONSTITUTIONAL: altered.   HEAD: NCAT  EYES: PERRL; EOMI; anicteric.  ENT: Normal pharynx;   NECK: Supple  CARD: RRR; nl S1/S2; no M/R/G.   RESP: Respiratory rate and effort are normal; breath sounds clear and equal bilaterally.  ABD: Soft, NT ND  MSK/EXT: No gross deformities; full range of motion.  SKIN: Warm and dry;

## 2020-04-13 NOTE — ED PROVIDER NOTE - CLINICAL SUMMARY MEDICAL DECISION MAKING FREE TEXT BOX
Pt presented to ED from dialysis for confusion. Labs, imaging obtained and reviewed. Abx given. Discussed with neuro who will see pt due to CT findings. Endorsed to medicine team.

## 2020-04-13 NOTE — ED ADULT NURSE NOTE - NSIMPLEMENTINTERV_GEN_ALL_ED
Implemented All Fall Risk Interventions:  [place] to call system. Call bell, personal items and telephone within reach. Instruct patient to call for assistance. Room bathroom lighting operational. Non-slip footwear when patient is off stretcher. Physically safe environment: no spills, clutter or unnecessary equipment. Stretcher in lowest position, wheels locked, appropriate side rails in place. Provide visual cue, wrist band, yellow gown, etc. Monitor gait and stability. Monitor for mental status changes and reorient to person, place, and time. Review medications for side effects contributing to fall risk. Reinforce activity limits and safety measures with patient and family.

## 2020-04-13 NOTE — ED PROVIDER NOTE - OBJECTIVE STATEMENT
59 year old female with a pmh of ESRD on HD, DM, CHF HTN HLD brought in by ambulance for alterted mental status after finishing dialysis. Further history limited due to patient's current condition.

## 2020-04-14 LAB
CRP SERPL-MCNC: 23.09 MG/DL — HIGH (ref 0–0.4)
FERRITIN SERPL-MCNC: 1025 NG/ML — HIGH (ref 15–150)
GLUCOSE BLDC GLUCOMTR-MCNC: 128 MG/DL — HIGH (ref 70–99)
GLUCOSE BLDC GLUCOMTR-MCNC: 172 MG/DL — HIGH (ref 70–99)
GLUCOSE BLDC GLUCOMTR-MCNC: 221 MG/DL — HIGH (ref 70–99)
PROCALCITONIN SERPL-MCNC: 3.68 NG/ML — HIGH (ref 0.02–0.1)
SARS-COV-2 RNA SPEC QL NAA+PROBE: DETECTED

## 2020-04-14 PROCEDURE — 99222 1ST HOSP IP/OBS MODERATE 55: CPT

## 2020-04-14 PROCEDURE — 99223 1ST HOSP IP/OBS HIGH 75: CPT

## 2020-04-14 RX ORDER — AZITHROMYCIN 500 MG/1
250 TABLET, FILM COATED ORAL DAILY
Refills: 0 | Status: COMPLETED | OUTPATIENT
Start: 2020-04-14 | End: 2020-04-16

## 2020-04-14 RX ORDER — HYDROXYCHLOROQUINE SULFATE 200 MG
TABLET ORAL
Refills: 0 | Status: COMPLETED | OUTPATIENT
Start: 2020-04-14 | End: 2020-04-17

## 2020-04-14 RX ORDER — HYDROXYCHLOROQUINE SULFATE 200 MG
400 TABLET ORAL DAILY
Refills: 0 | Status: COMPLETED | OUTPATIENT
Start: 2020-04-14 | End: 2020-04-16

## 2020-04-14 RX ADMIN — GABAPENTIN 300 MILLIGRAM(S): 400 CAPSULE ORAL at 12:19

## 2020-04-14 RX ADMIN — INSULIN GLARGINE 44 UNIT(S): 100 INJECTION, SOLUTION SUBCUTANEOUS at 21:48

## 2020-04-14 RX ADMIN — CHLORHEXIDINE GLUCONATE 1 APPLICATION(S): 213 SOLUTION TOPICAL at 05:36

## 2020-04-14 RX ADMIN — Medication 81 MILLIGRAM(S): at 12:19

## 2020-04-14 RX ADMIN — Medication 4 UNIT(S): at 17:19

## 2020-04-14 RX ADMIN — Medication 667 MILLIGRAM(S): at 12:19

## 2020-04-14 RX ADMIN — HEPARIN SODIUM 5000 UNIT(S): 5000 INJECTION INTRAVENOUS; SUBCUTANEOUS at 05:36

## 2020-04-14 RX ADMIN — Medication 667 MILLIGRAM(S): at 17:20

## 2020-04-14 RX ADMIN — AZITHROMYCIN 250 MILLIGRAM(S): 500 TABLET, FILM COATED ORAL at 12:19

## 2020-04-14 RX ADMIN — Medication 0.5 MILLIGRAM(S): at 05:36

## 2020-04-14 RX ADMIN — Medication 25 MILLIGRAM(S): at 05:36

## 2020-04-14 RX ADMIN — Medication 25 MILLIGRAM(S): at 17:21

## 2020-04-14 RX ADMIN — CARVEDILOL PHOSPHATE 6.25 MILLIGRAM(S): 80 CAPSULE, EXTENDED RELEASE ORAL at 17:20

## 2020-04-14 RX ADMIN — CARVEDILOL PHOSPHATE 6.25 MILLIGRAM(S): 80 CAPSULE, EXTENDED RELEASE ORAL at 05:36

## 2020-04-14 RX ADMIN — Medication 4 UNIT(S): at 08:35

## 2020-04-14 RX ADMIN — Medication 400 MILLIGRAM(S): at 12:19

## 2020-04-14 RX ADMIN — HEPARIN SODIUM 5000 UNIT(S): 5000 INJECTION INTRAVENOUS; SUBCUTANEOUS at 21:50

## 2020-04-14 NOTE — PROGRESS NOTE ADULT - ASSESSMENT
59 year old female with PMH of ESRD on HD (M/W/F), DM II, HTN, DLD, PVD s/p LLE BKA, anxiety brought in by ambulance from [facility] after completing hemodialysis today for AMS    # AMS / metabolic encephalopathy / Subacute vs chronic CVA  Possibly multifactorial: SARS-nCov2 pneumonia, subacute/chronic infarct  - CT head: subacute/chronic infarct of the right posterior parietal/occipital lobe  - Neurology eval appreciated  - REEG, CTA head/neck (f/u attending recs), 2D-echo  - Lipid profile, HbA1C  - Continue ASA    # COVID-19 pneumonia / Acute hypoxemic respiratory failure (SpO2 88% on RA at NH)  - Sepsis was not present on admission  - Febrile Tmax 102.9 F. Lymphopenia. D-dimer 1263. Fibrinogen >700.   - CXR with bilateral increased perihilar markings. Pulmonary edema vs COVID-19 pneumonia  - ECG: QTc baseline: 445  - O2 requirement: 3 LPM NC O2, SpO2 97%  - COVID, Procalcitonin, CRP and blood cultures were sent  - Was started on Plaquenil + Azithromycin at NH  - s/p Vancomycin 1 gm and Cefepime 2 gm IV once in ED  FULL CODE per NH documentation    Treatment:  - Continue Azithromycin + Plaquenil (started on 4/11 at NH) to complete 5 days  - Tylenol PRN for fever  - Cough: Tessalon 100mg po q8 prn cough  - Albuterol MDI 1 puff po q4 prn wheeze (or Combivent); avoid steroids, if possible    # Troponinemia; likely ESRD and demand ischemia from acute viral illness  - Troponin 0.77 (previous 0.44); EKG with no acute ischemic changes  - Repeat Troponin and CKMB    # ESRD on HD (M/W/F)  - HAGMA likely due to ESRD. Bicarb WNL  - Nephro consult for HD  - Continue Calcium acetate  - Check phos    # DM II: check fingersticks and continue same insulin regimen as home    # HTN: continue Carvedilol and Hydralazine    # Anxiety: continue Lorazepam    DVT ppx: Heparin SC    Dispo: Resident at [facility]

## 2020-04-14 NOTE — CONSULT NOTE ADULT - SUBJECTIVE AND OBJECTIVE BOX
NEPHROLOGY CONSULTATION NOTE    THIS CONSULT IS INCOMPLETE / FULL CONSULT TO FOLLOW    Patient is a 59y Female whom presented to the hospital with     PAST MEDICAL & SURGICAL HISTORY:  Type 2 diabetes mellitus  CHF (congestive heart failure)  End stage renal failure on dialysis  Hypertension  Hyperlipemia  Heart failure  Amputation of leg: left  History of femoral angiogram: left angiogram  Port-a-cath in place: right chest wall    Allergies:  No Known Allergies    Home Medications Reviewed  Hospital Medications:   MEDICATIONS  (STANDING):  aspirin  chewable 81 milliGRAM(s) Oral daily  azithromycin   Tablet 250 milliGRAM(s) Oral daily  calcium acetate 667 milliGRAM(s) Oral three times a day with meals  carvedilol 6.25 milliGRAM(s) Oral every 12 hours  chlorhexidine 4% Liquid 1 Application(s) Topical <User Schedule>  darbepoetin Injectable Syringe 40 MICROGram(s) SubCutaneous every 7 days  dextrose 5%. 1000 milliLiter(s) (50 mL/Hr) IV Continuous <Continuous>  dextrose 50% Injectable 12.5 Gram(s) IV Push once  dextrose 50% Injectable 25 Gram(s) IV Push once  dextrose 50% Injectable 25 Gram(s) IV Push once  gabapentin 300 milliGRAM(s) Oral daily  heparin  Injectable 5000 Unit(s) SubCutaneous every 8 hours  hydrALAZINE 25 milliGRAM(s) Oral two times a day  hydroxychloroquine   Oral   hydroxychloroquine 400 milliGRAM(s) Oral daily  insulin glargine Injectable (LANTUS) 44 Unit(s) SubCutaneous at bedtime  insulin lispro Injectable (HumaLOG) 4 Unit(s) SubCutaneous three times a day before meals  LORazepam     Tablet 0.5 milliGRAM(s) Oral two times a day      SOCIAL HISTORY:  Denies ETOH,Smoking,   FAMILY HISTORY:  No pertinent family history in first degree relatives        REVIEW OF SYSTEMS:  CONSTITUTIONAL: No weakness, fevers or chills  EYES/ENT: No visual changes;  No vertigo or throat pain   NECK: No pain or stiffness  RESPIRATORY: No cough, wheezing, hemoptysis; No shortness of breath  CARDIOVASCULAR: No chest pain or palpitations.  GASTROINTESTINAL: No abdominal or epigastric pain. No nausea, vomiting, or hematemesis; No diarrhea or constipation. No melena or hematochezia.  GENITOURINARY: No dysuria, frequency, foamy urine, urinary urgency, incontinence or hematuria  NEUROLOGICAL: No numbness or weakness  SKIN: No itching, burning, rashes, or lesions   VASCULAR: No bilateral lower extremity edema.   All other review of systems is negative unless indicated above.    VITALS:  T(F): 98.4 (04-14-20 @ 04:25), Max: 102.9 (04-13-20 @ 15:10)  HR: 65 (04-14-20 @ 04:25)  BP: 133/60 (04-14-20 @ 04:25)  RR: 18 (04-14-20 @ 04:25)  SpO2: 98% (04-14-20 @ 00:57)    04-13 @ 07:01  -  04-14 @ 07:00  --------------------------------------------------------  IN: 100 mL / OUT: 0 mL / NET: 100 mL            I&O's Detail    13 Apr 2020 07:01  -  14 Apr 2020 07:00  --------------------------------------------------------  IN:    Oral Fluid: 100 mL  Total IN: 100 mL    OUT:  Total OUT: 0 mL    Total NET: 100 mL        Creatine Kinase, Serum: 877 U/L (04-13-20 @ 16:39)      PHYSICAL EXAM:  Constitutional: NAD  HEENT: anicteric sclera, oropharynx clear, MMM  Neck: No JVD  Respiratory: CTAB, no wheezes, rales or rhonchi  Cardiovascular: S1, S2, RRR  Gastrointestinal: BS+, soft, NT/ND  Extremities: No cyanosis or clubbing. No peripheral edema  Neurological: A/O x 3, no focal deficits  Psychiatric: Normal mood, normal affect  : No CVA tenderness. No anderson.   Skin: No rashes  Vascular Access:    LABS:  04-13    136  |  89<L>  |  25<H>  ----------------------------<  147<H>  4.2   |  25  |  5.9<HH>    Ca    8.8      13 Apr 2020 16:39  Mg     2.1     04-13    TPro  8.0  /  Alb  4.5  /  TBili  0.5  /  DBili      /  AST  30  /  ALT  13  /  AlkPhos  55  04-13    Creatinine Trend: 5.9 <--                        11.9   8.45  )-----------( 160      ( 13 Apr 2020 16:39 )             35.7     Urine Studies:              RADIOLOGY & ADDITIONAL STUDIES: NEPHROLOGY CONSULTATION NOTE  History obtained from chart / patient confised   59 year old female with PMH of ESRD on HD (M/W/F), DM II, HTN, DLD, PVD s/p LLE BKA, anxiety brought in by ambulance from [facility] after completing hemodialysis today for AMS. History was limited due to current mental status. Patient is a permanent resident at [facility]. The NH was contacted and as per staff patient had fever since 4/8/20, was hypoxic to SpO2 87% requiring 3 LPM of O2 was started on Plaquenil + Azithromycin on 4/11. As per staff, patient likely had positive exposure with many COVID+ residents. Today after completing HD she was altered.   Seen at bedside / altered mental status / On oxygen/ sp HD yesterday       PAST MEDICAL & SURGICAL HISTORY:  Type 2 diabetes mellitus  CHF (congestive heart failure)  End stage renal failure on dialysis  Hypertension  Hyperlipemia  Heart failure  Amputation of leg: left  History of femoral angiogram: left angiogram  Port-a-cath in place: right chest wall    Allergies:  No Known Allergies    Home Medications Reviewed  Hospital Medications:   MEDICATIONS  (STANDING):  aspirin  chewable 81 milliGRAM(s) Oral daily  azithromycin   Tablet 250 milliGRAM(s) Oral daily  calcium acetate 667 milliGRAM(s) Oral three times a day with meals  carvedilol 6.25 milliGRAM(s) Oral every 12 hours  chlorhexidine 4% Liquid 1 Application(s) Topical <User Schedule>  darbepoetin Injectable Syringe 40 MICROGram(s) SubCutaneous every 7 days  dextrose 5%. 1000 milliLiter(s) (50 mL/Hr) IV Continuous <Continuous>  gabapentin 300 milliGRAM(s) Oral daily  heparin  Injectable 5000 Unit(s) SubCutaneous every 8 hours  hydrALAZINE 25 milliGRAM(s) Oral two times a day  hydroxychloroquine   Oral   hydroxychloroquine 400 milliGRAM(s) Oral daily  insulin glargine Injectable (LANTUS) 44 Unit(s) SubCutaneous at bedtime  insulin lispro Injectable (HumaLOG) 4 Unit(s) SubCutaneous three times a day before meals  LORazepam     Tablet 0.5 milliGRAM(s) Oral two times a day      SOCIAL HISTORY:  Denies ETOH,Smoking,   FAMILY HISTORY:  No pertinent family history in first degree relatives        REVIEW OF SYSTEMS:  All other review of systems is negative unless indicated above.    VITALS:  T(F): 98.4 (04-14-20 @ 04:25), Max: 102.9 (04-13-20 @ 15:10)  HR: 65 (04-14-20 @ 04:25)  BP: 133/60 (04-14-20 @ 04:25)  RR: 18 (04-14-20 @ 04:25)  SpO2: 98% (04-14-20 @ 00:57)    04-13 @ 07:01  -  04-14 @ 07:00  --------------------------------------------------------  IN: 100 mL / OUT: 0 mL / NET: 100 mL            I&O's Detail    13 Apr 2020 07:01  -  14 Apr 2020 07:00  --------------------------------------------------------  IN:    Oral Fluid: 100 mL  Total IN: 100 mL    OUT:  Total OUT: 0 mL    Total NET: 100 mL        Creatine Kinase, Serum: 877 U/L (04-13-20 @ 16:39)      PHYSICAL EXAM:  Constitutional: NAD  HEENT: anicteric sclera, oropharynx clear, MMM  Neck: No JVD  Respiratory: CTAB, no wheezes, rales or rhonchi  Cardiovascular: S1, S2, RRR  Gastrointestinal: BS+, soft, NT/ND  Extremities: No cyanosis or clubbing. No peripheral edema  Neurological: A/O x 3, no focal deficits  Psychiatric: Normal mood, normal affect  : No CVA tenderness. No anderson.   Skin: No rashes  Vascular Access:    LABS:  04-13    136  |  89<L>  |  25<H>  ----------------------------<  147<H>  4.2   |  25  |  5.9<HH>    Ca    8.8      13 Apr 2020 16:39  Mg     2.1     04-13    TPro  8.0  /  Alb  4.5  /  TBili  0.5  /  DBili      /  AST  30  /  ALT  13  /  AlkPhos  55  04-13    Creatinine Trend: 5.9 <--                        11.9   8.45  )-----------( 160      ( 13 Apr 2020 16:39 )             35.7     Urine Studies:              RADIOLOGY & ADDITIONAL STUDIES:  < from: CT Head No Cont (04.13.20 @ 19:23) >    IMPRESSION:    Subacute/chronic infarct of the right posterior parietal/occipital lobe.    < end of copied text >

## 2020-04-14 NOTE — PROGRESS NOTE ADULT - SUBJECTIVE AND OBJECTIVE BOX
24H events:    Patient is a 59y old Female who presents with a chief complaint of Febrile illness (14 Apr 2020 08:36)    Primary diagnosis of Febrile illness     Today is hospital day 1d.     PAST MEDICAL & SURGICAL HISTORY  Type 2 diabetes mellitus  CHF (congestive heart failure)  End stage renal failure on dialysis  Hypertension  Hyperlipemia  Heart failure  Amputation of leg: left  History of femoral angiogram: left angiogram  Port-a-cath in place: right chest wall    SOCIAL HISTORY:  Negative for smoking/alcohol/drug use.     ALLERGIES:  No Known Allergies    MEDICATIONS:  STANDING MEDICATIONS  aspirin  chewable 81 milliGRAM(s) Oral daily  azithromycin   Tablet 250 milliGRAM(s) Oral daily  calcium acetate 667 milliGRAM(s) Oral three times a day with meals  carvedilol 6.25 milliGRAM(s) Oral every 12 hours  chlorhexidine 4% Liquid 1 Application(s) Topical <User Schedule>  darbepoetin Injectable Syringe 40 MICROGram(s) SubCutaneous every 7 days  dextrose 5%. 1000 milliLiter(s) IV Continuous <Continuous>  dextrose 50% Injectable 12.5 Gram(s) IV Push once  dextrose 50% Injectable 25 Gram(s) IV Push once  dextrose 50% Injectable 25 Gram(s) IV Push once  gabapentin 300 milliGRAM(s) Oral daily  heparin  Injectable 5000 Unit(s) SubCutaneous every 8 hours  hydrALAZINE 25 milliGRAM(s) Oral two times a day  hydroxychloroquine   Oral   hydroxychloroquine 400 milliGRAM(s) Oral daily  insulin glargine Injectable (LANTUS) 44 Unit(s) SubCutaneous at bedtime  insulin lispro Injectable (HumaLOG) 4 Unit(s) SubCutaneous three times a day before meals  LORazepam     Tablet 0.5 milliGRAM(s) Oral two times a day    PRN MEDICATIONS  acetaminophen   Tablet .. 650 milliGRAM(s) Oral every 6 hours PRN  ALBUTerol    90 MICROgram(s) HFA Inhaler 2 Puff(s) Inhalation every 6 hours PRN  benzonatate 100 milliGRAM(s) Oral every 8 hours PRN  bisacodyl Suppository 10 milliGRAM(s) Rectal daily PRN  dextrose 40% Gel 15 Gram(s) Oral once PRN  diphenhydrAMINE 25 milliGRAM(s) Oral every 6 hours PRN  glucagon  Injectable 1 milliGRAM(s) IntraMuscular once PRN  ondansetron    Tablet 8 milliGRAM(s) Oral two times a day PRN  polyethylene glycol 3350 17 Gram(s) Oral daily PRN    VITALS:   T(F): 98.4  HR: 65  BP: 133/60  RR: 18  SpO2: 93%    LABS:                        11.9   8.45  )-----------( 160      ( 13 Apr 2020 16:39 )             35.7     04-13    136  |  89<L>  |  25<H>  ----------------------------<  147<H>  4.2   |  25  |  5.9<HH>    Ca    8.8      13 Apr 2020 16:39  Mg     2.1     04-13    TPro  8.0  /  Alb  4.5  /  TBili  0.5  /  DBili  x   /  AST  30  /  ALT  13  /  AlkPhos  55  04-13    PT/INR - ( 13 Apr 2020 16:39 )   PT: 12.20 sec;   INR: 1.06 ratio         PTT - ( 13 Apr 2020 16:39 )  PTT:31.1 sec      Lactate, Blood: 1.0 mmol/L (04-13-20 @ 16:39)  Creatine Kinase, Serum: 877 U/L <H> (04-13-20 @ 16:39)  Troponin T, Serum: 0.77 ng/mL <HH> (04-13-20 @ 16:39)      CARDIAC MARKERS ( 13 Apr 2020 16:39 )  x     / 0.77 ng/mL / 877 U/L / x     / x          PHYSICAL EXAM:

## 2020-04-14 NOTE — CONSULT NOTE ADULT - ASSESSMENT
59 year old female with PMH of ESRD on HD (M/W/F), DM II, HTN, DLD, PVD s/p LLE BKA, anxiety presenting with confusion / fever     ·	ESRD on HD for HD in AM no need for urgent RRT   ·	Fever chills confusion rule out Covid 19 on HQ and azithromycin/ covid pending  ·	CT scan findings noted / consider neuro eval/ on ASA already

## 2020-04-15 LAB
GLUCOSE BLDC GLUCOMTR-MCNC: 147 MG/DL — HIGH (ref 70–99)
GLUCOSE BLDC GLUCOMTR-MCNC: 276 MG/DL — HIGH (ref 70–99)
TROPONIN T SERPL-MCNC: 0.52 NG/ML — CRITICAL HIGH

## 2020-04-15 PROCEDURE — 99233 SBSQ HOSP IP/OBS HIGH 50: CPT

## 2020-04-15 PROCEDURE — 70450 CT HEAD/BRAIN W/O DYE: CPT | Mod: 26

## 2020-04-15 PROCEDURE — 95819 EEG AWAKE AND ASLEEP: CPT | Mod: 26

## 2020-04-15 PROCEDURE — 71045 X-RAY EXAM CHEST 1 VIEW: CPT | Mod: 26,CS

## 2020-04-15 RX ORDER — VALPROIC ACID (AS SODIUM SALT) 250 MG/5ML
1360 SOLUTION, ORAL ORAL ONCE
Refills: 0 | Status: DISCONTINUED | OUTPATIENT
Start: 2020-04-15 | End: 2020-04-15

## 2020-04-15 RX ORDER — VALPROIC ACID (AS SODIUM SALT) 250 MG/5ML
750 SOLUTION, ORAL ORAL
Refills: 0 | Status: DISCONTINUED | OUTPATIENT
Start: 2020-04-16 | End: 2020-04-16

## 2020-04-15 RX ORDER — VALPROIC ACID (AS SODIUM SALT) 250 MG/5ML
1500 SOLUTION, ORAL ORAL ONCE
Refills: 0 | Status: COMPLETED | OUTPATIENT
Start: 2020-04-15 | End: 2020-04-15

## 2020-04-15 RX ORDER — ATORVASTATIN CALCIUM 80 MG/1
40 TABLET, FILM COATED ORAL AT BEDTIME
Refills: 0 | Status: DISCONTINUED | OUTPATIENT
Start: 2020-04-15 | End: 2020-04-24

## 2020-04-15 RX ADMIN — Medication 25 MILLIGRAM(S): at 05:21

## 2020-04-15 RX ADMIN — CARVEDILOL PHOSPHATE 6.25 MILLIGRAM(S): 80 CAPSULE, EXTENDED RELEASE ORAL at 17:54

## 2020-04-15 RX ADMIN — GABAPENTIN 300 MILLIGRAM(S): 400 CAPSULE ORAL at 12:25

## 2020-04-15 RX ADMIN — Medication 25 MILLIGRAM(S): at 17:54

## 2020-04-15 RX ADMIN — Medication 667 MILLIGRAM(S): at 08:14

## 2020-04-15 RX ADMIN — Medication 0.5 MILLIGRAM(S): at 05:21

## 2020-04-15 RX ADMIN — Medication 40 MICROGRAM(S): at 15:26

## 2020-04-15 RX ADMIN — Medication 667 MILLIGRAM(S): at 12:25

## 2020-04-15 RX ADMIN — Medication 81 MILLIGRAM(S): at 12:26

## 2020-04-15 RX ADMIN — Medication 40 MILLIGRAM(S): at 17:55

## 2020-04-15 RX ADMIN — HEPARIN SODIUM 5000 UNIT(S): 5000 INJECTION INTRAVENOUS; SUBCUTANEOUS at 05:21

## 2020-04-15 RX ADMIN — CARVEDILOL PHOSPHATE 6.25 MILLIGRAM(S): 80 CAPSULE, EXTENDED RELEASE ORAL at 05:21

## 2020-04-15 RX ADMIN — CHLORHEXIDINE GLUCONATE 1 APPLICATION(S): 213 SOLUTION TOPICAL at 05:20

## 2020-04-15 RX ADMIN — AZITHROMYCIN 250 MILLIGRAM(S): 500 TABLET, FILM COATED ORAL at 12:25

## 2020-04-15 RX ADMIN — Medication 400 MILLIGRAM(S): at 12:25

## 2020-04-15 RX ADMIN — Medication 57.5 MILLIGRAM(S): at 21:35

## 2020-04-15 NOTE — PROGRESS NOTE ADULT - ASSESSMENT
59 year old female with PMH of ESRD on HD (M/W/F), DM II, HTN, DLD, PVD s/p LLE BKA, anxiety presenting with confusion / fever     ·	ESRD on HD for HD today 3h opti 160 2k UF 1.5 l if tolerated   ·	check IP and PTH   ·	Fever chills confusion covid positive  on HQ and azithromycin/   ·	CT scan findings noted / neuro eval appreciated for CTA head with contrast

## 2020-04-15 NOTE — CHART NOTE - NSCHARTNOTEFT_GEN_A_CORE
I have tried to reach out to the family on chart multiple times to obtain consent regarding VEEG, Neither of the phone numbers in the system responded. Will try again tomorrow

## 2020-04-15 NOTE — PROGRESS NOTE ADULT - SUBJECTIVE AND OBJECTIVE BOX
PHILIPPE SMITH  59y Female    CHIEF COMPLAINT:    Patient is a 59y old  Female who presents with a chief complaint of Febrile illness (14 Apr 2020 12:57)      INTERVAL HPI/OVERNIGHT EVENTS:    Patient seen and examined.    ROS: All other systems are negative.    Vital Signs:    T(F): 99.5 (04-15-20 @ 05:05), Max: 99.5 (04-15-20 @ 05:05)  HR: 70 (04-15-20 @ 05:05) (62 - 70)  BP: 100/50 (04-15-20 @ 05:05) (100/50 - 111/58)  RR: 18 (04-15-20 @ 05:05) (18 - 18)  SpO2: 93% (04-14-20 @ 08:58) (93% - 93%)  I&O's Summary    14 Apr 2020 07:01  -  15 Apr 2020 07:00  --------------------------------------------------------  IN: 0 mL / OUT: 0 mL / NET: 0 mL      Daily     Daily   CAPILLARY BLOOD GLUCOSE      POCT Blood Glucose.: 128 mg/dL (14 Apr 2020 21:42)  POCT Blood Glucose.: 172 mg/dL (14 Apr 2020 16:36)  POCT Blood Glucose.: 221 mg/dL (14 Apr 2020 08:31)      PHYSICAL EXAM:    GENERAL:  NAD  SKIN: No rashes or lesions  HENT: Atrumatic. Normocephalic. PERRL. Moist membranes.  NECK: Supple, No JVD. No lymphadenopathy.  PULMONARY: CTA B/L. No wheezing. No rales  CVS: Normal S1, S2. Rate and Rythm are regular. No murmurs.  ABDOMEN/GI: Soft, Nontender, Nondistended; BS present  EXTREMITIES: Peripheral pulses intact. No edema B/L LE.  NEUROLOGIC:  No motor or sensory deficit.  PSYCH: Alert & oriented x 3    Consultant(s) Notes Reviewed:  [x ] YES  [ ] NO  Care Discussed with Consultants/Other Providers [ x] YES  [ ] NO    EKG reviewed  Telemetry reviewed    LABS:                        11.9   8.45  )-----------( 160      ( 13 Apr 2020 16:39 )             35.7     04-13    136  |  89<L>  |  25<H>  ----------------------------<  147<H>  4.2   |  25  |  5.9<HH>    Ca    8.8      13 Apr 2020 16:39  Mg     2.1     04-13    TPro  8.0  /  Alb  4.5  /  TBili  0.5  /  DBili  x   /  AST  30  /  ALT  13  /  AlkPhos  55  04-13    PT/INR - ( 13 Apr 2020 16:39 )   PT: 12.20 sec;   INR: 1.06 ratio         PTT - ( 13 Apr 2020 16:39 )  PTT:31.1 sec  Serum Pro-Brain Natriuretic Peptide: 55117 pg/mL (04-13-20 @ 16:39)    Trop 0.77, CKMB --, , 04-13-20 @ 16:39      Culture - Blood (collected 13 Apr 2020 16:39)  Source: .Blood Blood-Peripheral  Preliminary Report (15 Apr 2020 01:01):    No growth to date.    Culture - Blood (collected 13 Apr 2020 16:39)  Source: .Blood Blood-Peripheral  Preliminary Report (15 Apr 2020 01:01):    No growth to date.        RADIOLOGY & ADDITIONAL TESTS:      Imaging or report Personally Reviewed:  [ ] YES  [ ] NO    Medications:  Standing  aspirin  chewable 81 milliGRAM(s) Oral daily  azithromycin   Tablet 250 milliGRAM(s) Oral daily  calcium acetate 667 milliGRAM(s) Oral three times a day with meals  carvedilol 6.25 milliGRAM(s) Oral every 12 hours  chlorhexidine 4% Liquid 1 Application(s) Topical <User Schedule>  darbepoetin Injectable Syringe 40 MICROGram(s) SubCutaneous every 7 days  dextrose 5%. 1000 milliLiter(s) IV Continuous <Continuous>  dextrose 50% Injectable 12.5 Gram(s) IV Push once  dextrose 50% Injectable 25 Gram(s) IV Push once  dextrose 50% Injectable 25 Gram(s) IV Push once  gabapentin 300 milliGRAM(s) Oral daily  heparin  Injectable 5000 Unit(s) SubCutaneous every 8 hours  hydrALAZINE 25 milliGRAM(s) Oral two times a day  hydroxychloroquine   Oral   hydroxychloroquine 400 milliGRAM(s) Oral daily  insulin glargine Injectable (LANTUS) 44 Unit(s) SubCutaneous at bedtime  insulin lispro Injectable (HumaLOG) 4 Unit(s) SubCutaneous three times a day before meals  LORazepam     Tablet 0.5 milliGRAM(s) Oral two times a day    PRN Meds  acetaminophen   Tablet .. 650 milliGRAM(s) Oral every 6 hours PRN  ALBUTerol    90 MICROgram(s) HFA Inhaler 2 Puff(s) Inhalation every 6 hours PRN  benzonatate 100 milliGRAM(s) Oral every 8 hours PRN  bisacodyl Suppository 10 milliGRAM(s) Rectal daily PRN  dextrose 40% Gel 15 Gram(s) Oral once PRN  diphenhydrAMINE 25 milliGRAM(s) Oral every 6 hours PRN  glucagon  Injectable 1 milliGRAM(s) IntraMuscular once PRN  ondansetron    Tablet 8 milliGRAM(s) Oral two times a day PRN  polyethylene glycol 3350 17 Gram(s) Oral daily PRN      Case discussed with resident    Care discussed with pt/family PHILIPPE SMITH  59y Female    CHIEF COMPLAINT:    Patient is a 59y old  Female who presents with a chief complaint of Febrile illness (14 Apr 2020 12:57)      INTERVAL HPI/OVERNIGHT EVENTS:    Patient seen and examined. Lethargic and confused. No fever. On O2. Unable to give history    ROS: All other systems are negative.    Vital Signs:    T(F): 99.5 (04-15-20 @ 05:05), Max: 99.5 (04-15-20 @ 05:05)  HR: 70 (04-15-20 @ 05:05) (62 - 70)  BP: 100/50 (04-15-20 @ 05:05) (100/50 - 111/58)  RR: 18 (04-15-20 @ 05:05) (18 - 18)  SpO2: 93% (04-14-20 @ 08:58) (93% - 93%)  I&O's Summary    14 Apr 2020 07:01  -  15 Apr 2020 07:00  --------------------------------------------------------  IN: 0 mL / OUT: 0 mL / NET: 0 mL      Daily     Daily   CAPILLARY BLOOD GLUCOSE      POCT Blood Glucose.: 128 mg/dL (14 Apr 2020 21:42)  POCT Blood Glucose.: 172 mg/dL (14 Apr 2020 16:36)  POCT Blood Glucose.: 221 mg/dL (14 Apr 2020 08:31)      PHYSICAL EXAM:    GENERAL:  NAD  SKIN: No rashes or lesions  HENT: Atraumatic Normocephalic. PERRL. Moist membranes.  NECK: Supple, No JVD. No lymphadenopathy.  PULMONARY: CTA B/L. No wheezing. No rales  CVS: Normal S1, S2. Rate and Rhythm are regular. No murmurs.  ABDOMEN/GI: Soft, Nontender, Nondistended; BS present  EXTREMITIES: Peripheral pulses intact. Lt BKA  NEUROLOGIC:  Lethargic and confused.   PSYCH: Alert & oriented x 3    Consultant(s) Notes Reviewed:  [x ] YES  [ ] NO  Care Discussed with Consultants/Other Providers [ x] YES  [ ] NO    EKG reviewed  Telemetry reviewed    LABS:                        11.9   8.45  )-----------( 160      ( 13 Apr 2020 16:39 )             35.7     04-13    136  |  89<L>  |  25<H>  ----------------------------<  147<H>  4.2   |  25  |  5.9<HH>    Ca    8.8      13 Apr 2020 16:39  Mg     2.1     04-13    TPro  8.0  /  Alb  4.5  /  TBili  0.5  /  DBili  x   /  AST  30  /  ALT  13  /  AlkPhos  55  04-13    PT/INR - ( 13 Apr 2020 16:39 )   PT: 12.20 sec;   INR: 1.06 ratio         PTT - ( 13 Apr 2020 16:39 )  PTT:31.1 sec  Serum Pro-Brain Natriuretic Peptide: 61060 pg/mL (04-13-20 @ 16:39)    Trop 0.77, CKMB --, , 04-13-20 @ 16:39      Culture - Blood (collected 13 Apr 2020 16:39)  Source: .Blood Blood-Peripheral  Preliminary Report (15 Apr 2020 01:01):    No growth to date.    Culture - Blood (collected 13 Apr 2020 16:39)  Source: .Blood Blood-Peripheral  Preliminary Report (15 Apr 2020 01:01):    No growth to date.        RADIOLOGY & ADDITIONAL TESTS:    < from: CT Head No Cont (04.13.20 @ 19:23) >    IMPRESSION:    Subacute/chronic infarct of the right posterior parietal/occipital lobe.    < end of copied text >    Imaging or report Personally Reviewed:  [ ] YES  [ ] NO    Medications:  Standing  aspirin  chewable 81 milliGRAM(s) Oral daily  azithromycin   Tablet 250 milliGRAM(s) Oral daily  calcium acetate 667 milliGRAM(s) Oral three times a day with meals  carvedilol 6.25 milliGRAM(s) Oral every 12 hours  chlorhexidine 4% Liquid 1 Application(s) Topical <User Schedule>  darbepoetin Injectable Syringe 40 MICROGram(s) SubCutaneous every 7 days  dextrose 5%. 1000 milliLiter(s) IV Continuous <Continuous>  dextrose 50% Injectable 12.5 Gram(s) IV Push once  dextrose 50% Injectable 25 Gram(s) IV Push once  dextrose 50% Injectable 25 Gram(s) IV Push once  gabapentin 300 milliGRAM(s) Oral daily  heparin  Injectable 5000 Unit(s) SubCutaneous every 8 hours  hydrALAZINE 25 milliGRAM(s) Oral two times a day  hydroxychloroquine   Oral   hydroxychloroquine 400 milliGRAM(s) Oral daily  insulin glargine Injectable (LANTUS) 44 Unit(s) SubCutaneous at bedtime  insulin lispro Injectable (HumaLOG) 4 Unit(s) SubCutaneous three times a day before meals  LORazepam     Tablet 0.5 milliGRAM(s) Oral two times a day    PRN Meds  acetaminophen   Tablet .. 650 milliGRAM(s) Oral every 6 hours PRN  ALBUTerol    90 MICROgram(s) HFA Inhaler 2 Puff(s) Inhalation every 6 hours PRN  benzonatate 100 milliGRAM(s) Oral every 8 hours PRN  bisacodyl Suppository 10 milliGRAM(s) Rectal daily PRN  dextrose 40% Gel 15 Gram(s) Oral once PRN  diphenhydrAMINE 25 milliGRAM(s) Oral every 6 hours PRN  glucagon  Injectable 1 milliGRAM(s) IntraMuscular once PRN  ondansetron    Tablet 8 milliGRAM(s) Oral two times a day PRN  polyethylene glycol 3350 17 Gram(s) Oral daily PRN      Case discussed with resident    Care discussed with pt/family

## 2020-04-15 NOTE — PROGRESS NOTE ADULT - SUBJECTIVE AND OBJECTIVE BOX
1 year old female with PMH of ESRD on HD (M/W/F), DM II, HTN, DLD, PVD s/p LLE BKA, anxiety brought in by ambulance from [facility] after completing hemodialysis today for AMS    # AMS / metabolic encephalopathy / Subacute vs chronic CVA  Possibly multifactorial: SARS-nCov2 pneumonia, subacute/chronic infarct  - CT head: subacute/chronic infarct of the right posterior parietal/occipital lobe  - Neurology eval appreciated  - REEG, CTA head/neck (f/u attending recs), 2D-echo  - Lipid profile, HbA1C  - Continue ASA    # COVID-19 pneumonia / Acute hypoxemic respiratory failure (SpO2 88% on RA at NH)  - Sepsis was not present on admission  - Febrile Tmax 102.9 F. Lymphopenia. D-dimer 1263. Fibrinogen >700.   - CXR with bilateral increased perihilar markings. Pulmonary edema vs COVID-19 pneumonia  - ECG: QTc baseline: 445  - O2 requirement: 3 LPM NC O2, SpO2 97%  - COVID, Procalcitonin, CRP and blood cultures were sent  - Was started on Plaquenil + Azithromycin at NH  - s/p Vancomycin 1 gm and Cefepime 2 gm IV once in ED  -Repeat Chest xray  -Spo2 is 95% on 3 L   FULL CODE per NH documentation    Treatment:  - Continue Azithromycin + Plaquenil (started on 4/11 at NH) to complete 5 days  - Tylenol PRN for fever  - Cough: Tessalon 100mg po q8 prn cough  - Albuterol MDI 1 puff po q4 prn wheeze (or Combivent); avoid steroids, if possible    # Troponinemia; likely ESRD and demand ischemia from acute viral illness  - Troponin 0.77 (previous 0.44); EKG with no acute ischemic changes  - Repeat Troponin     # ESRD on HD (M/W/F)  - HAGMA likely due to ESRD. Bicarb WNL  - Nephro consult for HD  - Continue Calcium acetate  - Check phos    # DM II: check fingersticks and continue same insulin regimen as home    # HTN: continue Carvedilol and Hydralazine    # Anxiety: continue Lorazepam    DVT ppx: Heparin SC    Dispo: Resident at [facility] 24H events:    Patient is a 59y old Female who presents with a chief complaint of Febrile illness (15 Apr 2020 10:10)    Primary diagnosis of Febrile illness     Today is hospital day 2d.     PAST MEDICAL & SURGICAL HISTORY  Type 2 diabetes mellitus  CHF (congestive heart failure)  End stage renal failure on dialysis  Hypertension  Hyperlipemia  Heart failure  Amputation of leg: left  History of femoral angiogram: left angiogram  Port-a-cath in place: right chest wall    SOCIAL HISTORY:  Negative for smoking/alcohol/drug use.     ALLERGIES:  No Known Allergies    MEDICATIONS:  STANDING MEDICATIONS  aspirin  chewable 81 milliGRAM(s) Oral daily  azithromycin   Tablet 250 milliGRAM(s) Oral daily  calcium acetate 667 milliGRAM(s) Oral three times a day with meals  carvedilol 6.25 milliGRAM(s) Oral every 12 hours  chlorhexidine 4% Liquid 1 Application(s) Topical <User Schedule>  darbepoetin Injectable Syringe 40 MICROGram(s) SubCutaneous every 7 days  dextrose 5%. 1000 milliLiter(s) IV Continuous <Continuous>  dextrose 50% Injectable 12.5 Gram(s) IV Push once  dextrose 50% Injectable 25 Gram(s) IV Push once  dextrose 50% Injectable 25 Gram(s) IV Push once  gabapentin 300 milliGRAM(s) Oral daily  heparin  Injectable 5000 Unit(s) SubCutaneous every 8 hours  hydrALAZINE 25 milliGRAM(s) Oral two times a day  hydroxychloroquine   Oral   hydroxychloroquine 400 milliGRAM(s) Oral daily  insulin glargine Injectable (LANTUS) 44 Unit(s) SubCutaneous at bedtime  insulin lispro Injectable (HumaLOG) 4 Unit(s) SubCutaneous three times a day before meals  LORazepam     Tablet 0.5 milliGRAM(s) Oral two times a day    PRN MEDICATIONS  acetaminophen   Tablet .. 650 milliGRAM(s) Oral every 6 hours PRN  ALBUTerol    90 MICROgram(s) HFA Inhaler 2 Puff(s) Inhalation every 6 hours PRN  benzonatate 100 milliGRAM(s) Oral every 8 hours PRN  bisacodyl Suppository 10 milliGRAM(s) Rectal daily PRN  dextrose 40% Gel 15 Gram(s) Oral once PRN  diphenhydrAMINE 25 milliGRAM(s) Oral every 6 hours PRN  glucagon  Injectable 1 milliGRAM(s) IntraMuscular once PRN  ondansetron    Tablet 8 milliGRAM(s) Oral two times a day PRN  polyethylene glycol 3350 17 Gram(s) Oral daily PRN    VITALS:   T(F): 99.5  HR: 70  BP: 100/50  RR: 18  SpO2: 95%    LABS:                        11.9   8.45  )-----------( 160      ( 13 Apr 2020 16:39 )             35.7     04-13    136  |  89<L>  |  25<H>  ----------------------------<  147<H>  4.2   |  25  |  5.9<HH>    Ca    8.8      13 Apr 2020 16:39  Mg     2.1     04-13    TPro  8.0  /  Alb  4.5  /  TBili  0.5  /  DBili  x   /  AST  30  /  ALT  13  /  AlkPhos  55  04-13    PT/INR - ( 13 Apr 2020 16:39 )   PT: 12.20 sec;   INR: 1.06 ratio         PTT - ( 13 Apr 2020 16:39 )  PTT:31.1 sec          Culture - Blood (collected 13 Apr 2020 16:39)  Source: .Blood Blood-Peripheral  Preliminary Report (15 Apr 2020 01:01):    No growth to date.    Culture - Blood (collected 13 Apr 2020 16:39)  Source: .Blood Blood-Peripheral  Preliminary Report (15 Apr 2020 01:01):    No growth to date.      CARDIAC MARKERS ( 13 Apr 2020 16:39 )  x     / 0.77 ng/mL / 877 U/L / x     / x            PHYSICAL EXAM:  GENERAL: NAD, very confused, not cooperative with exam  Respiratory: Bilateral equal breath sounds, no wheeze  Cardiovascular: S1, S2 appreciated, no murmurs  Gastrointestinal: BS+, soft, nondistended  Neuro: Confused, follow some commands

## 2020-04-15 NOTE — PROGRESS NOTE ADULT - SUBJECTIVE AND OBJECTIVE BOX
Nephrology progress note    THIS IS AN INCOMPLETE NOTE . FULL NOTE TO FOLLOW SHORTLY    Patient is seen and examined, events over the last 24 h noted .    Allergies:  No Known Allergies    Hospital Medications:   MEDICATIONS  (STANDING):  aspirin  chewable 81 milliGRAM(s) Oral daily  azithromycin   Tablet 250 milliGRAM(s) Oral daily  calcium acetate 667 milliGRAM(s) Oral three times a day with meals  carvedilol 6.25 milliGRAM(s) Oral every 12 hours  chlorhexidine 4% Liquid 1 Application(s) Topical <User Schedule>  darbepoetin Injectable Syringe 40 MICROGram(s) SubCutaneous every 7 days  dextrose 5%. 1000 milliLiter(s) (50 mL/Hr) IV Continuous <Continuous>  dextrose 50% Injectable 12.5 Gram(s) IV Push once  dextrose 50% Injectable 25 Gram(s) IV Push once  dextrose 50% Injectable 25 Gram(s) IV Push once  gabapentin 300 milliGRAM(s) Oral daily  heparin  Injectable 5000 Unit(s) SubCutaneous every 8 hours  hydrALAZINE 25 milliGRAM(s) Oral two times a day  hydroxychloroquine   Oral   hydroxychloroquine 400 milliGRAM(s) Oral daily  insulin glargine Injectable (LANTUS) 44 Unit(s) SubCutaneous at bedtime  insulin lispro Injectable (HumaLOG) 4 Unit(s) SubCutaneous three times a day before meals  LORazepam     Tablet 0.5 milliGRAM(s) Oral two times a day        VITALS:  T(F): 99.5 (04-15-20 @ 05:05), Max: 99.5 (04-15-20 @ 05:05)  HR: 70 (04-15-20 @ 05:05)  BP: 100/50 (04-15-20 @ 05:05)  RR: 18 (04-15-20 @ 05:05)  SpO2: 95% (04-15-20 @ 08:19)  Wt(kg): --    04-13 @ 07:01  -  04-14 @ 07:00  --------------------------------------------------------  IN: 100 mL / OUT: 0 mL / NET: 100 mL    04-14 @ 07:01  -  04-15 @ 07:00  --------------------------------------------------------  IN: 0 mL / OUT: 0 mL / NET: 0 mL          PHYSICAL EXAM:  Constitutional: NAD  HEENT: anicteric sclera, oropharynx clear, MMM  Neck: No JVD  Respiratory: CTAB, no wheezes, rales or rhonchi  Cardiovascular: S1, S2, RRR  Gastrointestinal: BS+, soft, NT/ND  Extremities: No cyanosis or clubbing. No peripheral edema  :  No anderson.   Skin: No rashes    LABS:  04-13    136  |  89<L>  |  25<H>  ----------------------------<  147<H>  4.2   |  25  |  5.9<HH>    Ca    8.8      13 Apr 2020 16:39  Mg     2.1     04-13    TPro  8.0  /  Alb  4.5  /  TBili  0.5  /  DBili      /  AST  30  /  ALT  13  /  AlkPhos  55  04-13                          11.9   8.45  )-----------( 160      ( 13 Apr 2020 16:39 )             35.7       Urine Studies:      RADIOLOGY & ADDITIONAL STUDIES: Nephrology progress note  Patient is seen and examined, events over the last 24 h noted .  still lethargic and confused     Allergies:  No Known Allergies    Hospital Medications:   MEDICATIONS  (STANDING):  aspirin  chewable 81 milliGRAM(s) Oral daily  azithromycin   Tablet 250 milliGRAM(s) Oral daily  calcium acetate 667 milliGRAM(s) Oral three times a day with meals  carvedilol 6.25 milliGRAM(s) Oral every 12 hours  chlorhexidine 4% Liquid 1 Application(s) Topical <User Schedule>  darbepoetin Injectable Syringe 40 MICROGram(s) SubCutaneous every 7 days  dextrose 5%. 1000 milliLiter(s) (50 mL/Hr) IV Continuous <Continuous>  gabapentin 300 milliGRAM(s) Oral daily  heparin  Injectable 5000 Unit(s) SubCutaneous every 8 hours  hydrALAZINE 25 milliGRAM(s) Oral two times a day  hydroxychloroquine   Oral   hydroxychloroquine 400 milliGRAM(s) Oral daily  insulin glargine Injectable (LANTUS) 44 Unit(s) SubCutaneous at bedtime  insulin lispro Injectable (HumaLOG) 4 Unit(s) SubCutaneous three times a day before meals  LORazepam     Tablet 0.5 milliGRAM(s) Oral two times a day        VITALS:  T(F): 99.5 (04-15-20 @ 05:05), Max: 99.5 (04-15-20 @ 05:05)  HR: 70 (04-15-20 @ 05:05)  BP: 100/50 (04-15-20 @ 05:05)  RR: 18 (04-15-20 @ 05:05)  SpO2: 95% (04-15-20 @ 08:19)      04-13 @ 07:01  -  04-14 @ 07:00  --------------------------------------------------------  IN: 100 mL / OUT: 0 mL / NET: 100 mL    04-14 @ 07:01  -  04-15 @ 07:00  --------------------------------------------------------  IN: 0 mL / OUT: 0 mL / NET: 0 mL          PHYSICAL EXAM:  Constitutional: NAD  HEENT: anicteric sclera, oropharynx clear, MMM  Neck: No JVD  Respiratory: CTAB, no wheezes, rales or rhonchi  Cardiovascular: S1, S2, RRR  Gastrointestinal: BS+, soft, NT/ND  Extremities: No cyanosis or clubbing. No peripheral edema  :  No anderson.   Skin: No rashes    LABS:  04-13    136  |  89<L>  |  25<H>  ----------------------------<  147<H>  4.2   |  25  |  5.9<HH>    Ca    8.8      13 Apr 2020 16:39  Mg     2.1     04-13    TPro  8.0  /  Alb  4.5  /  TBili  0.5  /  DBili      /  AST  30  /  ALT  13  /  AlkPhos  55  04-13                          11.9   8.45  )-----------( 160      ( 13 Apr 2020 16:39 )             35.7     COVID-19 PCR . (04.13.20 @ 16:30)    COVID-19 PCR: Detected: Methodology:  The Abbott Real Time SARS-CoV-2 assay is a real time reverse  transcriptase (RT) Polymerase Chain Reaction (PCR), test intended for the  qualitative detection of RNA from the SARS-CoV-2 in nasopharyngeal and  oropharyngeal swabs from patients suspected of COVID-19 infection.  The SARS-CoV-2 assay is performed on the Abbott m2000 system.  Reference Range:  Not Detected  This test has been validated by [facility]  Molecular lab. This assay is for in Vitro diagnostic testing under FDA  Emergency Use Authorization only.  This Test Was Performed At [facility], Molecular Lab,  [place], [place] [id number]      Urine Studies:      RADIOLOGY & ADDITIONAL STUDIES:

## 2020-04-15 NOTE — PROGRESS NOTE ADULT - ASSESSMENT
59 year old female with PMH of ESRD on HD (M/W/F), DM II, HTN, DLD, PVD s/p LLE BKA, anxiety brought in by ambulance from [facility] after completing hemodialysis today for AMS. As per NH staff patient had fever since 4/8/20, was hypoxic to SpO2 87% requiring 3 LPM of O2 was started on Plaquenil + Azithromycin on 4/11.       1. 59 year old female with PMH of ESRD on HD (M/W/F), DM II, HTN, DLD, PVD s/p LLE BKA, anxiety brought in by ambulance from [facility] after completing hemodialysis today for AMS. As per NH staff patient had fever since 4/8/20, was hypoxic to SpO2 87% requiring 3 LPM of O2 was started on Plaquenil + Azithromycin on 4/11.       1.	Toxic/Metabolic encephalopathy  2.	B/L viral PNA due to COVID-19  3.	Cytokine Release Syndrome  4.	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   5.	ESRD on HD  6.	DM-2 / HTN / DL  7.	PVD S/P LLE BKA         PLAN:    ·	D-dimer is 1263, Fibrinogen >700, Ferritin 1025, , Procalcitonin 3.68 and Trop is 0.77  ·	Inflammatory markers are very high. Will call ID eval. On Zithromax and Hydroxychloroquine.   ·	Start Solumedrol 40 mg iv q 12h.   ·	Pro BNP on admission was 54258  ·	Repeat Troponin. Elevated Troponin could be due to ESRD or myocarditis.   ·	CT head reviewed. Subacute/chronic infarct. Cannot do MRI due to COVID positive  ·	Neuro eval noted. Recommended routine EEG and CTA head.   ·	Cont ASA. Add Atorvastatin 40 mg po qhs.   ·	Hold Lorazepam  ·	Monitor FS. Cont Insulin and her other meds.   ·	Blood cx x 1 is negative.   ·	Poor prognosis

## 2020-04-15 NOTE — PROGRESS NOTE ADULT - ASSESSMENT
ear old female with PMH of ESRD on HD (M/W/F), DM II, HTN, DLD, PVD s/p LLE BKA, anxiety brought in by ambulance from [facility] after completing hemodialysis today for AMS    # AMS / metabolic encephalopathy / Subacute vs chronic CVA  Possibly multifactorial: SARS-nCov2 pneumonia, subacute/chronic infarct  - CT head: subacute/chronic infarct of the right posterior parietal/occipital lobe  - Neurology eval appreciated  - REEG, CTA head/neck (f/u attending recs), 2D-echo  - Lipid profile, HbA1C  - Continue ASA    # COVID-19 pneumonia / Acute hypoxemic respiratory failure (SpO2 88% on RA at NH)  - Sepsis was not present on admission  - Febrile Tmax 102.9 F. Lymphopenia. D-dimer 1263. Fibrinogen >700.   - CXR with bilateral increased perihilar markings.   -COVID positive  - ECG: QTc baseline: 445  - O2 requirement: 3 LPM NC O2, SpO2 97%  - COVID positive  -Inflammatory markers were sent   - Was started on Plaquenil + Azithromycin at NH  - s/p Vancomycin 1 gm and Cefepime 2 gm IV once in ED  -Repeat Chest xray  -Spo2 is 95% on 3 L      FULL CODE per NH documentation    Treatment:  - Continue Azithromycin + Plaquenil (started on 4/11 at NH) to complete 5 days  - Tylenol PRN for fever  - Cough: Tessalon 100mg po q8 prn cough  - Albuterol MDI 1 puff po q4 prn wheeze (or Combivent); avoid steroids, if possible    # Troponinemia; likely ESRD and demand ischemia from acute viral illness  - Troponin 0.77 (previous 0.44); EKG with no acute ischemic changes  - Repeat Troponin     # ESRD on HD (M/W/F)  - HAGMA likely due to ESRD. Bicarb WNL  - Nephro is following up, recs appreciated  - Continue Calcium acetate  -IPH sent.     # DM II: check fingersticks and continue same insulin regimen as home    # HTN: continue Carvedilol and Hydralazine

## 2020-04-15 NOTE — CHART NOTE - NSCHARTNOTEFT_GEN_A_CORE
Patient with recent stroke and EEG now showing frequent epileptiform discharges.  Recommend loading with valproic acid 20 mg/kg x 1 then checking valproic acid level 4 hours later and tomorrow am.  Please also start maintenance valproic acid 750 mg IV q12 hours.  Continue vEEG monitoring. Patient with recent stroke and EEG now showing frequent epileptiform discharges.  Recommend loading with valproic acid 20 mg/kg x 1 then checking valproic acid level 4 hours later and tomorrow am.  Please also start maintenance valproic acid 750 mg IV q12 hours.  Continue vEEG monitoring.    Discussed plan with Dr. Delgado at 2:02 p.m.

## 2020-04-16 ENCOUNTER — TRANSCRIPTION ENCOUNTER (OUTPATIENT)
Age: 60
End: 2020-04-16

## 2020-04-16 LAB
CRP SERPL-MCNC: 16.73 MG/DL — HIGH (ref 0–0.4)
FERRITIN SERPL-MCNC: 1142 NG/ML — HIGH (ref 15–150)
GLUCOSE BLDC GLUCOMTR-MCNC: 381 MG/DL — HIGH (ref 70–99)
GLUCOSE BLDC GLUCOMTR-MCNC: 387 MG/DL — HIGH (ref 70–99)
GLUCOSE BLDC GLUCOMTR-MCNC: 387 MG/DL — HIGH (ref 70–99)
GLUCOSE BLDC GLUCOMTR-MCNC: 419 MG/DL — HIGH (ref 70–99)
PROCALCITONIN SERPL-MCNC: 3.99 NG/ML — HIGH (ref 0.02–0.1)

## 2020-04-16 PROCEDURE — 71045 X-RAY EXAM CHEST 1 VIEW: CPT | Mod: 26,CS

## 2020-04-16 PROCEDURE — 99232 SBSQ HOSP IP/OBS MODERATE 35: CPT

## 2020-04-16 PROCEDURE — 95720 EEG PHY/QHP EA INCR W/VEEG: CPT

## 2020-04-16 PROCEDURE — 99233 SBSQ HOSP IP/OBS HIGH 50: CPT

## 2020-04-16 RX ORDER — HYDRALAZINE HCL 50 MG
50 TABLET ORAL THREE TIMES A DAY
Refills: 0 | Status: DISCONTINUED | OUTPATIENT
Start: 2020-04-16 | End: 2020-04-20

## 2020-04-16 RX ORDER — INSULIN LISPRO 100/ML
5 VIAL (ML) SUBCUTANEOUS
Refills: 0 | Status: DISCONTINUED | OUTPATIENT
Start: 2020-04-16 | End: 2020-04-23

## 2020-04-16 RX ORDER — VALPROIC ACID (AS SODIUM SALT) 250 MG/5ML
750 SOLUTION, ORAL ORAL ONCE
Refills: 0 | Status: COMPLETED | OUTPATIENT
Start: 2020-04-17 | End: 2020-04-17

## 2020-04-16 RX ORDER — INSULIN GLARGINE 100 [IU]/ML
15 INJECTION, SOLUTION SUBCUTANEOUS AT BEDTIME
Refills: 0 | Status: DISCONTINUED | OUTPATIENT
Start: 2020-04-16 | End: 2020-04-24

## 2020-04-16 RX ORDER — INSULIN LISPRO 100/ML
VIAL (ML) SUBCUTANEOUS
Refills: 0 | Status: DISCONTINUED | OUTPATIENT
Start: 2020-04-16 | End: 2020-04-24

## 2020-04-16 RX ADMIN — Medication 6: at 18:05

## 2020-04-16 RX ADMIN — Medication 53.75 MILLIGRAM(S): at 11:58

## 2020-04-16 RX ADMIN — INSULIN GLARGINE 15 UNIT(S): 100 INJECTION, SOLUTION SUBCUTANEOUS at 22:20

## 2020-04-16 RX ADMIN — Medication 5: at 11:55

## 2020-04-16 RX ADMIN — Medication 4 UNIT(S): at 08:07

## 2020-04-16 RX ADMIN — CARVEDILOL PHOSPHATE 6.25 MILLIGRAM(S): 80 CAPSULE, EXTENDED RELEASE ORAL at 18:06

## 2020-04-16 RX ADMIN — Medication 40 MILLIGRAM(S): at 05:54

## 2020-04-16 RX ADMIN — Medication 667 MILLIGRAM(S): at 11:58

## 2020-04-16 RX ADMIN — CARVEDILOL PHOSPHATE 6.25 MILLIGRAM(S): 80 CAPSULE, EXTENDED RELEASE ORAL at 05:54

## 2020-04-16 RX ADMIN — Medication 667 MILLIGRAM(S): at 18:06

## 2020-04-16 RX ADMIN — Medication 81 MILLIGRAM(S): at 11:57

## 2020-04-16 RX ADMIN — Medication 400 MILLIGRAM(S): at 14:12

## 2020-04-16 RX ADMIN — Medication 5 UNIT(S): at 18:05

## 2020-04-16 RX ADMIN — HEPARIN SODIUM 5000 UNIT(S): 5000 INJECTION INTRAVENOUS; SUBCUTANEOUS at 22:20

## 2020-04-16 RX ADMIN — Medication 100 MILLIGRAM(S): at 05:55

## 2020-04-16 RX ADMIN — Medication 53.75 MILLIGRAM(S): at 23:14

## 2020-04-16 RX ADMIN — GABAPENTIN 300 MILLIGRAM(S): 400 CAPSULE ORAL at 11:58

## 2020-04-16 RX ADMIN — Medication 5 UNIT(S): at 11:55

## 2020-04-16 RX ADMIN — Medication 50 MILLIGRAM(S): at 14:12

## 2020-04-16 RX ADMIN — Medication 25 MILLIGRAM(S): at 05:54

## 2020-04-16 RX ADMIN — AZITHROMYCIN 250 MILLIGRAM(S): 500 TABLET, FILM COATED ORAL at 11:58

## 2020-04-16 RX ADMIN — Medication 40 MILLIGRAM(S): at 18:06

## 2020-04-16 RX ADMIN — HEPARIN SODIUM 5000 UNIT(S): 5000 INJECTION INTRAVENOUS; SUBCUTANEOUS at 14:13

## 2020-04-16 RX ADMIN — HEPARIN SODIUM 5000 UNIT(S): 5000 INJECTION INTRAVENOUS; SUBCUTANEOUS at 05:54

## 2020-04-16 RX ADMIN — Medication 667 MILLIGRAM(S): at 08:07

## 2020-04-16 NOTE — DISCHARGE NOTE NURSING/CASE MANAGEMENT/SOCIAL WORK - NSDCPEPTSTRK_GEN_ALL_CORE
Prescribed medications/Risk factors for stroke/Stroke support groups for patients, families, and friends/Stroke education booklet/Stroke warning signs and symptoms/Signs and symptoms of stroke/Call 911 for stroke/Need for follow up after discharge

## 2020-04-16 NOTE — PROGRESS NOTE ADULT - SUBJECTIVE AND OBJECTIVE BOX
Neurology Follow up note  Patient seen and examined at bedside she is awake and alert today but remains confused. As per RN she has been verbally abusive agitated and refusing to take oral medications, medications but the nurse was able to give her IV medications.  VEEG monitoring is in progress. No clinical seizures overnight.    HPI:  59 year old female with PMH of ESRD on HD (M/W/F), DM II, HTN, DLD, PVD s/p LLE BKA, anxiety brought in by ambulance from [facility] after completing hemodialysis today for AMS. History was limited due to current mental status. Patient is a permanent resident at [facility]. The NH was contacted and as per staff patient had fever since 4/8/20, was hypoxic to SpO2 87% requiring 3 LPM of O2 was started on Plaquenil + Azithromycin on 4/11. As per staff, patient likely had positive exposure with many COVID+ residents. Today after completing HD she was altered.       Vital Signs Last 24 Hrs  T(C): 37.4 (15 Apr 2020 20:57), Max: 37.5 (15 Apr 2020 05:05)  T(F): 99.3 (15 Apr 2020 20:57), Max: 99.5 (15 Apr 2020 05:05)  HR: 71 (15 Apr 2020 20:57) (62 - 71)  BP: 152/66 (15 Apr 2020 20:57) (100/50 - 152/66)  BP(mean): --  RR: 18 (15 Apr 2020 20:57) (18 - 18)  SpO2: 94% (15 Apr 2020 20:04) (94% - 95%)          Neurological Exam:   Mental status: Awake, alert confused following simple commands.   Cranial nerves: grossly intact , except her speech is garbled  Motor:  Moves UE to antigravity             RLE has spastic tone which is baseline, LLE is bka  Sensation: unreliable  No abnormal involuntary mvmts        Medications  acetaminophen   Tablet .. 650 milliGRAM(s) Oral every 6 hours PRN  ALBUTerol    90 MICROgram(s) HFA Inhaler 2 Puff(s) Inhalation every 6 hours PRN  aspirin  chewable 81 milliGRAM(s) Oral daily  atorvastatin 40 milliGRAM(s) Oral at bedtime  azithromycin   Tablet 250 milliGRAM(s) Oral daily  benzonatate 100 milliGRAM(s) Oral every 8 hours PRN  bisacodyl Suppository 10 milliGRAM(s) Rectal daily PRN  calcium acetate 667 milliGRAM(s) Oral three times a day with meals  carvedilol 6.25 milliGRAM(s) Oral every 12 hours  chlorhexidine 4% Liquid 1 Application(s) Topical <User Schedule>  darbepoetin Injectable Syringe 40 MICROGram(s) SubCutaneous every 7 days  dextrose 40% Gel 15 Gram(s) Oral once PRN  dextrose 5%. 1000 milliLiter(s) IV Continuous <Continuous>  dextrose 50% Injectable 12.5 Gram(s) IV Push once  dextrose 50% Injectable 25 Gram(s) IV Push once  dextrose 50% Injectable 25 Gram(s) IV Push once  diphenhydrAMINE 25 milliGRAM(s) Oral every 6 hours PRN  gabapentin 300 milliGRAM(s) Oral daily  glucagon  Injectable 1 milliGRAM(s) IntraMuscular once PRN  heparin  Injectable 5000 Unit(s) SubCutaneous every 8 hours  hydrALAZINE 25 milliGRAM(s) Oral two times a day  hydroxychloroquine   Oral   hydroxychloroquine 400 milliGRAM(s) Oral daily  insulin glargine Injectable (LANTUS) 44 Unit(s) SubCutaneous at bedtime  insulin lispro Injectable (HumaLOG) 4 Unit(s) SubCutaneous three times a day before meals  methylPREDNISolone sodium succinate Injectable 40 milliGRAM(s) IV Push every 12 hours  ondansetron    Tablet 8 milliGRAM(s) Oral two times a day PRN  polyethylene glycol 3350 17 Gram(s) Oral daily PRN  valproate sodium IVPB 750 milliGRAM(s) IV Intermittent <User Schedule>      Lab  Magnesium, Serum (04.13.20 @ 16:39)    Magnesium, Serum: 2.1 mg/dL        Radiology  < from: CT Head No Cont (04.15.20 @ 15:56) >  Findings:  Multiple skin electrodes and patient motion cause artifact and mildly limit evaluation. There is redemonstration of subacute to chronic right posterior temporal and occipital lobe infarct. Within limits of artifact, no evidence of hemorrhage transformation. No midline shift.     The ventricles, basal cisterns and sulcal pattern are within normal limits for the patient's stated age.      The visualized paranasal sinuses and mastoid air cells are well aerated. No gross orbital mass.     Impression:     The study is mildly limited by skin electrodes and patient motion.    Redemonstration of right posterior temporal/occipital lobe infarction. Within limits of artifact, no hemorrhagic transformation.        ECHO<   from: Transthoracic Echocardiogram (07.03.18 @ 10:40) >  Summary:   1. LV Ejection Fraction by Kirkpatrick's Method with a biplane EF of 60 %.   2. Normal left ventricular size and wall thicknesses, with normal   systolic function.   3. The mean global longitudinal strain by speckle tracking is -11.2%   which is reduced.   4. Mild tricuspid regurgitation.   5. Estimated pulmonary artery systolic pressure is 41.0 mmHg assuming a   right atrial pressure of 3 mmHg, which is consistent with mild pulmonary   hypertension.   6. Pulmonary hypertension is present.   7. LA volume Index is 30.7 ml/m² ml/m2. Neurology Follow up note  Patient seen and examined at bedside she is awake and alert today but remains confused. As per RN she has been verbally abusive agitated and refusing to take oral medications, medications but the nurse was able to give her IV medications.VEEG monitoring is in progress. No clinical seizures overnight.    HPI:  59 year old female with PMH of ESRD on HD (M/W/F), DM II, HTN, DLD, PVD s/p LLE BKA, anxiety brought in by ambulance from [facility] after completing hemodialysis today for AMS. History was limited due to current mental status. Patient is a permanent resident at [facility]. The NH was contacted and as per staff patient had fever since 4/8/20, was hypoxic to SpO2 87% requiring 3 LPM of O2 was started on Plaquenil + Azithromycin on 4/11. As per staff, patient likely had positive exposure with many COVID+ residents. Today after completing HD she was altered.       Vital Signs Last 24 Hrs  T(C): 37.4 (15 Apr 2020 20:57), Max: 37.5 (15 Apr 2020 05:05)  T(F): 99.3 (15 Apr 2020 20:57), Max: 99.5 (15 Apr 2020 05:05)  HR: 71 (15 Apr 2020 20:57) (62 - 71)  BP: 152/66 (15 Apr 2020 20:57) (100/50 - 152/66)  BP(mean): --  RR: 18 (15 Apr 2020 20:57) (18 - 18)  SpO2: 94% (15 Apr 2020 20:04) (94% - 95%)          Neurological Exam:   Mental status: Awake, alert confused following simple commands.   Cranial nerves: grossly intact , except her speech is garbled  Motor:  Moves UE to antigravity             RLE has spastic tone which is baseline, LLE is bka  Sensation: unreliable  No abnormal involuntary mvmts        Medications  acetaminophen   Tablet .. 650 milliGRAM(s) Oral every 6 hours PRN  ALBUTerol    90 MICROgram(s) HFA Inhaler 2 Puff(s) Inhalation every 6 hours PRN  aspirin  chewable 81 milliGRAM(s) Oral daily  atorvastatin 40 milliGRAM(s) Oral at bedtime  azithromycin   Tablet 250 milliGRAM(s) Oral daily  benzonatate 100 milliGRAM(s) Oral every 8 hours PRN  bisacodyl Suppository 10 milliGRAM(s) Rectal daily PRN  calcium acetate 667 milliGRAM(s) Oral three times a day with meals  carvedilol 6.25 milliGRAM(s) Oral every 12 hours  chlorhexidine 4% Liquid 1 Application(s) Topical <User Schedule>  darbepoetin Injectable Syringe 40 MICROGram(s) SubCutaneous every 7 days  dextrose 40% Gel 15 Gram(s) Oral once PRN  dextrose 5%. 1000 milliLiter(s) IV Continuous <Continuous>  dextrose 50% Injectable 12.5 Gram(s) IV Push once  dextrose 50% Injectable 25 Gram(s) IV Push once  dextrose 50% Injectable 25 Gram(s) IV Push once  diphenhydrAMINE 25 milliGRAM(s) Oral every 6 hours PRN  gabapentin 300 milliGRAM(s) Oral daily  glucagon  Injectable 1 milliGRAM(s) IntraMuscular once PRN  heparin  Injectable 5000 Unit(s) SubCutaneous every 8 hours  hydrALAZINE 25 milliGRAM(s) Oral two times a day  hydroxychloroquine   Oral   hydroxychloroquine 400 milliGRAM(s) Oral daily  insulin glargine Injectable (LANTUS) 44 Unit(s) SubCutaneous at bedtime  insulin lispro Injectable (HumaLOG) 4 Unit(s) SubCutaneous three times a day before meals  methylPREDNISolone sodium succinate Injectable 40 milliGRAM(s) IV Push every 12 hours  ondansetron    Tablet 8 milliGRAM(s) Oral two times a day PRN  polyethylene glycol 3350 17 Gram(s) Oral daily PRN  valproate sodium IVPB 750 milliGRAM(s) IV Intermittent <User Schedule>      Lab  Magnesium, Serum (04.13.20 @ 16:39)    Magnesium, Serum: 2.1 mg/dL        Radiology  < from: CT Head No Cont (04.15.20 @ 15:56) >  Findings:  Multiple skin electrodes and patient motion cause artifact and mildly limit evaluation. There is redemonstration of subacute to chronic right posterior temporal and occipital lobe infarct. Within limits of artifact, no evidence of hemorrhage transformation. No midline shift.     The ventricles, basal cisterns and sulcal pattern are within normal limits for the patient's stated age.      The visualized paranasal sinuses and mastoid air cells are well aerated. No gross orbital mass.     Impression:     The study is mildly limited by skin electrodes and patient motion.    Redemonstration of right posterior temporal/occipital lobe infarction. Within limits of artifact, no hemorrhagic transformation.        ECHO<   from: Transthoracic Echocardiogram (07.03.18 @ 10:40) >  Summary:   1. LV Ejection Fraction by Kirkpatrick's Method with a biplane EF of 60 %.   2. Normal left ventricular size and wall thicknesses, with normal   systolic function.   3. The mean global longitudinal strain by speckle tracking is -11.2%   which is reduced.   4. Mild tricuspid regurgitation.   5. Estimated pulmonary artery systolic pressure is 41.0 mmHg assuming a   right atrial pressure of 3 mmHg, which is consistent with mild pulmonary   hypertension.   6. Pulmonary hypertension is present.   7. LA volume Index is 30.7 ml/m² ml/m2.

## 2020-04-16 NOTE — DISCHARGE NOTE NURSING/CASE MANAGEMENT/SOCIAL WORK - PATIENT PORTAL LINK FT
You can access the FollowMyHealth Patient Portal offered by [facility] by registering at the following website: [email or web address]. By joining [facility]’s FollowMyHealth portal, you will also be able to view your health information using other applications (apps) compatible with our system.

## 2020-04-16 NOTE — EEG REPORT - NS EEG TEXT BOX
Epilepsy Attending Note:     PHILIPPE SMITH    59y Female  MRN [id number]    Vital Signs Last 24 Hrs  T(C): 37.2 (16 Apr 2020 05:51), Max: 37.4 (15 Apr 2020 20:57)  T(F): 99 (16 Apr 2020 05:51), Max: 99.3 (15 Apr 2020 20:57)  HR: 75 (16 Apr 2020 05:51) (62 - 75)  BP: 190/73 (16 Apr 2020 08:03) (109/36 - 190/73)  BP(mean): --  RR: 18 (16 Apr 2020 05:51) (18 - 18)  SpO2: 99% (16 Apr 2020 08:03) (94% - 99%)                MEDICATIONS  (STANDING):  aspirin  chewable 81 milliGRAM(s) Oral daily  atorvastatin 40 milliGRAM(s) Oral at bedtime  azithromycin   Tablet 250 milliGRAM(s) Oral daily  calcium acetate 667 milliGRAM(s) Oral three times a day with meals  carvedilol 6.25 milliGRAM(s) Oral every 12 hours  chlorhexidine 4% Liquid 1 Application(s) Topical <User Schedule>  darbepoetin Injectable Syringe 40 MICROGram(s) SubCutaneous every 7 days  dextrose 5%. 1000 milliLiter(s) (50 mL/Hr) IV Continuous <Continuous>  dextrose 50% Injectable 12.5 Gram(s) IV Push once  dextrose 50% Injectable 25 Gram(s) IV Push once  dextrose 50% Injectable 25 Gram(s) IV Push once  gabapentin 300 milliGRAM(s) Oral daily  heparin  Injectable 5000 Unit(s) SubCutaneous every 8 hours  hydrALAZINE 50 milliGRAM(s) Oral three times a day  hydroxychloroquine   Oral   hydroxychloroquine 400 milliGRAM(s) Oral daily  insulin glargine Injectable (LANTUS) 15 Unit(s) SubCutaneous at bedtime  insulin lispro (HumaLOG) corrective regimen sliding scale   SubCutaneous three times a day before meals  insulin lispro Injectable (HumaLOG) 5 Unit(s) SubCutaneous three times a day before meals  methylPREDNISolone sodium succinate Injectable 40 milliGRAM(s) IV Push every 12 hours  valproate sodium IVPB 750 milliGRAM(s) IV Intermittent <User Schedule>    MEDICATIONS  (PRN):  acetaminophen   Tablet .. 650 milliGRAM(s) Oral every 6 hours PRN Temp greater or equal to 38C (100.4F)  ALBUTerol    90 MICROgram(s) HFA Inhaler 2 Puff(s) Inhalation every 6 hours PRN Shortness of Breath  benzonatate 100 milliGRAM(s) Oral every 8 hours PRN Cough  bisacodyl Suppository 10 milliGRAM(s) Rectal daily PRN Constipation  dextrose 40% Gel 15 Gram(s) Oral once PRN Blood Glucose LESS THAN 70 milliGRAM(s)/deciliter  diphenhydrAMINE 25 milliGRAM(s) Oral every 6 hours PRN Rash and/or Itching  glucagon  Injectable 1 milliGRAM(s) IntraMuscular once PRN Glucose LESS THAN 70 milligrams/deciliter  ondansetron    Tablet 8 milliGRAM(s) Oral two times a day PRN Nausea and/or Vomiting  polyethylene glycol 3350 17 Gram(s) Oral daily PRN Constipation            VEEG in the last 24 hours:    Background-------------------6-7 hz     Focal and generalized slowing-----moderate generalized slowing                                                    mild right hemispheric focal slowing    Interictal activity------------------Bilateral posterior quadrant sharps or isolated left posterior sharps that appear epileptiform in nature    Events----------------------none    Seizures------------------none    Impression:  abnormal as above    Plan - discussed with the neurology

## 2020-04-16 NOTE — PROGRESS NOTE ADULT - ASSESSMENT
59 year old female with PMH of ESRD on HD (M/W/F), DM II, HTN, DLD, PVD s/p LLE BKA, anxiety presenting with confusion / fever     ·	ESRD on HD sp HD yesterday / no need for HD today   ·	check IP and PTH   ·	Fever chills confusion covid positive  on HQ and azithromycin/   ·	CT scan findings noted / neuro eval appreciated for CTA head with contrast / on VPA check levels   ·	HTN noted target SBP between 140-160 / increase hydralazine to 50 q8h/ keep on coreg

## 2020-04-16 NOTE — PROGRESS NOTE ADULT - ATTENDING COMMENTS
Patient examined this afternoon and has improvement in alertness and was able to follow a few simple commands with much encouragement.  She apparently is still irritable and refuses blood draws so we don't have a valproate level.  I suggested early tomorrow am give am VPA dose at 0300 then at 11 am dialysis can have valproic acid level checked.  May need to increase valproic acid maintenance dose to reflect 15 mg/kg/day.  Patient's EEG did not show complex partial status though epileptiform discharges were recorded. Patient examined this afternoon and has improvement in alertness and was able to follow a few simple commands with much encouragement.  She apparently is still irritable and refuses blood draws so we don't have a valproate level.  I suggested early tomorrow am give am VPA dose at 0300 then at 11 am dialysis can have valproic acid level checked.  May need to increase valproic acid maintenance dose to reflect 15 mg/kg/day.  Patient's EEG did not show complex partial status though epileptiform discharges were recorded.    Impression:  1.  Right hemisphere stroke of undetermined cause, though likely secondary to atherosclerosis from DM, HTN, hyperlipidemia.  2.  Abnormal EEG with epileptiform discharges.    Recommend:  1.  Continue antiplatelet therapy and treatment with high intensity statin for stroke prevention.  2.  Continue valproic acid however change dose to reflect 15 mg/kg total daily dose.  3.  Check valproic acid level tomorrow ~ 11 am during dialysis.  4.  Continue seizure precautions.

## 2020-04-16 NOTE — PROGRESS NOTE ADULT - ASSESSMENT
59 year old female with PMH of ESRD on HD (M/W/F), DM II, HTN, DLD, PVD s/p LLE BKA, anxiety brought in by ambulance from [facility], operated by [facility] after completing hemodialysis today for AMS. As per NH staff patient had fever since 4/8/20, was hypoxic to SpO2 87% requiring 3 LPM of O2 was started on Plaquenil + Azithromycin on 4/11.       1.	Toxic/Metabolic encephalopathy  2.	B/L viral PNA due to COVID-19  3.	Cytokine Release Syndrome  4.	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   5.	ESRD on HD  6.	DM-2 / HTN / DL  7.	PVD S/P LLE BKA         PLAN:    ·	Pt remains confused. Neuro F/U  ·	S/P VEEG. Was started on Valproic acid. Check the level  ·	Seizure precautions.   ·	D-dimer is 1263, Fibrinogen >700, Ferritin 1025, , Procalcitonin 3.68 and Trop is 0.77  ·	Inflammatory markers are very high. Will call ID eval. On Zithromax and Hydroxychloroquine.   ·	Cont Solumedrol 40 mg iv q 12h. Repeat markers.   ·	Pro BNP on admission was 74955  ·	Repeat Troponin. Elevated Troponin could be due to ESRD or myocarditis.   ·	CT head reviewed. Subacute/chronic infarct. Cannot do MRI due to COVID positive  ·	Cont ASA and Atorvastatin 40 mg po qhs.   ·	Hold Lorazepam  ·	Monitor FS. Start her on Lantus 15 units qhs and Humalog 5 units before meals  ·	Blood cx x 1 is negative.   ·	Poor prognosis

## 2020-04-16 NOTE — PROGRESS NOTE ADULT - SUBJECTIVE AND OBJECTIVE BOX
PHILIPPE SMITH  59y Female    CHIEF COMPLAINT:    Patient is a 59y old  Female who presents with a chief complaint of Febrile illness (2020 08:58)      INTERVAL HPI/OVERNIGHT EVENTS:    Patient seen and examined. Awake and alert but confused. Looks comfortable on O2. Having cough.     ROS: All other systems are negative.    Vital Signs:    T(F): 99.1 (20 @ 14:00), Max: 99.3 (04-15-20 @ 20:57)  HR: 78 (20 @ 14:00) (62 - 78)  BP: 190/73 (20 @ 08:03) (109/36 - 190/73)  RR: 18 (20 @ 14:00) (18 - 18)  SpO2: 86% (20 @ 12:15) (86% - 99%)  I&O's Summary    15 Apr 2020 07:01  -  2020 07:00  --------------------------------------------------------  IN: 460 mL / OUT: 1000 mL / NET: -540 mL    2020 07:01  -  2020 14:06  --------------------------------------------------------  IN: 300 mL / OUT: 0 mL / NET: 300 mL      Daily     Daily Weight in k (2020 05:51)  CAPILLARY BLOOD GLUCOSE      POCT Blood Glucose.: 387 mg/dL (2020 11:28)  POCT Blood Glucose.: 387 mg/dL (2020 07:46)  POCT Blood Glucose.: 276 mg/dL (15 Apr 2020 21:24)  POCT Blood Glucose.: 147 mg/dL (15 Apr 2020 16:33)      PHYSICAL EXAM:    GENERAL:  NAD  SKIN: No rashes or lesions  HENT: Atrumatic. Normocephalic. PERRL. Moist membranes.  NECK: Supple, No JVD. No lymphadenopathy.  PULMONARY: CTA B/L. No wheezing. No rales  CVS: Normal S1, S2. Rate and Rhythm are regular. No murmurs.  ABDOMEN/GI: Soft, Nontender, Nondistended; BS present  EXTREMITIES: Peripheral pulses intact. Lt BKA  NEUROLOGIC:  Confused.  PSYCH: Alert & oriented x 0    Consultant(s) Notes Reviewed:  [x ] YES  [ ] NO  Care Discussed with Consultants/Other Providers [ x] YES  [ ] NO    EKG reviewed  Telemetry reviewed    LABS:            Serum Pro-Brain Natriuretic Peptide: 82288 pg/mL (20 @ 16:39)    Trop 0.52, CKMB --, CK --, 04-15-20 @ 11:38  Trop 0.77, CKMB --, , 20 @ 16:39      Culture - Blood (collected 2020 16:39)  Source: .Blood Blood-Peripheral  Preliminary Report (15 Apr 2020 01:01):    No growth to date.    Culture - Blood (collected 2020 16:39)  Source: .Blood Blood-Peripheral  Preliminary Report (15 Apr 2020 01:01):    No growth to date.        RADIOLOGY & ADDITIONAL TESTS:      Imaging or report Personally Reviewed:  [ ] YES  [ ] NO    Medications:  Standing  aspirin  chewable 81 milliGRAM(s) Oral daily  atorvastatin 40 milliGRAM(s) Oral at bedtime  calcium acetate 667 milliGRAM(s) Oral three times a day with meals  carvedilol 6.25 milliGRAM(s) Oral every 12 hours  chlorhexidine 4% Liquid 1 Application(s) Topical <User Schedule>  darbepoetin Injectable Syringe 40 MICROGram(s) SubCutaneous every 7 days  dextrose 5%. 1000 milliLiter(s) IV Continuous <Continuous>  dextrose 50% Injectable 12.5 Gram(s) IV Push once  dextrose 50% Injectable 25 Gram(s) IV Push once  dextrose 50% Injectable 25 Gram(s) IV Push once  gabapentin 300 milliGRAM(s) Oral daily  heparin  Injectable 5000 Unit(s) SubCutaneous every 8 hours  hydrALAZINE 50 milliGRAM(s) Oral three times a day  hydroxychloroquine   Oral   hydroxychloroquine 400 milliGRAM(s) Oral daily  insulin glargine Injectable (LANTUS) 15 Unit(s) SubCutaneous at bedtime  insulin lispro (HumaLOG) corrective regimen sliding scale   SubCutaneous three times a day before meals  insulin lispro Injectable (HumaLOG) 5 Unit(s) SubCutaneous three times a day before meals  methylPREDNISolone sodium succinate Injectable 40 milliGRAM(s) IV Push every 12 hours  valproate sodium IVPB 750 milliGRAM(s) IV Intermittent <User Schedule>    PRN Meds  acetaminophen   Tablet .. 650 milliGRAM(s) Oral every 6 hours PRN  ALBUTerol    90 MICROgram(s) HFA Inhaler 2 Puff(s) Inhalation every 6 hours PRN  benzonatate 100 milliGRAM(s) Oral every 8 hours PRN  bisacodyl Suppository 10 milliGRAM(s) Rectal daily PRN  dextrose 40% Gel 15 Gram(s) Oral once PRN  diphenhydrAMINE 25 milliGRAM(s) Oral every 6 hours PRN  glucagon  Injectable 1 milliGRAM(s) IntraMuscular once PRN  ondansetron    Tablet 8 milliGRAM(s) Oral two times a day PRN  polyethylene glycol 3350 17 Gram(s) Oral daily PRN      Case discussed with resident    Care discussed with pt/family

## 2020-04-16 NOTE — PROGRESS NOTE ADULT - SUBJECTIVE AND OBJECTIVE BOX
Nephrology progress note    THIS IS AN INCOMPLETE NOTE . FULL NOTE TO FOLLOW SHORTLY    Patient is seen and examined, events over the last 24 h noted .    Allergies:  No Known Allergies    Hospital Medications:   MEDICATIONS  (STANDING):  aspirin  chewable 81 milliGRAM(s) Oral daily  atorvastatin 40 milliGRAM(s) Oral at bedtime  azithromycin   Tablet 250 milliGRAM(s) Oral daily  calcium acetate 667 milliGRAM(s) Oral three times a day with meals  carvedilol 6.25 milliGRAM(s) Oral every 12 hours  chlorhexidine 4% Liquid 1 Application(s) Topical <User Schedule>  darbepoetin Injectable Syringe 40 MICROGram(s) SubCutaneous every 7 days  dextrose 5%. 1000 milliLiter(s) (50 mL/Hr) IV Continuous <Continuous>  dextrose 50% Injectable 12.5 Gram(s) IV Push once  dextrose 50% Injectable 25 Gram(s) IV Push once  dextrose 50% Injectable 25 Gram(s) IV Push once  gabapentin 300 milliGRAM(s) Oral daily  heparin  Injectable 5000 Unit(s) SubCutaneous every 8 hours  hydrALAZINE 25 milliGRAM(s) Oral two times a day  hydroxychloroquine   Oral   hydroxychloroquine 400 milliGRAM(s) Oral daily  insulin glargine Injectable (LANTUS) 44 Unit(s) SubCutaneous at bedtime  insulin lispro Injectable (HumaLOG) 4 Unit(s) SubCutaneous three times a day before meals  methylPREDNISolone sodium succinate Injectable 40 milliGRAM(s) IV Push every 12 hours  valproate sodium IVPB 750 milliGRAM(s) IV Intermittent <User Schedule>        VITALS:  T(F): 99 (04-16-20 @ 05:51), Max: 99.3 (04-15-20 @ 20:57)  HR: 75 (04-16-20 @ 05:51)  BP: 190/73 (04-16-20 @ 08:03)  RR: 18 (04-16-20 @ 05:51)  SpO2: 99% (04-16-20 @ 08:03)  Wt(kg): --    04-14 @ 07:01  -  04-15 @ 07:00  --------------------------------------------------------  IN: 0 mL / OUT: 0 mL / NET: 0 mL    04-15 @ 07:01  -  04-16 @ 07:00  --------------------------------------------------------  IN: 460 mL / OUT: 1000 mL / NET: -540 mL        Weight (kg): 68 (04-15 @ 20:04)    PHYSICAL EXAM:  Constitutional: NAD  HEENT: anicteric sclera, oropharynx clear, MMM  Neck: No JVD  Respiratory: CTAB, no wheezes, rales or rhonchi  Cardiovascular: S1, S2, RRR  Gastrointestinal: BS+, soft, NT/ND  Extremities: No cyanosis or clubbing. No peripheral edema  :  No anderson.   Skin: No rashes    LABS:            Urine Studies:      RADIOLOGY & ADDITIONAL STUDIES: Nephrology progress note  Patient is seen and examined, events over the last 24 h noted .  still confused     Allergies:  No Known Allergies    Hospital Medications:   MEDICATIONS  (STANDING):  aspirin  chewable 81 milliGRAM(s) Oral daily  atorvastatin 40 milliGRAM(s) Oral at bedtime  azithromycin   Tablet 250 milliGRAM(s) Oral daily  calcium acetate 667 milliGRAM(s) Oral three times a day with meals  carvedilol 6.25 milliGRAM(s) Oral every 12 hours  darbepoetin Injectable Syringe 40 MICROGram(s) SubCutaneous every 7 days  dextrose 5%. 1000 milliLiter(s) (50 mL/Hr) IV Continuous <Continuous>  gabapentin 300 milliGRAM(s) Oral daily  heparin  Injectable 5000 Unit(s) SubCutaneous every 8 hours  hydrALAZINE 25 milliGRAM(s) Oral two times a day  hydroxychloroquine 400 milliGRAM(s) Oral daily  insulin glargine Injectable (LANTUS) 44 Unit(s) SubCutaneous at bedtime  insulin lispro Injectable (HumaLOG) 4 Unit(s) SubCutaneous three times a day before meals  methylPREDNISolone sodium succinate Injectable 40 milliGRAM(s) IV Push every 12 hours  valproate sodium IVPB 750 milliGRAM(s) IV Intermittent <User Schedule>        VITALS:  T(F): 99 (04-16-20 @ 05:51), Max: 99.3 (04-15-20 @ 20:57)  HR: 75 (04-16-20 @ 05:51)  BP: 190/73 (04-16-20 @ 08:03)  RR: 18 (04-16-20 @ 05:51)  SpO2: 99% (04-16-20 @ 08:03)      04-14 @ 07:01  -  04-15 @ 07:00  --------------------------------------------------------  IN: 0 mL / OUT: 0 mL / NET: 0 mL    04-15 @ 07:01  -  04-16 @ 07:00  --------------------------------------------------------  IN: 460 mL / OUT: 1000 mL / NET: -540 mL        Weight (kg): 68 (04-15 @ 20:04)    PHYSICAL EXAM:  Constitutional: confused   HEENT: anicteric sclera, oropharynx clear, MMM  Neck: No JVD  Respiratory: CTAB, no wheezes, rales or rhonchi  Cardiovascular: S1, S2, RRR  Gastrointestinal: BS+, soft, NT/ND  Extremities: No cyanosis or clubbing. No peripheral edema  :  No anderson.   Skin: No rashes    LABS:

## 2020-04-16 NOTE — PROGRESS NOTE ADULT - ASSESSMENT
58 y/o female with PMH of ESRD on HD, DM, HTN, DLD, brought in S/P hemodialysis for AMS. 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 On VEEG which  showed frequent epileptiform discharges was reccomended vpa loading which was only administered at 10pm as the there was no accurate weight on the patient. VEEG in progress , no clinical seizures overnight.          Plan  Continue seizure precautions  Obtain VPA levels at 4.30am labs   Continue VEEG monitoring  continue  mg iv q 12 hrs  Please check Lipid profile , hbaic  Continue  asa 81 mg q 24  Start Lipitor 80 mg q 24  Neuro attending Note will follow 58 y/o female with PMH of ESRD on HD, DM, HTN, DLD, brought in S/P hemodialysis for AMS. 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 On VEEG which  showed frequent epileptiform discharges was reccomended vpa loading which was only administered at 10pm as the there was no accurate weight on the patient. VEEG in progress , no clinical seizures overnight. Repeat cth 4/16 was negative for new findings.          Plan  Continue seizure precautions  Obtain VPA levels at 4.30am labs   Continue VEEG monitoring  continue  mg iv q 12 hrs  Please check Lipid profile , hbaic  Continue  asa 81 mg q 24  Start Lipitor 80 mg q 24  Neuro attending Note will follow

## 2020-04-17 LAB
ALBUMIN SERPL ELPH-MCNC: 4.4 G/DL — SIGNIFICANT CHANGE UP (ref 3.5–5.2)
ALP SERPL-CCNC: 62 U/L — SIGNIFICANT CHANGE UP (ref 30–115)
ALT FLD-CCNC: 11 U/L — SIGNIFICANT CHANGE UP (ref 0–41)
ANION GAP SERPL CALC-SCNC: 24 MMOL/L — HIGH (ref 7–14)
AST SERPL-CCNC: 19 U/L — SIGNIFICANT CHANGE UP (ref 0–41)
BILIRUB SERPL-MCNC: 0.3 MG/DL — SIGNIFICANT CHANGE UP (ref 0.2–1.2)
BUN SERPL-MCNC: 55 MG/DL — HIGH (ref 10–20)
CALCIUM SERPL-MCNC: 9.7 MG/DL — SIGNIFICANT CHANGE UP (ref 8.5–10.1)
CHLORIDE SERPL-SCNC: 90 MMOL/L — LOW (ref 98–110)
CO2 SERPL-SCNC: 24 MMOL/L — SIGNIFICANT CHANGE UP (ref 17–32)
CREAT SERPL-MCNC: 7.6 MG/DL — CRITICAL HIGH (ref 0.7–1.5)
D DIMER BLD IA.RAPID-MCNC: 537 NG/ML DDU — HIGH (ref 0–230)
GLUCOSE BLDC GLUCOMTR-MCNC: 205 MG/DL — HIGH (ref 70–99)
GLUCOSE BLDC GLUCOMTR-MCNC: 307 MG/DL — HIGH (ref 70–99)
GLUCOSE BLDC GLUCOMTR-MCNC: 365 MG/DL — HIGH (ref 70–99)
GLUCOSE BLDC GLUCOMTR-MCNC: 383 MG/DL — HIGH (ref 70–99)
GLUCOSE SERPL-MCNC: 444 MG/DL — HIGH (ref 70–99)
HCT VFR BLD CALC: 35.9 % — LOW (ref 37–47)
HGB BLD-MCNC: 12.2 G/DL — SIGNIFICANT CHANGE UP (ref 12–16)
MCHC RBC-ENTMCNC: 28.5 PG — SIGNIFICANT CHANGE UP (ref 27–31)
MCHC RBC-ENTMCNC: 34 G/DL — SIGNIFICANT CHANGE UP (ref 32–37)
MCV RBC AUTO: 83.9 FL — SIGNIFICANT CHANGE UP (ref 81–99)
NRBC # BLD: 0 /100 WBCS — SIGNIFICANT CHANGE UP (ref 0–0)
PLATELET # BLD AUTO: 261 K/UL — SIGNIFICANT CHANGE UP (ref 130–400)
POTASSIUM SERPL-MCNC: 5.1 MMOL/L — HIGH (ref 3.5–5)
POTASSIUM SERPL-SCNC: 5.1 MMOL/L — HIGH (ref 3.5–5)
PROT SERPL-MCNC: 8.4 G/DL — HIGH (ref 6–8)
RBC # BLD: 4.28 M/UL — SIGNIFICANT CHANGE UP (ref 4.2–5.4)
RBC # FLD: 17.2 % — HIGH (ref 11.5–14.5)
SODIUM SERPL-SCNC: 138 MMOL/L — SIGNIFICANT CHANGE UP (ref 135–146)
VALPROATE SERPL-MCNC: 58 UG/ML — SIGNIFICANT CHANGE UP (ref 50–100)
WBC # BLD: 5.98 K/UL — SIGNIFICANT CHANGE UP (ref 4.8–10.8)
WBC # FLD AUTO: 5.98 K/UL — SIGNIFICANT CHANGE UP (ref 4.8–10.8)

## 2020-04-17 PROCEDURE — 99233 SBSQ HOSP IP/OBS HIGH 50: CPT

## 2020-04-17 RX ORDER — INSULIN LISPRO 100/ML
4 VIAL (ML) SUBCUTANEOUS ONCE
Refills: 0 | Status: COMPLETED | OUTPATIENT
Start: 2020-04-17 | End: 2020-04-17

## 2020-04-17 RX ORDER — VALPROIC ACID (AS SODIUM SALT) 250 MG/5ML
750 SOLUTION, ORAL ORAL
Refills: 0 | Status: DISCONTINUED | OUTPATIENT
Start: 2020-04-17 | End: 2020-04-24

## 2020-04-17 RX ORDER — LABETALOL HCL 100 MG
10 TABLET ORAL ONCE
Refills: 0 | Status: COMPLETED | OUTPATIENT
Start: 2020-04-17 | End: 2020-04-17

## 2020-04-17 RX ORDER — INSULIN HUMAN 100 [IU]/ML
4 INJECTION, SOLUTION SUBCUTANEOUS ONCE
Refills: 0 | Status: COMPLETED | OUTPATIENT
Start: 2020-04-17 | End: 2020-04-17

## 2020-04-17 RX ADMIN — Medication 5 UNIT(S): at 17:17

## 2020-04-17 RX ADMIN — Medication 50 MILLIGRAM(S): at 13:48

## 2020-04-17 RX ADMIN — Medication 5: at 17:21

## 2020-04-17 RX ADMIN — Medication 28.75 MILLIGRAM(S): at 22:10

## 2020-04-17 RX ADMIN — INSULIN HUMAN 4 UNIT(S): 100 INJECTION, SOLUTION SUBCUTANEOUS at 23:21

## 2020-04-17 RX ADMIN — Medication 81 MILLIGRAM(S): at 13:48

## 2020-04-17 RX ADMIN — GABAPENTIN 300 MILLIGRAM(S): 400 CAPSULE ORAL at 13:48

## 2020-04-17 RX ADMIN — Medication 10 MILLIGRAM(S): at 06:08

## 2020-04-17 RX ADMIN — HEPARIN SODIUM 5000 UNIT(S): 5000 INJECTION INTRAVENOUS; SUBCUTANEOUS at 13:48

## 2020-04-17 RX ADMIN — Medication 28.75 MILLIGRAM(S): at 03:06

## 2020-04-17 RX ADMIN — Medication 40 MILLIGRAM(S): at 08:23

## 2020-04-17 RX ADMIN — Medication 667 MILLIGRAM(S): at 13:48

## 2020-04-17 NOTE — PROGRESS NOTE ADULT - SUBJECTIVE AND OBJECTIVE BOX
PHILIPPE SMTIH  59y Female    CHIEF COMPLAINT:    Patient is a 59y old  Female who presents with a chief complaint of Febrile illness (17 Apr 2020 10:48)      INTERVAL HPI/OVERNIGHT EVENTS:    Patient seen and examined. Awake but confused. Opens her eyes but not answering questions. Pt is refusing to take her meds. BP is running high.     ROS: All other systems are negative.    Vital Signs:    T(F): 98.7 (04-17-20 @ 05:46), Max: 99.2 (04-16-20 @ 22:12)  HR: 76 (04-17-20 @ 09:00) (71 - 78)  BP: 187/81 (04-17-20 @ 09:00) (141/62 - 213/95)  RR: 18 (04-17-20 @ 08:30) (18 - 20)  SpO2: 96% (04-16-20 @ 19:40) (96% - 96%)  I&O's Summary    16 Apr 2020 07:01  -  17 Apr 2020 07:00  --------------------------------------------------------  IN: 420 mL / OUT: 0 mL / NET: 420 mL      Daily     Daily   CAPILLARY BLOOD GLUCOSE      POCT Blood Glucose.: 205 mg/dL (17 Apr 2020 12:14)  POCT Blood Glucose.: 381 mg/dL (16 Apr 2020 21:31)  POCT Blood Glucose.: 419 mg/dL (16 Apr 2020 18:01)      PHYSICAL EXAM:    GENERAL:  NAD  SKIN: No rashes or lesions  HENT: Atraumatic Normocephalic. PERRL. Moist membranes.  NECK: Supple, No JVD. No lymphadenopathy.  PULMONARY: CTA B/L. No wheezing. No rales  CVS: Normal S1, S2. Rate and Rhythm are regular. No murmurs.  ABDOMEN/GI: Soft, Nontender, Nondistended; BS present  EXTREMITIES: Peripheral pulses intact. Lt BKA  NEUROLOGIC:  No motor or sensory deficit.  PSYCH: Alert & oriented x 0    Consultant(s) Notes Reviewed:  [x ] YES  [ ] NO  Care Discussed with Consultants/Other Providers [ x] YES  [ ] NO    EKG reviewed  Telemetry reviewed    LABS:                        12.2   5.98  )-----------( 261      ( 17 Apr 2020 10:29 )             35.9     04-17    138  |  90<L>  |  55<H>  ----------------------------<  444<H>  5.1<H>   |  24  |  7.6<HH>    Ca    9.7      17 Apr 2020 10:29    TPro  8.4<H>  /  Alb  4.4  /  TBili  0.3  /  DBili  x   /  AST  19  /  ALT  11  /  AlkPhos  62  04-17      Serum Pro-Brain Natriuretic Peptide: 10582 pg/mL (04-13-20 @ 16:39)    Trop 0.52, CKMB --, CK --, 04-15-20 @ 11:38        RADIOLOGY & ADDITIONAL TESTS:      Imaging or report Personally Reviewed:  [ ] YES  [ ] NO    Medications:  Standing  aspirin  chewable 81 milliGRAM(s) Oral daily  atorvastatin 40 milliGRAM(s) Oral at bedtime  calcium acetate 667 milliGRAM(s) Oral three times a day with meals  carvedilol 6.25 milliGRAM(s) Oral every 12 hours  chlorhexidine 4% Liquid 1 Application(s) Topical <User Schedule>  darbepoetin Injectable Syringe 40 MICROGram(s) SubCutaneous every 7 days  dextrose 5%. 1000 milliLiter(s) IV Continuous <Continuous>  dextrose 50% Injectable 12.5 Gram(s) IV Push once  dextrose 50% Injectable 25 Gram(s) IV Push once  dextrose 50% Injectable 25 Gram(s) IV Push once  gabapentin 300 milliGRAM(s) Oral daily  heparin  Injectable 5000 Unit(s) SubCutaneous every 8 hours  hydrALAZINE 50 milliGRAM(s) Oral three times a day  insulin glargine Injectable (LANTUS) 15 Unit(s) SubCutaneous at bedtime  insulin lispro (HumaLOG) corrective regimen sliding scale   SubCutaneous three times a day before meals  insulin lispro Injectable (HumaLOG) 5 Unit(s) SubCutaneous three times a day before meals  methylPREDNISolone sodium succinate Injectable 40 milliGRAM(s) IV Push every 12 hours    PRN Meds  acetaminophen   Tablet .. 650 milliGRAM(s) Oral every 6 hours PRN  ALBUTerol    90 MICROgram(s) HFA Inhaler 2 Puff(s) Inhalation every 6 hours PRN  benzonatate 100 milliGRAM(s) Oral every 8 hours PRN  bisacodyl Suppository 10 milliGRAM(s) Rectal daily PRN  dextrose 40% Gel 15 Gram(s) Oral once PRN  diphenhydrAMINE 25 milliGRAM(s) Oral every 6 hours PRN  glucagon  Injectable 1 milliGRAM(s) IntraMuscular once PRN  ondansetron    Tablet 8 milliGRAM(s) Oral two times a day PRN  polyethylene glycol 3350 17 Gram(s) Oral daily PRN      Case discussed with resident    Care discussed with pt/family

## 2020-04-17 NOTE — CONSULT NOTE ADULT - SUBJECTIVE AND OBJECTIVE BOX
PHILIPPE SMITH  59y, Female  Allergy: No Known Allergies      CHIEF COMPLAINT: Febrile illness (16 Apr 2020 14:06)      LOS  4d    HPI:  59 year old female with PMH of ESRD on HD (M/W/F), DM II, HTN, DLD, PVD s/p LLE BKA, anxiety brought in by ambulance from [facility] after completing hemodialysis today for AMS. History was limited due to current mental status. Patient is a permanent resident at [facility]. The NH was contacted and as per staff patient had fever since 4/8/20, was hypoxic to SpO2 87% requiring 3 LPM of O2 was started on Plaquenil + Azithromycin on 4/11. As per staff, patient likely had positive exposure with many COVID+ residents. Today after completing HD she was altered.     In ED: Febrile Tmax 102.9 F. Lymphopenia. D-dimer 1263. Troponin 0.77 (previous 0.44). QTc 445. SpO2 97% on 3 LPM NCO2  CT head: subacute/chronic infarct of the right posterior parietal/occipital lobe  CXR with bilateral increased perihilar markings. Pulmonary edema vs COVID-19 pneumonia  Given: Vancomycin 1 gm and Cefepime 2 gm IV once. (13 Apr 2020 23:27)      INFECTIOUS DISEASE HISTORY:  SARS-CoV-2 POSITIVE   CXR decreased opacities     PAST MEDICAL & SURGICAL HISTORY:  Type 2 diabetes mellitus  CHF (congestive heart failure)  End stage renal failure on dialysis  Hypertension  Hyperlipemia  Heart failure  Amputation of leg: left  History of femoral angiogram: left angiogram  Port-a-cath in place: right chest wall      FAMILY HISTORY  No pertinent family history in first degree relatives      SOCIAL HISTORY  Social History:  Unable to obtain due to mental status (13 Apr 2020 23:27)        ROS  General: Denies rigors, nightsweats  HEENT: Denies headache, rhinorrhea, sore throat, eye pain  CV: Denies CP, palpitations  PULM: as noted above   GI: Denies hematemesis, hematochezia, melena  : Denies discharge, hematuria  MSK: Denies arthralgias, myalgias  SKIN: Denies rash, lesions  NEURO: Denies paresthesias, weakness  PSYCH: Denies depression, anxiety    VITALS:  T(F): 98.7, Max: 99.2 (04-16-20 @ 22:12)  HR: 74  BP: 213/95  RR: 20Vital Signs Last 24 Hrs  T(C): 37.1 (17 Apr 2020 05:46), Max: 37.3 (16 Apr 2020 14:00)  T(F): 98.7 (17 Apr 2020 05:46), Max: 99.2 (16 Apr 2020 22:12)  HR: 74 (17 Apr 2020 05:46) (71 - 78)  BP: 213/95 (17 Apr 2020 05:46) (141/62 - 213/95)  BP(mean): --  RR: 20 (17 Apr 2020 05:46) (18 - 20)  SpO2: 96% (16 Apr 2020 19:40) (86% - 96%)    PHYSICAL EXAM:  Gen: on NC  HEENT: NCAT  Resp: b/l chest expansion  Neuro: nonfocal   L BKA  HD line    TESTS & MEASUREMENTS:                  Culture - Blood (collected 04-13-20 @ 16:39)  Source: .Blood Blood-Peripheral  Preliminary Report (04-15-20 @ 01:01):    No growth to date.    Culture - Blood (collected 04-13-20 @ 16:39)  Source: .Blood Blood-Peripheral  Preliminary Report (04-15-20 @ 01:01):    No growth to date.        Blood Gas Venous - Lactate: 1.2 mmoL/L (04-13-20 @ 17:49)  Lactate, Blood: 1.0 mmol/L (04-13-20 @ 16:39)      INFECTIOUS DISEASES TESTING  Procalcitonin, Serum: 3.99 ng/mL (04-15-20 @ 11:38)  Procalcitonin, Serum: 3.68 ng/mL (04-13-20 @ 16:39)  COVID-19 PCR: Detected (04-13-20 @ 16:30)      RADIOLOGY & ADDITIONAL TESTS:  I have personally reviewed the last Chest xray  CXR      CT      CARDIOLOGY TESTING  12 Lead ECG:   Ventricular Rate 71 BPM    Atrial Rate 71 BPM    P-R Interval 152 ms    QRS Duration 84 ms    Q-T Interval 410 ms    QTC Calculation(Bezet) 445 ms    P Axis 59 degrees    R Axis 76 degrees    T Axis 49 degrees    Diagnosis Line Normal sinus rhythm  Possible Left atrial enlargement  Borderline ECG    Confirmed by JESSE ART MD ([id number]) on 4/13/2020 6:00:03 PM (04-13-20 @ 16:55)      MEDICATIONS  aspirin  chewable 81  atorvastatin 40  calcium acetate 667  carvedilol 6.25  chlorhexidine 4% Liquid 1  darbepoetin Injectable Syringe 40  dextrose 5%. 1000  dextrose 50% Injectable 12.5  dextrose 50% Injectable 25  dextrose 50% Injectable 25  gabapentin 300  heparin  Injectable 5000  hydrALAZINE 50  insulin glargine Injectable (LANTUS) 15  insulin lispro (HumaLOG) corrective regimen sliding scale   insulin lispro Injectable (HumaLOG) 5  methylPREDNISolone sodium succinate Injectable 40      Weight  Weight (kg): 68 (04-15-20 @ 20:04)    ANTIBIOTICS:      ALLERGIES:  No Known Allergies

## 2020-04-17 NOTE — CONSULT NOTE ADULT - ASSESSMENT
ASSESSMENT  59 year old female with PMH of ESRD on HD (M/W/F), DM II, HTN, DLD, PVD s/p LLE BKA, anxiety brought in by ambulance from [facility] after completing hemodialysis for AMS. History was limited due to current mental status. Patient is a permanent resident at [facility]. The NH was contacted and as per staff patient had fever since 4/8/20, was hypoxic to SpO2 87% requiring 3 LPM of O2 was started on Plaquenil + Azithromycin on 4/11. As per staff, patient likely had positive exposure with many COVID+ residents.     IMPRESSION  # COVID-19 PNA     CXR decreased bilateral opacities     Procalcitonin, Serum: 3.99 ng/mL (04-15-20 @ 11:38)    4/13 BCX NGTD     on NC  # Fever on admission T>101   # ESRD on HD  #Cytokine Release Syndrome   C-Reactive Protein, Serum: 16.73 mg/dL (04-15-20 @ 11:38)  Ferritin, Serum: 1142 ng/mL (04-15-20 @ 11:38)  D-Dimer Assay, Quantitative: 1263 ng/mL DDU (04.13.20 @ 16:39)    RECOMMENDATIONS  - repeat procalcitonin as rising, repeat BCX (cxr improving)  - Currently on NC, does not meet requirements for anakinra  - Trial colchicine 0.6mg BID x 5 days, monitor Cr and for diarrhea (if diarrhea can decrease to daily)   - on [facility]    Spectra [phone] ASSESSMENT  59 year old female with PMH of ESRD on HD (M/W/F), DM II, HTN, DLD, PVD s/p LLE BKA, anxiety brought in by ambulance from [facility] after completing hemodialysis for AMS. History was limited due to current mental status. Patient is a permanent resident at [facility]. The NH was contacted and as per staff patient had fever since 4/8/20, was hypoxic to SpO2 87% requiring 3 LPM of O2 was started on Plaquenil + Azithromycin on 4/11. As per staff, patient likely had positive exposure with many COVID+ residents.     IMPRESSION  # COVID-19 PNA     CXR decreased bilateral opacities     Procalcitonin, Serum: 3.99 ng/mL (04-15-20 @ 11:38)    4/13 BCX NGTD     on NC  # Fever on admission T>101   # ESRD on HD  #Cytokine Release Syndrome   C-Reactive Protein, Serum: 16.73 mg/dL (04-15-20 @ 11:38)  Ferritin, Serum: 1142 ng/mL (04-15-20 @ 11:38)  D-Dimer Assay, Quantitative: 1263 ng/mL DDU (04.13.20 @ 16:39)    RECOMMENDATIONS  - repeat procalcitonin as rising, repeat BCX (cxr improving)  - Currently on NC, does not meet requirements for anakinra  - Trial colchicine 0.6mg x1 given HD patient   - on [facility]    Spectra [phone] ASSESSMENT  59 year old female with PMH of ESRD on HD (M/W/F), DM II, HTN, DLD, PVD s/p LLE BKA, anxiety brought in by ambulance from [facility] after completing hemodialysis for AMS. History was limited due to current mental status. Patient is a permanent resident at [facility]. The NH was contacted and as per staff patient had fever since 4/8/20, was hypoxic to SpO2 87% requiring 3 LPM of O2 was started on Plaquenil + Azithromycin on 4/11. As per staff, patient likely had positive exposure with many COVID+ residents.     IMPRESSION  # COVID-19 PNA     CXR decreased bilateral opacities     Procalcitonin, Serum: 3.99 ng/mL (04-15-20 @ 11:38)    4/13 BCX NGTD     on NC  # Fever on admission T>101   # < from: CT Head No Cont (04.15.20 @ 15:56) >Redemonstration of right posterior temporal/occipital lobe infarction.     EEG with possible epileptiform activity  #Metabolic encephalopathy  # ESRD on HD  #Cytokine Release Syndrome   C-Reactive Protein, Serum: 16.73 mg/dL (04-15-20 @ 11:38)  Ferritin, Serum: 1142 ng/mL (04-15-20 @ 11:38)  D-Dimer Assay, Quantitative: 1263 ng/mL DDU (04.13.20 @ 16:39)    RECOMMENDATIONS  - repeat procalcitonin as rising, ddimer, CRP, ferritin, CBC, BMP  - Currently on NC, does not meet requirements for anakinra  - on [facility]    Spectra [phone]

## 2020-04-17 NOTE — PROGRESS NOTE ADULT - SUBJECTIVE AND OBJECTIVE BOX
Nephrology progress note    Patient was seen and examined on HD, events over the last 24 h noted .    Allergies:  No Known Allergies    Hospital Medications:   MEDICATIONS  (STANDING):  aspirin  chewable 81 milliGRAM(s) Oral daily  atorvastatin 40 milliGRAM(s) Oral at bedtime  calcium acetate 667 milliGRAM(s) Oral three times a day with meals  carvedilol 6.25 milliGRAM(s) Oral every 12 hours  chlorhexidine 4% Liquid 1 Application(s) Topical <User Schedule>  darbepoetin Injectable Syringe 40 MICROGram(s) SubCutaneous every 7 days  dextrose 5%. 1000 milliLiter(s) (50 mL/Hr) IV Continuous <Continuous>  dextrose 50% Injectable 12.5 Gram(s) IV Push once  dextrose 50% Injectable 25 Gram(s) IV Push once  dextrose 50% Injectable 25 Gram(s) IV Push once  gabapentin 300 milliGRAM(s) Oral daily  heparin  Injectable 5000 Unit(s) SubCutaneous every 8 hours  hydrALAZINE 50 milliGRAM(s) Oral three times a day  insulin glargine Injectable (LANTUS) 15 Unit(s) SubCutaneous at bedtime  insulin lispro (HumaLOG) corrective regimen sliding scale   SubCutaneous three times a day before meals  insulin lispro Injectable (HumaLOG) 5 Unit(s) SubCutaneous three times a day before meals  methylPREDNISolone sodium succinate Injectable 40 milliGRAM(s) IV Push every 12 hours        VITALS:  T(F): 98.7 (04-17-20 @ 05:46), Max: 99.2 (04-16-20 @ 22:12)  HR: 76 (04-17-20 @ 09:00)  BP: 187/81 (04-17-20 @ 09:00)  RR: 18 (04-17-20 @ 08:30)  SpO2: 96% (04-16-20 @ 19:40)  Wt(kg): --    04-15 @ 07:01  -  04-16 @ 07:00  --------------------------------------------------------  IN: 460 mL / OUT: 1000 mL / NET: -540 mL    04-16 @ 07:01  -  04-17 @ 07:00  --------------------------------------------------------  IN: 420 mL / OUT: 0 mL / NET: 420 mL          PHYSICAL EXAM:  Constitutional: NAD  HEENT: anicteric sclera, oropharynx clear, MMM  Neck: No JVD  Respiratory: CTAB, no wheezes, rales or rhonchi  Cardiovascular: S1, S2, RRR  Gastrointestinal: BS+, soft, NT/ND  Extremities: No cyanosis or clubbing. No peripheral edema  :  No anderson.   Skin: No rashes    LABS:                              12.2   5.98  )-----------( 261      ( 17 Apr 2020 10:29 )             35.9       Urine Studies:      RADIOLOGY & ADDITIONAL STUDIES:

## 2020-04-17 NOTE — PROGRESS NOTE ADULT - ASSESSMENT
59 year old female with PMH of ESRD on HD (M/W/F), DM II, HTN, DLD, PVD s/p LLE BKA, anxiety presenting with confusion / fever     ·	ESRD on HD   ·	HD today 3 hrs 2 K bath  ·	check IP and PTH   ·	Fever chills confusion covid positive  on HQ and azithromycin/   ·	CT scan findings noted / neuro eval appreciated for CTA head with contrast / on VPA check levels   ·	HTN noted target SBP between 140-160 / increase hydralazine to75 q8h/ keep on coreg

## 2020-04-17 NOTE — PROGRESS NOTE ADULT - SUBJECTIVE AND OBJECTIVE BOX
24H events:    Patient is a 59y old Female who presents with a chief complaint of Febrile illness (17 Apr 2020 12:25)    Primary diagnosis of Febrile illness     Today is hospital day 4d.     PAST MEDICAL & SURGICAL HISTORY  Type 2 diabetes mellitus  CHF (congestive heart failure)  End stage renal failure on dialysis  Hypertension  Hyperlipemia  Heart failure  Amputation of leg: left  History of femoral angiogram: left angiogram  Port-a-cath in place: right chest wall    SOCIAL HISTORY:  Negative for smoking/alcohol/drug use.     ALLERGIES:  No Known Allergies    MEDICATIONS:  STANDING MEDICATIONS  aspirin  chewable 81 milliGRAM(s) Oral daily  atorvastatin 40 milliGRAM(s) Oral at bedtime  calcium acetate 667 milliGRAM(s) Oral three times a day with meals  carvedilol 6.25 milliGRAM(s) Oral every 12 hours  chlorhexidine 4% Liquid 1 Application(s) Topical <User Schedule>  darbepoetin Injectable Syringe 40 MICROGram(s) SubCutaneous every 7 days  dextrose 5%. 1000 milliLiter(s) IV Continuous <Continuous>  dextrose 50% Injectable 12.5 Gram(s) IV Push once  dextrose 50% Injectable 25 Gram(s) IV Push once  dextrose 50% Injectable 25 Gram(s) IV Push once  gabapentin 300 milliGRAM(s) Oral daily  heparin  Injectable 5000 Unit(s) SubCutaneous every 8 hours  hydrALAZINE 50 milliGRAM(s) Oral three times a day  insulin glargine Injectable (LANTUS) 15 Unit(s) SubCutaneous at bedtime  insulin lispro (HumaLOG) corrective regimen sliding scale   SubCutaneous three times a day before meals  insulin lispro Injectable (HumaLOG) 5 Unit(s) SubCutaneous three times a day before meals    PRN MEDICATIONS  acetaminophen   Tablet .. 650 milliGRAM(s) Oral every 6 hours PRN  ALBUTerol    90 MICROgram(s) HFA Inhaler 2 Puff(s) Inhalation every 6 hours PRN  benzonatate 100 milliGRAM(s) Oral every 8 hours PRN  bisacodyl Suppository 10 milliGRAM(s) Rectal daily PRN  dextrose 40% Gel 15 Gram(s) Oral once PRN  diphenhydrAMINE 25 milliGRAM(s) Oral every 6 hours PRN  glucagon  Injectable 1 milliGRAM(s) IntraMuscular once PRN  ondansetron    Tablet 8 milliGRAM(s) Oral two times a day PRN  polyethylene glycol 3350 17 Gram(s) Oral daily PRN    VITALS:   T(F): 98.7  HR: 71  BP: 126/86  RR: 18  SpO2: 96%    LABS:                        12.2   5.98  )-----------( 261      ( 17 Apr 2020 10:29 )             35.9     04-17    138  |  90<L>  |  55<H>  ----------------------------<  444<H>  5.1<H>   |  24  |  7.6<HH>    Ca    9.7      17 Apr 2020 10:29    TPro  8.4<H>  /  Alb  4.4  /  TBili  0.3  /  DBili  x   /  AST  19  /  ALT  11  /  AlkPhos  62  04-17        PHYSICAL EXAM:  GENERAL:  NAD  SKIN: No rashes or lesions  HENT: Atraumatic Normocephalic. PERRL. Moist membranes.  NECK: Supple, No JVD. No lymphadenopathy.  PULMONARY: CTA B/L. No wheezing. No rales  CVS: Normal S1, S2. Rate and Rhythm are regular. No murmurs.  ABDOMEN/GI: Soft, Nontender, Nondistended; BS present  EXTREMITIES: Peripheral pulses intact. Lt BKA  NEUROLOGIC:  No motor or sensory deficit.  PSYCH: Alert & oriented x 0

## 2020-04-17 NOTE — PROGRESS NOTE ADULT - ASSESSMENT
59 year old female with PMH of ESRD on HD (M/W/F), DM II, HTN, DLD, PVD s/p LLE BKA, anxiety brought in by ambulance from [facility] after completing hemodialysis today for AMS    # AMS / metabolic encephalopathy / Subacute vs chronic CVA  Possibly multifactorial: SARS-nCov2 pneumonia, subacute/chronic infarct  - CT head: subacute/chronic infarct of the right posterior parietal/occipital lobe  - Neurology eval appreciated  - REEG, CTA head/neck (f/u attending recs), 2D-echo  -Patientis on Valproic acid 750 mg IV BID. Levels are therapeutic      # COVID-19 pneumonia / Acute hypoxemic respiratory failure (SpO2 88% on RA at NH)  - Sepsis was not present on admission  - Febrile Tmax 102.9 F. Lymphopenia. D-dimer 1263--500s. rest of the inflammatory markers are still pending.   - CXR with bilateral increased perihilar markings.   -COVID positive  - ECG: QTc baseline: 445  - O2 requirement: 3 LPM NC O2, SpO2 95%  - COVID positive  -Inflammatory markers were sent   - Was started on Plaquenil + Azithromycin at NH  - s/p Vancomycin 1 gm and Cefepime 2 gm IV once in ED  -Repeat Chest xray  -Spo2 is 95% on 3 L      FULL CODE per NH documentation    Treatment:  - Continue Azithromycin + Plaquenil (started on 4/11 at NH) to complete 5 days  - Tylenol PRN for fever  - Cough: Tessalon 100mg po q8 prn cough  - Albuterol MDI 1 puff po q4 prn wheeze (or Combivent); avoid steroids, if possible    # Troponinemia; likely ESRD and demand ischemia from acute viral illness  - Troponin 0.77 (previous 0.44); EKG with no acute ischemic changes  - Repeat Troponin     # ESRD on HD (M/W/F)  - HAGMA likely due to ESRD. Bicarb WNL  - Nephro is following up, recs appreciated  - Continue Calcium acetate  -IPH sent.     # DM II: check fingersticks and continue same insulin regimen as home    # HTN: continue Carvedilol and Hydralazine

## 2020-04-17 NOTE — PROGRESS NOTE ADULT - ASSESSMENT
59 year old female with PMH of ESRD on HD (M/W/F), DM II, HTN, DLD, PVD s/p LLE BKA, anxiety brought in by ambulance from [facility] after completing hemodialysis today for AMS. As per NH staff patient had fever since 4/8/20, was hypoxic to SpO2 87% requiring 3 LPM of O2 was started on Plaquenil + Azithromycin on 4/11.       1.	Toxic/Metabolic encephalopathy  2.	B/L viral PNA due to COVID-19  3.	Cytokine Release Syndrome  4.	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   5.	ESRD on HD  6.	DM-2 / HTN / DL  7.	PVD S/P LLE BKA         PLAN:    ·	As pt is refusing to take oral meds, will switch her iv antihypertensives   ·	Pt remains confused. Neuro F/U  ·	S/P VEEG. Showed seizure activity. On IV Valproic acid. Check the level  ·	Seizure precautions.   ·	D-dimer is 1263, Fibrinogen >700, Ferritin 1025, , Procalcitonin 3.68 and Trop is 0.77  ·	Inflammatory markers werd very high on admission  ·	ID eval noted. Recommended to repeat markers.   ·	CXR from 4/16 showed decreased intersitial opacities. bbb   ·	Pro BNP on admission was 59887  ·	Repeat Troponin. Elevated Troponin could be due to ESRD or myocarditis.   ·	CT head reviewed. Subacute/chronic infarct. Cannot do MRI due to COVID positive  ·	Cont ASA and Atorvastatin 40 mg po qhs.   ·	Hold Lorazepam  ·	Monitor FS. Start her on Lantus 15 units qhs and Humalog 5 units before meals  ·	Blood cx x 1 is negative.   ·	Called pt's daughter Chai [phone] and updated her about her mother's condition  ·	Poor prognosis

## 2020-04-18 LAB
CRP SERPL-MCNC: 7.41 MG/DL — HIGH (ref 0–0.4)
GLUCOSE BLDC GLUCOMTR-MCNC: 266 MG/DL — HIGH (ref 70–99)
GLUCOSE BLDC GLUCOMTR-MCNC: 343 MG/DL — HIGH (ref 70–99)
GLUCOSE BLDC GLUCOMTR-MCNC: 439 MG/DL — HIGH (ref 70–99)
GLUCOSE BLDC GLUCOMTR-MCNC: 476 MG/DL — CRITICAL HIGH (ref 70–99)
GLUCOSE BLDC GLUCOMTR-MCNC: 551 MG/DL — CRITICAL HIGH (ref 70–99)
GLUCOSE BLDC GLUCOMTR-MCNC: 554 MG/DL — CRITICAL HIGH (ref 70–99)
GLUCOSE BLDC GLUCOMTR-MCNC: 566 MG/DL — CRITICAL HIGH (ref 70–99)
GLUCOSE BLDC GLUCOMTR-MCNC: >600 MG/DL — CRITICAL HIGH (ref 70–99)
GLUCOSE BLDC GLUCOMTR-MCNC: >600 MG/DL — CRITICAL HIGH (ref 70–99)
PROCALCITONIN SERPL-MCNC: 2.67 NG/ML — HIGH (ref 0.02–0.1)

## 2020-04-18 PROCEDURE — 99233 SBSQ HOSP IP/OBS HIGH 50: CPT

## 2020-04-18 RX ORDER — INSULIN LISPRO 100/ML
6 VIAL (ML) SUBCUTANEOUS ONCE
Refills: 0 | Status: COMPLETED | OUTPATIENT
Start: 2020-04-18 | End: 2020-04-18

## 2020-04-18 RX ORDER — INSULIN LISPRO 100/ML
10 VIAL (ML) SUBCUTANEOUS ONCE
Refills: 0 | Status: COMPLETED | OUTPATIENT
Start: 2020-04-18 | End: 2020-04-18

## 2020-04-18 RX ADMIN — GABAPENTIN 300 MILLIGRAM(S): 400 CAPSULE ORAL at 13:00

## 2020-04-18 RX ADMIN — Medication 10 UNIT(S): at 18:43

## 2020-04-18 RX ADMIN — Medication 6: at 17:06

## 2020-04-18 RX ADMIN — Medication 28.75 MILLIGRAM(S): at 22:50

## 2020-04-18 RX ADMIN — Medication 6 UNIT(S): at 23:45

## 2020-04-18 RX ADMIN — Medication 28.75 MILLIGRAM(S): at 12:00

## 2020-04-18 RX ADMIN — Medication 6 UNIT(S): at 20:45

## 2020-04-18 RX ADMIN — Medication 81 MILLIGRAM(S): at 13:00

## 2020-04-18 RX ADMIN — HEPARIN SODIUM 5000 UNIT(S): 5000 INJECTION INTRAVENOUS; SUBCUTANEOUS at 13:00

## 2020-04-18 RX ADMIN — Medication 10 UNIT(S): at 19:35

## 2020-04-18 RX ADMIN — Medication 5 UNIT(S): at 17:06

## 2020-04-18 RX ADMIN — Medication 50 MILLIGRAM(S): at 13:00

## 2020-04-18 RX ADMIN — INSULIN GLARGINE 15 UNIT(S): 100 INJECTION, SOLUTION SUBCUTANEOUS at 22:49

## 2020-04-18 NOTE — PROGRESS NOTE ADULT - ATTENDING COMMENTS
Patient seen and examined independently. Agree with resident note/ history / physical exam and plan of care with following exceptions/additions/updates. Case discussed with house-staff, nursing and patient/pt decision maker. above notes edited    dw neurology, cont valporic acid. they will follow pt.     poor prognosis   as per neurology this mental status is almost her baseline.   Full code Patient seen and examined independently. Agree with resident note/ history / physical exam and plan of care with following exceptions/additions/updates. Case discussed with house-staff, nursing and patient/pt decision maker. above notes edited    dw neurology, cont valporic acid. they will follow pt.     poor prognosis   as per neurology this mental status is almost her baseline. per daughter she is usually more alert.   Full code  spoke with daughter @[phone]

## 2020-04-18 NOTE — PROGRESS NOTE ADULT - SUBJECTIVE AND OBJECTIVE BOX
24H events:    Patient is a 59y old Female who presents with a chief complaint of Febrile illness (17 Apr 2020 13:07)    Primary diagnosis of Febrile illness     Today is hospital day 5d.     PAST MEDICAL & SURGICAL HISTORY  Type 2 diabetes mellitus  CHF (congestive heart failure)  End stage renal failure on dialysis  Hypertension  Hyperlipemia  Heart failure  Amputation of leg: left  History of femoral angiogram: left angiogram  Port-a-cath in place: right chest wall    SOCIAL HISTORY:  Negative for smoking/alcohol/drug use.     ALLERGIES:  No Known Allergies    MEDICATIONS:  STANDING MEDICATIONS  aspirin  chewable 81 milliGRAM(s) Oral daily  atorvastatin 40 milliGRAM(s) Oral at bedtime  calcium acetate 667 milliGRAM(s) Oral three times a day with meals  carvedilol 6.25 milliGRAM(s) Oral every 12 hours  chlorhexidine 4% Liquid 1 Application(s) Topical <User Schedule>  darbepoetin Injectable Syringe 40 MICROGram(s) SubCutaneous every 7 days  dextrose 5%. 1000 milliLiter(s) IV Continuous <Continuous>  dextrose 50% Injectable 12.5 Gram(s) IV Push once  dextrose 50% Injectable 25 Gram(s) IV Push once  dextrose 50% Injectable 25 Gram(s) IV Push once  gabapentin 300 milliGRAM(s) Oral daily  heparin  Injectable 5000 Unit(s) SubCutaneous every 8 hours  hydrALAZINE 50 milliGRAM(s) Oral three times a day  insulin glargine Injectable (LANTUS) 15 Unit(s) SubCutaneous at bedtime  insulin lispro (HumaLOG) corrective regimen sliding scale   SubCutaneous three times a day before meals  insulin lispro Injectable (HumaLOG) 5 Unit(s) SubCutaneous three times a day before meals  valproate sodium IVPB 750 milliGRAM(s) IV Intermittent <User Schedule>    PRN MEDICATIONS  acetaminophen   Tablet .. 650 milliGRAM(s) Oral every 6 hours PRN  ALBUTerol    90 MICROgram(s) HFA Inhaler 2 Puff(s) Inhalation every 6 hours PRN  benzonatate 100 milliGRAM(s) Oral every 8 hours PRN  bisacodyl Suppository 10 milliGRAM(s) Rectal daily PRN  dextrose 40% Gel 15 Gram(s) Oral once PRN  diphenhydrAMINE 25 milliGRAM(s) Oral every 6 hours PRN  glucagon  Injectable 1 milliGRAM(s) IntraMuscular once PRN  ondansetron    Tablet 8 milliGRAM(s) Oral two times a day PRN  polyethylene glycol 3350 17 Gram(s) Oral daily PRN    VITALS:   T(F): 99  HR: 65  BP: 156/67  RR: 18  SpO2: 97%    LABS:                        12.2   5.98  )-----------( 261      ( 17 Apr 2020 10:29 )             35.9     04-17    138  |  90<L>  |  55<H>  ----------------------------<  444<H>  5.1<H>   |  24  |  7.6<HH>    Ca    9.7      17 Apr 2020 10:29    TPro  8.4<H>  /  Alb  4.4  /  TBili  0.3  /  DBili  x   /  AST  19  /  ALT  11  /  AlkPhos  62  04-17            PHYSICAL EXAM:  GENERAL:  NAD  SKIN: No rashes or lesions  HENT: Atraumatic Normocephalic. PERRL. Moist membranes.  NECK: Supple, No JVD. No lymphadenopathy.  PULMONARY: CTA B/L. No wheezing. No rales  CVS: Normal S1, S2. Rate and Rhythm are regular. No murmurs.  ABDOMEN/GI: Soft, Nontender, Nondistended; BS present  EXTREMITIES: Peripheral pulses intact. Lt BKA  NEUROLOGIC:  No motor or sensory deficit.  PSYCH: Alert & oriented x 0 24H events:    Patient is a 59y old Female who presents with a chief complaint of Febrile illness (17 Apr 2020 13:07)    Primary diagnosis of Febrile illness     Today is hospital day 5d.     PAST MEDICAL & SURGICAL HISTORY  Type 2 diabetes mellitus  CHF (congestive heart failure)  End stage renal failure on dialysis  Hypertension  Hyperlipemia  Heart failure  Amputation of leg: left  History of femoral angiogram: left angiogram  Port-a-cath in place: right chest wall    SOCIAL HISTORY:  Negative for smoking/alcohol/drug use.     ALLERGIES:  No Known Allergies    MEDICATIONS:  STANDING MEDICATIONS  aspirin  chewable 81 milliGRAM(s) Oral daily  atorvastatin 40 milliGRAM(s) Oral at bedtime  calcium acetate 667 milliGRAM(s) Oral three times a day with meals  carvedilol 6.25 milliGRAM(s) Oral every 12 hours  chlorhexidine 4% Liquid 1 Application(s) Topical <User Schedule>  darbepoetin Injectable Syringe 40 MICROGram(s) SubCutaneous every 7 days  dextrose 5%. 1000 milliLiter(s) IV Continuous <Continuous>  dextrose 50% Injectable 12.5 Gram(s) IV Push once  dextrose 50% Injectable 25 Gram(s) IV Push once  dextrose 50% Injectable 25 Gram(s) IV Push once  gabapentin 300 milliGRAM(s) Oral daily  heparin  Injectable 5000 Unit(s) SubCutaneous every 8 hours  hydrALAZINE 50 milliGRAM(s) Oral three times a day  insulin glargine Injectable (LANTUS) 15 Unit(s) SubCutaneous at bedtime  insulin lispro (HumaLOG) corrective regimen sliding scale   SubCutaneous three times a day before meals  insulin lispro Injectable (HumaLOG) 5 Unit(s) SubCutaneous three times a day before meals  valproate sodium IVPB 750 milliGRAM(s) IV Intermittent <User Schedule>    PRN MEDICATIONS  acetaminophen   Tablet .. 650 milliGRAM(s) Oral every 6 hours PRN  ALBUTerol    90 MICROgram(s) HFA Inhaler 2 Puff(s) Inhalation every 6 hours PRN  benzonatate 100 milliGRAM(s) Oral every 8 hours PRN  bisacodyl Suppository 10 milliGRAM(s) Rectal daily PRN  dextrose 40% Gel 15 Gram(s) Oral once PRN  diphenhydrAMINE 25 milliGRAM(s) Oral every 6 hours PRN  glucagon  Injectable 1 milliGRAM(s) IntraMuscular once PRN  ondansetron    Tablet 8 milliGRAM(s) Oral two times a day PRN  polyethylene glycol 3350 17 Gram(s) Oral daily PRN    VITALS:   T(F): 99  HR: 65  BP: 156/67  RR: 18  SpO2: 97%    LABS:                        12.2   5.98  )-----------( 261      ( 17 Apr 2020 10:29 )             35.9     04-17    138  |  90<L>  |  55<H>  ----------------------------<  444<H>  5.1<H>   |  24  |  7.6<HH>    Ca    9.7      17 Apr 2020 10:29    TPro  8.4<H>  /  Alb  4.4  /  TBili  0.3  /  DBili  x   /  AST  19  /  ALT  11  /  AlkPhos  62  04-17            PHYSICAL EXAM:  GENERAL:  NAD  SKIN: No rashes or lesions  HENT: Atraumatic Normocephalic. PERRL. Moist membranes.  NECK: Supple, No JVD. No lymphadenopathy.  PULMONARY: CTA B/L. No wheezing. No rales  CVS: Normal S1, S2. Rate and Rhythm are regular. No murmurs.  ABDOMEN/GI: Soft, Nontender, Nondistended; BS present  EXTREMITIES: Peripheral pulses intact. Lt BKA  NEUROLOGIC:  unable to assess, pt is unable to participate. left upper ext is rigid on passive motion. rt lower and left upper are not rigid and she does not have motor tone. difficult to arouse. left bka

## 2020-04-18 NOTE — PROGRESS NOTE ADULT - ASSESSMENT
59 year old female with PMH of ESRD on HD (M/W/F), DM II, HTN, DLD, PVD s/p LLE BKA, anxiety brought in by ambulance from [facility] after completing hemodialysis today for AMS    # AMS / metabolic encephalopathy / Subacute vs chronic CVA  Possibly multifactorial: SARS-nCov2 pneumonia, subacute/chronic infarct  - CT head: subacute/chronic infarct of the right posterior parietal/occipital lobe  - Neurology eval appreciated  - REEG, CTA head/neck (f/u attending recs), 2D-echo  -Patientis on Valproic acid 750 mg IV BID. Levels are therapeutic      # COVID-19 pneumonia / Acute hypoxemic respiratory failure (SpO2 88% on RA at NH)  - Sepsis was not present on admission  - Febrile Tmax 102.9 F. Lymphopenia. D-dimer 1263--500s. All inflammatory markers are trending down   -COVID positive  - ECG: QTc baseline: 445  - O2 requirement: 3 LPM NC O2, SpO2 95%  - COVID positive  -Inflammatory markers were sent   - Was started on Plaquenil + Azithromycin at NH, completerd  - s/p Vancomycin 1 gm and Cefepime 2 gm IV once in ED  -Repeat Chest xray  -Spo2 is 95% on 3 L        # Troponinemia; likely ESRD and demand ischemia from acute viral illness  - Troponin 0.77 (previous 0.44); EKG with no acute ischemic changes  - Repeat Troponin     # ESRD on HD (M/W/F)  - HAGMA likely due to ESRD. Bicarb WNL  - Nephro is following up, recs appreciated  - Continue Calcium acetate  -IPH sent.     # DM II: check fingersticks and continue same insulin regimen as home    # HTN: continue Carvedilol and Hydralazine 59 year old female with PMH of ESRD on HD (M/W/F), DM II, HTN, DLD, PVD s/p LLE BKA, anxiety brought in by ambulance from [facility] after completing hemodialysis today for AMS    # AMS / metabolic encephalopathy / Subacute vs chronic CVA, poss sz  Possibly multifactorial: SARS-nCov2 pneumonia, subacute/chronic infarct  - CT head: subacute/chronic infarct of the right posterior parietal/occipital lobe  - Neurology eval appreciated  - REEG, CTA head/neck (f/u attending recs), 2D-echo  -Patientis on Valproic acid 750 mg IV BID. Levels are therapeutic      # COVID-19 pneumonia / Acute hypoxemic respiratory failure (SpO2 88% on RA at NH)  - Sepsis was not present on admission  - Febrile Tmax 102.9 F. Lymphopenia. D-dimer 1263--500s. All inflammatory markers are trending down   -COVID positive  - ECG: QTc baseline: 445  - O2 requirement: 3 LPM NC O2, SpO2 95%  - COVID positive  -Inflammatory markers were sent   - Was started on Plaquenil + Azithromycin at NH, completerd  - s/p Vancomycin 1 gm and Cefepime 2 gm IV once in ED  -Repeat Chest xray  -Spo2 is 95% on 3 L        # Troponinemia; likely ESRD and demand ischemia from acute viral illness  - Troponin 0.77 (previous 0.44); EKG with no acute ischemic changes  - Repeat Troponin     # ESRD on HD (M/W/F)  - HAGMA likely due to ESRD. Bicarb WNL  - Nephro is following up, recs appreciated  - Continue Calcium acetate  -IPH sent.     # DM II: check fingersticks and continue same insulin regimen as home    # HTN: continue Carvedilol and Hydralazine

## 2020-04-19 LAB
ALBUMIN SERPL ELPH-MCNC: 3.9 G/DL — SIGNIFICANT CHANGE UP (ref 3.5–5.2)
ALP SERPL-CCNC: 55 U/L — SIGNIFICANT CHANGE UP (ref 30–115)
ALT FLD-CCNC: 7 U/L — SIGNIFICANT CHANGE UP (ref 0–41)
ANION GAP SERPL CALC-SCNC: 27 MMOL/L — HIGH (ref 7–14)
AST SERPL-CCNC: 12 U/L — SIGNIFICANT CHANGE UP (ref 0–41)
BASOPHILS # BLD AUTO: 0.02 K/UL — SIGNIFICANT CHANGE UP (ref 0–0.2)
BASOPHILS NFR BLD AUTO: 0.2 % — SIGNIFICANT CHANGE UP (ref 0–1)
BILIRUB SERPL-MCNC: 0.3 MG/DL — SIGNIFICANT CHANGE UP (ref 0.2–1.2)
BUN SERPL-MCNC: 90 MG/DL — CRITICAL HIGH (ref 10–20)
CALCIUM SERPL-MCNC: 9.9 MG/DL — SIGNIFICANT CHANGE UP (ref 8.5–10.1)
CHLORIDE SERPL-SCNC: 93 MMOL/L — LOW (ref 98–110)
CO2 SERPL-SCNC: 20 MMOL/L — SIGNIFICANT CHANGE UP (ref 17–32)
CREAT SERPL-MCNC: 9.4 MG/DL — CRITICAL HIGH (ref 0.7–1.5)
CULTURE RESULTS: SIGNIFICANT CHANGE UP
CULTURE RESULTS: SIGNIFICANT CHANGE UP
EOSINOPHIL # BLD AUTO: 0 K/UL — SIGNIFICANT CHANGE UP (ref 0–0.7)
EOSINOPHIL NFR BLD AUTO: 0 % — SIGNIFICANT CHANGE UP (ref 0–8)
GLUCOSE BLDC GLUCOMTR-MCNC: 114 MG/DL — HIGH (ref 70–99)
GLUCOSE BLDC GLUCOMTR-MCNC: 135 MG/DL — HIGH (ref 70–99)
GLUCOSE BLDC GLUCOMTR-MCNC: 142 MG/DL — HIGH (ref 70–99)
GLUCOSE BLDC GLUCOMTR-MCNC: 149 MG/DL — HIGH (ref 70–99)
GLUCOSE BLDC GLUCOMTR-MCNC: 220 MG/DL — HIGH (ref 70–99)
GLUCOSE SERPL-MCNC: 134 MG/DL — HIGH (ref 70–99)
HCT VFR BLD CALC: 41 % — SIGNIFICANT CHANGE UP (ref 37–47)
HGB BLD-MCNC: 13 G/DL — SIGNIFICANT CHANGE UP (ref 12–16)
IMM GRANULOCYTES NFR BLD AUTO: 1.1 % — HIGH (ref 0.1–0.3)
LYMPHOCYTES # BLD AUTO: 1.54 K/UL — SIGNIFICANT CHANGE UP (ref 1.2–3.4)
LYMPHOCYTES # BLD AUTO: 15.5 % — LOW (ref 20.5–51.1)
MCHC RBC-ENTMCNC: 26.8 PG — LOW (ref 27–31)
MCHC RBC-ENTMCNC: 31.7 G/DL — LOW (ref 32–37)
MCV RBC AUTO: 84.5 FL — SIGNIFICANT CHANGE UP (ref 81–99)
MONOCYTES # BLD AUTO: 1.28 K/UL — HIGH (ref 0.1–0.6)
MONOCYTES NFR BLD AUTO: 12.9 % — HIGH (ref 1.7–9.3)
NEUTROPHILS # BLD AUTO: 7 K/UL — HIGH (ref 1.4–6.5)
NEUTROPHILS NFR BLD AUTO: 70.3 % — SIGNIFICANT CHANGE UP (ref 42.2–75.2)
NRBC # BLD: 0 /100 WBCS — SIGNIFICANT CHANGE UP (ref 0–0)
PLATELET # BLD AUTO: 295 K/UL — SIGNIFICANT CHANGE UP (ref 130–400)
POTASSIUM SERPL-MCNC: 5.6 MMOL/L — HIGH (ref 3.5–5)
POTASSIUM SERPL-SCNC: 5.6 MMOL/L — HIGH (ref 3.5–5)
PROT SERPL-MCNC: 7.5 G/DL — SIGNIFICANT CHANGE UP (ref 6–8)
RBC # BLD: 4.85 M/UL — SIGNIFICANT CHANGE UP (ref 4.2–5.4)
RBC # FLD: 17.2 % — HIGH (ref 11.5–14.5)
SODIUM SERPL-SCNC: 140 MMOL/L — SIGNIFICANT CHANGE UP (ref 135–146)
SPECIMEN SOURCE: SIGNIFICANT CHANGE UP
SPECIMEN SOURCE: SIGNIFICANT CHANGE UP
WBC # BLD: 9.95 K/UL — SIGNIFICANT CHANGE UP (ref 4.8–10.8)
WBC # FLD AUTO: 9.95 K/UL — SIGNIFICANT CHANGE UP (ref 4.8–10.8)

## 2020-04-19 PROCEDURE — 99233 SBSQ HOSP IP/OBS HIGH 50: CPT

## 2020-04-19 RX ORDER — SODIUM CHLORIDE 9 MG/ML
1000 INJECTION INTRAMUSCULAR; INTRAVENOUS; SUBCUTANEOUS ONCE
Refills: 0 | Status: COMPLETED | OUTPATIENT
Start: 2020-04-19 | End: 2020-04-19

## 2020-04-19 RX ORDER — MIDODRINE HYDROCHLORIDE 2.5 MG/1
5 TABLET ORAL THREE TIMES A DAY
Refills: 0 | Status: DISCONTINUED | OUTPATIENT
Start: 2020-04-19 | End: 2020-04-20

## 2020-04-19 RX ORDER — SODIUM CHLORIDE 9 MG/ML
500 INJECTION, SOLUTION INTRAVENOUS ONCE
Refills: 0 | Status: COMPLETED | OUTPATIENT
Start: 2020-04-19 | End: 2020-04-19

## 2020-04-19 RX ADMIN — SODIUM CHLORIDE 1000 MILLILITER(S): 9 INJECTION INTRAMUSCULAR; INTRAVENOUS; SUBCUTANEOUS at 00:21

## 2020-04-19 RX ADMIN — INSULIN GLARGINE 15 UNIT(S): 100 INJECTION, SOLUTION SUBCUTANEOUS at 21:55

## 2020-04-19 RX ADMIN — Medication 667 MILLIGRAM(S): at 08:08

## 2020-04-19 RX ADMIN — Medication 28.75 MILLIGRAM(S): at 21:59

## 2020-04-19 RX ADMIN — Medication 5 UNIT(S): at 08:06

## 2020-04-19 RX ADMIN — HEPARIN SODIUM 5000 UNIT(S): 5000 INJECTION INTRAVENOUS; SUBCUTANEOUS at 12:39

## 2020-04-19 RX ADMIN — SODIUM CHLORIDE 1000 MILLILITER(S): 9 INJECTION, SOLUTION INTRAVENOUS at 14:45

## 2020-04-19 RX ADMIN — Medication 5 UNIT(S): at 11:41

## 2020-04-19 RX ADMIN — MIDODRINE HYDROCHLORIDE 5 MILLIGRAM(S): 2.5 TABLET ORAL at 21:07

## 2020-04-19 RX ADMIN — Medication 28.75 MILLIGRAM(S): at 11:42

## 2020-04-19 NOTE — PROGRESS NOTE ADULT - SUBJECTIVE AND OBJECTIVE BOX
PHILIPPE SMITH  59[id number]      Patient is a 59y old  Female who presents with a chief complaint of Febrile illness (18 Apr 2020 09:47)      INTERVAL HPI/OVERNIGHT EVENTS:  no overngiht events       REVIEW OF SYSTEMS:  ROS neg  FAMILY HISTORY:  No pertinent family history in first degree relatives    T(C): 35.9 (04-19-20 @ 13:00), Max: 36.9 (04-18-20 @ 23:53)  HR: 62 (04-19-20 @ 13:00) (62 - 65)  BP: 88/50 (04-19-20 @ 13:00) (82/46 - 129/61)  RR: 18 (04-19-20 @ 13:00) (18 - 18)  SpO2: 95% (04-19-20 @ 08:53) (94% - 95%)  Wt(kg): --Vital Signs Last 24 Hrs  T(C): 35.9 (19 Apr 2020 13:00), Max: 36.9 (18 Apr 2020 23:53)  T(F): 96.6 (19 Apr 2020 13:00), Max: 98.4 (18 Apr 2020 23:53)  HR: 62 (19 Apr 2020 13:00) (62 - 65)  BP: 88/50 (19 Apr 2020 13:00) (82/46 - 129/61)  BP(mean): --  RR: 18 (19 Apr 2020 13:00) (18 - 18)  SpO2: 95% (19 Apr 2020 08:53) (94% - 95%)    PHYSICAL EXAM:    GEN Lying in no acute distress  HEENT Pupils equal and reactive to light and accommodationSupple Neck  PULM Clear to auscultation bilaterally  CV s1s2 regular rate and rhythm  GI + bowel sounds nontnender  EXT no cyanosis or edema , L BKA  INTEG No Lesions  NEURO BOWEN      LABS:                            13.0   9.95  )-----------( 295      ( 19 Apr 2020 05:45 )             41.0   04-19    140  |  93<L>  |  90<HH>  ----------------------------<  134<H>  5.6<H>   |  20  |  9.4<HH>    Ca    9.9      19 Apr 2020 05:45    TPro  7.5  /  Alb  3.9  /  TBili  0.3  /  DBili  x   /  AST  12  /  ALT  7   /  AlkPhos  55  04-19            acetaminophen   Tablet .. 650 milliGRAM(s) Oral every 6 hours PRN  ALBUTerol    90 MICROgram(s) HFA Inhaler 2 Puff(s) Inhalation every 6 hours PRN  aspirin  chewable 81 milliGRAM(s) Oral daily  atorvastatin 40 milliGRAM(s) Oral at bedtime  benzonatate 100 milliGRAM(s) Oral every 8 hours PRN  bisacodyl Suppository 10 milliGRAM(s) Rectal daily PRN  calcium acetate 667 milliGRAM(s) Oral three times a day with meals  carvedilol 6.25 milliGRAM(s) Oral every 12 hours  chlorhexidine 4% Liquid 1 Application(s) Topical <User Schedule>  darbepoetin Injectable Syringe 40 MICROGram(s) SubCutaneous every 7 days  dextrose 40% Gel 15 Gram(s) Oral once PRN  dextrose 5%. 1000 milliLiter(s) IV Continuous <Continuous>  dextrose 50% Injectable 12.5 Gram(s) IV Push once  dextrose 50% Injectable 25 Gram(s) IV Push once  dextrose 50% Injectable 25 Gram(s) IV Push once  diphenhydrAMINE 25 milliGRAM(s) Oral every 6 hours PRN  gabapentin 300 milliGRAM(s) Oral daily  glucagon  Injectable 1 milliGRAM(s) IntraMuscular once PRN  heparin  Injectable 5000 Unit(s) SubCutaneous every 8 hours  hydrALAZINE 50 milliGRAM(s) Oral three times a day  insulin glargine Injectable (LANTUS) 15 Unit(s) SubCutaneous at bedtime  insulin lispro (HumaLOG) corrective regimen sliding scale   SubCutaneous three times a day before meals  insulin lispro Injectable (HumaLOG) 5 Unit(s) SubCutaneous three times a day before meals  lactated ringers Bolus 500 milliLiter(s) IV Bolus once  ondansetron    Tablet 8 milliGRAM(s) Oral two times a day PRN  polyethylene glycol 3350 17 Gram(s) Oral daily PRN  valproate sodium IVPB 750 milliGRAM(s) IV Intermittent <User Schedule>      HEALTH ISSUES - PROBLEM Dx:          Case Discussed with House Staff     Spectra [phone]

## 2020-04-19 NOTE — PROGRESS NOTE ADULT - ASSESSMENT
Patient is a 59y old  Female who presents with a chief complaint of Febrile illness (18 Apr 2020 09:47)    #Acute hypoxic respiratory failure , complicated by metabolic encephalopathy secondary to COVID-19 with r CVA and abnormal eeg  appreciate neurology input  on valproic acid with appropriate levels       #DM  POCT Blood Glucose.: 135 mg/dL (19 Apr 2020 11:22)  POCT Blood Glucose.: 142 mg/dL (19 Apr 2020 07:54)  POCT Blood Glucose.: 220 mg/dL (19 Apr 2020 01:56)  POCT Blood Glucose.: 266 mg/dL (18 Apr 2020 23:38)  POCT Blood Glucose.: 343 mg/dL (18 Apr 2020 22:33)  POCT Blood Glucose.: 439 mg/dL (18 Apr 2020 21:13)  POCT Blood Glucose.: 476 mg/dL (18 Apr 2020 20:14)  POCT Blood Glucose.: 551 mg/dL (18 Apr 2020 18:50)  POCT Blood Glucose.: 554 mg/dL (18 Apr 2020 18:49)  POCT Blood Glucose.: >600 mg/dL (18 Apr 2020 18:04)  POCT Blood Glucose.: >600 mg/dL (18 Apr 2020 18:03)  POCT Blood Glucose.: 566 mg/dL (18 Apr 2020 16:39)  elevated yesterday but well controlled today    #HTN Vital Signs Last 24 Hrs  BP: 88/50 (19 Apr 2020 13:00) (82/46 - 129/61)  not requiring anti-htn for now , and rather hypo tensive    #ESRD on HD     # PVD s/p LLE BKA    #Hyperkalemia monitor if worsens ekg and d50+insulin    #Anemia no indication for transfusion     #Mild tricuspid regurgitation    #Mild pulmonary hypertension    Progress Note Handoff    Pending:  titrate oxygen , pt rehab , hd slot     Family discussion: attempted to reach family [phone] at 2:55 pm no answer , left message    Disposition: Nursing facility Patient is a 59y old  Female who presents with a chief complaint of Febrile illness (18 Apr 2020 09:47)    #Acute hypoxic respiratory failure , complicated by metabolic encephalopathy secondary to COVID-19 with r CVA and abnormal eeg  appreciate neurology input  on valproic acid with appropriate levels   crp trending down       #DM  POCT Blood Glucose.: 135 mg/dL (19 Apr 2020 11:22)  POCT Blood Glucose.: 142 mg/dL (19 Apr 2020 07:54)  POCT Blood Glucose.: 220 mg/dL (19 Apr 2020 01:56)  POCT Blood Glucose.: 266 mg/dL (18 Apr 2020 23:38)  POCT Blood Glucose.: 343 mg/dL (18 Apr 2020 22:33)  POCT Blood Glucose.: 439 mg/dL (18 Apr 2020 21:13)  POCT Blood Glucose.: 476 mg/dL (18 Apr 2020 20:14)  POCT Blood Glucose.: 551 mg/dL (18 Apr 2020 18:50)  POCT Blood Glucose.: 554 mg/dL (18 Apr 2020 18:49)  POCT Blood Glucose.: >600 mg/dL (18 Apr 2020 18:04)  POCT Blood Glucose.: >600 mg/dL (18 Apr 2020 18:03)  POCT Blood Glucose.: 566 mg/dL (18 Apr 2020 16:39)  elevated yesterday but well controlled today    #HTN Vital Signs Last 24 Hrs  BP: 88/50 (19 Apr 2020 13:00) (82/46 - 129/61)  not requiring anti-htn for now , and rather hypo tensive    #ESRD on HD     # PVD s/p LLE BKA    #Hyperkalemia monitor if worsens ekg and d50+insulin    #Anemia no indication for transfusion     #Mild tricuspid regurgitation    #Mild pulmonary hypertension    Progress Note Handoff    Pending:  titrate oxygen , pt rehab , hd slot     Family discussion: attempted to reach family [phone] at 2:55 pm no answer , left message    Disposition: Nursing facility

## 2020-04-20 LAB
GLUCOSE BLDC GLUCOMTR-MCNC: 191 MG/DL — HIGH (ref 70–99)
GLUCOSE BLDC GLUCOMTR-MCNC: 194 MG/DL — HIGH (ref 70–99)
GLUCOSE BLDC GLUCOMTR-MCNC: 211 MG/DL — HIGH (ref 70–99)
GLUCOSE BLDC GLUCOMTR-MCNC: 227 MG/DL — HIGH (ref 70–99)

## 2020-04-20 PROCEDURE — 99233 SBSQ HOSP IP/OBS HIGH 50: CPT

## 2020-04-20 RX ADMIN — Medication 81 MILLIGRAM(S): at 16:59

## 2020-04-20 RX ADMIN — HEPARIN SODIUM 5000 UNIT(S): 5000 INJECTION INTRAVENOUS; SUBCUTANEOUS at 15:41

## 2020-04-20 RX ADMIN — Medication 667 MILLIGRAM(S): at 17:00

## 2020-04-20 RX ADMIN — CHLORHEXIDINE GLUCONATE 1 APPLICATION(S): 213 SOLUTION TOPICAL at 06:30

## 2020-04-20 RX ADMIN — Medication 28.75 MILLIGRAM(S): at 15:38

## 2020-04-20 NOTE — PROGRESS NOTE ADULT - SUBJECTIVE AND OBJECTIVE BOX
SUBJECTIVE:    Patient is a 59y old Female who presents with a chief complaint of Febrile illness (19 Apr 2020 14:55)    Currently admitted to medicine with the primary diagnosis of Febrile illness     Today is hospital day 7d. This morning she is resting comfortably in bed and reports no new issues or overnight events.     INTERVAL EVENTS: pt resting in bed responds to palpation but is very confused per Rn, chart this is her baseline    PAST MEDICAL & SURGICAL HISTORY  Type 2 diabetes mellitus  CHF (congestive heart failure)  End stage renal failure on dialysis  Hypertension  Hyperlipemia  Heart failure  Amputation of leg: left  History of femoral angiogram: left angiogram  Port-a-cath in place: right chest wall      ALLERGIES:  No Known Allergies    MEDICATIONS:  STANDING MEDICATIONS  aspirin  chewable 81 milliGRAM(s) Oral daily  atorvastatin 40 milliGRAM(s) Oral at bedtime  calcium acetate 667 milliGRAM(s) Oral three times a day with meals  carvedilol 6.25 milliGRAM(s) Oral every 12 hours  chlorhexidine 4% Liquid 1 Application(s) Topical <User Schedule>  darbepoetin Injectable Syringe 40 MICROGram(s) SubCutaneous every 7 days  dextrose 5%. 1000 milliLiter(s) IV Continuous <Continuous>  dextrose 50% Injectable 12.5 Gram(s) IV Push once  dextrose 50% Injectable 25 Gram(s) IV Push once  dextrose 50% Injectable 25 Gram(s) IV Push once  gabapentin 300 milliGRAM(s) Oral daily  heparin  Injectable 5000 Unit(s) SubCutaneous every 8 hours  hydrALAZINE 50 milliGRAM(s) Oral three times a day  insulin glargine Injectable (LANTUS) 15 Unit(s) SubCutaneous at bedtime  insulin lispro (HumaLOG) corrective regimen sliding scale   SubCutaneous three times a day before meals  insulin lispro Injectable (HumaLOG) 5 Unit(s) SubCutaneous three times a day before meals  midodrine. 5 milliGRAM(s) Oral three times a day  valproate sodium IVPB 750 milliGRAM(s) IV Intermittent <User Schedule>    PRN MEDICATIONS  acetaminophen   Tablet .. 650 milliGRAM(s) Oral every 6 hours PRN  ALBUTerol    90 MICROgram(s) HFA Inhaler 2 Puff(s) Inhalation every 6 hours PRN  benzonatate 100 milliGRAM(s) Oral every 8 hours PRN  bisacodyl Suppository 10 milliGRAM(s) Rectal daily PRN  dextrose 40% Gel 15 Gram(s) Oral once PRN  diphenhydrAMINE 25 milliGRAM(s) Oral every 6 hours PRN  glucagon  Injectable 1 milliGRAM(s) IntraMuscular once PRN  ondansetron    Tablet 8 milliGRAM(s) Oral two times a day PRN  polyethylene glycol 3350 17 Gram(s) Oral daily PRN    VITALS:   T(F): 98.4  HR: 71  BP: 101/57  RR: 18  SpO2: 98%    LABS:                        13.0   9.95  )-----------( 295      ( 19 Apr 2020 05:45 )             41.0     04-19    140  |  93<L>  |  90<HH>  ----------------------------<  134<H>  5.6<H>   |  20  |  9.4<HH>    Ca    9.9      19 Apr 2020 05:45    TPro  7.5  /  Alb  3.9  /  TBili  0.3  /  DBili  x   /  AST  12  /  ALT  7   /  AlkPhos  55  04-19                  RADIOLOGY:    PHYSICAL EXAM:  GEN: confused on 2liters nasal cannula @97%  PULM/CHEST: Clear to auscultation bilaterally,  CVS: Regular rate and rhythm, S1-S2, no murmurs  ABD: Soft, non-tender, non-distended, +BS  EXT: LLe Bka  NEURO: AAOx0    Bolanos Catheter: SUBJECTIVE:    Patient is a 59y old Female who presents with a chief complaint of Febrile illness (19 Apr 2020 14:55)    Currently admitted to medicine with the primary diagnosis of Febrile illness     Today is hospital day 7d. This morning she is resting comfortably in bed and reports no new issues or overnight events.     INTERVAL EVENTS: pt resting in bed responds to palpation but is very confused per Rn, chart this is her baseline  pt ging for hd today    PAST MEDICAL & SURGICAL HISTORY  Type 2 diabetes mellitus  CHF (congestive heart failure)  End stage renal failure on dialysis  Hypertension  Hyperlipemia  Heart failure  Amputation of leg: left  History of femoral angiogram: left angiogram  Port-a-cath in place: right chest wall      ALLERGIES:  No Known Allergies    MEDICATIONS:  STANDING MEDICATIONS  aspirin  chewable 81 milliGRAM(s) Oral daily  atorvastatin 40 milliGRAM(s) Oral at bedtime  calcium acetate 667 milliGRAM(s) Oral three times a day with meals  carvedilol 6.25 milliGRAM(s) Oral every 12 hours  chlorhexidine 4% Liquid 1 Application(s) Topical <User Schedule>  darbepoetin Injectable Syringe 40 MICROGram(s) SubCutaneous every 7 days  dextrose 5%. 1000 milliLiter(s) IV Continuous <Continuous>  dextrose 50% Injectable 12.5 Gram(s) IV Push once  dextrose 50% Injectable 25 Gram(s) IV Push once  dextrose 50% Injectable 25 Gram(s) IV Push once  gabapentin 300 milliGRAM(s) Oral daily  heparin  Injectable 5000 Unit(s) SubCutaneous every 8 hours  hydrALAZINE 50 milliGRAM(s) Oral three times a day  insulin glargine Injectable (LANTUS) 15 Unit(s) SubCutaneous at bedtime  insulin lispro (HumaLOG) corrective regimen sliding scale   SubCutaneous three times a day before meals  insulin lispro Injectable (HumaLOG) 5 Unit(s) SubCutaneous three times a day before meals  midodrine. 5 milliGRAM(s) Oral three times a day  valproate sodium IVPB 750 milliGRAM(s) IV Intermittent <User Schedule>    PRN MEDICATIONS  acetaminophen   Tablet .. 650 milliGRAM(s) Oral every 6 hours PRN  ALBUTerol    90 MICROgram(s) HFA Inhaler 2 Puff(s) Inhalation every 6 hours PRN  benzonatate 100 milliGRAM(s) Oral every 8 hours PRN  bisacodyl Suppository 10 milliGRAM(s) Rectal daily PRN  dextrose 40% Gel 15 Gram(s) Oral once PRN  diphenhydrAMINE 25 milliGRAM(s) Oral every 6 hours PRN  glucagon  Injectable 1 milliGRAM(s) IntraMuscular once PRN  ondansetron    Tablet 8 milliGRAM(s) Oral two times a day PRN  polyethylene glycol 3350 17 Gram(s) Oral daily PRN    VITALS:   T(F): 98.4  HR: 71  BP: 101/57  RR: 18  SpO2: 98%    LABS:                        13.0   9.95  )-----------( 295      ( 19 Apr 2020 05:45 )             41.0     04-19    140  |  93<L>  |  90<HH>  ----------------------------<  134<H>  5.6<H>   |  20  |  9.4<HH>    Ca    9.9      19 Apr 2020 05:45    TPro  7.5  /  Alb  3.9  /  TBili  0.3  /  DBili  x   /  AST  12  /  ALT  7   /  AlkPhos  55  04-19                  RADIOLOGY:    PHYSICAL EXAM:  GEN: confused on 2liters nasal cannula @97%  PULM/CHEST: Clear to auscultation bilaterally,  CVS: Regular rate and rhythm, S1-S2, no murmurs  ABD: Soft, non-tender, non-distended, +BS  EXT: LLe Bka  NEURO: AAOx0    Bolanos Catheter: SUBJECTIVE:    Patient is a 59y old Female who presents with a chief complaint of Febrile illness (19 Apr 2020 14:55)    Currently admitted to medicine with the primary diagnosis of Febrile illness     Today is hospital day 7d. This morning she is resting comfortably in bed and reports no new issues or overnight events.     INTERVAL EVENTS: pt resting in bed responds to palpation but is very confused per Rn, chart this is her baseline  pt ging for hd today  pt asymptomatic bradycardic holding coreg, metoprolol for betina EP agrees and said to call back if bradycardia persists    PAST MEDICAL & SURGICAL HISTORY  Type 2 diabetes mellitus  CHF (congestive heart failure)  End stage renal failure on dialysis  Hypertension  Hyperlipemia  Heart failure  Amputation of leg: left  History of femoral angiogram: left angiogram  Port-a-cath in place: right chest wall      ALLERGIES:  No Known Allergies    MEDICATIONS:  STANDING MEDICATIONS  aspirin  chewable 81 milliGRAM(s) Oral daily  atorvastatin 40 milliGRAM(s) Oral at bedtime  calcium acetate 667 milliGRAM(s) Oral three times a day with meals  carvedilol 6.25 milliGRAM(s) Oral every 12 hours  chlorhexidine 4% Liquid 1 Application(s) Topical <User Schedule>  darbepoetin Injectable Syringe 40 MICROGram(s) SubCutaneous every 7 days  dextrose 5%. 1000 milliLiter(s) IV Continuous <Continuous>  dextrose 50% Injectable 12.5 Gram(s) IV Push once  dextrose 50% Injectable 25 Gram(s) IV Push once  dextrose 50% Injectable 25 Gram(s) IV Push once  gabapentin 300 milliGRAM(s) Oral daily  heparin  Injectable 5000 Unit(s) SubCutaneous every 8 hours  hydrALAZINE 50 milliGRAM(s) Oral three times a day  insulin glargine Injectable (LANTUS) 15 Unit(s) SubCutaneous at bedtime  insulin lispro (HumaLOG) corrective regimen sliding scale   SubCutaneous three times a day before meals  insulin lispro Injectable (HumaLOG) 5 Unit(s) SubCutaneous three times a day before meals  midodrine. 5 milliGRAM(s) Oral three times a day  valproate sodium IVPB 750 milliGRAM(s) IV Intermittent <User Schedule>    PRN MEDICATIONS  acetaminophen   Tablet .. 650 milliGRAM(s) Oral every 6 hours PRN  ALBUTerol    90 MICROgram(s) HFA Inhaler 2 Puff(s) Inhalation every 6 hours PRN  benzonatate 100 milliGRAM(s) Oral every 8 hours PRN  bisacodyl Suppository 10 milliGRAM(s) Rectal daily PRN  dextrose 40% Gel 15 Gram(s) Oral once PRN  diphenhydrAMINE 25 milliGRAM(s) Oral every 6 hours PRN  glucagon  Injectable 1 milliGRAM(s) IntraMuscular once PRN  ondansetron    Tablet 8 milliGRAM(s) Oral two times a day PRN  polyethylene glycol 3350 17 Gram(s) Oral daily PRN    VITALS:   T(F): 98.4  HR: 71  BP: 101/57  RR: 18  SpO2: 98%    LABS:                        13.0   9.95  )-----------( 295      ( 19 Apr 2020 05:45 )             41.0     04-19    140  |  93<L>  |  90<HH>  ----------------------------<  134<H>  5.6<H>   |  20  |  9.4<HH>    Ca    9.9      19 Apr 2020 05:45    TPro  7.5  /  Alb  3.9  /  TBili  0.3  /  DBili  x   /  AST  12  /  ALT  7   /  AlkPhos  55  04-19                  RADIOLOGY:    PHYSICAL EXAM:  GEN: confused on 2liters nasal cannula @97%  PULM/CHEST: Clear to auscultation bilaterally,  CVS: Regular rate and rhythm, S1-S2, no murmurs  ABD: Soft, non-tender, non-distended, +BS  EXT: LLe Bka  NEURO: AAOx0    Bolanos Catheter:

## 2020-04-20 NOTE — PROGRESS NOTE ADULT - ASSESSMENT
59 year old female with PMH of ESRD on HD (M/W/F), DM II, HTN, DLD, PVD s/p LLE BKA, anxiety presenting with confusion / fever     ·	ESRD on HD   ·	HD today 3 hrs 2 K bath  ·	check IP and PTH   ·	Fever chills confusion covid positive  on HQ and azithromycin/   ·	CT scan findings noted / neuro eval appreciated for CTA head with contrast / on VPA check levels   ·	HTN noted target SBP between 140-160 / increase hydralazine to75 q8h/ keep on coreg 59 year old female with PMH of ESRD on HD (M/W/F), DM II, HTN, DLD, PVD s/p LLE BKA, anxiety presenting with confusion / fever     ·	ESRD on HD   ·	HD today 3 hrs 2 K bath UF 1.5 l   ·	check IP and PTH   ·	Fever chills confusion covid positive  on HQ and azithromycin/   ·	CT scan findings noted / neuro eval appreciated for CTA head with contrast / VPA levels noted   ·	HTN noted avoid hypotension

## 2020-04-20 NOTE — PROGRESS NOTE ADULT - SUBJECTIVE AND OBJECTIVE BOX
Nephrology progress note    THIS IS AN INCOMPLETE NOTE . FULL NOTE TO FOLLOW SHORTLY    Patient is seen and examined, events over the last 24 h noted .    Allergies:  No Known Allergies    Hospital Medications:   MEDICATIONS  (STANDING):  aspirin  chewable 81 milliGRAM(s) Oral daily  atorvastatin 40 milliGRAM(s) Oral at bedtime  calcium acetate 667 milliGRAM(s) Oral three times a day with meals  carvedilol 6.25 milliGRAM(s) Oral every 12 hours  chlorhexidine 4% Liquid 1 Application(s) Topical <User Schedule>  darbepoetin Injectable Syringe 40 MICROGram(s) SubCutaneous every 7 days  dextrose 5%. 1000 milliLiter(s) (50 mL/Hr) IV Continuous <Continuous>  dextrose 50% Injectable 12.5 Gram(s) IV Push once  dextrose 50% Injectable 25 Gram(s) IV Push once  dextrose 50% Injectable 25 Gram(s) IV Push once  gabapentin 300 milliGRAM(s) Oral daily  heparin  Injectable 5000 Unit(s) SubCutaneous every 8 hours  hydrALAZINE 50 milliGRAM(s) Oral three times a day  insulin glargine Injectable (LANTUS) 15 Unit(s) SubCutaneous at bedtime  insulin lispro (HumaLOG) corrective regimen sliding scale   SubCutaneous three times a day before meals  insulin lispro Injectable (HumaLOG) 5 Unit(s) SubCutaneous three times a day before meals  midodrine. 5 milliGRAM(s) Oral three times a day  valproate sodium IVPB 750 milliGRAM(s) IV Intermittent <User Schedule>        VITALS:  T(F): 98.4 (04-20-20 @ 05:14), Max: 98.4 (04-20-20 @ 05:14)  HR: 71 (04-20-20 @ 05:14)  BP: 101/57 (04-20-20 @ 05:14)  RR: 18 (04-20-20 @ 05:14)  SpO2: 98% (04-19-20 @ 20:26)  Wt(kg): --    04-18 @ 07:01  -  04-19 @ 07:00  --------------------------------------------------------  IN: 500 mL / OUT: 0 mL / NET: 500 mL    04-19 @ 07:01  -  04-20 @ 07:00  --------------------------------------------------------  IN: 120 mL / OUT: 0 mL / NET: 120 mL          PHYSICAL EXAM:  Constitutional: NAD  HEENT: anicteric sclera, oropharynx clear, MMM  Neck: No JVD  Respiratory: CTAB, no wheezes, rales or rhonchi  Cardiovascular: S1, S2, RRR  Gastrointestinal: BS+, soft, NT/ND  Extremities: No cyanosis or clubbing. No peripheral edema  :  No anderson.   Skin: No rashes    LABS:  04-19    140  |  93<L>  |  90<HH>  ----------------------------<  134<H>  5.6<H>   |  20  |  9.4<HH>    Ca    9.9      19 Apr 2020 05:45    TPro  7.5  /  Alb  3.9  /  TBili  0.3  /  DBili      /  AST  12  /  ALT  7   /  AlkPhos  55  04-19                          13.0   9.95  )-----------( 295      ( 19 Apr 2020 05:45 )             41.0       Urine Studies:      RADIOLOGY & ADDITIONAL STUDIES: Nephrology progress note  Patient is seen and examined, events over the last 24 h noted .  still lethargic confused     Allergies:  No Known Allergies    Hospital Medications:   MEDICATIONS  (STANDING):    aspirin  chewable 81 milliGRAM(s) Oral daily  atorvastatin 40 milliGRAM(s) Oral at bedtime  calcium acetate 667 milliGRAM(s) Oral three times a day with meals  carvedilol 6.25 milliGRAM(s) Oral every 12 hours  darbepoetin Injectable Syringe 40 MICROGram(s) SubCutaneous every 7 days  dextrose 5%. 1000 milliLiter(s) (50 mL/Hr) IV Continuous <Continuous>  gabapentin 300 milliGRAM(s) Oral daily  heparin  Injectable 5000 Unit(s) SubCutaneous every 8 hours  hydrALAZINE 50 milliGRAM(s) Oral three times a day  insulin glargine Injectable (LANTUS) 15 Unit(s) SubCutaneous at bedtime  insulin lispro (HumaLOG) corrective regimen sliding scale   SubCutaneous three times a day before meals  insulin lispro Injectable (HumaLOG) 5 Unit(s) SubCutaneous three times a day before meals  midodrine. 5 milliGRAM(s) Oral three times a day  valproate sodium IVPB 750 milliGRAM(s) IV Intermittent <User Schedule>        VITALS:  T(F): 98.4 (04-20-20 @ 05:14), Max: 98.4 (04-20-20 @ 05:14)  HR: 71 (04-20-20 @ 05:14)  BP: 101/57 (04-20-20 @ 05:14)  RR: 18 (04-20-20 @ 05:14)  SpO2: 98% (04-19-20 @ 20:26)      04-18 @ 07:01  -  04-19 @ 07:00  --------------------------------------------------------  IN: 500 mL / OUT: 0 mL / NET: 500 mL    04-19 @ 07:01  -  04-20 @ 07:00  --------------------------------------------------------  IN: 120 mL / OUT: 0 mL / NET: 120 mL          PHYSICAL EXAM:  Constitutional: confused   HEENT: anicteric sclera, oropharynx clear, MMM  Neck: No JVD  Respiratory: CTAB, no wheezes, rales or rhonchi  Cardiovascular: S1, S2, RRR  Gastrointestinal: BS+, soft, NT/ND  Extremities: No cyanosis or clubbing. BKA   :  No anderson.   Skin: No rashes    LABS:  04-19    140  |  93<L>  |  90<HH>  ----------------------------<  134<H>  5.6<H>   |  20  |  9.4<HH>    Ca    9.9      19 Apr 2020 05:45    TPro  7.5  /  Alb  3.9  /  TBili  0.3  /  DBili      /  AST  12  /  ALT  7   /  AlkPhos  55  04-19                          13.0   9.95  )-----------( 295      ( 19 Apr 2020 05:45 )             41.0     Valproic Acid Level, Serum: 58.0 ug/mL (04.17.20 @ 10:29)      Urine Studies:      RADIOLOGY & ADDITIONAL STUDIES:

## 2020-04-20 NOTE — PROGRESS NOTE ADULT - SUBJECTIVE AND OBJECTIVE BOX
PHILIPPE SMITH  59y Female    CHIEF COMPLAINT:    Patient is a 59y old  Female who presents with a chief complaint of Febrile illness (20 Apr 2020 09:12)      INTERVAL HPI/OVERNIGHT EVENTS:    Patient seen and examined. Remains lethargic and confused. Barely opens her eyes. No fever. Was bradycardiac on tele. Refusing her meds.     ROS: All other systems are negative.    Vital Signs:    T(F): 97.6 (04-20-20 @ 14:49), Max: 98.4 (04-20-20 @ 05:14)  HR: 76 (04-20-20 @ 14:49) (62 - 76)  BP: 125/60 (04-20-20 @ 14:49) (90/46 - 125/60)  RR: 18 (04-20-20 @ 14:49) (18 - 18)  SpO2: 96% (04-20-20 @ 14:49) (94% - 98%)  I&O's Summary    19 Apr 2020 07:01  -  20 Apr 2020 07:00  --------------------------------------------------------  IN: 120 mL / OUT: 0 mL / NET: 120 mL    20 Apr 2020 07:01  -  20 Apr 2020 15:17  --------------------------------------------------------  IN: 0 mL / OUT: 200 mL / NET: -200 mL      Daily     Daily   CAPILLARY BLOOD GLUCOSE      POCT Blood Glucose.: 194 mg/dL (20 Apr 2020 14:45)  POCT Blood Glucose.: 191 mg/dL (20 Apr 2020 07:40)  POCT Blood Glucose.: 149 mg/dL (19 Apr 2020 21:13)  POCT Blood Glucose.: 114 mg/dL (19 Apr 2020 17:37)      PHYSICAL EXAM:    GENERAL:  NAD  SKIN: No rashes or lesions  HENT: Atraumatic Normocephalic. PERRL. Moist membranes.  NECK: Supple, No JVD. No lymphadenopathy.  PULMONARY: CTA B/L. No wheezing. No rales  CVS: Normal S1, S2. Rate and Rhythm are regular. No murmurs.  ABDOMEN/GI: Soft, Nontender, Nondistended; BS present  EXTREMITIES: Peripheral pulses intact. Lt BKA  NEUROLOGIC:  Lethargic and confused.   PSYCH: Alert & oriented x 0    Consultant(s) Notes Reviewed:  [x ] YES  [ ] NO  Care Discussed with Consultants/Other Providers [ x] YES  [ ] NO    EKG reviewed  Telemetry reviewed    LABS:                        13.0   9.95  )-----------( 295      ( 19 Apr 2020 05:45 )             41.0     04-19    140  |  93<L>  |  90<HH>  ----------------------------<  134<H>  5.6<H>   |  20  |  9.4<HH>    Ca    9.9      19 Apr 2020 05:45    TPro  7.5  /  Alb  3.9  /  TBili  0.3  /  DBili  x   /  AST  12  /  ALT  7   /  AlkPhos  55  04-19      Serum Pro-Brain Natriuretic Peptide: 48895 pg/mL (04-13-20 @ 16:39)          RADIOLOGY & ADDITIONAL TESTS:      Imaging or report Personally Reviewed:  [ ] YES  [ ] NO    Medications:  Standing  aspirin  chewable 81 milliGRAM(s) Oral daily  atorvastatin 40 milliGRAM(s) Oral at bedtime  calcium acetate 667 milliGRAM(s) Oral three times a day with meals  chlorhexidine 4% Liquid 1 Application(s) Topical <User Schedule>  darbepoetin Injectable Syringe 40 MICROGram(s) SubCutaneous every 7 days  dextrose 5%. 1000 milliLiter(s) IV Continuous <Continuous>  dextrose 50% Injectable 12.5 Gram(s) IV Push once  dextrose 50% Injectable 25 Gram(s) IV Push once  dextrose 50% Injectable 25 Gram(s) IV Push once  gabapentin 300 milliGRAM(s) Oral daily  heparin  Injectable 5000 Unit(s) SubCutaneous every 8 hours  insulin glargine Injectable (LANTUS) 15 Unit(s) SubCutaneous at bedtime  insulin lispro (HumaLOG) corrective regimen sliding scale   SubCutaneous three times a day before meals  insulin lispro Injectable (HumaLOG) 5 Unit(s) SubCutaneous three times a day before meals  valproate sodium IVPB 750 milliGRAM(s) IV Intermittent <User Schedule>    PRN Meds  acetaminophen   Tablet .. 650 milliGRAM(s) Oral every 6 hours PRN  ALBUTerol    90 MICROgram(s) HFA Inhaler 2 Puff(s) Inhalation every 6 hours PRN  benzonatate 100 milliGRAM(s) Oral every 8 hours PRN  bisacodyl Suppository 10 milliGRAM(s) Rectal daily PRN  dextrose 40% Gel 15 Gram(s) Oral once PRN  diphenhydrAMINE 25 milliGRAM(s) Oral every 6 hours PRN  glucagon  Injectable 1 milliGRAM(s) IntraMuscular once PRN  ondansetron    Tablet 8 milliGRAM(s) Oral two times a day PRN  polyethylene glycol 3350 17 Gram(s) Oral daily PRN      Case discussed with resident    Care discussed with pt/family

## 2020-04-20 NOTE — PROGRESS NOTE ADULT - ASSESSMENT
59 year old female with PMH of ESRD on HD (M/W/F), DM II, HTN, DLD, PVD s/p LLE BKA, anxiety brought in by ambulance from [facility] [place] after completing hemodialysis today for AMS. As per NH staff patient had fever since 4/8/20, was hypoxic to SpO2 87% requiring 3 LPM of O2 was started on Plaquenil + Azithromycin on 4/11.       1.	Toxic/Metabolic encephalopathy  2.	B/L viral PNA due to COVID-19  3.	Cytokine Release Syndrome  4.	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   5.	ESRD on HD  6.	Hyperkalemia  7.	DM-2 / HTN / DL  8.	PVD S/P LLE BKA         PLAN:      ·	Pt remains confused. D/W the Neuro. Will repeat EEG and possible brain MRI if no improvement post HD  ·	S/P VEEG. Showed seizure activity. On IV Valproic acid.   ·	Seizure precautions.   ·	D-dimer is 1263, Fibrinogen >700, Ferritin 1025, , Procalcitonin 3.68--->2.67, CRP 16.73--->7.41 and Trop is 0.77--->0.52   ·	CXR from 4/16 showed decreased intersitial opacities.   ·	Pro BNP on admission was 38254  ·	Elevated Troponin likely due to ESRD or myocarditis.   ·	Bradycardiac on monitor. D/C Coreg. D/C Midodrine.   ·	CT head reviewed. Subacute/chronic infarct. Cannot do MRI due to COVID positive  ·	Cont ASA and Atorvastatin 40 mg po qhs.   ·	Hold Lorazepam  ·	Monitor FS. Start her on Lantus 15 units qhs and Humalog 5 units before meals  ·	Blood cx x 1 is negative.   ·	Called pt's daughter Chai [phone] and updated her about her mother's condition  ·	Poor prognosis

## 2020-04-20 NOTE — PROGRESS NOTE ADULT - ASSESSMENT
59 year old female with PMH of ESRD on HD (M/W/F), DM II, HTN, DLD, PVD s/p LLE BKA, anxiety brought in by ambulance from [facility] after completing hemodialysis today for AMS  Daughter [phone]     INTERVAL EVENTS: pt resting in bed responds to palpation but is very confused per Rn, chart this is her baseline  pt refused her midodrine this am has low Bp    # AMS / metabolic encephalopathy / Subacute vs chronic CVA, poss sz  Possibly multifactorial: SARS-nCov2 pneumonia, subacute/chronic infarct  - CT head: subacute/chronic infarct of the right posterior parietal/occipital lobe  - Neurology eval appreciated- on valproic acid 750 BID ,per neuro mental status is her baseline  -eeg - diffuse cerebral electrophysiological dysfunction, potential seizure ffrom left hemisphere    # COVID-19 pneumonia / Acute hypoxemic respiratory failure (SpO2 88% on RA at NH)  - Sepsis was not present on admission  - on admission Febrile Tmax 102.9 F. Lymphopenia. D-dimer 1263--500s. All inflammatory markers are trending down   -currently afebrile past 24-48 hours has boderline low BP refusing her midodrine  - ECG: QTc baseline: 445, finished HCq and azithro at NH  - O2 requirement: 2 LPM NC O2, SpO2 97%     # ESRD on HD (M/W/F)  - HAGMA likely due to ESRD. Bicarb WNL  - Nephro is following up, recs appreciated ast Hd was on Friday   - Continue Calcium acetate    # Troponinemia; likely ESRD and demand ischemia from acute viral illness  - Troponin 0.77 then 0.52 (previous 0.44); EKG with no acute ischemic changes  -no reason to trend    # DM II: check fingersticks and continue same insulin regimen as home    # HTN: continue Carvedilol and Hydralazine 59 year old female with PMH of ESRD on HD (M/W/F), DM II, HTN, DLD, PVD s/p LLE BKA, anxiety brought in by ambulance from [facility] after completing hemodialysis today for AMS  Daughter [phone]     INTERVAL EVENTS: pt resting in bed responds to palpation but is very confused per Rn, chart this is her baseline  pt refused her midodrine this am has low Bp    # AMS / metabolic encephalopathy / Subacute vs chronic A, poss sz  Possibly multifactorial: SARS-nCov2 pneumonia, subacute/chronic infarct  - CT head: subacute/chronic infarct of the right posterior parietal/occipital lobe  - Neurology eval appreciated- on valproic acid 750 BID ,per neuro mental status is her baseline  -eeg - diffuse cerebral electrophysiological dysfunction, potential seizure ffrom left hemisphere    # COVID-19 pneumonia / Acute hypoxemic respiratory failure (SpO2 88% on RA at NH)  - Sepsis was not present on admission  - on admission Febrile Tmax 102.9 F. Lymphopenia. D-dimer 1263--500s. All inflammatory markers are trending down   -currently afebrile past 24-48 hours has boderline low BP refusing her midodrine  - ECG: QTc baseline: 445, finished HCq and azithro at NH  - O2 requirement: 2 LPM NC O2, SpO2 97%     # ESRD on HD (M/W/F)  - HAGMA likely due to ESRD. Bicarb WNL  - Nephro is following up, recs appreciated ast Hd was on Friday   - Continue Calcium acetate    # Troponinemia; likely ESRD and demand ischemia from acute viral illness  - Troponin 0.77 then 0.52 (previous 0.44); EKG with no acute ischemic changes  -no reason to trend    # DM II: check fingersticks and continue same insulin regimen as home    # HTN: continue Carvedilol and Hydralazine 59 year old female with PMH of ESRD on HD (M/W/F), DM II, HTN, DLD, PVD s/p LLE BKA, anxiety brought in by ambulance from [facility] after completing hemodialysis today for AMS  Daughter [phone]     INTERVAL EVENTS: pt resting in bed responds to palpation but is very confused per Rn, chart this is her baseline  pt refused her midodrine this am has low Bp  pt asymptomatic bradycardic holding coreg, metoprolol for betina EP agrees and said to call back if bradycardia persists    # AMS / metabolic encephalopathy / Subacute vs chronic A, poss sz  Possibly multifactorial: SARS-nCov2 pneumonia, subacute/chronic infarct  - CT head: subacute/chronic infarct of the right posterior parietal/occipital lobe  - Neurology eval appreciated- on valproic acid 750 BID ,per neuro mental status is her baseline, waiting for ny other recs from neuro  -eeg - diffuse cerebral electrophysiological dysfunction, potential seizure ffrom left hemisphere    # COVID-19 pneumonia / Acute hypoxemic respiratory failure (SpO2 88% on RA at NH)  - Sepsis was not present on admission  - on admission Febrile Tmax 102.9 F. Lymphopenia. D-dimer 1263--500s. All inflammatory markers are trending down   -currently afebrile past 24-48 hours has boderline low BP refusing her midodrine  - ECG: QTc baseline: 445, finished HCq and azithro at NH  - O2 requirement: 2 LPM NC O2, SpO2 97%     # ESRD on HD (M/W/F)  - HAGMA likely due to ESRD. Bicarb WNL  - Nephro is following up, recs appreciated ast Hd was on Friday   - Continue Calcium acetate    # Troponinemia; likely ESRD and demand ischemia from acute viral illness  - Troponin 0.77 then 0.52 (previous 0.44); EKG with no acute ischemic changes  -no reason to trend    # DM II: check fingersticks and continue same insulin regimen as home    # HTN: continue Carvedilol and Hydralazine

## 2020-04-21 LAB
GLUCOSE BLDC GLUCOMTR-MCNC: 163 MG/DL — HIGH (ref 70–99)
GLUCOSE BLDC GLUCOMTR-MCNC: 229 MG/DL — HIGH (ref 70–99)
GLUCOSE BLDC GLUCOMTR-MCNC: 295 MG/DL — HIGH (ref 70–99)
GLUCOSE BLDC GLUCOMTR-MCNC: 296 MG/DL — HIGH (ref 70–99)
GLUCOSE BLDC GLUCOMTR-MCNC: 388 MG/DL — HIGH (ref 70–99)
VALPROATE FREE SERPL-MCNC: 33.8 MG/L — HIGH (ref 4.8–17.3)

## 2020-04-21 PROCEDURE — 99233 SBSQ HOSP IP/OBS HIGH 50: CPT | Mod: CS

## 2020-04-21 RX ADMIN — HEPARIN SODIUM 5000 UNIT(S): 5000 INJECTION INTRAVENOUS; SUBCUTANEOUS at 13:01

## 2020-04-21 RX ADMIN — Medication 2: at 08:22

## 2020-04-21 RX ADMIN — Medication 3: at 17:33

## 2020-04-21 RX ADMIN — Medication 28.75 MILLIGRAM(S): at 22:24

## 2020-04-21 RX ADMIN — Medication 81 MILLIGRAM(S): at 13:01

## 2020-04-21 RX ADMIN — Medication 667 MILLIGRAM(S): at 08:24

## 2020-04-21 RX ADMIN — Medication 28.75 MILLIGRAM(S): at 12:00

## 2020-04-21 RX ADMIN — INSULIN GLARGINE 15 UNIT(S): 100 INJECTION, SOLUTION SUBCUTANEOUS at 22:23

## 2020-04-21 RX ADMIN — Medication 5 UNIT(S): at 08:21

## 2020-04-21 RX ADMIN — HEPARIN SODIUM 5000 UNIT(S): 5000 INJECTION INTRAVENOUS; SUBCUTANEOUS at 22:24

## 2020-04-21 RX ADMIN — Medication 5 UNIT(S): at 17:33

## 2020-04-21 RX ADMIN — Medication 5 UNIT(S): at 12:32

## 2020-04-21 RX ADMIN — Medication 28.75 MILLIGRAM(S): at 01:10

## 2020-04-21 RX ADMIN — Medication 3: at 12:32

## 2020-04-21 RX ADMIN — GABAPENTIN 300 MILLIGRAM(S): 400 CAPSULE ORAL at 13:01

## 2020-04-21 NOTE — PROGRESS NOTE ADULT - ATTENDING COMMENTS
I saw and evaluated patient  by bedside, remains confused, lethargic , no fever  All labs, radiology studies, VS was reviewed  Vital Signs Last 24 Hrs  T(C): 37.1 (21 Apr 2020 12:39), Max: 37.1 (21 Apr 2020 12:39)  T(F): 98.7 (21 Apr 2020 12:39), Max: 98.7 (21 Apr 2020 12:39)  HR: 71 (21 Apr 2020 12:39) (53 - 76)  BP: 113/55 (21 Apr 2020 12:39) (113/55 - 135/59)  RR: 17 (21 Apr 2020 12:39) (17 - 18)  SpO2: 100% (20 Apr 2020 21:43) (96% - 100%)  GENERAL: NAD, Awake, confused, lethargic, patient is laying comfortably in bed  HEENT: AT, NC, PERRLA, SUPPLE, NO JVD, NO CB  LUNG: CTA B/L  CVS: normal S1, S2, RRR, NO M/G/R  ABDOMEN: soft, bowel sounds present, normoactive in all 4 quadrants, non-tender, non-distended  EXT:  left BKA status, right lower ext spasticity, no E/C/C, positive PP right lower  extremity  NEURO: lethargic state, pt. is not following verbal commands  SKIN: no rash, no ecchymosis  I have reviewed the resident's note and agree with documented findings and  plan of care.  # Acute toxic encephalopathy present on admission due to multifactorial etiology  - subacute CVA right posterior parietal occipital lobe area, suspected seizure and covid infection( april 13th).   -patient completed hydroxychloroquine tx.  - continue close neurological assessments  - started on IV Valproic acid tx.  -oxygen demand is decreased  -continue supportive tx.    # ESRD on HD tx.    # h/o DM 2 - bsfs with insulin tx.    # h/o HTN -continue current tx.    daily DVT proph.    #Progress Note Handoff: monitor neurological status, continue AED tx. , maintain seizure and fall precautions.  Family discussion: yes Disposition: Home___to be determined

## 2020-04-21 NOTE — PROGRESS NOTE ADULT - SUBJECTIVE AND OBJECTIVE BOX
Neurology Progress Note    Interval History:  Pt s/p HD yesterday.  Currently more awake AOx1 person only remains encephalopathic but some improvement in mental status.  follows simple commands.      HPI:  59 year old female with PMH of ESRD on HD (M/W/F), DM II, HTN, DLD, PVD s/p LLE BKA, anxiety brought in by ambulance from [facility] after completing hemodialysis today for AMS. History was limited due to current mental status. Patient is a permanent resident at [facility]. The NH was contacted and as per staff patient had fever since 4/8/20, was hypoxic to SpO2 87% requiring 3 LPM of O2 was started on Plaquenil + Azithromycin on 4/11. As per staff, patient likely had positive exposure with many COVID+ residents. Today after completing HD she was altered.     In ED: Febrile Tmax 102.9 F. Lymphopenia. D-dimer 1263. Troponin 0.77 (previous 0.44). QTc 445. SpO2 97% on 3 LPM NCO2  CT head: subacute/chronic infarct of the right posterior parietal/occipital lobe  CXR with bilateral increased perihilar markings. Pulmonary edema vs COVID-19 pneumonia  Given: Vancomycin 1 gm and Cefepime 2 gm IV once. (13 Apr 2020 23:27)      PAST MEDICAL & SURGICAL HISTORY:  Type 2 diabetes mellitus  CHF (congestive heart failure)  End stage renal failure on dialysis  Hypertension  Hyperlipemia  Heart failure  Amputation of leg: left  History of femoral angiogram: left angiogram  Port-a-cath in place: right chest wall    Medications:  acetaminophen   Tablet .. 650 milliGRAM(s) Oral every 6 hours PRN  ALBUTerol    90 MICROgram(s) HFA Inhaler 2 Puff(s) Inhalation every 6 hours PRN  aspirin  chewable 81 milliGRAM(s) Oral daily  atorvastatin 40 milliGRAM(s) Oral at bedtime  benzonatate 100 milliGRAM(s) Oral every 8 hours PRN  bisacodyl Suppository 10 milliGRAM(s) Rectal daily PRN  calcium acetate 667 milliGRAM(s) Oral three times a day with meals  chlorhexidine 4% Liquid 1 Application(s) Topical <User Schedule>  darbepoetin Injectable Syringe 40 MICROGram(s) SubCutaneous every 7 days  dextrose 40% Gel 15 Gram(s) Oral once PRN  dextrose 5%. 1000 milliLiter(s) IV Continuous <Continuous>  dextrose 50% Injectable 12.5 Gram(s) IV Push once  dextrose 50% Injectable 25 Gram(s) IV Push once  dextrose 50% Injectable 25 Gram(s) IV Push once  diphenhydrAMINE 25 milliGRAM(s) Oral every 6 hours PRN  gabapentin 300 milliGRAM(s) Oral daily  glucagon  Injectable 1 milliGRAM(s) IntraMuscular once PRN  heparin  Injectable 5000 Unit(s) SubCutaneous every 8 hours  insulin glargine Injectable (LANTUS) 15 Unit(s) SubCutaneous at bedtime  insulin lispro (HumaLOG) corrective regimen sliding scale   SubCutaneous three times a day before meals  insulin lispro Injectable (HumaLOG) 5 Unit(s) SubCutaneous three times a day before meals  ondansetron    Tablet 8 milliGRAM(s) Oral two times a day PRN  polyethylene glycol 3350 17 Gram(s) Oral daily PRN  valproate sodium IVPB 750 milliGRAM(s) IV Intermittent <User Schedule>      Vital Signs Last 24 Hrs  T(C): 37.1 (21 Apr 2020 12:39), Max: 37.1 (21 Apr 2020 12:39)  T(F): 98.7 (21 Apr 2020 12:39), Max: 98.7 (21 Apr 2020 12:39)  HR: 71 (21 Apr 2020 12:39) (53 - 76)  BP: 113/55 (21 Apr 2020 12:39) (113/55 - 135/59)  BP(mean): --  RR: 17 (21 Apr 2020 12:39) (17 - 18)  SpO2: 100% (20 Apr 2020 21:43) (96% - 100%)    Neurological Exam:   Mental status: Awake, alert and oriented x3.  Recent and remote memory intact.  Naming, repetition and comprehension intact.  Attention/concentration intact.  No dysarthria, no aphasia.  Fund of knowledge appropriate.    Cranial nerves: Pupils equally round and reactive to light, visual fields full, no nystagmus, extraocular muscles intact, V1 through V3 intact bilaterally and symmetric, face symmetric, hearing intact to finger rub, palate elevation symmetric, tongue was midline.  Motor:  MRC grading 5/5 b/l UE/LE.   strength 5/5.  Normal tone and bulk.  No abnormal movements.    Sensation: Intact to light touch, proprioception, and pinprick.   Coordination: No dysmetria on finger-to-nose and heel-to-shin.  No dysdiadokinesia.  Reflexes: 2+ in bilateral UE/LE, downgoing toes bilaterally. (-) Hanna.  Gait: Narrow and steady. No ataxia.  Romberg negative    VEEG in the last 24 hours:    Background-------------------6-7 hz     Focal and generalized slowing-----moderate generalized slowing                                                    mild right hemispheric focal slowing    Interictal activity------------------Bilateral posterior quadrant sharps or isolated left posterior sharps that appear epileptiform in nature    Events----------------------none    Seizures------------------none    Impression:  abnormal as above    Plan - discussed with the neurology    Electronic Signatures:  Hakeem Franks)  (Signed 16-Apr-2020 11:59)  	Authored: EEG REPORT      < from: CT Head No Cont (04.15.20 @ 15:56) >  Impression:     The study is mildly limited by skin electrodes and patient motion.    Redemonstration of right posterior temporal/occipital lobe infarction. Within limits of artifact, no hemorrhagic transformation.    LANCE MARTELL M.D., RESIDENT RADIOLOGIST  This document has been electronically signed.  STERLING CARR M.D., ATTENDING RADIOLOGIST  This document has been electronically signed. Apr 15 2020  4:12PM    < end of copied text >

## 2020-04-21 NOTE — PROGRESS NOTE ADULT - ASSESSMENT
59 year old female with PMH of ESRD on HD (M/W/F), DM II, HTN, DLD, PVD s/p LLE BKA, anxiety presenting with confusion / fever     ·	ESRD on HD   ·	sp HD yesterday / no need for HD today   ·	Fever chills confusion covid positive  on HQ and azithromycin/   ·	CT scan findings noted / neuro eval appreciated for CTA head with contrast / VPA levels noted   ·	HTN noted avoid hypotension

## 2020-04-21 NOTE — PROGRESS NOTE ADULT - ASSESSMENT
60 y/o female with PMH of ESRD on HD, DM, HTN, DLD, brought in S/P hemodialysis for AMS found with subacute/chronic cva in the right parietal/ occipital lobe complicated by frequent epileptiform discharges improved with valproic acid currently with persistent encephalopathy currently slowly improving suspect toxic/metabolic encephalopathy.      Plan  - repeat REEG  - continue  mg BID  - secondary stroke prevention w/ ASA, Lipitor  - correct electrolytes  - continue medical management  - check VPA trough level in AM

## 2020-04-21 NOTE — PROGRESS NOTE ADULT - ASSESSMENT
59 year old female with PMH of ESRD on HD (M/W/F), DM II, HTN, DLD, PVD s/p LLE BKA, anxiety brought in by ambulance from [facility] after completing hemodialysis today for AMS    # AMS / metabolic encephalopathy / Subacute vs chronic A, poss sz  -still altered, AO x 0, follows simple commands  Possibly multifactorial: SARS-nCov2 pneumonia, subacute/chronic infarct  - 4/15 CT head: subacute/chronic infarct of the right posterior parietal/occipital lobe  -Neurology eval appreciated- on valproic acid 750 BID ,per neuro mental status is her baseline, spoke w neuro, waiting recs   -4/16: eeg - diffuse cerebral electrophysiological dysfunction, potential seizure from left hemisphere  -sz precautions  -on IV  BID, lorazepam held   -c/w asa and statin   -blood cx x2 neg (4/13)     # COVID-19 pneumonia / Acute hypoxemic respiratory failure (SpO2 88% on RA at NH)-100% on 3L today  -titrate off to RA   - Sepsis was not present on admission  - on admission Febrile Tmax 102.9 F. Lymphopenia. D-dimer 1263--500s. All inflammatory markers are trending down   -currently afebrile   - s/p HCQ and azithro at NH     # ESRD on HD (M/W/F)  - Nephro is following up, recs appreciated ast Hd was on Friday   - continue calcium acetate  -HD tmrw     # Troponinemia  - likely ESRD and demand ischemia from acute viral illness  - Troponin 0.77 then 0.52 (previous 0.44); EKG with no acute ischemic changes  -no reason to trend    # DM II  - 15/5/5/5    # HTN, controlled   -coreg and midodrine d/c'ed per Dr. Snow for bradycardia    Code:FULL  GI ppx: n/a  DVT ppx: heparin   Dispo: neuro recs, HD tmrw, mentation improvement, PT, titrate off to RA 59 year old female with PMH of ESRD on HD (M/W/F), DM II, HTN, DLD, PVD s/p LLE BKA, anxiety brought in by ambulance from [facility] after completing hemodialysis today for AMS    # AMS / metabolic encephalopathy / Subacute vs chronic A, poss sz  -still altered, AO x 0, follows simple commands  Possibly multifactorial: SARS-nCov2 pneumonia, subacute/chronic infarct  - 4/15 CT head: subacute/chronic infarct of the right posterior parietal/occipital lobe  -Neurology eval appreciated- on valproic acid 750 BID ,per neuro mental status is her baseline, spoke w neuro, repeat EEG (placed)  -4/16: eeg - diffuse cerebral electrophysiological dysfunction, potential seizure from left hemisphere  -sz precautions  -on IV  BID, lorazepam held, am VA level placed  -c/w asa and statin   -blood cx x2 neg (4/13)     # COVID-19 pneumonia / Acute hypoxemic respiratory failure (SpO2 88% on RA at NH)-100% on 3L today  -titrate off to RA   - Sepsis was not present on admission  - on admission Febrile Tmax 102.9 F. Lymphopenia. D-dimer 1263--500s. All inflammatory markers are trending down   -currently afebrile   - s/p HCQ and azithro at NH     # ESRD on HD (M/W/F)  - Nephro is following up, recs appreciated ast Hd was on Friday   - continue calcium acetate  -HD tmrw     # Troponinemia  - likely ESRD and demand ischemia from acute viral illness  - Troponin 0.77 then 0.52 (previous 0.44); EKG with no acute ischemic changes  -no reason to trend    # DM II  - 15/5/5/5    # HTN, controlled   -coreg and midodrine d/c'ed per Dr. Snow for bradycardia    Code:FULL  GI ppx: n/a  DVT ppx: heparin   Dispo: neuro recs, HD tmrw, mentation improvement, PT, titrate off to RA

## 2020-04-21 NOTE — PHYSICAL THERAPY INITIAL EVALUATION ADULT - GENERAL OBSERVATIONS, REHAB EVAL
PT IE attempted but not completed 2/2 decreased arousal. Pt was unable to keep her eyes open or follow commands. Pt's spO2 was 90-91% on 3 L O2 NC. IZA hawkins.

## 2020-04-21 NOTE — PROGRESS NOTE ADULT - SUBJECTIVE AND OBJECTIVE BOX
SUBJECTIVE:    Patient is a 59y old Female who presents with a chief complaint of Febrile illness (21 Apr 2020 08:57)    Currently admitted to medicine with the primary diagnosis of Febrile illness     Today is hospital day 8d. AOx0, follows simple commands, combative at times     INTERVAL EVENTS: NAEON     PAST MEDICAL & SURGICAL HISTORY  Type 2 diabetes mellitus  CHF (congestive heart failure)  End stage renal failure on dialysis  Hypertension  Hyperlipemia  Heart failure  Amputation of leg: left  History of femoral angiogram: left angiogram  Port-a-cath in place: right chest wall    ALLERGIES:  No Known Allergies    MEDICATIONS:  STANDING MEDICATIONS  aspirin  chewable 81 milliGRAM(s) Oral daily  atorvastatin 40 milliGRAM(s) Oral at bedtime  calcium acetate 667 milliGRAM(s) Oral three times a day with meals  chlorhexidine 4% Liquid 1 Application(s) Topical <User Schedule>  darbepoetin Injectable Syringe 40 MICROGram(s) SubCutaneous every 7 days  dextrose 5%. 1000 milliLiter(s) IV Continuous <Continuous>  dextrose 50% Injectable 12.5 Gram(s) IV Push once  dextrose 50% Injectable 25 Gram(s) IV Push once  dextrose 50% Injectable 25 Gram(s) IV Push once  gabapentin 300 milliGRAM(s) Oral daily  heparin  Injectable 5000 Unit(s) SubCutaneous every 8 hours  insulin glargine Injectable (LANTUS) 15 Unit(s) SubCutaneous at bedtime  insulin lispro (HumaLOG) corrective regimen sliding scale   SubCutaneous three times a day before meals  insulin lispro Injectable (HumaLOG) 5 Unit(s) SubCutaneous three times a day before meals  valproate sodium IVPB 750 milliGRAM(s) IV Intermittent <User Schedule>    PRN MEDICATIONS  acetaminophen   Tablet .. 650 milliGRAM(s) Oral every 6 hours PRN  ALBUTerol    90 MICROgram(s) HFA Inhaler 2 Puff(s) Inhalation every 6 hours PRN  benzonatate 100 milliGRAM(s) Oral every 8 hours PRN  bisacodyl Suppository 10 milliGRAM(s) Rectal daily PRN  dextrose 40% Gel 15 Gram(s) Oral once PRN  diphenhydrAMINE 25 milliGRAM(s) Oral every 6 hours PRN  glucagon  Injectable 1 milliGRAM(s) IntraMuscular once PRN  ondansetron    Tablet 8 milliGRAM(s) Oral two times a day PRN  polyethylene glycol 3350 17 Gram(s) Oral daily PRN    VITALS:   T(F): 97.6  HR: 53  BP: 135/59  RR: 18  SpO2: 100%    LABS:        RADIOLOGY:    < from: Xray Chest 1 View- PORTABLE-Routine (04.16.20 @ 09:00) >  Bilateral lung opacities are decreased.      PHYSICAL EXAM:  GEN: No acute distress, 3L NC in place  PULM/CHEST: dec breath sounds at bases, no wheezes    CVS: normal rate, regular rhythm, S1-S2, no murmurs  ABD: +BS, NTTP,   EXT: No edema, L BKA   NEURO: AAOx0

## 2020-04-21 NOTE — PROGRESS NOTE ADULT - SUBJECTIVE AND OBJECTIVE BOX
Nephrology progress note    THIS IS AN INCOMPLETE NOTE . FULL NOTE TO FOLLOW SHORTLY    Patient is seen and examined, events over the last 24 h noted .    Allergies:  No Known Allergies    Hospital Medications:   MEDICATIONS  (STANDING):  aspirin  chewable 81 milliGRAM(s) Oral daily  atorvastatin 40 milliGRAM(s) Oral at bedtime  calcium acetate 667 milliGRAM(s) Oral three times a day with meals  chlorhexidine 4% Liquid 1 Application(s) Topical <User Schedule>  darbepoetin Injectable Syringe 40 MICROGram(s) SubCutaneous every 7 days  dextrose 5%. 1000 milliLiter(s) (50 mL/Hr) IV Continuous <Continuous>  dextrose 50% Injectable 12.5 Gram(s) IV Push once  dextrose 50% Injectable 25 Gram(s) IV Push once  dextrose 50% Injectable 25 Gram(s) IV Push once  gabapentin 300 milliGRAM(s) Oral daily  heparin  Injectable 5000 Unit(s) SubCutaneous every 8 hours  insulin glargine Injectable (LANTUS) 15 Unit(s) SubCutaneous at bedtime  insulin lispro (HumaLOG) corrective regimen sliding scale   SubCutaneous three times a day before meals  insulin lispro Injectable (HumaLOG) 5 Unit(s) SubCutaneous three times a day before meals  valproate sodium IVPB 750 milliGRAM(s) IV Intermittent <User Schedule>        VITALS:  T(F): 97.6 (04-20-20 @ 21:43), Max: 97.6 (04-20-20 @ 14:49)  HR: 53 (04-20-20 @ 21:43)  BP: 135/59 (04-20-20 @ 21:43)  RR: 18 (04-20-20 @ 21:43)  SpO2: 100% (04-20-20 @ 21:43)  Wt(kg): --    04-19 @ 07:01  -  04-20 @ 07:00  --------------------------------------------------------  IN: 120 mL / OUT: 0 mL / NET: 120 mL    04-20 @ 07:01  -  04-21 @ 07:00  --------------------------------------------------------  IN: 0 mL / OUT: 200 mL / NET: -200 mL    04-21 @ 07:01  - 04-21 @ 08:58  --------------------------------------------------------  IN: 120 mL / OUT: 0 mL / NET: 120 mL          PHYSICAL EXAM:  Constitutional: NAD  HEENT: anicteric sclera, oropharynx clear, MMM  Neck: No JVD  Respiratory: CTAB, no wheezes, rales or rhonchi  Cardiovascular: S1, S2, RRR  Gastrointestinal: BS+, soft, NT/ND  Extremities: No cyanosis or clubbing. No peripheral edema  :  No anderson.   Skin: No rashes    LABS:            Urine Studies:      RADIOLOGY & ADDITIONAL STUDIES: Nephrology progress note  Patient is seen and examined, events over the last 24 h noted .  no major events  stable  clinically    Allergies:  No Known Allergies    Hospital Medications:   MEDICATIONS  (STANDING):    aspirin  chewable 81 milliGRAM(s) Oral daily  atorvastatin 40 milliGRAM(s) Oral at bedtime  calcium acetate 667 milliGRAM(s) Oral three times a day with meals  darbepoetin Injectable Syringe 40 MICROGram(s) SubCutaneous every 7 days  dextrose 5%. 1000 milliLiter(s) (50 mL/Hr) IV Continuous <Continuous>  gabapentin 300 milliGRAM(s) Oral daily  heparin  Injectable 5000 Unit(s) SubCutaneous every 8 hours  insulin glargine Injectable (LANTUS) 15 Unit(s) SubCutaneous at bedtime  insulin lispro (HumaLOG) corrective regimen sliding scale   SubCutaneous three times a day before meals  insulin lispro Injectable (HumaLOG) 5 Unit(s) SubCutaneous three times a day before meals  valproate sodium IVPB 750 milliGRAM(s) IV Intermittent <User Schedule>        VITALS:  T(F): 97.6 (04-20-20 @ 21:43), Max: 97.6 (04-20-20 @ 14:49)  HR: 53 (04-20-20 @ 21:43)  BP: 135/59 (04-20-20 @ 21:43)  RR: 18 (04-20-20 @ 21:43)  SpO2: 100% (04-20-20 @ 21:43)      04-19 @ 07:01  -  04-20 @ 07:00  --------------------------------------------------------  IN: 120 mL / OUT: 0 mL / NET: 120 mL    04-20 @ 07:01  -  04-21 @ 07:00  --------------------------------------------------------  IN: 0 mL / OUT: 200 mL / NET: -200 mL    04-21 @ 07:01  -  04-21 @ 08:58  --------------------------------------------------------  IN: 120 mL / OUT: 0 mL / NET: 120 mL          PHYSICAL EXAM:  Constitutional: NAD  HEENT: anicteric sclera, oropharynx clear, MMM  Neck: No JVD  Respiratory: CTAB, no wheezes, rales or rhonchi  Cardiovascular: S1, S2, RRR  Gastrointestinal: BS+, soft, NT/ND  Extremities: No cyanosis or clubbing. No peripheral edema  Skin: No rashes

## 2020-04-22 LAB
ALBUMIN SERPL ELPH-MCNC: 3.6 G/DL — SIGNIFICANT CHANGE UP (ref 3.5–5.2)
ALBUMIN SERPL ELPH-MCNC: 3.6 G/DL — SIGNIFICANT CHANGE UP (ref 3.5–5.2)
ALBUMIN SERPL ELPH-MCNC: 3.8 G/DL — SIGNIFICANT CHANGE UP (ref 3.5–5.2)
ALP SERPL-CCNC: 50 U/L — SIGNIFICANT CHANGE UP (ref 30–115)
ALT FLD-CCNC: 6 U/L — SIGNIFICANT CHANGE UP (ref 0–41)
ANION GAP SERPL CALC-SCNC: 21 MMOL/L — HIGH (ref 7–14)
ANION GAP SERPL CALC-SCNC: 24 MMOL/L — HIGH (ref 7–14)
ANISOCYTOSIS BLD QL: SLIGHT — SIGNIFICANT CHANGE UP
AST SERPL-CCNC: 15 U/L — SIGNIFICANT CHANGE UP (ref 0–41)
BASOPHILS # BLD AUTO: 0 K/UL — SIGNIFICANT CHANGE UP (ref 0–0.2)
BASOPHILS NFR BLD AUTO: 0 % — SIGNIFICANT CHANGE UP (ref 0–1)
BILIRUB SERPL-MCNC: 0.3 MG/DL — SIGNIFICANT CHANGE UP (ref 0.2–1.2)
BUN SERPL-MCNC: 55 MG/DL — HIGH (ref 10–20)
BUN SERPL-MCNC: 83 MG/DL — CRITICAL HIGH (ref 10–20)
CALCIUM SERPL-MCNC: 8.7 MG/DL — SIGNIFICANT CHANGE UP (ref 8.5–10.1)
CALCIUM SERPL-MCNC: 9 MG/DL — SIGNIFICANT CHANGE UP (ref 8.5–10.1)
CHLORIDE SERPL-SCNC: 91 MMOL/L — LOW (ref 98–110)
CHLORIDE SERPL-SCNC: 93 MMOL/L — LOW (ref 98–110)
CO2 SERPL-SCNC: 25 MMOL/L — SIGNIFICANT CHANGE UP (ref 17–32)
CO2 SERPL-SCNC: 26 MMOL/L — SIGNIFICANT CHANGE UP (ref 17–32)
CREAT SERPL-MCNC: 6.3 MG/DL — CRITICAL HIGH (ref 0.7–1.5)
CREAT SERPL-MCNC: 9.6 MG/DL — CRITICAL HIGH (ref 0.7–1.5)
DACRYOCYTES BLD QL SMEAR: SLIGHT — SIGNIFICANT CHANGE UP
EOSINOPHIL # BLD AUTO: 0 K/UL — SIGNIFICANT CHANGE UP (ref 0–0.7)
EOSINOPHIL NFR BLD AUTO: 0 % — SIGNIFICANT CHANGE UP (ref 0–8)
GIANT PLATELETS BLD QL SMEAR: PRESENT — SIGNIFICANT CHANGE UP
GLUCOSE BLDC GLUCOMTR-MCNC: 152 MG/DL — HIGH (ref 70–99)
GLUCOSE BLDC GLUCOMTR-MCNC: 198 MG/DL — HIGH (ref 70–99)
GLUCOSE BLDC GLUCOMTR-MCNC: 211 MG/DL — HIGH (ref 70–99)
GLUCOSE SERPL-MCNC: 168 MG/DL — HIGH (ref 70–99)
GLUCOSE SERPL-MCNC: 190 MG/DL — HIGH (ref 70–99)
HCT VFR BLD CALC: 36 % — LOW (ref 37–47)
HGB BLD-MCNC: 11.9 G/DL — LOW (ref 12–16)
LYMPHOCYTES # BLD AUTO: 0.6 K/UL — LOW (ref 1.2–3.4)
LYMPHOCYTES # BLD AUTO: 9.6 % — LOW (ref 20.5–51.1)
MAGNESIUM SERPL-MCNC: 2.4 MG/DL — SIGNIFICANT CHANGE UP (ref 1.8–2.4)
MANUAL SMEAR VERIFICATION: SIGNIFICANT CHANGE UP
MCHC RBC-ENTMCNC: 27.8 PG — SIGNIFICANT CHANGE UP (ref 27–31)
MCHC RBC-ENTMCNC: 33.1 G/DL — SIGNIFICANT CHANGE UP (ref 32–37)
MCV RBC AUTO: 84.1 FL — SIGNIFICANT CHANGE UP (ref 81–99)
METAMYELOCYTES # FLD: 0.9 % — HIGH (ref 0–0)
MICROCYTES BLD QL: SLIGHT — SIGNIFICANT CHANGE UP
MONOCYTES # BLD AUTO: 0.49 K/UL — SIGNIFICANT CHANGE UP (ref 0.1–0.6)
MONOCYTES NFR BLD AUTO: 7.8 % — SIGNIFICANT CHANGE UP (ref 1.7–9.3)
MYELOCYTES NFR BLD: 2.6 % — HIGH (ref 0–0)
NEUTROPHILS # BLD AUTO: 4.43 K/UL — SIGNIFICANT CHANGE UP (ref 1.4–6.5)
NEUTROPHILS NFR BLD AUTO: 70.4 % — SIGNIFICANT CHANGE UP (ref 42.2–75.2)
PHOSPHATE SERPL-MCNC: 3.8 MG/DL — SIGNIFICANT CHANGE UP (ref 2.1–4.9)
PHOSPHATE SERPL-MCNC: 3.9 MG/DL — SIGNIFICANT CHANGE UP (ref 2.1–4.9)
PLAT MORPH BLD: NORMAL — SIGNIFICANT CHANGE UP
PLATELET # BLD AUTO: 211 K/UL — SIGNIFICANT CHANGE UP (ref 130–400)
POIKILOCYTOSIS BLD QL AUTO: SLIGHT — SIGNIFICANT CHANGE UP
POTASSIUM SERPL-MCNC: 3.6 MMOL/L — SIGNIFICANT CHANGE UP (ref 3.5–5)
POTASSIUM SERPL-MCNC: 3.6 MMOL/L — SIGNIFICANT CHANGE UP (ref 3.5–5)
POTASSIUM SERPL-SCNC: 3.6 MMOL/L — SIGNIFICANT CHANGE UP (ref 3.5–5)
POTASSIUM SERPL-SCNC: 3.6 MMOL/L — SIGNIFICANT CHANGE UP (ref 3.5–5)
PROT SERPL-MCNC: 7.3 G/DL — SIGNIFICANT CHANGE UP (ref 6–8)
RBC # BLD: 4.28 M/UL — SIGNIFICANT CHANGE UP (ref 4.2–5.4)
RBC # FLD: 17.6 % — HIGH (ref 11.5–14.5)
RBC BLD AUTO: NORMAL — SIGNIFICANT CHANGE UP
SODIUM SERPL-SCNC: 140 MMOL/L — SIGNIFICANT CHANGE UP (ref 135–146)
SODIUM SERPL-SCNC: 140 MMOL/L — SIGNIFICANT CHANGE UP (ref 135–146)
TARGETS BLD QL SMEAR: SLIGHT — SIGNIFICANT CHANGE UP
VALPROATE SERPL-MCNC: 71 UG/ML — SIGNIFICANT CHANGE UP (ref 50–100)
VARIANT LYMPHS # BLD: 8.7 % — HIGH (ref 0–5)
WBC # BLD: 6.29 K/UL — SIGNIFICANT CHANGE UP (ref 4.8–10.8)
WBC # FLD AUTO: 6.29 K/UL — SIGNIFICANT CHANGE UP (ref 4.8–10.8)

## 2020-04-22 PROCEDURE — 99233 SBSQ HOSP IP/OBS HIGH 50: CPT | Mod: CS,GC

## 2020-04-22 RX ADMIN — Medication 2: at 09:52

## 2020-04-22 RX ADMIN — Medication 5 UNIT(S): at 12:15

## 2020-04-22 RX ADMIN — Medication 28.75 MILLIGRAM(S): at 17:42

## 2020-04-22 RX ADMIN — Medication 667 MILLIGRAM(S): at 17:42

## 2020-04-22 RX ADMIN — ATORVASTATIN CALCIUM 40 MILLIGRAM(S): 80 TABLET, FILM COATED ORAL at 21:59

## 2020-04-22 RX ADMIN — Medication 40 MICROGRAM(S): at 15:46

## 2020-04-22 RX ADMIN — Medication 5 UNIT(S): at 09:52

## 2020-04-22 RX ADMIN — Medication 28.75 MILLIGRAM(S): at 21:58

## 2020-04-22 RX ADMIN — Medication 81 MILLIGRAM(S): at 11:56

## 2020-04-22 RX ADMIN — Medication 1: at 12:15

## 2020-04-22 RX ADMIN — CHLORHEXIDINE GLUCONATE 1 APPLICATION(S): 213 SOLUTION TOPICAL at 05:06

## 2020-04-22 RX ADMIN — Medication 667 MILLIGRAM(S): at 11:56

## 2020-04-22 NOTE — PROGRESS NOTE ADULT - ATTENDING COMMENTS
Patient seen and examined independently. Agree with resident note with exceptions. Case discussed with housestaff, nursing and patient    AMS  -Neuro f/u and EEG  -If not further w/u d/c planning to longterm facility Patient seen and examined independently. Agree with resident note with exceptions. Case discussed with housestaff, nursing and patient    AMS  -Neuro f/u and EEG  - COVID related encephalopathy?  -If not further w/u d/c planning to longterm facility

## 2020-04-22 NOTE — PROGRESS NOTE ADULT - SUBJECTIVE AND OBJECTIVE BOX
SUBJECTIVE:    Patient is a 59y old Female who presents with a chief complaint of Febrile illness (22 Apr 2020 09:25)    Currently admitted to medicine with the primary diagnosis of Febrile illness     Today is hospital day 9d. AOx2 denies any somatic complaint     INTERVAL EVENTS:     PAST MEDICAL & SURGICAL HISTORY  Type 2 diabetes mellitus  CHF (congestive heart failure)  End stage renal failure on dialysis  Hypertension  Hyperlipemia  Heart failure  Amputation of leg: left  History of femoral angiogram: left angiogram  Port-a-cath in place: right chest wall      ALLERGIES:  No Known Allergies    MEDICATIONS:  STANDING MEDICATIONS  aspirin  chewable 81 milliGRAM(s) Oral daily  atorvastatin 40 milliGRAM(s) Oral at bedtime  calcium acetate 667 milliGRAM(s) Oral three times a day with meals  chlorhexidine 4% Liquid 1 Application(s) Topical <User Schedule>  darbepoetin Injectable Syringe 40 MICROGram(s) SubCutaneous every 7 days  dextrose 5%. 1000 milliLiter(s) IV Continuous <Continuous>  dextrose 50% Injectable 12.5 Gram(s) IV Push once  dextrose 50% Injectable 25 Gram(s) IV Push once  dextrose 50% Injectable 25 Gram(s) IV Push once  heparin  Injectable 5000 Unit(s) SubCutaneous every 8 hours  insulin glargine Injectable (LANTUS) 15 Unit(s) SubCutaneous at bedtime  insulin lispro (HumaLOG) corrective regimen sliding scale   SubCutaneous three times a day before meals  insulin lispro Injectable (HumaLOG) 5 Unit(s) SubCutaneous three times a day before meals  valproate sodium IVPB 750 milliGRAM(s) IV Intermittent <User Schedule>    PRN MEDICATIONS  acetaminophen   Tablet .. 650 milliGRAM(s) Oral every 6 hours PRN  ALBUTerol    90 MICROgram(s) HFA Inhaler 2 Puff(s) Inhalation every 6 hours PRN  bisacodyl Suppository 10 milliGRAM(s) Rectal daily PRN  dextrose 40% Gel 15 Gram(s) Oral once PRN  glucagon  Injectable 1 milliGRAM(s) IntraMuscular once PRN  ondansetron    Tablet 8 milliGRAM(s) Oral two times a day PRN  polyethylene glycol 3350 17 Gram(s) Oral daily PRN    VITALS:   T(F): 96.5  HR: 82  BP: 136/63  RR: 17  SpO2: 95%    LABS:                        RADIOLOGY:    PHYSICAL EXAM:  GEN: No acute distress  PULM/CHEST: Clear to auscultation bilaterally, no rales, rhonchi or wheezes   CVS: normal rate, regular rhythm, S1-S2, no murmurs  ABD: Soft, non-tender, non-distended, +BS  EXT: No edema  NEURO: AAOx3    Bolanos Catheter:

## 2020-04-22 NOTE — PROGRESS NOTE ADULT - ASSESSMENT
59 year old female with PMH of ESRD on HD (M/W/F), DM II, HTN, DLD, PVD s/p LLE BKA, anxiety brought in by ambulance from [facility] after completing hemodialysis today for AMS    # AMS / metabolic encephalopathy / Subacute vs chronic  infarcts, poss sz   -still altered, AO x2, follows simple commands, spoke w daughter, has poor short term memory, intact long term memory, (will ask about her kids- 2 daughters, 3 grandkids to assess)   Possibly multifactorial: SARS-nCov2 pneumonia, subacute/chronic infarct  - 4/15 CT head: subacute/chronic infarct of the right posterior parietal/occipital lobe  -Neurology eval appreciated- on valproic acid 750 BID ,per neuro mental status is her baseline, spoke w neuro, repeat EEG (placed)  -4/16: eeg - diffuse cerebral electrophysiological dysfunction, potential seizure from left hemisphere  -sz precautions  -on IV  BID, lorazepam held, am VA level placed  -c/w asa and statin   -blood cx x2 neg (4/13)   -labs refused this am, f/u 11am   -spoke w daughter, pt is wheelchair bound, intact long term memory, poor short term     # COVID-19 pneumonia / Acute hypoxemic respiratory failure (SpO2 88% on RA at NH)-100% RA today  - Sepsis was not present on admission  - on admission Febrile Tmax 102.9 F. Lymphopenia. D-dimer 1263--500s. All inflammatory markers are trending down   -currently afebrile   - s/p HCQ and azithro at NH    # ESRD on HD (M/W/F)  - Nephro is following up, recs appreciated ast Hd was on Friday   - continue calcium acetate  -HD today    # Troponinemia  - likely ESRD and demand ischemia from acute viral illness  - Troponin 0.77 then 0.52 (previous 0.44); EKG with no acute ischemic changes  -no reason to trend    # DM II  - 15/5/5/5    # HTN, controlled   -coreg and midodrine d/c'ed per Dr. Snow for bradycardia    Code: FULL  GI ppx: n/a  DVT ppx: heparin   Dispo: neuro recs, EEG, labs today

## 2020-04-22 NOTE — PROGRESS NOTE ADULT - ASSESSMENT
59 year old female with PMH of ESRD on HD (M/W/F), DM II, HTN, DLD, PVD s/p LLE BKA, anxiety presenting with confusion / fever     ·	ESRD on HD   ·	for HD today 3h opti 160 2k UF 2l as toelrated   ·	Fever chills confusion covid positive  on HQ and azithromycin/   ·	CT scan findings noted / neuro eval appreciated for CTA head with contrast / VPA levels noted   ·	HTN noted avoid hypotension

## 2020-04-22 NOTE — PROGRESS NOTE ADULT - SUBJECTIVE AND OBJECTIVE BOX
Nephrology progress note    THIS IS AN INCOMPLETE NOTE . FULL NOTE TO FOLLOW SHORTLY    Patient is seen and examined, events over the last 24 h noted .    Allergies:  No Known Allergies    Hospital Medications:   MEDICATIONS  (STANDING):  aspirin  chewable 81 milliGRAM(s) Oral daily  atorvastatin 40 milliGRAM(s) Oral at bedtime  calcium acetate 667 milliGRAM(s) Oral three times a day with meals  chlorhexidine 4% Liquid 1 Application(s) Topical <User Schedule>  darbepoetin Injectable Syringe 40 MICROGram(s) SubCutaneous every 7 days  dextrose 5%. 1000 milliLiter(s) (50 mL/Hr) IV Continuous <Continuous>  dextrose 50% Injectable 12.5 Gram(s) IV Push once  dextrose 50% Injectable 25 Gram(s) IV Push once  dextrose 50% Injectable 25 Gram(s) IV Push once  heparin  Injectable 5000 Unit(s) SubCutaneous every 8 hours  insulin glargine Injectable (LANTUS) 15 Unit(s) SubCutaneous at bedtime  insulin lispro (HumaLOG) corrective regimen sliding scale   SubCutaneous three times a day before meals  insulin lispro Injectable (HumaLOG) 5 Unit(s) SubCutaneous three times a day before meals  valproate sodium IVPB 750 milliGRAM(s) IV Intermittent <User Schedule>        VITALS:  T(F): 96.8 (04-22-20 @ 05:56), Max: 98.7 (04-21-20 @ 12:39)  HR: 73 (04-22-20 @ 05:56)  BP: 104/51 (04-22-20 @ 05:56)  RR: 17 (04-22-20 @ 05:56)  SpO2: 96% (04-22-20 @ 08:29)  Wt(kg): --    04-20 @ 07:01  -  04-21 @ 07:00  --------------------------------------------------------  IN: 0 mL / OUT: 200 mL / NET: -200 mL    04-21 @ 07:01  -  04-22 @ 07:00  --------------------------------------------------------  IN: 120 mL / OUT: 0 mL / NET: 120 mL          PHYSICAL EXAM:  Constitutional: NAD  HEENT: anicteric sclera, oropharynx clear, MMM  Neck: No JVD  Respiratory: CTAB, no wheezes, rales or rhonchi  Cardiovascular: S1, S2, RRR  Gastrointestinal: BS+, soft, NT/ND  Extremities: No cyanosis or clubbing. No peripheral edema  :  No anderson.   Skin: No rashes    LABS:            Urine Studies:      RADIOLOGY & ADDITIONAL STUDIES: Nephrology progress note  Patient is seen and examined, events over the last 24 h noted .  More talkative   no other events     Allergies:  No Known Allergies    Hospital Medications:   MEDICATIONS  (STANDING):  aspirin  chewable 81 milliGRAM(s) Oral daily  atorvastatin 40 milliGRAM(s) Oral at bedtime  calcium acetate 667 milliGRAM(s) Oral three times a day with meals  darbepoetin Injectable Syringe 40 MICROGram(s) SubCutaneous every 7 days  dextrose 5%. 1000 milliLiter(s) (50 mL/Hr) IV Continuous <Continuous>  heparin  Injectable 5000 Unit(s) SubCutaneous every 8 hours  insulin glargine Injectable (LANTUS) 15 Unit(s) SubCutaneous at bedtime  insulin lispro (HumaLOG) corrective regimen sliding scale   SubCutaneous three times a day before meals  insulin lispro Injectable (HumaLOG) 5 Unit(s) SubCutaneous three times a day before meals  valproate sodium IVPB 750 milliGRAM(s) IV Intermittent <User Schedule>        VITALS:  T(F): 96.8 (04-22-20 @ 05:56), Max: 98.7 (04-21-20 @ 12:39)  HR: 73 (04-22-20 @ 05:56)  BP: 104/51 (04-22-20 @ 05:56)  RR: 17 (04-22-20 @ 05:56)  SpO2: 96% (04-22-20 @ 08:29)      04-20 @ 07:01  -  04-21 @ 07:00  --------------------------------------------------------  IN: 0 mL / OUT: 200 mL / NET: -200 mL    04-21 @ 07:01  -  04-22 @ 07:00  --------------------------------------------------------  IN: 120 mL / OUT: 0 mL / NET: 120 mL          PHYSICAL EXAM:  Constitutional: NAD  HEENT: anicteric sclera, oropharynx clear, MMM  Neck: No JVD  Respiratory: CTAB, no wheezes, rales or rhonchi  Cardiovascular: S1, S2, RRR  Gastrointestinal: BS+, soft, NT/ND  Extremities: No cyanosis or clubbing. No peripheral edema  :  No anderson.   Skin: No rashes    LABS:

## 2020-04-22 NOTE — CHART NOTE - NSCHARTNOTEFT_GEN_A_CORE
Patient's daughter, Bea Hairston, [phone], reports patient is able to eat on her own, requires full assistance with ADLs, her short term memory is   worse than long term and she is able to recognize her family members upon visitation.

## 2020-04-23 LAB
CRP SERPL-MCNC: 16.39 MG/DL — HIGH (ref 0–0.4)
FERRITIN SERPL-MCNC: 1323 NG/ML — HIGH (ref 15–150)
GLUCOSE BLDC GLUCOMTR-MCNC: 161 MG/DL — HIGH (ref 70–99)
GLUCOSE BLDC GLUCOMTR-MCNC: 190 MG/DL — HIGH (ref 70–99)
GLUCOSE BLDC GLUCOMTR-MCNC: 224 MG/DL — HIGH (ref 70–99)
GLUCOSE BLDC GLUCOMTR-MCNC: 264 MG/DL — HIGH (ref 70–99)

## 2020-04-23 PROCEDURE — 99232 SBSQ HOSP IP/OBS MODERATE 35: CPT | Mod: CS

## 2020-04-23 PROCEDURE — 99233 SBSQ HOSP IP/OBS HIGH 50: CPT | Mod: CS,GC

## 2020-04-23 RX ORDER — INSULIN LISPRO 100/ML
7 VIAL (ML) SUBCUTANEOUS
Refills: 0 | Status: DISCONTINUED | OUTPATIENT
Start: 2020-04-23 | End: 2020-04-24

## 2020-04-23 RX ADMIN — Medication 81 MILLIGRAM(S): at 11:48

## 2020-04-23 RX ADMIN — Medication 2: at 07:59

## 2020-04-23 RX ADMIN — HEPARIN SODIUM 5000 UNIT(S): 5000 INJECTION INTRAVENOUS; SUBCUTANEOUS at 22:51

## 2020-04-23 RX ADMIN — Medication 667 MILLIGRAM(S): at 11:48

## 2020-04-23 RX ADMIN — Medication 667 MILLIGRAM(S): at 08:00

## 2020-04-23 RX ADMIN — Medication 7 UNIT(S): at 11:47

## 2020-04-23 RX ADMIN — Medication 7 UNIT(S): at 19:34

## 2020-04-23 RX ADMIN — ATORVASTATIN CALCIUM 40 MILLIGRAM(S): 80 TABLET, FILM COATED ORAL at 22:52

## 2020-04-23 RX ADMIN — Medication 5 UNIT(S): at 07:59

## 2020-04-23 RX ADMIN — Medication 28.75 MILLIGRAM(S): at 13:41

## 2020-04-23 RX ADMIN — INSULIN GLARGINE 15 UNIT(S): 100 INJECTION, SOLUTION SUBCUTANEOUS at 23:02

## 2020-04-23 RX ADMIN — Medication 3: at 11:46

## 2020-04-23 RX ADMIN — Medication 28.75 MILLIGRAM(S): at 22:51

## 2020-04-23 RX ADMIN — HEPARIN SODIUM 5000 UNIT(S): 5000 INJECTION INTRAVENOUS; SUBCUTANEOUS at 12:46

## 2020-04-23 NOTE — PROGRESS NOTE ADULT - SUBJECTIVE AND OBJECTIVE BOX
Nephrology progress note    THIS IS AN INCOMPLETE NOTE . FULL NOTE TO FOLLOW SHORTLY    Patient is seen and examined, events over the last 24 h noted .    Allergies:  No Known Allergies    Hospital Medications:   MEDICATIONS  (STANDING):  aspirin  chewable 81 milliGRAM(s) Oral daily  atorvastatin 40 milliGRAM(s) Oral at bedtime  calcium acetate 667 milliGRAM(s) Oral three times a day with meals  chlorhexidine 4% Liquid 1 Application(s) Topical <User Schedule>  darbepoetin Injectable Syringe 40 MICROGram(s) SubCutaneous every 7 days  dextrose 5%. 1000 milliLiter(s) (50 mL/Hr) IV Continuous <Continuous>  dextrose 50% Injectable 12.5 Gram(s) IV Push once  dextrose 50% Injectable 25 Gram(s) IV Push once  dextrose 50% Injectable 25 Gram(s) IV Push once  heparin  Injectable 5000 Unit(s) SubCutaneous every 8 hours  insulin glargine Injectable (LANTUS) 15 Unit(s) SubCutaneous at bedtime  insulin lispro (HumaLOG) corrective regimen sliding scale   SubCutaneous three times a day before meals  insulin lispro Injectable (HumaLOG) 7 Unit(s) SubCutaneous three times a day before meals  valproate sodium IVPB 750 milliGRAM(s) IV Intermittent <User Schedule>        VITALS:  T(F): 98.3 (04-23-20 @ 03:30), Max: 98.3 (04-23-20 @ 03:30)  HR: 81 (04-23-20 @ 03:30)  BP: 141/67 (04-23-20 @ 03:30)  RR: 20 (04-23-20 @ 03:30)  SpO2: 94% (04-23-20 @ 03:30)  Wt(kg): --    04-21 @ 07:01  -  04-22 @ 07:00  --------------------------------------------------------  IN: 120 mL / OUT: 0 mL / NET: 120 mL    04-22 @ 07:01  -  04-23 @ 07:00  --------------------------------------------------------  IN: 0 mL / OUT: 500 mL / NET: -500 mL          PHYSICAL EXAM:  Constitutional: NAD  HEENT: anicteric sclera, oropharynx clear, MMM  Neck: No JVD  Respiratory: CTAB, no wheezes, rales or rhonchi  Cardiovascular: S1, S2, RRR  Gastrointestinal: BS+, soft, NT/ND  Extremities: No cyanosis or clubbing. No peripheral edema  :  No anderson.   Skin: No rashes    LABS:  04-22    140  |  91<L>  |  83<HH>  ----------------------------<  190<H>  3.6   |  25  |  9.6<HH>    Ca    9.0      22 Apr 2020 15:26  Phos  3.8     04-22  Mg     2.4     04-22    TPro  7.3  /  Alb  3.6  /  TBili  0.3  /  DBili      /  AST  15  /  ALT  6   /  AlkPhos  50  04-22                          11.9   6.29  )-----------( 211      ( 22 Apr 2020 15:24 )             36.0       Urine Studies:      RADIOLOGY & ADDITIONAL STUDIES: Nephrology progress note  Patient is seen and examined, events over the last 24 h noted .  lying in bed lethargic  no major clinical events     Allergies:  No Known Allergies    Hospital Medications:   MEDICATIONS  (STANDING):  aspirin  chewable 81 milliGRAM(s) Oral daily  atorvastatin 40 milliGRAM(s) Oral at bedtime  calcium acetate 667 milliGRAM(s) Oral three times a day with meals  darbepoetin Injectable Syringe 40 MICROGram(s) SubCutaneous every 7 days  dextrose 5%. 1000 milliLiter(s) (50 mL/Hr) IV Continuous <Continuous>  heparin  Injectable 5000 Unit(s) SubCutaneous every 8 hours  insulin glargine Injectable (LANTUS) 15 Unit(s) SubCutaneous at bedtime  insulin lispro (HumaLOG) corrective regimen sliding scale   SubCutaneous three times a day before meals  insulin lispro Injectable (HumaLOG) 7 Unit(s) SubCutaneous three times a day before meals  valproate sodium IVPB 750 milliGRAM(s) IV Intermittent <User Schedule>        VITALS:  T(F): 98.3 (04-23-20 @ 03:30), Max: 98.3 (04-23-20 @ 03:30)  HR: 81 (04-23-20 @ 03:30)  BP: 141/67 (04-23-20 @ 03:30)  RR: 20 (04-23-20 @ 03:30)  SpO2: 94% (04-23-20 @ 03:30)      04-21 @ 07:01  -  04-22 @ 07:00  --------------------------------------------------------  IN: 120 mL / OUT: 0 mL / NET: 120 mL    04-22 @ 07:01  -  04-23 @ 07:00  --------------------------------------------------------  IN: 0 mL / OUT: 500 mL / NET: -500 mL          PHYSICAL EXAM:  Constitutional: lethargic   HEENT: anicteric sclera, oropharynx clear, MMM  Neck: No JVD  Respiratory: CTAB, no wheezes, rales or rhonchi  Cardiovascular: S1, S2, RRR  Gastrointestinal: BS+, soft, NT/ND  Extremities: No cyanosis or clubbing. No peripheral edema  :  No anderson.   Skin: No rashes    LABS:  04-22    140  |  91<L>  |  83<HH>  ----------------------------<  190<H>  3.6   |  25  |  9.6<HH>    Ca    9.0      22 Apr 2020 15:26  Phos  3.8     04-22  Mg     2.4     04-22    TPro  7.3  /  Alb  3.6  /  TBili  0.3  /  DBili      /  AST  15  /  ALT  6   /  AlkPhos  50  04-22                          11.9   6.29  )-----------( 211      ( 22 Apr 2020 15:24 )             36.0     Valproic Acid Level, Serum (04.22.20 @ 15:26)    Valproic Acid Level, Serum: 71.0 ug/mL      Urine Studies:      RADIOLOGY & ADDITIONAL STUDIES:

## 2020-04-23 NOTE — PROGRESS NOTE ADULT - SUBJECTIVE AND OBJECTIVE BOX
SUBJECTIVE:    Patient is a 59y old Female who presents with a chief complaint of Febrile illness (23 Apr 2020 09:01)    Currently admitted to medicine with the primary diagnosis of Febrile illness     Today is hospital day 10d. AOx2, able to state names of her daughters and grandchildren, poor short term recall, denies pain, shortness of breath     INTERVAL EVENTS: NAEON     PAST MEDICAL & SURGICAL HISTORY  Type 2 diabetes mellitus  CHF (congestive heart failure)  End stage renal failure on dialysis  Hypertension  Hyperlipemia  Heart failure  Amputation of leg: left  History of femoral angiogram: left angiogram  Port-a-cath in place: right chest wall      ALLERGIES:  No Known Allergies    MEDICATIONS:  STANDING MEDICATIONS  aspirin  chewable 81 milliGRAM(s) Oral daily  atorvastatin 40 milliGRAM(s) Oral at bedtime  calcium acetate 667 milliGRAM(s) Oral three times a day with meals  chlorhexidine 4% Liquid 1 Application(s) Topical <User Schedule>  darbepoetin Injectable Syringe 40 MICROGram(s) SubCutaneous every 7 days  dextrose 5%. 1000 milliLiter(s) IV Continuous <Continuous>  dextrose 50% Injectable 12.5 Gram(s) IV Push once  dextrose 50% Injectable 25 Gram(s) IV Push once  dextrose 50% Injectable 25 Gram(s) IV Push once  heparin  Injectable 5000 Unit(s) SubCutaneous every 8 hours  insulin glargine Injectable (LANTUS) 15 Unit(s) SubCutaneous at bedtime  insulin lispro (HumaLOG) corrective regimen sliding scale   SubCutaneous three times a day before meals  insulin lispro Injectable (HumaLOG) 7 Unit(s) SubCutaneous three times a day before meals  valproate sodium IVPB 750 milliGRAM(s) IV Intermittent <User Schedule>    PRN MEDICATIONS  acetaminophen   Tablet .. 650 milliGRAM(s) Oral every 6 hours PRN  ALBUTerol    90 MICROgram(s) HFA Inhaler 2 Puff(s) Inhalation every 6 hours PRN  bisacodyl Suppository 10 milliGRAM(s) Rectal daily PRN  dextrose 40% Gel 15 Gram(s) Oral once PRN  glucagon  Injectable 1 milliGRAM(s) IntraMuscular once PRN  ondansetron    Tablet 8 milliGRAM(s) Oral two times a day PRN  polyethylene glycol 3350 17 Gram(s) Oral daily PRN    VITALS:   T(F): 98.3  HR: 81  BP: 141/67  RR: 20  SpO2: 94%    LABS:                        11.9   6.29  )-----------( 211      ( 22 Apr 2020 15:24 )             36.0     04-22    140  |  91<L>  |  83<HH>  ----------------------------<  190<H>  3.6   |  25  |  9.6<HH>    Ca    9.0      22 Apr 2020 15:26  Phos  3.8     04-22  Mg     2.4     04-22    TPro  7.3  /  Alb  3.6  /  TBili  0.3  /  DBili  x   /  AST  15  /  ALT  6   /  AlkPhos  50  04-22        PHYSICAL EXAM:  GEN: No acute distress, NC on forehead   PULM/CHEST: dec br sd bases bilaterally, no rales, rhonchi or wheezes   CVS: normal rate, regular rhythm, S1-S2, no murmurs  ABD: obese, soft, non-tender, non-distended, +BS  EXT: LLE BKA   NEURO: intact long term memory, poor short term, AOx2

## 2020-04-23 NOTE — DIETITIAN INITIAL EVALUATION ADULT. - RD TO REMAIN AVAILABLE
yes/Interventions: meals and snacks, medical food supplement. Monitor/Evaluate: RD to monitor energy intake, diet order, body comp. NFPF, glucose profile, renal profile; Will remove DASH/TLC diet modifiers to improve po intake; will add it once po intake is adequate.

## 2020-04-23 NOTE — PROGRESS NOTE ADULT - ASSESSMENT
58 y/o female with PMH of ESRD on HD, DM, HTN, DLD, brought in S/P hemodialysis for AMS found with subacute/chronic cva in the right parietal/ occipital lobe complicated by frequent epileptiform discharges improved with valproic acid currently with persistent encephalopathy currently slowly improving suspect toxic/metabolic encephalopathy.      Plan  - continue  mg BID  - secondary stroke prevention w/ ASA, Lipitor  - correct electrolytes  - continue medical management  - call back w/ questions 58 y/o female with PMH of ESRD on HD, DM, HTN, DLD, brought in S/P hemodialysis for AMS found with subacute/chronic cva in the right parietal/ occipital lobe complicated by frequent epileptiform discharges improved with valproic acid currently with persistent encephalopathy currently improving suspect toxic/metabolic encephalopathy without new focal deficits.     Plan  - continue  mg BID  - secondary stroke prevention w/ ASA, Lipitor  - correct electrolytes  - continue medical management  - call back w/ questions

## 2020-04-23 NOTE — PROGRESS NOTE ADULT - ATTENDING COMMENTS
Patient seen and examined independently. Agree with resident note with exceptions. Case discussed with housestaff, nursing and patient      COVID PNA   - possible encaphalopathy  - neuro seen, apparent seizure like activity noted on EEG  - taper oxygen as tolerated  - may transfer east at this time

## 2020-04-23 NOTE — PATIENT PROFILE ADULT. - LEARNING ASSESSMENT (PATIENT) ADDITIONAL COMMENTS
Zosyn (Piperacillin/Tazobactam) Extended Infusion Aleyda Pradhan, a 54 y.o. yo male, has been converted to an extended infusion of Zosyn while in the intensive care unit. A loading dose of 3.375 or 4.5 gm will be given over 30 minutes depending on indication. Extended infusions will begin 4 hours after the initial dose if CrCl  >/= 20 ml/min or 8 hours after the initial dose if CrCl < 20 ml/min. Extended infusions will run over 4 hours (240 minutes). Recent Labs  
  04/23/20 
0530 04/22/20 
0354 04/21/20 
1830 CREA 1.30 1.24 1.31* Ht Readings from Last 1 Encounters:  
04/22/20 177.8 cm (70\") Wt Readings from Last 1 Encounters:  
04/22/20 (!) 269.9 kg (595 lb) CrCl : Serum creatinine: 1.3 mg/dL 04/23/20 0530 Estimated creatinine clearance: 152.4 mL/min Renal adjustment of extended infusion of Zosyn 3.375 or 4.5 gm every 8 hours for CrCl >/= 20 ml/min 3.375 or 4.5 gm every 12 hours for CrCl < 20 ml/min, intermittent HD or PD 
 
 
 
 Pt states she only wants PICC line, wants to refuse all meds

## 2020-04-23 NOTE — DIETITIAN INITIAL EVALUATION ADULT. - OTHER INFO
Pertinent Medical Information:  AMS / metabolic encephalopathy / Subacute vs chronic  infarcts, poss sz -improving; possibly multifactorial: SARS-nCov2 pneumonia, subacute/chronic infarct. COVID-19 pneumonia / Acute hypoxemic respiratory failure (SpO2 88% on RA at NH)- 94% recorded on 2L NC, but not using NC, NAD. ESRD on HD (M/W/F) nephro following; on calcium acetate. Troponinemia likely ESRD and demand ischemia from acute viral illness.     Pertinent Subjective Information: Unable to interview pt due to AMS and pt is in isolation precautions for COVID. RD called emergency contacts but no answer from them. No nutrition hx obtained. Per RN, pt has poor appetite since admission; consuming <50% of meals. Calorie count is ordered; results due 4/25.

## 2020-04-23 NOTE — PROGRESS NOTE ADULT - SUBJECTIVE AND OBJECTIVE BOX
Neurology Progress Note    Interval History:  Per family baseline has poor short term memory recall with intact long term memory.  Able to feed herself but requires full assistance with ADLs at baseline.      HPI:  59 year old female with PMH of ESRD on HD (M/W/F), DM II, HTN, DLD, PVD s/p LLE BKA, anxiety brought in by ambulance from [facility] after completing hemodialysis today for AMS. History was limited due to current mental status. Patient is a permanent resident at [facility]. The NH was contacted and as per staff patient had fever since 4/8/20, was hypoxic to SpO2 87% requiring 3 LPM of O2 was started on Plaquenil + Azithromycin on 4/11. As per staff, patient likely had positive exposure with many COVID+ residents. Today after completing HD she was altered.     In ED: Febrile Tmax 102.9 F. Lymphopenia. D-dimer 1263. Troponin 0.77 (previous 0.44). QTc 445. SpO2 97% on 3 LPM NCO2  CT head: subacute/chronic infarct of the right posterior parietal/occipital lobe  CXR with bilateral increased perihilar markings. Pulmonary edema vs COVID-19 pneumonia  Given: Vancomycin 1 gm and Cefepime 2 gm IV once. (13 Apr 2020 23:27)      PAST MEDICAL & SURGICAL HISTORY:  Type 2 diabetes mellitus  CHF (congestive heart failure)  End stage renal failure on dialysis  Hypertension  Hyperlipemia  Heart failure  Amputation of leg: left  History of femoral angiogram: left angiogram  Port-a-cath in place: right chest wall    Medications:  acetaminophen   Tablet .. 650 milliGRAM(s) Oral every 6 hours PRN  ALBUTerol    90 MICROgram(s) HFA Inhaler 2 Puff(s) Inhalation every 6 hours PRN  aspirin  chewable 81 milliGRAM(s) Oral daily  atorvastatin 40 milliGRAM(s) Oral at bedtime  bisacodyl Suppository 10 milliGRAM(s) Rectal daily PRN  calcium acetate 667 milliGRAM(s) Oral three times a day with meals  chlorhexidine 4% Liquid 1 Application(s) Topical <User Schedule>  darbepoetin Injectable Syringe 40 MICROGram(s) SubCutaneous every 7 days  dextrose 40% Gel 15 Gram(s) Oral once PRN  dextrose 5%. 1000 milliLiter(s) IV Continuous <Continuous>  dextrose 50% Injectable 12.5 Gram(s) IV Push once  dextrose 50% Injectable 25 Gram(s) IV Push once  dextrose 50% Injectable 25 Gram(s) IV Push once  glucagon  Injectable 1 milliGRAM(s) IntraMuscular once PRN  heparin  Injectable 5000 Unit(s) SubCutaneous every 8 hours  insulin glargine Injectable (LANTUS) 15 Unit(s) SubCutaneous at bedtime  insulin lispro (HumaLOG) corrective regimen sliding scale   SubCutaneous three times a day before meals  insulin lispro Injectable (HumaLOG) 7 Unit(s) SubCutaneous three times a day before meals  ondansetron    Tablet 8 milliGRAM(s) Oral two times a day PRN  polyethylene glycol 3350 17 Gram(s) Oral daily PRN  valproate sodium IVPB 750 milliGRAM(s) IV Intermittent <User Schedule>    Vital Signs Last 24 Hrs  T(C): 37.1 (23 Apr 2020 09:20), Max: 37.1 (23 Apr 2020 09:20)  T(F): 98.7 (23 Apr 2020 09:20), Max: 98.7 (23 Apr 2020 09:20)  HR: 80 (23 Apr 2020 09:20) (72 - 81)  BP: 104/51 (23 Apr 2020 09:20) (104/51 - 141/67)  BP(mean): --  RR: 18 (23 Apr 2020 09:20) (18 - 20)  SpO2: 93% (23 Apr 2020 09:20) (93% - 95%)    Neurological Exam:   Mental status: Awake, alert and oriented x3.  Recent and remote memory intact.  Naming, repetition and comprehension intact.  Attention/concentration intact.  No dysarthria, no aphasia.  Fund of knowledge appropriate.    Cranial nerves: Pupils equally round and reactive to light, visual fields full, no nystagmus, extraocular muscles intact, V1 through V3 intact bilaterally and symmetric, face symmetric, hearing intact to finger rub, palate elevation symmetric, tongue was midline.  Motor:  MRC grading 5/5 b/l UE/LE.   strength 5/5.  Normal tone and bulk.  No abnormal movements.    Sensation: Intact to light touch, proprioception, and pinprick.   Coordination: No dysmetria on finger-to-nose and heel-to-shin.  No dysdiadokinesia.  Reflexes: 2+ in bilateral UE/LE, downgoing toes bilaterally. (-) Hanna.  Gait: Narrow and steady. No ataxia.  Romberg negative    Labs:  CBC Full  -  ( 22 Apr 2020 15:24 )  WBC Count : 6.29 K/uL  RBC Count : 4.28 M/uL  Hemoglobin : 11.9 g/dL  Hematocrit : 36.0 %  Platelet Count - Automated : 211 K/uL  Mean Cell Volume : 84.1 fL  Mean Cell Hemoglobin : 27.8 pg  Mean Cell Hemoglobin Concentration : 33.1 g/dL  Auto Neutrophil # : 4.43 K/uL  Auto Lymphocyte # : 0.60 K/uL  Auto Monocyte # : 0.49 K/uL  Auto Eosinophil # : 0.00 K/uL  Auto Basophil # : 0.00 K/uL  Auto Neutrophil % : 70.4 %  Auto Lymphocyte % : 9.6 %  Auto Monocyte % : 7.8 %  Auto Eosinophil % : 0.0 %  Auto Basophil % : 0.0 %    04-22    140  |  91<L>  |  83<HH>  ----------------------------<  190<H>  3.6   |  25  |  9.6<HH>    Ca    9.0      22 Apr 2020 15:26  Phos  3.8     04-22  Mg     2.4     04-22    TPro  7.3  /  Alb  3.6  /  TBili  0.3  /  DBili  x   /  AST  15  /  ALT  6   /  AlkPhos  50  04-22    LIVER FUNCTIONS - ( 22 Apr 2020 15:26 )  Alb: 3.6 g/dL / Pro: 7.3 g/dL / ALK PHOS: 50 U/L / ALT: 6 U/L / AST: 15 U/L / GGT: x           < from: EEG (04.22.20 @ 11:30) >  Impression:  Abnormal due to generalized slowing as above.    Dr. Pepito JUNIOR MD  This document has been electronically signed. Apr 23 2020  9:59AM    < end of copied text >    Valproic Acid Level, Serum (04.22.20 @ 15:26)    Valproic Acid Level, Serum: 71.0 ug/mL

## 2020-04-23 NOTE — PROGRESS NOTE ADULT - ASSESSMENT
59 year old female with PMH of ESRD on HD (M/W/F), DM II, HTN, DLD, PVD s/p LLE BKA, anxiety brought in by ambulance from [facility] after completing hemodialysis today for AMS, improving.     # AMS / metabolic encephalopathy / Subacute vs chronic  infarcts, poss sz -improving   -s/p EEG 4/22- pending results   - AO x2, follows simple commands, spoke w daughter, has poor short term memory, intact long term memory,   Possibly multifactorial: SARS-nCov2 pneumonia, subacute/chronic infarct  - 4/15 CT head: subacute/chronic infarct of the right posterior parietal/occipital lobe  -Neurology eval appreciated- on valproic acid 750 BID ,per neuro mental status is her baseline, spoke w neuro, repeat EEG (placed)  -4/16: eeg - diffuse cerebral electrophysiological dysfunction, potential seizure from left hemisphere  -sz precautions  -on IV  BID, lorazepam held, Valproic Acid Level, Serum (04.22.20 @ 15:26)    Valproic Acid Level, Serum: 71.0 ug/mL  -c/w asa and statin   -blood cx x2 neg (4/13)    -spoke w daughter, pt is wheelchair bound, intact long term memory, poor short term     # COVID-19 pneumonia / Acute hypoxemic respiratory failure (SpO2 88% on RA at NH)- 94% recorded on 2L NC, but not using NC, NAD  - Sepsis was not present on admission  - on admission Febrile Tmax 102.9 F. Lymphopenia. D-dimer 1263--500s. All inflammatory markers are trending down   -currently afebrile   - s/p HCQ and azithro at NH    # ESRD on HD (M/W/F)  - Nephro is following up, recs appreciated ast Hd was on Friday   - continue calcium acetate  -s/p HD     # Troponinemia  - likely ESRD and demand ischemia from acute viral illness  - Troponin 0.77 then 0.52 (previous 0.44); EKG with no acute ischemic changes  -no reason to trend    # DM II  - 15/7/7/7    # HTN, controlled   -coreg and midodrine d/c'ed per Dr. Snow for bradycardia    Code: FULL  GI ppx: n/a  DVT ppx: heparin   Dispo: neuro recs, EEG, order labs on HD days (tmrw) 59 year old female with PMH of ESRD on HD (M/W/F), DM II, HTN, DLD, PVD s/p LLE BKA, anxiety brought in by ambulance from [facility] after completing hemodialysis today for AMS, improving.     # AMS / metabolic encephalopathy / Subacute vs chronic  infarcts, poss sz -improving   -improving: < from: EEG (04.22.20 @ 11:30) Impression:  Abnormal due to generalized slowing as above.  - AO x2, follows simple commands, spoke w daughter, has poor short term memory, intact long term memory,   Possibly multifactorial: SARS-nCov2 pneumonia, subacute/chronic infarct  - 4/15 CT head: subacute/chronic infarct of the right posterior parietal/occipital lobe  -Neurology eval appreciated- on valproic acid 750 BID ,per neuro mental status is her baseline, signed off   -4/16: eeg - diffuse cerebral electrophysiological dysfunction, potential seizure from left hemisphere  -sz precautions  -on IV  BID, lorazepam held, Valproic Acid Level, Serum (04.22.20 @ 15:26)    Valproic Acid Level, Serum: 71.0 ug/mL  -c/w asa and statin   -blood cx x2 neg (4/13)    -spoke w daughter, pt is wheelchair bound, intact long term memory, poor short term     # COVID-19 pneumonia / Acute hypoxemic respiratory failure (SpO2 88% on RA at NH)- 94% recorded on 2L NC, but not using NC, NAD  - Sepsis was not present on admission  - on admission Febrile Tmax 102.9 F. Lymphopenia. D-dimer 1263--500s. All inflammatory markers are trending down   -currently afebrile   - s/p HCQ and azithro at NH    # ESRD on HD (M/W/F)  - Nephro is following up, recs appreciated ast Hd was on Friday   - continue calcium acetate  -s/p HD     # Troponinemia  - likely ESRD and demand ischemia from acute viral illness  - Troponin 0.77 then 0.52 (previous 0.44); EKG with no acute ischemic changes  -no reason to trend    # DM II  - 15/7/7/7    # HTN, controlled   -coreg and midodrine d/c'ed per Dr. Snow for bradycardia    Code: FULL  GI ppx: n/a  DVT ppx: heparin   Dispo: clinical improvement, order labs on HD days (tmrw)

## 2020-04-23 NOTE — DIETITIAN INITIAL EVALUATION ADULT. - ENERGY NEEDS
Calories: 4497-7093 kcal/day (25-30 kcal/kg CBW for ESRD on HD considered; unable to use MSJ since ht is missing, will adjust once ht and BMI are obtained)  Protein: 82-95 g/day (1.2-1.4 g/kg CBW for above reason)  Fluids: 1000ml+ urine output or per LIP)

## 2020-04-23 NOTE — DIETITIAN INITIAL EVALUATION ADULT. - PERTINENT LABORATORY DATA
4/22: chloride- 91, BUN- 83, cr- 9.6, glucose serum- 190, GFR-5, POCT BG- 211, 198, 152, 4/23: POCT BG- 224, 264

## 2020-04-23 NOTE — PROGRESS NOTE ADULT - ASSESSMENT
59 year old female with PMH of ESRD on HD (M/W/F), DM II, HTN, DLD, PVD s/p LLE BKA, anxiety presenting with confusion / fever     ·	ESRD on HD   ·	sp HD yesterday / no need for HD today   ·	Fever chills confusion covid positive sp HQ   ·	CVA CT scan findings noted / neuro eval appreciated for CTA head with contrast / VPA levels noted

## 2020-04-24 ENCOUNTER — TRANSCRIPTION ENCOUNTER (OUTPATIENT)
Age: 60
End: 2020-04-24

## 2020-04-24 VITALS
SYSTOLIC BLOOD PRESSURE: 132 MMHG | DIASTOLIC BLOOD PRESSURE: 58 MMHG | OXYGEN SATURATION: 97 % | TEMPERATURE: 98 F | HEART RATE: 73 BPM

## 2020-04-24 LAB
GLUCOSE BLDC GLUCOMTR-MCNC: 208 MG/DL — HIGH (ref 70–99)
GLUCOSE BLDC GLUCOMTR-MCNC: 211 MG/DL — HIGH (ref 70–99)
GLUCOSE BLDC GLUCOMTR-MCNC: 224 MG/DL — HIGH (ref 70–99)
MRSA PCR RESULT.: NEGATIVE — SIGNIFICANT CHANGE UP

## 2020-04-24 RX ORDER — ATORVASTATIN CALCIUM 80 MG/1
1 TABLET, FILM COATED ORAL
Qty: 0 | Refills: 0 | DISCHARGE
Start: 2020-04-24

## 2020-04-24 RX ORDER — VALPROIC ACID (AS SODIUM SALT) 250 MG/5ML
7.5 SOLUTION, ORAL ORAL
Qty: 0 | Refills: 0 | DISCHARGE
Start: 2020-04-24

## 2020-04-24 RX ADMIN — Medication 7 UNIT(S): at 11:53

## 2020-04-24 RX ADMIN — Medication 81 MILLIGRAM(S): at 11:52

## 2020-04-24 RX ADMIN — Medication 667 MILLIGRAM(S): at 08:11

## 2020-04-24 RX ADMIN — Medication 28.75 MILLIGRAM(S): at 10:23

## 2020-04-24 RX ADMIN — Medication 2: at 11:52

## 2020-04-24 RX ADMIN — Medication 667 MILLIGRAM(S): at 11:52

## 2020-04-24 RX ADMIN — Medication 2: at 08:09

## 2020-04-24 RX ADMIN — Medication 7 UNIT(S): at 08:09

## 2020-04-24 NOTE — PROGRESS NOTE ADULT - REASON FOR ADMISSION
Febrile illness

## 2020-04-24 NOTE — DISCHARGE NOTE PROVIDER - CARE PROVIDER_API CALL
Leighton Zaragoza)  Internal Medicine  [address] [place] [address]  Phone: [phone]  Fax: [phone]  Follow Up Time: 2 weeks

## 2020-04-24 NOTE — DISCHARGE NOTE PROVIDER - NSDCMRMEDTOKEN_GEN_ALL_CORE_FT
acetaminophen 325 mg oral tablet: 2 tab(s) orally every 6 hours, As Needed  albuterol 0.63 mg/3 mL (0.021%) inhalation solution: 3 milliliter(s) inhaled every 4 hours, As Needed  aspirin 81 mg oral tablet, chewable: 1 tab(s) orally once a day  atorvastatin 40 mg oral tablet: 1 tab(s) orally once a day (at bedtime)  calcium acetate 667 mg oral tablet: orally 3 times a day  carvedilol 6.25 mg oral tablet: 1 tab(s) orally every 12 hours  darbepoetin carol[place] 40 mcg/mL injectable solution:  injectable every 7 days post dialysis (hold if BP&gt;160/90)  diphenhydrAMINE 25 mg oral capsule: 1 cap(s) orally every 6 hours, As needed, Rash and/or Itching  docusate sodium 100 mg oral capsule: 1 cap(s) orally once a day, As Needed  gabapentin 800 mg oral tablet: 1 tab(s) orally 3 times a day  HumaLOG 100 units/mL subcutaneous solution: 4 unit(s) subcutaneous 3 times a day (with meals)  hydrALAZINE 25 mg oral tablet: 1 tab(s) orally 2 times a day  insulin glargine 100 units/mL subcutaneous solution: 44 unit(s) subcutaneous once a day (at bedtime)  ondansetron 4 mg oral tablet: 2 tab(s) orally 2 times a day, As Needed  valproic acid (as valproate sodium) 100 mg/mL intravenous solution: 7.5 milliliter(s) intravenous   Ventolin HFA 90 mcg/inh inhalation aerosol: 2 puff(s) inhaled every 4 hours, As Needed

## 2020-04-24 NOTE — DISCHARGE NOTE PROVIDER - NSDCCPCAREPLAN_GEN_ALL_CORE_FT
PRINCIPAL DISCHARGE DIAGNOSIS  Diagnosis: Febrile illness  Assessment and Plan of Treatment: You have been diagnosed and treated for COVID-19. Please quarantine yourself until your are 72 hours symptom free (shortness of breath, cough, fevers, etc.)  COVID-19 mainly spreads from person to person, similar to the flu. This usually happens when a sick person coughs or sneezes near other people. Doctors also think it is possible to get sick if you touch a surface that has the virus on it and then touch your mouth, nose, or eyes.  From what experts know so far, COVID-19 seems to spread most easily when people are showing symptoms. It is possible to spread it without having symptoms, too, but experts don't know how often this happens.  Symptoms usually start a few days after a person is infected with the virus. But in some people it can take even longer for symptoms to appear.  Symptoms can include: Fever, Cough, fatigue, Trouble breathing, Muscle aches  Although it is less common, some people have other symptoms, such as headache, sore throat, runny nose, or problems with their sense of smell. Some have digestive problems like nausea or diarrhea.  For most people, symptoms will get better within a few weeks, and will not lead to long-term problems. Some people even have no symptoms at all. But in other people, COVID-19 can lead to serious problems like pneumonia, not getting enough oxygen, heart problems, or even death. This is more common in people who are older or have other health problems.  Keep the sick person away from others – The sick person should stay in a separate room and use a separate bathroom if possible. They should also eat in their own room.  Use face masks   Wash hands        SECONDARY DISCHARGE DIAGNOSES  Diagnosis: Elevated troponin  Assessment and Plan of Treatment: ESRD    Diagnosis: CVA (cerebral vascular accident)  Assessment and Plan of Treatment: you have seizure acitivity, continue your medications as prescribed    Diagnosis: ESRD on hemodialysis  Assessment and Plan of Treatment:

## 2020-04-24 NOTE — PROGRESS NOTE ADULT - ASSESSMENT
59 year old female with PMH of ESRD on HD (M/W/F), DM II, HTN, DLD, PVD s/p LLE BKA, anxiety presenting with confusion / fever     ·	ESRD on HD   ·	for HD today 3h opti 160 2k UF 2l as tolerated   ·	Fever chills confusion covid positive sp HQ   ·	CVA CT scan findings noted / neuro eval appreciated for CTA head with contrast / VPA levels noted   ·	appreciate neuro follow up    DC planning?

## 2020-04-24 NOTE — PROGRESS NOTE ADULT - SUBJECTIVE AND OBJECTIVE BOX
SUBJECTIVE:    Patient is a 59y old Female who presents with a chief complaint of Febrile illness (23 Apr 2020 10:17)    Currently admitted to medicine with the primary diagnosis of Febrile illness     Today is hospital day 11d. This morning she is resting comfortably in bed and reports no new issues or overnight events.     INTERVAL EVENTS: NAEON    PAST MEDICAL & SURGICAL HISTORY  Type 2 diabetes mellitus  CHF (congestive heart failure)  End stage renal failure on dialysis  Hypertension  Hyperlipemia  Heart failure  Amputation of leg: left  History of femoral angiogram: left angiogram  Port-a-cath in place: right chest wall      ALLERGIES:  No Known Allergies    MEDICATIONS:  STANDING MEDICATIONS  aspirin  chewable 81 milliGRAM(s) Oral daily  atorvastatin 40 milliGRAM(s) Oral at bedtime  calcium acetate 667 milliGRAM(s) Oral three times a day with meals  chlorhexidine 4% Liquid 1 Application(s) Topical <User Schedule>  darbepoetin Injectable Syringe 40 MICROGram(s) SubCutaneous every 7 days  dextrose 5%. 1000 milliLiter(s) IV Continuous <Continuous>  dextrose 50% Injectable 12.5 Gram(s) IV Push once  dextrose 50% Injectable 25 Gram(s) IV Push once  dextrose 50% Injectable 25 Gram(s) IV Push once  heparin  Injectable 5000 Unit(s) SubCutaneous every 8 hours  insulin glargine Injectable (LANTUS) 15 Unit(s) SubCutaneous at bedtime  insulin lispro (HumaLOG) corrective regimen sliding scale   SubCutaneous three times a day before meals  insulin lispro Injectable (HumaLOG) 7 Unit(s) SubCutaneous three times a day before meals  valproate sodium IVPB 750 milliGRAM(s) IV Intermittent <User Schedule>    PRN MEDICATIONS  acetaminophen   Tablet .. 650 milliGRAM(s) Oral every 6 hours PRN  ALBUTerol    90 MICROgram(s) HFA Inhaler 2 Puff(s) Inhalation every 6 hours PRN  bisacodyl Suppository 10 milliGRAM(s) Rectal daily PRN  dextrose 40% Gel 15 Gram(s) Oral once PRN  glucagon  Injectable 1 milliGRAM(s) IntraMuscular once PRN  ondansetron    Tablet 8 milliGRAM(s) Oral two times a day PRN  polyethylene glycol 3350 17 Gram(s) Oral daily PRN    VITALS:   T(F): 96.2  HR: 80  BP: 156/72  RR: 18  SpO2: 100%    LABS:                        11.9   6.29  )-----------( 211      ( 22 Apr 2020 15:24 )             36.0     04-22    140  |  91<L>  |  83<HH>  ----------------------------<  190<H>  3.6   |  25  |  9.6<HH>    Ca    9.0      22 Apr 2020 15:26  Phos  3.8     04-22  Mg     2.4     04-22    TPro  7.3  /  Alb  3.6  /  TBili  0.3  /  DBili  x   /  AST  15  /  ALT  6   /  AlkPhos  50  04-22      RADIOLOGY:    no new images     PHYSICAL EXAM:  GEN: No acute distress  PULM/CHEST: Clear to auscultation bilaterally, no rales, rhonchi or wheezes   CVS: normal rate, regular rhythm, S1-S2, no murmurs  ABD: Soft, non-tender, non-distended, +BS  EXT: No edema  NEURO: AAOx2

## 2020-04-24 NOTE — PROGRESS NOTE ADULT - SUBJECTIVE AND OBJECTIVE BOX
Nephrology progress note    THIS IS AN INCOMPLETE NOTE . FULL NOTE TO FOLLOW SHORTLY    Patient is seen and examined, events over the last 24 h noted .    Allergies:  No Known Allergies    Hospital Medications:   MEDICATIONS  (STANDING):  aspirin  chewable 81 milliGRAM(s) Oral daily  atorvastatin 40 milliGRAM(s) Oral at bedtime  calcium acetate 667 milliGRAM(s) Oral three times a day with meals  chlorhexidine 4% Liquid 1 Application(s) Topical <User Schedule>  darbepoetin Injectable Syringe 40 MICROGram(s) SubCutaneous every 7 days  dextrose 5%. 1000 milliLiter(s) (50 mL/Hr) IV Continuous <Continuous>  dextrose 50% Injectable 12.5 Gram(s) IV Push once  dextrose 50% Injectable 25 Gram(s) IV Push once  dextrose 50% Injectable 25 Gram(s) IV Push once  heparin  Injectable 5000 Unit(s) SubCutaneous every 8 hours  insulin glargine Injectable (LANTUS) 15 Unit(s) SubCutaneous at bedtime  insulin lispro (HumaLOG) corrective regimen sliding scale   SubCutaneous three times a day before meals  insulin lispro Injectable (HumaLOG) 7 Unit(s) SubCutaneous three times a day before meals  valproate sodium IVPB 750 milliGRAM(s) IV Intermittent <User Schedule>        VITALS:  T(F): 98.6 (04-24-20 @ 09:15), Max: 98.6 (04-24-20 @ 09:15)  HR: 76 (04-24-20 @ 09:15)  BP: 138/64 (04-24-20 @ 09:15)  RR: 18 (04-24-20 @ 05:34)  SpO2: 100% (04-23-20 @ 16:53)  Wt(kg): --    04-22 @ 07:01  -  04-23 @ 07:00  --------------------------------------------------------  IN: 0 mL / OUT: 500 mL / NET: -500 mL          PHYSICAL EXAM:  Constitutional: NAD  HEENT: anicteric sclera, oropharynx clear, MMM  Neck: No JVD  Respiratory: CTAB, no wheezes, rales or rhonchi  Cardiovascular: S1, S2, RRR  Gastrointestinal: BS+, soft, NT/ND  Extremities: No cyanosis or clubbing. No peripheral edema  :  No anderson.   Skin: No rashes    LABS:  04-22    140  |  91<L>  |  83<HH>  ----------------------------<  190<H>  3.6   |  25  |  9.6<HH>    Ca    9.0      22 Apr 2020 15:26  Phos  3.8     04-22  Mg     2.4     04-22    TPro  7.3  /  Alb  3.6  /  TBili  0.3  /  DBili      /  AST  15  /  ALT  6   /  AlkPhos  50  04-22                          11.9   6.29  )-----------( 211      ( 22 Apr 2020 15:24 )             36.0       Urine Studies:      RADIOLOGY & ADDITIONAL STUDIES: Nephrology progress note  Patient is seen and examined, events over the last 24 h noted .  lying in bed comfortable no events     Allergies:  No Known Allergies    Hospital Medications:   MEDICATIONS  (STANDING):  aspirin  chewable 81 milliGRAM(s) Oral daily  atorvastatin 40 milliGRAM(s) Oral at bedtime  calcium acetate 667 milliGRAM(s) Oral three times a day with meals  darbepoetin Injectable Syringe 40 MICROGram(s) SubCutaneous every 7 days  dextrose 5%. 1000 milliLiter(s) (50 mL/Hr) IV Continuous <Continuous>  heparin  Injectable 5000 Unit(s) SubCutaneous every 8 hours  insulin glargine Injectable (LANTUS) 15 Unit(s) SubCutaneous at bedtime  insulin lispro (HumaLOG) corrective regimen sliding scale   SubCutaneous three times a day before meals  insulin lispro Injectable (HumaLOG) 7 Unit(s) SubCutaneous three times a day before meals  valproate sodium IVPB 750 milliGRAM(s) IV Intermittent <User Schedule>        VITALS:    T(F): 98.6 (04-24-20 @ 09:15), Max: 98.6 (04-24-20 @ 09:15)  HR: 76 (04-24-20 @ 09:15)  BP: 138/64 (04-24-20 @ 09:15)  RR: 18 (04-24-20 @ 05:34)  SpO2: 100% (04-23-20 @ 16:53)      04-22 @ 07:01  -  04-23 @ 07:00  --------------------------------------------------------  IN: 0 mL / OUT: 500 mL / NET: -500 mL          PHYSICAL EXAM:  Constitutional: NAD  HEENT: anicteric sclera, oropharynx clear, MMM  Neck: No JVD  Respiratory: CTAB, no wheezes, rales or rhonchi  Cardiovascular: S1, S2, RRR  Gastrointestinal: BS+, soft, NT/ND  Extremities: No cyanosis or clubbing. No peripheral edema  :  No anderson.   Skin: No rashes    LABS:  04-22    140  |  91<L>  |  83<HH>  ----------------------------<  190<H>  3.6   |  25  |  9.6<HH>    Ca    9.0      22 Apr 2020 15:26  Phos  3.8     04-22  Mg     2.4     04-22    TPro  7.3  /  Alb  3.6  /  TBili  0.3  /  DBili      /  AST  15  /  ALT  6   /  AlkPhos  50  04-22                          11.9   6.29  )-----------( 211      ( 22 Apr 2020 15:24 )             36.0       Urine Studies:      RADIOLOGY & ADDITIONAL STUDIES:

## 2020-04-24 NOTE — PROGRESS NOTE ADULT - ASSESSMENT
59 year old female with PMH of ESRD on HD (M/W/F), DM II, HTN, DLD, PVD s/p LLE BKA, anxiety brought in by ambulance from [facility] after completing hemodialysis today for AMS, improving.     # AMS / metabolic encephalopathy / Subacute vs chronic  infarcts, poss sz -improving   -improving: < from: EEG (04.22.20 @ 11:30) Impression:  Abnormal due to generalized slowing as above.  - AO x2, follows simple commands, spoke w daughter, has poor short term memory, intact long term memory   Possibly multifactorial: SARS-nCov2 pneumonia, subacute/chronic infarct  - 4/15 CT head: subacute/chronic infarct of the right posterior parietal/occipital lobe  -Neurology eval appreciated- on valproic acid 750 BID ,per neuro mental status is her baseline, signed off   -4/16: eeg - diffuse cerebral electrophysiological dysfunction, potential seizure from left hemisphere  -sz precautions  -on IV  BID, lorazepam held, Valproic Acid Level, Serum (04.22.20 @ 15:26)    Valproic Acid Level, Serum: 71.0 ug/mL  -c/w asa and statin   -blood cx x2 neg (4/13)    -spoke w daughter, pt is wheelchair bound, intact long term memory, poor short term     # COVID-19 pneumonia / Acute hypoxemic respiratory failure (SpO2 88% on RA at NH)- 94% recorded on 2L NC, but not using NC, NAD  - Sepsis was not present on admission  - on admission Febrile Tmax 102.9 F. Lymphopenia. D-dimer 1263--500s. All inflammatory markers are trending down   -currently afebrile   - s/p HCQ and azithro at NH    # ESRD on HD (M/W/F)  - Nephro is following up, recs appreciated ast Hd was on Friday   - continue calcium acetate  -HD today     # Troponinemia  - likely ESRD and demand ischemia from acute viral illness  - Troponin 0.77 then 0.52 (previous 0.44); EKG with no acute ischemic changes  -no reason to trend    # DM II  - 15/7/7/7    # HTN, controlled   -coreg and midodrine d/c'ed per Dr. Snow for bradycardia    Code: FULL  GI ppx: n/a  DVT ppx: heparin   Dispo: clinical improvement, order labs on HD days (tmrw)

## 2020-04-24 NOTE — DISCHARGE NOTE PROVIDER - HOSPITAL COURSE
59 year old female with PMH of ESRD on HD (M/W/F), DM II, HTN, DLD, PVD s/p LLE BKA, anxiety brought in by ambulance from [facility] after completing hemodialysis today for AMS, admitted for AMS / metabolic encephalopathy / Subacute vs chronic  infarcts, poss sz -improving, currently back to baseline. EEG (04.22.20 @ 11:30) Impression:  Abnormal due to generalized slowing as above. pt is AO x2, follows simple commands, spoke w daughter, has poor short term memory, intact long term memory. AMS is possibly multifactorial: SARS-nCov2 pneumonia, subacute/chronic infarct. 4/15 CT head: subacute/chronic infarct of the right posterior parietal/occipital lobe. Neurology was consulted, on valproic acid 750 BID ,per neuro mental status is her baseline. 4/16: eeg - diffuse cerebral electrophysiological dysfunction, potential seizure from left hemisphere. c/w asa and statin. blood cx x2 neg (4/13).  For her COVID-19 pneumonia / Acute hypoxemic respiratory failure (SpO2 88% on RA at NH)- 96% recorded on 2L NC, but not using NC, NAD. Sepsis was not present on admission,  on admission Febrile Tmax 102.9 F. Lymphopenia. D-dimer 1263--500s. All inflammatory markers are trending down, pt is s/p HCQ and azithro at NH. ESRD on HD (M/W/F),  continue calcium acetate. Troponinemia, likely ESRD and demand ischemia from acute viral illness, no ischemic changes on EKG. Pt is medically stable for discharge back to NH.

## 2020-04-28 DIAGNOSIS — Z79.82 LONG TERM (CURRENT) USE OF ASPIRIN: ICD-10-CM

## 2020-04-28 DIAGNOSIS — N18.6 END STAGE RENAL DISEASE: ICD-10-CM

## 2020-04-28 DIAGNOSIS — E11.22 TYPE 2 DIABETES MELLITUS WITH DIABETIC CHRONIC KIDNEY DISEASE: ICD-10-CM

## 2020-04-28 DIAGNOSIS — E11.51 TYPE 2 DIABETES MELLITUS WITH DIABETIC PERIPHERAL ANGIOPATHY WITHOUT GANGRENE: ICD-10-CM

## 2020-04-28 DIAGNOSIS — E78.5 HYPERLIPIDEMIA, UNSPECIFIED: ICD-10-CM

## 2020-04-28 DIAGNOSIS — Z99.2 DEPENDENCE ON RENAL DIALYSIS: ICD-10-CM

## 2020-04-28 DIAGNOSIS — I07.1 RHEUMATIC TRICUSPID INSUFFICIENCY: ICD-10-CM

## 2020-04-28 DIAGNOSIS — U07.1 COVID-19: ICD-10-CM

## 2020-04-28 DIAGNOSIS — I27.20 PULMONARY HYPERTENSION, UNSPECIFIED: ICD-10-CM

## 2020-04-28 DIAGNOSIS — J96.01 ACUTE RESPIRATORY FAILURE WITH HYPOXIA: ICD-10-CM

## 2020-04-28 DIAGNOSIS — Z89.512 ACQUIRED ABSENCE OF LEFT LEG BELOW KNEE: ICD-10-CM

## 2020-04-28 DIAGNOSIS — D64.9 ANEMIA, UNSPECIFIED: ICD-10-CM

## 2020-04-28 DIAGNOSIS — Z79.51 LONG TERM (CURRENT) USE OF INHALED STEROIDS: ICD-10-CM

## 2020-04-28 DIAGNOSIS — F41.9 ANXIETY DISORDER, UNSPECIFIED: ICD-10-CM

## 2020-04-28 DIAGNOSIS — R79.89 OTHER SPECIFIED ABNORMAL FINDINGS OF BLOOD CHEMISTRY: ICD-10-CM

## 2020-04-28 DIAGNOSIS — J12.89 OTHER VIRAL PNEUMONIA: ICD-10-CM

## 2020-04-28 DIAGNOSIS — Z79.899 OTHER LONG TERM (CURRENT) DRUG THERAPY: ICD-10-CM

## 2020-04-28 DIAGNOSIS — I13.2 HYPERTENSIVE HEART AND CHRONIC KIDNEY DISEASE WITH HEART FAILURE AND WITH STAGE 5 CHRONIC KIDNEY DISEASE, OR END STAGE RENAL DISEASE: ICD-10-CM

## 2020-04-28 DIAGNOSIS — I50.9 HEART FAILURE, UNSPECIFIED: ICD-10-CM

## 2020-04-28 DIAGNOSIS — G92 TOXIC ENCEPHALOPATHY: ICD-10-CM

## 2020-04-28 DIAGNOSIS — Z96.41 PRESENCE OF INSULIN PUMP (EXTERNAL) (INTERNAL): ICD-10-CM

## 2020-04-28 DIAGNOSIS — E87.5 HYPERKALEMIA: ICD-10-CM

## 2020-04-28 DIAGNOSIS — Z79.4 LONG TERM (CURRENT) USE OF INSULIN: ICD-10-CM

## 2020-04-28 DIAGNOSIS — I63.9 CEREBRAL INFARCTION, UNSPECIFIED: ICD-10-CM

## 2020-05-18 ENCOUNTER — APPOINTMENT (OUTPATIENT)
Dept: VASCULAR SURGERY | Facility: CLINIC | Age: 60
End: 2020-05-18

## 2020-07-31 NOTE — ASU DISCHARGE PLAN (ADULT/PEDIATRIC). - DISCHARGE TO
SURVEY:    You may be receiving a survey from [employer] regarding your visit today. Please complete the survey to enable us to provide the highest quality of care to you and your family. If you cannot score us a very good on any question, please call the office to discuss how we could have made your experience a very good one. Thank you.
Home

## 2020-09-21 NOTE — ASU PATIENT PROFILE, ADULT - TEACHING/LEARNING FACTORS INFLUENCE READINESS TO LEARN
Received a triage call from Pt who states she was started on Lisinopril jose 8/18/2020. Pt states she developed an occ daytime  cough-sometimes dry, sometimes productive. She called our office on 9/13/20 and was instructed to stop Lisinopril and change to Valsartan. Pt states she also had a CT scan on 9/11/20 and developed an allergic reaction to the contrast dye. She is presently being treated with Prednisone and Benadryl for contrast allergy from her PCP. Pt also takes Singulair and Xyzal for allergies. Pt reports that since stopping Lisinopril, he cough has continued to get worse. She states she is coughing quite a bit at night and it is disrupting her sleep. She would like Dr. Camejo's opinion. Discussed with Dr. Camejo who states cough would have resolved within 4 days of stopping Lisinopril if it was caused by Lisinopril. Per Dr. Camejo, Pt instructed to call her PCP to discuss possible pulmonary cause of her cough. Pt verbalized understanding and agreement.    none

## 2021-02-06 NOTE — BRIEF OPERATIVE NOTE - PRE-OP DX
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
Walking

## 2021-12-30 NOTE — PATIENT PROFILE ADULT. - MEDICATION ADMINISTRATION INFO, PROFILE
Srejio Lerma is a 42 year old male year old that presents to Immediate Care at [facility] for complaints of   Chief Complaint   Patient presents with   • Fever     x2 days    • Sore Throat   • Headache           ROS   Constitutional- denies  weight loss, cancer  Head and neck - denies  ear pain,ringing, loss of hearing  Cardiovascular- no chest pain palpitations, orthopnea, PND, edema  Pulmonary- no shortness of breath, wheeze or hemoptysis  GI-denies nausea, vomiting, diarrhea, hematemesis, melena, BRBPR  -denies dysuria, discharge  MSK-no pathologic fracture, osteoporosis, trauma  Neuro- no numbness, paralysis  Endo- no diabetes, polydipsia, polyuria     Patient Active Problem List   Diagnosis   • Mild intermittent asthma without complication   • Mixed rhinitis   • Sciatica       All other systems reviewed and otherwise negative     Comprehensive Physical Exam    Visit Vitals  /72   Pulse 81   Temp 95.4 °F (35.2 °C)   Resp 17   SpO2 96%       No acute distress, non toxic in appearance  Psychoneuro- appropriate affect, alert and oriented times three    Cranial Nerves 2-12 intact    Eyes- sclera anicteric, EOMI, conjunctiva clear, anicteric, no lid lag, IBIS    HENT- deferred to mitigate COVID exposure    Neck- trachea midline, full range of motion, no thyromegaly or lymphadenopathy    Lungs-clear to auscultation, normal respiratory effort, no intercostal retractions    Cardiovascular-S1 S2 present, RRR, no murmurs, rubs or gallop    Abdomen-bowel sounds present, soft, nontender no hepatosplenomegaly or masses    Extremities-no peripheral edema or lymphadenopathy    Skin- normal temperature, turgor, texture- no rash, ulcers, or subcutaneous nodules        Neuro- no numbness or weakness, normal gait      Medical Decision Making      Patient was informed of possible serious medical complications of SARS-CoV2 infection   Patients medical history and risk factor stratification was discussed.    An  POC rapid antigen test was performed and was positive    Management involves Reassurance and observation, hydration, symptomatic treatment  Quarantine instructions were discussed      Assessment and Plan    Rapid COVID swab positive    Self quarantine for 10d from date of symptoms or test positivity    Tylenol, hydration .  To ER for any worsening of symtpms    Hemodynamically Stable and without signs of dehydration, respiratory distress, surgical abdomen.    Reassurance and observation, hydration, symptomatic treatment    See orders    Side effects of all medications were discussed.  Patient is instructed to follow up in 2-3 days or as needed.  Patient is to go to the emergency room for any significant change or worsening.  Patient was discharged in a stable condition and was in agreement with the plan.  No further questions.    Miguel Durham, DO                                 no concerns

## 2022-01-31 NOTE — ED PROVIDER NOTE - CARE PLAN
Principal Discharge DX:	Febrile illness  Secondary Diagnosis:	ESRD on hemodialysis  Secondary Diagnosis:	CVA (cerebral vascular accident)  Secondary Diagnosis:	Elevated troponin
1.34

## 2022-06-03 NOTE — PROVIDER CONTACT NOTE (OTHER) - NAME OF MD/NP/PA/DO NOTIFIED:
DISCHARGE PLANNING NOTE - TOTAL JOINT    Procedure: Procedure(s):  ARTHROPLASTY, HIP, TOTAL - REPAIRS AS INDICATED  Procedure Date: 6/20/2022  Insurance: Payor: [employer] / [employer] / Plan: [employer] / [employer] / Product Type: *No Product type* /    Equipment currently available at home?  shower bench.  Steps into the home? 0  Steps within the home? 0  Toilet height? Standard  Type of shower? walk-in shower  Who will be with you during your recovery? Spouse.  Is Outpatient Physical Therapy set up after surgery? No   Did you take the Total Joint Class and where? Yes  Planning same day discharge?Yes     This writer met with pt during her preadmission appointment. Pt is going to get an ice machine. Pt will need crutches/fww. Home safety checklist reviewed and copy given to pt. Pt educated to dc criteria. All questions answered and verbalizes understanding of all instructions. No dc needs identified at this time. Anticipate dc to home without barriers.    
Guero Mcarthur
MD ART
dr austin
dr hough
low FS
shahnaz Mcarthur

## 2022-11-02 NOTE — ASU PATIENT PROFILE, ADULT - NS PRO MODE OF ARRIVAL
[place] toño/rudolph Helical Rim Advancement Flap Text: The defect edges were debeveled with a #15 blade scalpel.  Given the location of the defect and the proximity to free margins (helical rim) a double helical rim advancement flap was deemed most appropriate.  Using a sterile surgical marker, the appropriate advancement flaps were drawn incorporating the defect and placing the expected incisions between the helical rim and antihelix where possible.  The area thus outlined was incised through and through with a #15 scalpel blade.  With a skin hook and iris scissors, the flaps were gently and sharply undermined and freed up.

## 2022-11-06 NOTE — ANESTHESIA FOLLOW-UP NOTE - ELECTIVE PROCEDURE
Problem: Postpartum  Goal: Appropriate maternal -  bonding  Description:  Postpartum OB-Pregnancy care plan goal which identifies if the mother and  are bonding appropriately  2022 1019 by Aleshia Cunningham RN  Outcome: Completed     Problem: Postpartum  Goal: Establishment of infant feeding pattern  Description:  Postpartum OB-Pregnancy care plan goal which identifies if the mother is establishing a feeding pattern with their   2022 1019 by Aleshia Cunningham RN  Outcome: Completed     Problem: Pain  Goal: Verbalizes/displays adequate comfort level or baseline comfort level  2022 1019 by Aleshia Cunningham RN  Outcome: Completed     Problem: Infection - Adult  Goal: Absence of infection at discharge  2022 1019 by Aleshia Cunningham RN  Outcome: Completed     Problem: Infection - Adult  Goal: Absence of infection during hospitalization  2022 1019 by Aleshia Cunningham RN  Outcome: Completed     Problem: Discharge Planning  Goal: Discharge to home or other facility with appropriate resources  2022 1019 by Aleshia Cunningham RN  Outcome: Completed Yes

## 2022-11-22 NOTE — PROGRESS NOTE ADULT - EYES
Additional Notes: Patient consent was obtained to proceed with the visit and recommended plan of care after discussion of all risks and benefits, including the risks of COVID-19 exposure.
Detail Level: Simple
EOMI; PERRL; no drainage or redness
Render Risk Assessment In Note?: no

## 2023-05-15 NOTE — DISCHARGE NOTE ADULT - WEIGHT IN KG
-- DO NOT REPLY / DO NOT REPLY ALL --  -- Message is from Engagement Center Operations ([facility]) --    General Patient Message: Ilan cook, sent a reasonsable accomodation letter by mail-to Lavern Perkins's office a little over 3 weeks ago and havent gotten anything back in regards to an emotional suport animal prescribed by doctor. Please call back to discuss mailing back directly, as they do not have a fax machine.    Caller Information       Type Contact Phone/Fax    05/15/2023 01:30 PM CDT Phone (Incoming) Ilan (Other) [phone]        Alternative phone number: none    Can a detailed message be left? Yes    Message Turnaround:     Is it Working Hours? Yes - Working Hours     [place]:    Please give this turnaround time to the caller:   \"This message will be sent to [state Provider's name]. The clinical team will fulfill your request as soon as they review your message.\"                 61.5

## 2023-12-27 NOTE — CONSULT NOTE ADULT - CONSULT REQUESTED DATE/TIME
states she just saw her cardiologist in med December and if he remembers correct ( he is currently not home to look at the calendar) she has a neurologist appt coming at the end of January or beginning of February. When he looks at the if she doesn't he is going to call to get on scheduled. Does pt need scheduled here?
20-Jul-2018 10:41
24-Jul-2018 09:23
24-Jul-2018 14:20
23-Jul-2018 15:40

## 2024-02-07 NOTE — ED ADULT NURSE NOTE - CHIEF COMPLAINT
Addended by: JEFFY LOPEZ on: 2/7/2024 12:45 PM     Modules accepted: Orders    
The patient is a 57y Female complaining of fever.

## 2024-02-26 NOTE — CONSULT NOTE ADULT - PROBLEM SELECTOR RECOMMENDATION 9
Patient examined and evaluated.   Patient dressed with betadine/DSD/Kerlix bilaterally  bilateral foot xrays ordered  A duplex and V duplex Ordered  Bilateral heel offloading ordered  Recommend ID and Vacular consults  Request medical clearance with stratification for possible surgical intervention  Will f/u with attendings for any further recommendations 26-Feb-2024 08:23

## 2024-02-27 NOTE — ASU PATIENT PROFILE, ADULT - NS PRO LACT YNNA
Unfortunately, Ozempic has been on backorder.  I would recommending checking with other local pharmacies to see if they may have it available and if not, we may consider trying to switch her to Trulicity.    no

## 2025-01-01 NOTE — DIETITIAN INITIAL EVALUATION ADULT. - ENERGY NEEDS
total kcal = 8994-8569 kcal/day (30-35kcal/kg CBW in pt with ESRD on HD).  total protein = 83-90 g/day (1.2-1.3 g/kg CBW in pt with ESRD on HD).   total fluid = UOP + 1000mL
3 (mild pain)

## 2025-05-13 NOTE — DISCHARGE NOTE ADULT - PROCEDURE 1
Detail Level: Zone Initiate Treatment: Apply CeraVe sunscreen daily, reapply every two hours to areas of sun exposed skin Amputation or amputation revision, below-knee